# Patient Record
Sex: FEMALE | Race: WHITE | Employment: OTHER | URBAN - NONMETROPOLITAN AREA
[De-identification: names, ages, dates, MRNs, and addresses within clinical notes are randomized per-mention and may not be internally consistent; named-entity substitution may affect disease eponyms.]

---

## 2020-10-02 ENCOUNTER — APPOINTMENT (OUTPATIENT)
Dept: CT IMAGING | Age: 70
DRG: 065 | End: 2020-10-02
Payer: MEDICARE

## 2020-10-02 ENCOUNTER — HOSPITAL ENCOUNTER (INPATIENT)
Age: 70
LOS: 3 days | Discharge: HOME | DRG: 065 | End: 2020-10-05
Attending: EMERGENCY MEDICINE | Admitting: FAMILY MEDICINE
Payer: MEDICARE

## 2020-10-02 ENCOUNTER — APPOINTMENT (OUTPATIENT)
Dept: GENERAL RADIOLOGY | Age: 70
DRG: 065 | End: 2020-10-02
Payer: MEDICARE

## 2020-10-02 PROBLEM — I63.9 OCCIPITAL INFARCTION (HCC): Status: ACTIVE | Noted: 2020-10-02

## 2020-10-02 LAB
ALBUMIN SERPL-MCNC: 4 G/DL (ref 3.5–5.2)
ALP BLD-CCNC: 70 U/L (ref 35–104)
ALT SERPL-CCNC: 11 U/L (ref 5–33)
ANION GAP SERPL CALCULATED.3IONS-SCNC: 12 MMOL/L (ref 7–19)
AST SERPL-CCNC: 20 U/L (ref 5–32)
BASOPHILS ABSOLUTE: 0 K/UL (ref 0–0.2)
BASOPHILS RELATIVE PERCENT: 0.5 % (ref 0–1)
BILIRUB SERPL-MCNC: 1.6 MG/DL (ref 0.2–1.2)
BUN BLDV-MCNC: 14 MG/DL (ref 8–23)
CALCIUM SERPL-MCNC: 8.9 MG/DL (ref 8.8–10.2)
CHLORIDE BLD-SCNC: 101 MMOL/L (ref 98–111)
CO2: 24 MMOL/L (ref 22–29)
CREAT SERPL-MCNC: 0.6 MG/DL (ref 0.5–0.9)
EOSINOPHILS ABSOLUTE: 0 K/UL (ref 0–0.6)
EOSINOPHILS RELATIVE PERCENT: 0.5 % (ref 0–5)
GFR AFRICAN AMERICAN: >59
GFR NON-AFRICAN AMERICAN: >60
GLUCOSE BLD-MCNC: 115 MG/DL (ref 74–109)
HCT VFR BLD CALC: 41.2 % (ref 37–47)
HEMOGLOBIN: 13.1 G/DL (ref 12–16)
IMMATURE GRANULOCYTES #: 0 K/UL
INR BLD: 1.19 (ref 0.88–1.18)
LIPASE: 17 U/L (ref 13–60)
LV EF: 55 %
LVEF MODALITY: NORMAL
LYMPHOCYTES ABSOLUTE: 1.2 K/UL (ref 1.1–4.5)
LYMPHOCYTES RELATIVE PERCENT: 18.6 % (ref 20–40)
MCH RBC QN AUTO: 29.5 PG (ref 27–31)
MCHC RBC AUTO-ENTMCNC: 31.8 G/DL (ref 33–37)
MCV RBC AUTO: 92.8 FL (ref 81–99)
MONOCYTES ABSOLUTE: 0.5 K/UL (ref 0–0.9)
MONOCYTES RELATIVE PERCENT: 8.3 % (ref 0–10)
NEUTROPHILS ABSOLUTE: 4.6 K/UL (ref 1.5–7.5)
NEUTROPHILS RELATIVE PERCENT: 71.6 % (ref 50–65)
PDW BLD-RTO: 13.3 % (ref 11.5–14.5)
PLATELET # BLD: 184 K/UL (ref 130–400)
PMV BLD AUTO: 11.5 FL (ref 9.4–12.3)
POTASSIUM SERPL-SCNC: 3.8 MMOL/L (ref 3.5–5)
PROTHROMBIN TIME: 15.1 SEC (ref 12–14.6)
RBC # BLD: 4.44 M/UL (ref 4.2–5.4)
SODIUM BLD-SCNC: 137 MMOL/L (ref 136–145)
TOTAL PROTEIN: 7.4 G/DL (ref 6.6–8.7)
TROPONIN: <0.01 NG/ML (ref 0–0.03)
TSH REFLEX FT4: 0.59 UIU/ML (ref 0.35–5.5)
WBC # BLD: 6.4 K/UL (ref 4.8–10.8)

## 2020-10-02 PROCEDURE — 83690 ASSAY OF LIPASE: CPT

## 2020-10-02 PROCEDURE — 84484 ASSAY OF TROPONIN QUANT: CPT

## 2020-10-02 PROCEDURE — 99284 EMERGENCY DEPT VISIT MOD MDM: CPT

## 2020-10-02 PROCEDURE — 6360000004 HC RX CONTRAST MEDICATION: Performed by: FAMILY MEDICINE

## 2020-10-02 PROCEDURE — 84443 ASSAY THYROID STIM HORMONE: CPT

## 2020-10-02 PROCEDURE — 6370000000 HC RX 637 (ALT 250 FOR IP): Performed by: FAMILY MEDICINE

## 2020-10-02 PROCEDURE — 6360000002 HC RX W HCPCS: Performed by: EMERGENCY MEDICINE

## 2020-10-02 PROCEDURE — 85610 PROTHROMBIN TIME: CPT

## 2020-10-02 PROCEDURE — 6370000000 HC RX 637 (ALT 250 FOR IP): Performed by: EMERGENCY MEDICINE

## 2020-10-02 PROCEDURE — C8929 TTE W OR WO FOL WCON,DOPPLER: HCPCS

## 2020-10-02 PROCEDURE — 99999 PR OFFICE/OUTPT VISIT,PROCEDURE ONLY: CPT | Performed by: EMERGENCY MEDICINE

## 2020-10-02 PROCEDURE — 93880 EXTRACRANIAL BILAT STUDY: CPT

## 2020-10-02 PROCEDURE — 36415 COLL VENOUS BLD VENIPUNCTURE: CPT

## 2020-10-02 PROCEDURE — 71045 X-RAY EXAM CHEST 1 VIEW: CPT

## 2020-10-02 PROCEDURE — 1210000000 HC MED SURG R&B

## 2020-10-02 PROCEDURE — 70450 CT HEAD/BRAIN W/O DYE: CPT

## 2020-10-02 PROCEDURE — 80053 COMPREHEN METABOLIC PANEL: CPT

## 2020-10-02 PROCEDURE — 85025 COMPLETE CBC W/AUTO DIFF WBC: CPT

## 2020-10-02 PROCEDURE — 93005 ELECTROCARDIOGRAM TRACING: CPT

## 2020-10-02 RX ORDER — SODIUM CHLORIDE 0.9 % (FLUSH) 0.9 %
10 SYRINGE (ML) INJECTION EVERY 12 HOURS SCHEDULED
Status: DISCONTINUED | OUTPATIENT
Start: 2020-10-02 | End: 2020-10-05 | Stop reason: HOSPADM

## 2020-10-02 RX ORDER — ATORVASTATIN CALCIUM 20 MG/1
20 TABLET, FILM COATED ORAL DAILY
COMMUNITY
End: 2021-01-14 | Stop reason: SDUPTHER

## 2020-10-02 RX ORDER — ATORVASTATIN CALCIUM 40 MG/1
40 TABLET, FILM COATED ORAL NIGHTLY
Status: DISCONTINUED | OUTPATIENT
Start: 2020-10-02 | End: 2020-10-05 | Stop reason: HOSPADM

## 2020-10-02 RX ORDER — METOPROLOL TARTRATE 100 MG/1
100 TABLET ORAL 2 TIMES DAILY
COMMUNITY
End: 2021-01-14 | Stop reason: SDUPTHER

## 2020-10-02 RX ORDER — METHIMAZOLE 5 MG/1
5 TABLET ORAL 2 TIMES DAILY
COMMUNITY
End: 2021-01-14 | Stop reason: SDUPTHER

## 2020-10-02 RX ORDER — SODIUM CHLORIDE 0.9 % (FLUSH) 0.9 %
10 SYRINGE (ML) INJECTION PRN
Status: DISCONTINUED | OUTPATIENT
Start: 2020-10-02 | End: 2020-10-05 | Stop reason: HOSPADM

## 2020-10-02 RX ORDER — METOPROLOL TARTRATE 50 MG/1
100 TABLET, FILM COATED ORAL 2 TIMES DAILY
Status: DISCONTINUED | OUTPATIENT
Start: 2020-10-02 | End: 2020-10-05 | Stop reason: HOSPADM

## 2020-10-02 RX ORDER — ONDANSETRON 2 MG/ML
4 INJECTION INTRAMUSCULAR; INTRAVENOUS ONCE
Status: COMPLETED | OUTPATIENT
Start: 2020-10-02 | End: 2020-10-02

## 2020-10-02 RX ORDER — ESCITALOPRAM OXALATE 10 MG/1
10 TABLET ORAL DAILY
Status: DISCONTINUED | OUTPATIENT
Start: 2020-10-02 | End: 2020-10-05 | Stop reason: HOSPADM

## 2020-10-02 RX ORDER — METHIMAZOLE 5 MG/1
5 TABLET ORAL 2 TIMES DAILY
Status: DISCONTINUED | OUTPATIENT
Start: 2020-10-02 | End: 2020-10-05 | Stop reason: HOSPADM

## 2020-10-02 RX ORDER — ACETAMINOPHEN 325 MG/1
650 TABLET ORAL EVERY 4 HOURS PRN
Status: DISCONTINUED | OUTPATIENT
Start: 2020-10-02 | End: 2020-10-05 | Stop reason: HOSPADM

## 2020-10-02 RX ORDER — HYDROCODONE BITARTRATE AND ACETAMINOPHEN 7.5; 325 MG/1; MG/1
1 TABLET ORAL EVERY 6 HOURS PRN
Status: DISCONTINUED | OUTPATIENT
Start: 2020-10-02 | End: 2020-10-05 | Stop reason: HOSPADM

## 2020-10-02 RX ORDER — ACETAMINOPHEN 325 MG/1
650 TABLET ORAL ONCE
Status: COMPLETED | OUTPATIENT
Start: 2020-10-02 | End: 2020-10-02

## 2020-10-02 RX ORDER — CLOPIDOGREL BISULFATE 75 MG/1
75 TABLET ORAL DAILY
Status: ON HOLD | COMMUNITY
End: 2020-10-05 | Stop reason: HOSPADM

## 2020-10-02 RX ORDER — PROMETHAZINE HYDROCHLORIDE 12.5 MG/1
12.5 TABLET ORAL EVERY 6 HOURS PRN
Status: DISCONTINUED | OUTPATIENT
Start: 2020-10-02 | End: 2020-10-05 | Stop reason: HOSPADM

## 2020-10-02 RX ORDER — CLOPIDOGREL BISULFATE 75 MG/1
75 TABLET ORAL ONCE
Status: DISCONTINUED | OUTPATIENT
Start: 2020-10-02 | End: 2020-10-02

## 2020-10-02 RX ORDER — ONDANSETRON 2 MG/ML
4 INJECTION INTRAMUSCULAR; INTRAVENOUS EVERY 6 HOURS PRN
Status: DISCONTINUED | OUTPATIENT
Start: 2020-10-02 | End: 2020-10-05 | Stop reason: HOSPADM

## 2020-10-02 RX ORDER — HYDROCODONE BITARTRATE AND ACETAMINOPHEN 7.5; 325 MG/1; MG/1
1 TABLET ORAL EVERY 6 HOURS PRN
COMMUNITY
End: 2021-01-14 | Stop reason: ALTCHOICE

## 2020-10-02 RX ORDER — LABETALOL HYDROCHLORIDE 5 MG/ML
10 INJECTION, SOLUTION INTRAVENOUS EVERY 10 MIN PRN
Status: DISCONTINUED | OUTPATIENT
Start: 2020-10-02 | End: 2020-10-05 | Stop reason: HOSPADM

## 2020-10-02 RX ORDER — ESCITALOPRAM OXALATE 10 MG/1
10 TABLET ORAL DAILY
COMMUNITY
End: 2021-01-14 | Stop reason: SDUPTHER

## 2020-10-02 RX ADMIN — ACETAMINOPHEN 650 MG: 325 TABLET ORAL at 13:32

## 2020-10-02 RX ADMIN — METHIMAZOLE 5 MG: 5 TABLET ORAL at 22:23

## 2020-10-02 RX ADMIN — METOPROLOL TARTRATE 100 MG: 50 TABLET, FILM COATED ORAL at 22:22

## 2020-10-02 RX ADMIN — ONDANSETRON 4 MG: 2 INJECTION INTRAMUSCULAR; INTRAVENOUS at 13:32

## 2020-10-02 RX ADMIN — ACETAMINOPHEN 650 MG: 325 TABLET ORAL at 22:23

## 2020-10-02 RX ADMIN — PERFLUTREN 1.65 MG: 6.52 INJECTION, SUSPENSION INTRAVENOUS at 16:40

## 2020-10-02 RX ADMIN — ESCITALOPRAM OXALATE 10 MG: 10 TABLET ORAL at 22:23

## 2020-10-02 RX ADMIN — ATORVASTATIN CALCIUM 40 MG: 40 TABLET, FILM COATED ORAL at 22:23

## 2020-10-02 ASSESSMENT — ENCOUNTER SYMPTOMS
TROUBLE SWALLOWING: 0
EYE REDNESS: 0
VOMITING: 0
SORE THROAT: 0
SHORTNESS OF BREATH: 0
COUGH: 0
COLOR CHANGE: 0
ABDOMINAL PAIN: 0
NAUSEA: 0
WHEEZING: 0

## 2020-10-02 ASSESSMENT — PAIN SCALES - GENERAL
PAINLEVEL_OUTOF10: 4
PAINLEVEL_OUTOF10: 0
PAINLEVEL_OUTOF10: 4
PAINLEVEL_OUTOF10: 4

## 2020-10-02 NOTE — PROGRESS NOTES
Vascular lab preliminary. Bilateral carotid duplex scan completed. B/L ICA's no significant stenosis. B/l Vertebrals antegrade flow. Final report pending.

## 2020-10-02 NOTE — ED PROVIDER NOTES
disease          SURGICAL HISTORY       Past Surgical History:   Procedure Laterality Date    TONSILLECTOMY      TUMOR REMOVAL           CURRENT MEDICATIONS       Previous Medications    ATORVASTATIN (LIPITOR) 20 MG TABLET    Take 20 mg by mouth daily    CLOPIDOGREL (PLAVIX) 75 MG TABLET    Take 75 mg by mouth daily    ESCITALOPRAM (LEXAPRO) 10 MG TABLET    Take 10 mg by mouth daily    HYDROCODONE-ACETAMINOPHEN (NORCO) 7.5-325 MG PER TABLET    Take 1 tablet by mouth every 6 hours as needed for Pain. METHIMAZOLE (TAPAZOLE) 5 MG TABLET    Take 5 mg by mouth 2 times daily    METOPROLOL (LOPRESSOR) 100 MG TABLET    Take 100 mg by mouth 2 times daily       ALLERGIES     Niacin and related    FAMILY HISTORY     No family history on file. SOCIAL HISTORY       Social History     Socioeconomic History    Marital status:       Spouse name: Not on file    Number of children: Not on file    Years of education: Not on file    Highest education level: Not on file   Occupational History    Not on file   Social Needs    Financial resource strain: Not on file    Food insecurity     Worry: Not on file     Inability: Not on file    Transportation needs     Medical: Not on file     Non-medical: Not on file   Tobacco Use    Smoking status: Never Smoker    Smokeless tobacco: Never Used   Substance and Sexual Activity    Alcohol use: Not Currently    Drug use: Not Currently    Sexual activity: Not on file   Lifestyle    Physical activity     Days per week: Not on file     Minutes per session: Not on file    Stress: Not on file   Relationships    Social connections     Talks on phone: Not on file     Gets together: Not on file     Attends Buddhism service: Not on file     Active member of club or organization: Not on file     Attends meetings of clubs or organizations: Not on file     Relationship status: Not on file    Intimate partner violence     Fear of current or ex partner: Not on file Emotionally abused: Not on file     Physically abused: Not on file     Forced sexual activity: Not on file   Other Topics Concern    Not on file   Social History Narrative    Not on file       SCREENINGS             PHYSICAL EXAM    (up to 7 for level 4, 8 or more for level 5)     ED Triage Vitals [10/02/20 1103]   BP Temp Temp src Pulse Resp SpO2 Height Weight   (!) 144/92 98.2 °F (36.8 °C) -- 76 18 93 % 5' 2\" (1.575 m) (!) 340 lb (154.2 kg)       Physical Exam  Vitals signs and nursing note reviewed. Exam conducted with a chaperone present. Constitutional:       Appearance: Normal appearance. She is obese. Comments: No real distress although she seems a little off and confused but she her GCS really is 15. HENT:      Head: Normocephalic and atraumatic. Nose: Nose normal.      Mouth/Throat:      Mouth: Mucous membranes are moist.   Eyes:      General: Visual field deficit present. Extraocular Movements: Extraocular movements intact. Pupils: Pupils are equal, round, and reactive to light. Neck:      Musculoskeletal: Normal range of motion and neck supple. Cardiovascular:      Rate and Rhythm: Normal rate. Rhythm irregular. Pulses: Normal pulses. Comments: 80  Pulmonary:      Effort: Pulmonary effort is normal.      Breath sounds: Normal breath sounds. Abdominal:      General: Abdomen is flat. Palpations: Abdomen is soft. Tenderness: There is no abdominal tenderness. Musculoskeletal: Normal range of motion. General: No swelling or tenderness. Skin:     General: Skin is warm and dry. Capillary Refill: Capillary refill takes less than 2 seconds. Neurological:      Mental Status: She is alert. GCS: GCS eye subscore is 4. GCS verbal subscore is 5. GCS motor subscore is 6. Motor: Motor function is intact. Coordination: Coordination is intact. Gait: Gait is intact.       Comments: Patient appears to have a visual field defect with left hemianopsia on my exam.  She cannot see her granddaughter and then when I did my visual field she could not see me on that side. Her coordination and gait was pretty good she stood up and walked without difficulty. The patient was able to flip over her hands normally. Her legs and arms had equal strength. Psychiatric:         Mood and Affect: Mood normal.         Behavior: Behavior normal.         DIAGNOSTIC RESULTS     EKG: All EKG's are interpreted by the Emergency Department Physician who either signs or Co-signs this chart in the absence of a cardiologist.    ekg afib 85 no acute ischemia     RADIOLOGY:   Non-plain film images such as CT, Ultrasound and MRI are read by the radiologist. Plainradiographic images are visualized and preliminarily interpreted by the emergency physician with the below findings:        Interpretation per the Radiologist below, if available at the time of this note:    CT HEAD WO CONTRAST   Final Result   1. Right occipital lobe abnormality compatible with subacute or early   chronic infarct. 2. If symptoms do not fit for stroke in this distribution then pre and   postcontrast MR imaging should be performed to rule out underlying   neoplasm. Signed by Dr Matteo Perry on 10/2/2020 11:59 AM      XR CHEST PORTABLE   Final Result   Shallow inspiration with diminished lung volumes and hypoventilation.    Signed by Dr Georgina Santa on 10/2/2020 11:33 AM            ED BEDSIDE ULTRASOUND:   Performed by ED Physician - none    LABS:  Labs Reviewed   COMPREHENSIVE METABOLIC PANEL - Abnormal; Notable for the following components:       Result Value    Glucose 115 (*)     Total Bilirubin 1.6 (*)     All other components within normal limits   CBC WITH AUTO DIFFERENTIAL - Abnormal; Notable for the following components:    MCHC 31.8 (*)     Neutrophils % 71.6 (*)     Lymphocytes % 18.6 (*)     All other components within normal limits   PROTIME-INR - Abnormal; Notable for the following components:    Protime 15.1 (*)     INR 1.19 (*)     All other components within normal limits   LIPASE   TROPONIN   TSH WITH REFLEX TO FT4   URINE RT REFLEX TO CULTURE   URINE DRUG SCREEN       All other labs were within normal range or not returned as of this dictation. EMERGENCY DEPARTMENT COURSE and DIFFERENTIALDIAGNOSIS/MDM:   Vitals:    Vitals:    10/02/20 1103   BP: (!) 144/92   Pulse: 76   Resp: 18   Temp: 98.2 °F (36.8 °C)   SpO2: 93%   Weight: (!) 340 lb (154.2 kg)   Height: 5' 2\" (1.575 m)       MDM  Number of Diagnoses or Management Options  Atrial fibrillation, unspecified type [facility]): new and requires workup  Cerebrovascular accident (CVA), unspecified mechanism ([facility] [address]): new and requires workup  Confusion: new and requires workup  Obesity with serious comorbidity, unspecified classification, unspecified obesity type:   Visual field defect: new and requires workup  Diagnosis management comments: Patient with strokelike symptoms. Given that is been 2 days I ordered a Noncon head CT. This showed a right occipital infarct. This is consistent with her visual field defect on my exam.  Clinically this all fits it subacute. Patient is in A. fib I think this may be new this is likely ischemic. I did review the CT scan at length. I discussed with Dr. Vivian La neurology. Given that there is some hemorrhagic staining but no acute hemorrhage he did still feel it was beneficial that we did not start anticoagulation at this point the patient usually takes Plavix. He is getting an MRI repeat see his CT scan. I discussed this with Dr. Ej Mcnulty of the hospitalist service. Hold on anticoagulation given the risk of bleed and the large territory. Dr. Vivian La to see the patient in consult. Patient is in A. fib that is rate controlled. Morbid obesity with underlying lung disease as well. Patient be admitted to the hospitalist service.        Amount and/or Complexity of Data Reviewed  Clinical lab tests:

## 2020-10-02 NOTE — H&P
HISTORY AND PHYSICAL             Date: 10/2/2020        Patient Name: Lieutenant Garcia     YOB: 1950      Age:  81 y.o. Chief Complaint     Chief Complaint   Patient presents with    Dizziness     since wednesday    Nausea        History Obtained From   patient, electronic medical record, emergency room physician, family members present at the bedside    History of Present Illness   Patient is a 17-year-old  obese female with a known past medical history of COPD, hypertension, hyperlipidemia, hypothyroidism, prior history of CVA, brought to the emergency room via family member due to concern of altered behavior, patient was traveling from the [place] area to [place] when in her own words states she began to become confused with vision changes, ultimately got to the street of her family's residence however proceeded to the wrong house. Family members dog actually found the patient. Patient states she last suffered a stroke in 2015 has been taking Plavix since that time, patient does states she has had a diagnosis of dilated heart never of atrial fibrillation. Work-up in the emergency room reveals patient to have poor visual deficit left-sided, CT head reveals evidence of occipital infarct. Case discussed with neurology attending due to questionable hemorrhagic findings will hold off on anticoagulation. Patient admitted to medical floor telemetry monitoring plans for continued work-up. Patient currently complaining of headache, eye pain, fatigue currently with wet cloth over the forehead. Denies any chest pain, abdominal pain, nausea vomiting, fevers or chills.     Past Medical History     Past Medical History:   Diagnosis Date    CHF (congestive heart failure) (HCC)     COPD (chronic obstructive pulmonary disease) (HCC)     Hyperlipidemia     Hypertension     Thyroid disease         Past Surgical History     Past Surgical History:   Procedure Laterality Date    TONSILLECTOMY 340 lb (154.2 kg)   SpO2 92%   BMI 62.19 kg/m²     Physical Exam  Vitals signs and nursing note reviewed. Constitutional:       General: She is not in acute distress. Appearance: She is not ill-appearing. HENT:      Head: Normocephalic and atraumatic. Nose: Nose normal.   Eyes:      General:         Right eye: No discharge. Left eye: No discharge. Conjunctiva/sclera: Conjunctivae normal.   Neck:      Musculoskeletal: Normal range of motion. No neck rigidity. Cardiovascular:      Rate and Rhythm: Normal rate. Rhythm irregularly irregular. Pulmonary:      Effort: Pulmonary effort is normal.      Comments: Shallow breath sounds appreciated upon auscultation bilaterally  Abdominal:      General: Bowel sounds are normal.      Tenderness: There is no abdominal tenderness. There is no guarding or rebound. Musculoskeletal: Normal range of motion. Right lower leg: No edema. Left lower leg: No edema. Skin:     General: Skin is warm. Capillary Refill: Capillary refill takes less than 2 seconds. Neurological:      General: No focal deficit present. Mental Status: She is alert and oriented to person, place, and time.       Comments: Left-sided visual change   Psychiatric:         Mood and Affect: Mood normal.         Labs      Recent Results (from the past 24 hour(s))   Comprehensive Metabolic Panel    Collection Time: 10/02/20 11:10 AM   Result Value Ref Range    Sodium 137 136 - 145 mmol/L    Potassium 3.8 3.5 - 5.0 mmol/L    Chloride 101 98 - 111 mmol/L    CO2 24 22 - 29 mmol/L    Anion Gap 12 7 - 19 mmol/L    Glucose 115 (H) 74 - 109 mg/dL    BUN 14 8 - 23 mg/dL    CREATININE 0.6 0.5 - 0.9 mg/dL    GFR Non-African American >60 >60    GFR African American >59 >59    Calcium 8.9 8.8 - 10.2 mg/dL    Total Protein 7.4 6.6 - 8.7 g/dL    Alb 4.0 3.5 - 5.2 g/dL    Total Bilirubin 1.6 (H) 0.2 - 1.2 mg/dL    Alkaline Phosphatase 70 35 - 104 U/L    ALT 11 5 - 33 U/L    AST 20 5 - 32 U/L   CBC Auto Differential    Collection Time: 10/02/20 11:10 AM   Result Value Ref Range    WBC 6.4 4.8 - 10.8 K/uL    RBC 4.44 4.20 - 5.40 M/uL    Hemoglobin 13.1 12.0 - 16.0 g/dL    Hematocrit 41.2 37.0 - 47.0 %    MCV 92.8 81.0 - 99.0 fL    MCH 29.5 27.0 - 31.0 pg    MCHC 31.8 (L) 33.0 - 37.0 g/dL    RDW 13.3 11.5 - 14.5 %    Platelets 340 605 - 625 K/uL    MPV 11.5 9.4 - 12.3 fL    Neutrophils % 71.6 (H) 50.0 - 65.0 %    Lymphocytes % 18.6 (L) 20.0 - 40.0 %    Monocytes % 8.3 0.0 - 10.0 %    Eosinophils % 0.5 0.0 - 5.0 %    Basophils % 0.5 0.0 - 1.0 %    Neutrophils Absolute 4.6 1.5 - 7.5 K/uL    Immature Granulocytes # 0.0 K/uL    Lymphocytes Absolute 1.2 1.1 - 4.5 K/uL    Monocytes Absolute 0.50 0.00 - 0.90 K/uL    Eosinophils Absolute 0.00 0.00 - 0.60 K/uL    Basophils Absolute 0.00 0.00 - 0.20 K/uL   Lipase    Collection Time: 10/02/20 11:10 AM   Result Value Ref Range    Lipase 17 13 - 60 U/L   Protime-INR    Collection Time: 10/02/20 11:10 AM   Result Value Ref Range    Protime 15.1 (H) 12.0 - 14.6 sec    INR 1.19 (H) 0.88 - 1.18   Troponin    Collection Time: 10/02/20 11:10 AM   Result Value Ref Range    Troponin <0.01 0.00 - 0.03 ng/mL   TSH WITH REFLEX TO FT4    Collection Time: 10/02/20 11:10 AM   Result Value Ref Range    TSH Reflex FT4 0.59 0.35 - 5.50 uIU/mL        Imaging/Diagnostics Last 24 Hours   Ct Head Wo Contrast    Result Date: 10/2/2020  CT HEAD WO CONTRAST 10/2/2020 11:55 AM History: Altered mental status and visual changes. Dizziness. In order to have a CT radiation dose as low as reasonably achievable Automated Exposure Control was utilized for adjustment of the mA and/or KV according to patient size. DLP in mGycm= 736. Right occipital lobe hypodense focus involving cortex and subcortical white matter. Area of involvement = 6.3 x 3 cm. There is some slight hyperdensity within this focus with suggests hemorrhagic staining. There is no discrete hemorrhage.  There is a small focus of old ischemic change within the posterior right frontal lobe. Ventricle size is normal. No midline shift. Normal bones. Clear paranasal sinuses and mastoid air cells. 1. Right occipital lobe abnormality compatible with subacute or early chronic infarct. 2. If symptoms do not fit for stroke in this distribution then pre and postcontrast MR imaging should be performed to rule out underlying neoplasm. Signed by Dr Nikolas Diaz on 10/2/2020 11:59 AM    Xr Chest Portable    Result Date: 10/2/2020  XR CHEST PORTABLE 10/2/2020 10:20 AM HISTORY:   Confusion  Single view. Portable upright COMPARISONS:  None FINDINGS: The level of inspiration is shallow and lung volumes diminished. Bronchovascular interstitial markings are prominent likely related to the level of inspiration and hypoventilation. The heart also appears generous, again likely related to hypoventilation. No definite parenchymal infiltrates observed. The bony structures are intact. Shallow inspiration with diminished lung volumes and hypoventilation.  Signed by Dr Myah Hernandez on 10/2/2020 11:33 AM      Assessment      Hospital Problems           Last Modified POA    Occipital infarction ([facility] [address] 10/2/2020 Yes          Plan     Subacute occipital right-sided lobe infarct  -Associated with vision deficit  -CT head- evidence suggestive of hemorrhagic staining although no discrete hemorrhage seen  -Holding off on anticoagulation currently for neurology consultation  -Plans for repeat CT/MRI in the a.m.  -We will obtain echocardiogram  -Continue with statin therapy  -Elevation of head of bed  -PT/OT/SLP      History of hyperthyroidism-continue with methimazole    History of transient ischemic attack  -Currently holding anticoagulation until further characterization with brain imaging    New onset atrial fibrillation rate control  -Continue with metoprolol  -Telemetry monitoring  -We will obtain echocardiogram      Depression/anxiety-chronic condition,

## 2020-10-03 ENCOUNTER — APPOINTMENT (OUTPATIENT)
Dept: MRI IMAGING | Age: 70
DRG: 065 | End: 2020-10-03
Payer: MEDICARE

## 2020-10-03 LAB
AMPHETAMINE SCREEN, URINE: NEGATIVE
ANION GAP SERPL CALCULATED.3IONS-SCNC: 10 MMOL/L (ref 7–19)
BACTERIA: ABNORMAL /HPF
BARBITURATE SCREEN URINE: NEGATIVE
BENZODIAZEPINE SCREEN, URINE: NEGATIVE
BILIRUBIN URINE: NEGATIVE
BLOOD, URINE: NEGATIVE
BUN BLDV-MCNC: 11 MG/DL (ref 8–23)
C-REACTIVE PROTEIN: 0.46 MG/DL (ref 0–0.5)
CALCIUM SERPL-MCNC: 8.4 MG/DL (ref 8.8–10.2)
CANNABINOID SCREEN URINE: NEGATIVE
CHLORIDE BLD-SCNC: 104 MMOL/L (ref 98–111)
CHOLESTEROL, TOTAL: 127 MG/DL (ref 160–199)
CLARITY: ABNORMAL
CO2: 21 MMOL/L (ref 22–29)
COCAINE METABOLITE SCREEN URINE: NEGATIVE
COLOR: YELLOW
CREAT SERPL-MCNC: 0.5 MG/DL (ref 0.5–0.9)
CRYSTALS, UA: ABNORMAL /HPF
EPITHELIAL CELLS, UA: ABNORMAL /HPF
GFR AFRICAN AMERICAN: >59
GFR NON-AFRICAN AMERICAN: >60
GLUCOSE BLD-MCNC: 91 MG/DL (ref 74–109)
GLUCOSE URINE: NEGATIVE MG/DL
HBA1C MFR BLD: 5.5 % (ref 4–6)
HCT VFR BLD CALC: 37.7 % (ref 37–47)
HDLC SERPL-MCNC: 49 MG/DL (ref 65–121)
HEMOGLOBIN: 12.2 G/DL (ref 12–16)
KETONES, URINE: NEGATIVE MG/DL
LDL CHOLESTEROL CALCULATED: 62 MG/DL
LEUKOCYTE ESTERASE, URINE: ABNORMAL
Lab: ABNORMAL
MCH RBC QN AUTO: 29.9 PG (ref 27–31)
MCHC RBC AUTO-ENTMCNC: 32.4 G/DL (ref 33–37)
MCV RBC AUTO: 92.4 FL (ref 81–99)
NITRITE, URINE: NEGATIVE
OPIATE SCREEN URINE: POSITIVE
PDW BLD-RTO: 13.3 % (ref 11.5–14.5)
PH UA: 5 (ref 5–8)
PLATELET # BLD: 159 K/UL (ref 130–400)
PMV BLD AUTO: 11.5 FL (ref 9.4–12.3)
POTASSIUM REFLEX MAGNESIUM: 4 MMOL/L (ref 3.5–5)
PROTEIN UA: NEGATIVE MG/DL
RBC # BLD: 4.08 M/UL (ref 4.2–5.4)
SEDIMENTATION RATE, ERYTHROCYTE: 20 MM/HR (ref 0–25)
SODIUM BLD-SCNC: 135 MMOL/L (ref 136–145)
SPECIFIC GRAVITY UA: 1.02 (ref 1–1.03)
TRIGL SERPL-MCNC: 80 MG/DL (ref 0–149)
UROBILINOGEN, URINE: 1 E.U./DL
WBC # BLD: 5.6 K/UL (ref 4.8–10.8)
WBC UA: ABNORMAL /HPF (ref 0–5)

## 2020-10-03 PROCEDURE — 2580000003 HC RX 258: Performed by: FAMILY MEDICINE

## 2020-10-03 PROCEDURE — A9577 INJ MULTIHANCE: HCPCS | Performed by: PSYCHIATRY & NEUROLOGY

## 2020-10-03 PROCEDURE — 92523 SPEECH SOUND LANG COMPREHEN: CPT

## 2020-10-03 PROCEDURE — 1210000000 HC MED SURG R&B

## 2020-10-03 PROCEDURE — 97161 PT EVAL LOW COMPLEX 20 MIN: CPT

## 2020-10-03 PROCEDURE — 80061 LIPID PANEL: CPT

## 2020-10-03 PROCEDURE — 6360000004 HC RX CONTRAST MEDICATION: Performed by: PSYCHIATRY & NEUROLOGY

## 2020-10-03 PROCEDURE — 86140 C-REACTIVE PROTEIN: CPT

## 2020-10-03 PROCEDURE — 85652 RBC SED RATE AUTOMATED: CPT

## 2020-10-03 PROCEDURE — 70553 MRI BRAIN STEM W/O & W/DYE: CPT

## 2020-10-03 PROCEDURE — 6370000000 HC RX 637 (ALT 250 FOR IP): Performed by: FAMILY MEDICINE

## 2020-10-03 PROCEDURE — 85027 COMPLETE CBC AUTOMATED: CPT

## 2020-10-03 PROCEDURE — 92610 EVALUATE SWALLOWING FUNCTION: CPT

## 2020-10-03 PROCEDURE — 99223 1ST HOSP IP/OBS HIGH 75: CPT | Performed by: PSYCHIATRY & NEUROLOGY

## 2020-10-03 PROCEDURE — 6360000002 HC RX W HCPCS: Performed by: FAMILY MEDICINE

## 2020-10-03 PROCEDURE — 81001 URINALYSIS AUTO W/SCOPE: CPT

## 2020-10-03 PROCEDURE — 83036 HEMOGLOBIN GLYCOSYLATED A1C: CPT

## 2020-10-03 PROCEDURE — 80048 BASIC METABOLIC PNL TOTAL CA: CPT

## 2020-10-03 PROCEDURE — 97116 GAIT TRAINING THERAPY: CPT

## 2020-10-03 PROCEDURE — 80307 DRUG TEST PRSMV CHEM ANLYZR: CPT

## 2020-10-03 PROCEDURE — 36415 COLL VENOUS BLD VENIPUNCTURE: CPT

## 2020-10-03 RX ADMIN — GADOBENATE DIMEGLUMINE 20 ML: 529 INJECTION, SOLUTION INTRAVENOUS at 14:29

## 2020-10-03 RX ADMIN — HYDROCODONE BITARTRATE AND ACETAMINOPHEN 1 TABLET: 7.5; 325 TABLET ORAL at 20:32

## 2020-10-03 RX ADMIN — METOPROLOL TARTRATE 100 MG: 50 TABLET, FILM COATED ORAL at 20:32

## 2020-10-03 RX ADMIN — ESCITALOPRAM OXALATE 10 MG: 10 TABLET ORAL at 09:09

## 2020-10-03 RX ADMIN — METHIMAZOLE 5 MG: 5 TABLET ORAL at 09:09

## 2020-10-03 RX ADMIN — HYDROCODONE BITARTRATE AND ACETAMINOPHEN 1 TABLET: 7.5; 325 TABLET ORAL at 07:47

## 2020-10-03 RX ADMIN — SODIUM CHLORIDE, PRESERVATIVE FREE 10 ML: 5 INJECTION INTRAVENOUS at 20:32

## 2020-10-03 RX ADMIN — METOPROLOL TARTRATE 100 MG: 50 TABLET, FILM COATED ORAL at 09:09

## 2020-10-03 RX ADMIN — METHIMAZOLE 5 MG: 5 TABLET ORAL at 20:32

## 2020-10-03 RX ADMIN — ATORVASTATIN CALCIUM 40 MG: 40 TABLET, FILM COATED ORAL at 20:32

## 2020-10-03 RX ADMIN — ONDANSETRON 4 MG: 2 INJECTION INTRAMUSCULAR; INTRAVENOUS at 23:41

## 2020-10-03 RX ADMIN — ACETAMINOPHEN 650 MG: 325 TABLET ORAL at 23:41

## 2020-10-03 ASSESSMENT — PAIN SCALES - GENERAL
PAINLEVEL_OUTOF10: 8
PAINLEVEL_OUTOF10: 0
PAINLEVEL_OUTOF10: 7
PAINLEVEL_OUTOF10: 8

## 2020-10-03 ASSESSMENT — PAIN DESCRIPTION - PAIN TYPE: TYPE: ACUTE PAIN

## 2020-10-03 ASSESSMENT — PAIN DESCRIPTION - ONSET: ONSET: ON-GOING

## 2020-10-03 ASSESSMENT — PAIN DESCRIPTION - LOCATION: LOCATION: HEAD

## 2020-10-03 ASSESSMENT — PAIN DESCRIPTION - FREQUENCY: FREQUENCY: INTERMITTENT

## 2020-10-03 ASSESSMENT — PAIN - FUNCTIONAL ASSESSMENT: PAIN_FUNCTIONAL_ASSESSMENT: PREVENTS OR INTERFERES SOME ACTIVE ACTIVITIES AND ADLS

## 2020-10-03 ASSESSMENT — PAIN DESCRIPTION - DESCRIPTORS: DESCRIPTORS: ACHING;SHOOTING;THROBBING

## 2020-10-03 ASSESSMENT — PAIN DESCRIPTION - ORIENTATION: ORIENTATION: ANTERIOR

## 2020-10-03 ASSESSMENT — PAIN DESCRIPTION - PROGRESSION: CLINICAL_PROGRESSION: GRADUALLY WORSENING

## 2020-10-03 NOTE — CONSULTS
[facility] Neurology Consult      Patient:   Clemencia Todd  MR#:    [id number]  Account Number:                   [id number]      Room:    [facility]8/508-01   YOB: 1950  Date of Progress Note: 10/3/2020  Time of Note                           12:19 PM  Attending Physician:  Rosa Langford MD  Consulting Physician:  Rodolfo Whitehead DO       CHIEF COMPLAINT:  Visual loss    HISTORY OF PRESENT ILLNESS:   This is a 81 y.o. female who was admitted with left visual field loss. Symptoms started a few days ago. No overt associated facial drooping, slurred speech or focal weakness. No prior stroke history noted. No history of carotid disease. New onset atrial fibrillation is noted. Mild headache noted, though denies scalp tenderness or jaw claudication, no dizziness or vertigo. CT with subacute appearing right posterior MCA infarct, with patchy hyperdensity suggestive of mild or early hemorraghic transformation. REVIEW OF SYSTEMS:  Constitutional - No fever or chills. HENT -  No Scalp tenderness. No tinnitus or significant hearing loss. No nose bleeding, no sore throat. Eyes - No eye pain  Respiratory - No significant shortness of breath or cough  Cardiovascular - No chest pain. No palpitations or significant leg swelling  Gastrointestinal - No abdominal swelling or pain. Genitourinary - No difficulty urinating, dysuria  Musculoskeletal - No back pain or myalgia. Skin - No color change or rash  Neurologic - No seizures. No lateralizing weakness. No numbness. Hematologic - No easy bruising or spontaneous bleeding. Psychiatric - No anxiety.      PAST MEDICAL HISTORY:      Diagnosis Date    CHF (congestive heart failure) (HCC)     COPD [facility]chronic obstructive pulmonary disease) ([facility] [address]     Hyperlipidemia     Hypertension     Thyroid disease        PAST SURGICAL HISTORY:      Procedure Laterality Date    TONSILLECTOMY      TUMOR REMOVAL         SOCIAL HISTORY:   TOBACCO:   reports that lesions over external nose or ears  Oropharynx without erythema, palate midline  Cardiovascular -   No clubbing, cyanosis, or edema   Pulmonary-   Good expansion, normal effort without use of accessory muscles  Musculoskeletal -   No significant wasting of muscles noted  Gait as below, see gait exam in the neurologic exam  Muscle strength, tone, stability as below see the motor exam in the neurologic exam.   No bony deformities  Skin -   Warm, dry, and intact to inspection and palpation. No rash, erythema, or pallor  Psychiatric -   Mood, affect, and behavior appear normal    Memory as below see mental status examination in the neurologic exam      NEUROLOGICAL EXAM    Mental status   [x] Awake, alert, oriented   [x] Affect attention and concentration appear appropriate  [x] Recent and remote memory appears unremarkable  [x] Speech normal without dysarthria or aphasia, comprehension and repetition intact.    COMMENTS:   Cranial Nerves [] No VF deficit to confrontation,  optic discs normal, no papilledema on fundoscopic exam.  [x] PERRLA, EOMI, no nystagmus, conjugate eye movements, no ptosis  [x] Face symmetric  [x] Facial sensation intact  [x] Tongue midline no atrophy or fasciculations present  [x] Palate midline, hearing to finger rub normal  [x] Shoulder shrug and SCM testing normal  COMMENTS: Left visual field deficit noted   Motor   [x] 5/5 strength x 4 extremities  [x] Normal bulk and tone  [x] No tremor present  [x] No rigidity or bradykinesia noted  COMMENTS:   Sensory  [x] Sensation intact to light touch, pin prick, vibration, and proprioception BLE  [x] Sensation intact to light touch, pin prick, vibration, and proprioception BUE  COMMENTS:   Coordination [x] FTN normal bilaterally   [] HTS normal bilaterally  [] KVNG normal.   COMMENTS:   Reflexes  [x] Symmetric and non-pathological  [] Toes downgoing bilaterally  [] No clonus present  COMMENTS:   Gait                  [x] Normal steady gait    [] Ataxic    [] Spastic     [] Magnetic     [] Shuffling  [] Not assessed  COMMENTS:       LABS/IMAGING:    As below and per HPI    Ct Head Wo Contrast    Result Date: 10/2/2020  CT HEAD WO CONTRAST 10/2/2020 11:55 AM History: Altered mental status and visual changes. Dizziness. In order to have a CT radiation dose as low as reasonably achievable Automated Exposure Control was utilized for adjustment of the mA and/or KV according to patient size. DLP in mGycm= 736. Right occipital lobe hypodense focus involving cortex and subcortical white matter. Area of involvement = 6.3 x 3 cm. There is some slight hyperdensity within this focus with suggests hemorrhagic staining. There is no discrete hemorrhage. There is a small focus of old ischemic change within the posterior right frontal lobe. Ventricle size is normal. No midline shift. Normal bones. Clear paranasal sinuses and mastoid air cells. 1. Right occipital lobe abnormality compatible with subacute or early chronic infarct. 2. If symptoms do not fit for stroke in this distribution then pre and postcontrast MR imaging should be performed to rule out underlying neoplasm. Signed by Dr Imtiaz Truong on 10/2/2020 11:59 AM    Xr Chest Portable    Result Date: 10/2/2020  XR CHEST PORTABLE 10/2/2020 10:20 AM HISTORY:   Confusion  Single view. Portable upright COMPARISONS:  None FINDINGS: The level of inspiration is shallow and lung volumes diminished. Bronchovascular interstitial markings are prominent likely related to the level of inspiration and hypoventilation. The heart also appears generous, again likely related to hypoventilation. No definite parenchymal infiltrates observed. The bony structures are intact. Shallow inspiration with diminished lung volumes and hypoventilation.  Signed by Dr Lisa Lynn on 10/2/2020 11:33 AM    Vascular Carotid Duplex Bilateral    Result Date: 10/3/2020  Vascular Carotid Procedure  Demographics   Patient Name  [facility] Age                81                [address]   Patient       [id number]         Gender             Female  Number   Visit Number  [id number]      Interpreting       Bernadette Caldwell MD                               Physician   Date of Birth 1950     Referring          Ion Montesinos                               Physician   Accession     [id number]     [address]  Number                                          RVS, RCS  Procedure Type of Study:   Cerebral:Carotid, VL CAROTID BILATERAL. Indications for Study:CVA. Risk Factors   - The patient's risk factor(s) include: dyslipidemia, obesity and arterial     hypertension. Impression   Duplex sonography with color flow enhancement was performed bilaterally on  the cervical carotid system. No evidence of hemodynamically significant  stenosis in the bilateral carotid and vertebral arteries. Signature   ----------------------------------------------------------------  Electronically signed by Bernadette Caldwell MD(Interpreting  physician) on 10/03/2020 07:42 AM  ----------------------------------------------------------------  Blood Pressure:Left arm 217/ mmHg. Velocities are measured in cm/s ; Diameters are measured in mm Carotid Right Measurements +------------+-------+-------+--------+-------+------------+---------------+ ! Location    ! PSV    ! EDV    ! Angle   ! RI     !%Stenosis   ! Tortuosity     ! +------------+-------+-------+--------+-------+------------+---------------+ ! Prox CCA    !47.1   !11.8   !60      !0.75   !            !               ! +------------+-------+-------+--------+-------+------------+---------------+ ! Mid CCA     !61.3   !14.9   !60      !0.76   !            !               ! +------------+-------+-------+--------+-------+------------+---------------+ ! Prox ICA    !36.1   !14.9   !60      !0.59   !            !               ! +------------+-------+-------+--------+-------+------------+---------------+ ! Mid ICA !55.4   !19.6   !60      !0.65   !            !               ! +------------+-------+-------+--------+-------+------------+---------------+ ! Dist ICA    ! 64     !23.2   ! 60      !0.64   !            !               ! +------------+-------+-------+--------+-------+------------+---------------+ ! Prox ECA    !54.2   ! 8.25   !60      !0.85   !            !               ! +------------+-------+-------+--------+-------+------------+---------------+ ! Vertebral   !29.1   !8.64   !60      !0.7    ! !               ! +------------+-------+-------+--------+-------+------------+---------------+   - There is antegrade vertebral flow noted on the right side. - Additional Measurements:ICAPSV/CCAPSV 1.36. ICAEDV/CCAEDV 1.97. Carotid Left Measurements +------------+-------+-------+--------+-------+------------+---------------+ ! Location    ! PSV    ! EDV    ! Angle   ! RI     !%Stenosis   ! Tortuosity     ! +------------+-------+-------+--------+-------+------------+---------------+ ! Prox CCA    !75.6   !21.7   !60      !0.71   !            !               ! +------------+-------+-------+--------+-------+------------+---------------+ ! Mid CCA     !62.7   !18.8   !60      !0.7    ! !               ! +------------+-------+-------+--------+-------+------------+---------------+ ! Prox ICA    !46.8   !14.1   ! 60      !0.7    ! !               ! +------------+-------+-------+--------+-------+------------+---------------+ ! Mid ICA     !43.2   ! 15.7   !60      !0.64   !            !               ! +------------+-------+-------+--------+-------+------------+---------------+ ! Dist ICA    !55.8   !21.2   ! 60      !0.62   !            !               ! +------------+-------+-------+--------+-------+------------+---------------+ ! Prox ECA    !56.9   !8.79   !60      !0.85   !            !               ! +------------+-------+-------+--------+-------+------------+---------------+ ! Vertebral   !35.8   !9.43   ! 61 !0. 74   !            !               ! +------------+-------+-------+--------+-------+------------+---------------+   - There is antegrade vertebral flow noted on the left side. - Additional Measurements:ICAPSV/CCAPSV 0.74. ICAEDV/CCAEDV 0.98. Recent Labs     10/02/20  1110 10/03/20  0303   WBC 6.4 5.6   HGB 13.1 12.2    159     Recent Labs     10/02/20  1110 10/03/20  0302    135*   K 3.8 4.0    104   CO2 24 21*   BUN 14 11   CREATININE 0.6 0.5   GLUCOSE 115* 91     Recent Labs     10/02/20  1110   AST 20   ALT 11   BILITOT 1.6*   ALKPHOS 70     Recent Labs     10/03/20  0302   CHOL 127*   HDL 49*     Recent Labs     10/02/20  1110   INR 1.19*         ASSESSMENT:  81 y.o. admitted with left visual field loss, headache, CT with subacute appearing right posterior MCA infarct with suggestion of mild or early hemorrhagic transformation. New onset atrial fibrillation noted. PLAN:  1. MRI brain  2. ECHO, Tele  3. Hold antiplatelet medication and anticoagulation given CT findings and not to worsen potential hemorraghic changes. Follow up MRI. Long term will need anticoagulated once safe given underlying atrial fibrillaton. 4.  PT, OT, ST  5. DVT proph -scd's  6. Risk factor maximization, statin, cautious blood pressure titration. 7.  Additional labs including ESR, CRP    Please feel free to call with any questions. [phone] (cell phone).     Ameya Ramos DO  Board Certified Neurology

## 2020-10-03 NOTE — PROGRESS NOTES
Speech Language Pathology  Facility/Department: [facility] 5 SURG SERVICES  Initial Speech/Language/Cognitive Assessment  Bedside Swallow Evaluation     NAME: Clemencia Todd  : 1950   MRN: [id number]  ADMISSION DATE: 10/2/2020  ADMITTING DIAGNOSIS: has Occipital infarction [facility]) on their problem list.    Date of Eval: 10/3/2020   Evaluating Therapist: KOREY Ledesma    Assessment:  Completed assessment. SLP ranked functional intelligibility of speech for unfamiliar listeners at 100% in conversation. Patient exhibits decreased visual attention, slow processing, mildly delayed auditory comprehension particularly as length and complexity of information increases, and mildly delayed verbalizations. Also evaluated patient's swallowing function. Patient exhibits sluggish laryngeal elevation for swallow airway protection. Even so, no outward S/S penetration/aspiration was noted with any regular solid consistency trial or thin H2O trial administered during assessment this date. At this time, would recommend continuation regular solid consistency and thin liquids. Assist in meal set-up. Will continue to follow. Thank you for this consult. Goals:  Short-term Goals  Timeframe for Short-term Goals: 1-2x/day for 3 days  Goal 1: Patient will tolerate regular solid consistency and thin liquids with min S/S penetration/aspiration during PO intake. Goal 2: Patient staff will follow swallow safety recommendations to decrease risk of penetration/aspiration during PO intake. Goal 3: Monitor speech/language/cognitive functioning. Subjective:  General  Chart Reviewed: Yes  Patient assessed for rehabilitation services?: Yes  Family / Caregiver Present: Yes    Objective: Auditory Comprehension  Comprehension:  (Patient demonstrated ability to answer simple yes/no questions regarding immediate environment and current state of being at independent level and with adequate processing speed.  Patient demonstrated ability to follow simple 1 step commands independently and with adequate processing speed. While mild delays were noted, patient demonstrated ability to answer yes/no questions of increased complexity and to follow complex 1 and simple  2 step commands at independent level.)    Expression  Primary Mode of Expression:  (Confrontation naming of items in room was considered to be delayed but appropriate. Structured responsive speech and responses in natural conversation were considered to be mildly delayed but appropriate.)    Motor Speech:  (SLP ranked functional intelligibility of speech for unfamiliar listeners at 100% in verbalizations with background noise present.)    Overall Orientation Status:  (Patient demonstrated ability to verbalize name, birthday, age, address, phone number, city, state, hospital, month, and year at independent level. Patient did not verbalize SARAHY or date independently.)    Attention:  (Patient demonstrated decreased visual attention during assessment.)    Memory:  (Patient demonstrated appropriate immediate memory with sequences of unrelated numbers/words set up to 5 items without repetitions provided. Patietn demonstrated appropriate short-term/working memory with 10 minute delay +distractions present during assessment.)    Problem Solving:  (Patient verbalized appropriate simple solutions to situations that could occur during activities of daily living at independent level. )    Additional Assessments:  Assessed patient's swallowing function. With regular solid consistency trials presented independently, patient exhibited adequate vertical jaw movement during oral prep (patient recently underwent extraction of teeth in July). Oral transit of regular solid consistency primarily measured 1-2 seconds in length and min oral cavity residue was noted post swallows; residue cleared from the mouth with additional dry swallows.  Oral transit of thin H2O trials, presented independently via cup,

## 2020-10-03 NOTE — PROGRESS NOTES
Physical Therapy    Facility/Department: [facility] SURG SERVICES  Initial Assessment    NAME: Claudell Cancer  : 1950  MRN: [id number]    Date of Service: 10/3/2020    Discharge Recommendations:  Continue to assess pending progress   PT Equipment Recommendations  Other: assessing    Assessment   Body structures, Functions, Activity limitations: Decreased functional mobility ; Decreased endurance  Assessment: Pt is likely close to baseline for ambulation but is at inc risk for a fall due to visual disturbance. anticipate seeing short term for PT  Prognosis: Good  Decision Making: Low Complexity  PT Education: General Safety; Functional Mobility Training;Transfer Training  REQUIRES PT FOLLOW UP: Yes  Activity Tolerance  Activity Tolerance: Patient Tolerated treatment well       Patient Diagnosis(es): The primary encounter diagnosis was Cerebrovascular accident (CVA), unspecified mechanism ([facility] [address] Diagnoses of Visual field defect, Confusion, Obesity with serious comorbidity, unspecified classification, unspecified obesity type, and Atrial fibrillation, unspecified type [facility]) were also pertinent to this visit. has a past medical history of CHF (congestive heart failure) ([facility] [address] COPD (chronic obstructive pulmonary disease) ([facility] [address] Hyperlipidemia, Hypertension, and Thyroid disease. has a past surgical history that includes tumor removal and Tonsillectomy. Restrictions     Vision/Hearing  Vision: Impaired(c/o objects being distorted)     Subjective  General  Diagnosis: occipital infarction  Follows Commands: Within Functional Limits  Subjective  Subjective: Pt states her vision is her main issue. c/o objects appearing distorted and this causes her to feel mildly unsteady. family state Pt continues to have some confusion about how to dress and how to use utensils for eating.   Pain Screening  Patient Currently in Pain: No          Orientation     Social/Functional History  Social/Functional History  Lives With: Family  ADL Assistance: Independent  Ambulation Assistance: Independent  Transfer Assistance: Independent  Active : Yes  Cognition        Objective          AROM RLE (degrees)  RLE AROM: WFL  AROM LLE (degrees)  LLE AROM : WFL  Strength RUE  Comment: moving le's against gravity with no knee buckling when ambulating  Tone RLE  RLE Tone: Normotonic  Tone LLE  LLE Tone: Normotonic  Motor Control  Gross Motor?: WFL  Sensation  Overall Sensation Status: WFL  Bed mobility  Sit to Supine: Minimal assistance  Transfers  Sit to Stand: Contact guard assistance  Stand to sit: Contact guard assistance  Ambulation  Ambulation?: Yes  Ambulation 1  Surface: level tile  Device: No Device  Assistance: Contact guard assistance  Quality of Gait: \"waddling\" type gt. pt c/o \"bad knees\". pT was given a rw to try but states she does not need it and prefers to ambulate without one. Gait Deviations: Slow Ashli;Decreased step length  Distance: 10 ft, 25 ft  Comments: PT c/o fatigue and does not want to amb farther than in room at time of eval              Plan   Plan  Times per week: 5-7  Plan weeks: 2  Current Treatment Recommendations: Strengthening, Transfer Training, Cognitive Reorientation, Gait Training, Functional Mobility Training, Patient/Caregiver Education & Training, Endurance Training  Safety Devices  Type of devices: Call light within reach, Gait belt, Left in chair(family in room to observe)    G-Code       OutComes Score                                                  AM-PAC Score             Goals  Short term goals  Time Frame for Short term goals: 2 wks  Short term goal 1: amb 150 ft with supervision, AD as indicated.   Short term goal 2: sup<>sit, IND       Therapy Time   Individual Concurrent Group Co-treatment   Time In           Time Out           Minutes                   Samara Mc PT    Electronically signed by Samara Mc PT on 10/3/2020 at 10:17 AM

## 2020-10-03 NOTE — PROGRESS NOTES
Progress Note  Date:10/3/2020       Room:0508/508-01  Patient Name:Lashonda Milian     YOB: 1950     Age:69 y.o. Subjective    Subjective   Patient seen and examined this a.m. granddaughter present at the bedside as well as another family member. Patient states her vision slightly improved continues to feel as though objects along the wall are floating. Complains of frontal headache she states this is been chronic and ongoing since her dental procedure. Awaiting neurology. Currently denies any chest pain, shortness of breath, abdominal pain, nausea vomiting, fevers or chills      Cumulative Hospital course: Patient admitted 10/2, brought to the emergency room due to concerns of confusion, headache, patient with traveling vehicle and got lost.  Work-up in the emergency room revealed occipital lobe infarct. Patient admitted for neurological evaluation, echocardiogram completed awaiting official report, bilateral ultrasounds no evidence of significant stenosis, hemoglobin A1c within normal limits, lipid panel stable, neurology services consulted awaiting further plan of care    Review of Systems   ROS: 14 point review of systems is negative except as specifically addressed above. Objective         Vitals Last 24 Hours:  TEMPERATURE:  Temp  Av.8 °F (36.6 °C)  Min: 96.7 °F (35.9 °C)  Max: 98.2 °F (36.8 °C)  RESPIRATIONS RANGE: Resp  Av.6  Min: 16  Max: 23  PULSE OXIMETRY RANGE: SpO2  Av.4 %  Min: 84 %  Max: 96 %  PULSE RANGE: Pulse  Av.8  Min: 71  Max: 98  BLOOD PRESSURE RANGE: Systolic (71XZZ), JKJ:294 , Min:101 , MACHELLE:117   ; Diastolic (61VVP), YXF:54, Min:57, Max:105    I/O (24Hr): Intake/Output Summary (Last 24 hours) at 10/3/2020 0936  Last data filed at 10/3/2020 0730  Gross per 24 hour   Intake 275 ml   Output 300 ml   Net -25 ml     Physical Exam  Constitutional:       Appearance: She is obese. She is not ill-appearing.    HENT:      Head: Normocephalic and involving cortex and subcortical white matter. Area of involvement = 6.3 x 3 cm. There is some slight hyperdensity within this focus with suggests hemorrhagic staining. There is no discrete hemorrhage. There is a small focus of old ischemic change within the posterior right frontal lobe. Ventricle size is normal. No midline shift. Normal bones. Clear paranasal sinuses and mastoid air cells. 1. Right occipital lobe abnormality compatible with subacute or early chronic infarct. 2. If symptoms do not fit for stroke in this distribution then pre and postcontrast MR imaging should be performed to rule out underlying neoplasm. Signed by Dr Catalino Rios on 10/2/2020 11:59 AM    Xr Chest Portable    Result Date: 10/2/2020  XR CHEST PORTABLE 10/2/2020 10:20 AM HISTORY:   Confusion  Single view. Portable upright COMPARISONS:  None FINDINGS: The level of inspiration is shallow and lung volumes diminished. Bronchovascular interstitial markings are prominent likely related to the level of inspiration and hypoventilation. The heart also appears generous, again likely related to hypoventilation. No definite parenchymal infiltrates observed. The bony structures are intact. Shallow inspiration with diminished lung volumes and hypoventilation.  Signed by Dr Carmenza Khan on 10/2/2020 11:33 AM    Vascular Carotid Duplex Bilateral    Result Date: 10/3/2020  Vascular Carotid Procedure  Demographics   Patient Name  [facility]       Age                81                [address]   Patient       [id number]         Gender             Female  Number   Visit Number  [id number]      Interpreting       Chucho Fleming MD                               Physician   Date of Birth 1950     Referring          Anuja Watson                               Physician   Accession     [id number]     [address]  Number                                          RVS, RCS  Procedure Type of Study:   Cerebral:Carotid, VL CAROTID BILATERAL. Indications for Study:CVA. Risk Factors   - The patient's risk factor(s) include: dyslipidemia, obesity and arterial     hypertension. Impression   Duplex sonography with color flow enhancement was performed bilaterally on  the cervical carotid system. No evidence of hemodynamically significant  stenosis in the bilateral carotid and vertebral arteries. Signature   ----------------------------------------------------------------  Electronically signed by Hernan Storm MD(Interpreting  physician) on 10/03/2020 07:42 AM  ----------------------------------------------------------------  Blood Pressure:Left arm 217/ mmHg. Velocities are measured in cm/s ; Diameters are measured in mm Carotid Right Measurements +------------+-------+-------+--------+-------+------------+---------------+ ! Location    ! PSV    ! EDV    ! Angle   ! RI     !%Stenosis   ! Tortuosity     ! +------------+-------+-------+--------+-------+------------+---------------+ ! Prox CCA    !47.1   !11.8   !60      !0.75   !            !               ! +------------+-------+-------+--------+-------+------------+---------------+ ! Mid CCA     !61.3   !14.9   !60      !0.76   !            !               ! +------------+-------+-------+--------+-------+------------+---------------+ ! Prox ICA    !36.1   !14.9   !60      !0.59   !            !               ! +------------+-------+-------+--------+-------+------------+---------------+ ! Mid ICA     ! 55.4   !19.6   !60      !0.65   !            !               ! +------------+-------+-------+--------+-------+------------+---------------+ ! Dist ICA    ! 64     !23.2   ! 60      !0.64   !            !               ! +------------+-------+-------+--------+-------+------------+---------------+ ! Prox ECA    !54.2   ! 8.25   !60      !0.85   !            !               ! +------------+-------+-------+--------+-------+------------+---------------+ ! Vertebral   !29.1   !8.64   !60      !0.7    ! !               ! +------------+-------+-------+--------+-------+------------+---------------+   - There is antegrade vertebral flow noted on the right side. - Additional Measurements:ICAPSV/CCAPSV 1.36. ICAEDV/CCAEDV 1.97. Carotid Left Measurements +------------+-------+-------+--------+-------+------------+---------------+ ! Location    ! PSV    ! EDV    ! Angle   ! RI     !%Stenosis   ! Tortuosity     ! +------------+-------+-------+--------+-------+------------+---------------+ ! Prox CCA    !75.6   !21.7   !60      !0.71   !            !               ! +------------+-------+-------+--------+-------+------------+---------------+ ! Mid CCA     !62.7   !18.8   !60      !0.7    ! !               ! +------------+-------+-------+--------+-------+------------+---------------+ ! Prox ICA    !46.8   !14.1   ! 60      !0.7    ! !               ! +------------+-------+-------+--------+-------+------------+---------------+ ! Mid ICA     !43.2   ! 15.7   !60      !0.64   !            !               ! +------------+-------+-------+--------+-------+------------+---------------+ ! Dist ICA    !55.8   !21.2   ! 60      !0.62   !            !               ! +------------+-------+-------+--------+-------+------------+---------------+ ! Prox ECA    !56.9   !8.79   !60      !0.85   !            !               ! +------------+-------+-------+--------+-------+------------+---------------+ ! Vertebral   !35.8   !9.43   !60      !0.74   !            !               ! +------------+-------+-------+--------+-------+------------+---------------+   - There is antegrade vertebral flow noted on the left side. - Additional Measurements:ICAPSV/CCAPSV 0.74. ICAEDV/CCAEDV 0.98.     Assessment//Plan           Hospital Problems           Last Modified POA    Occipital infarction ([facility] [address] 10/2/2020 Yes        Subacute occipital right-sided lobe infarct  -Associated with vision deficit -patient stating objects along the wall of blurry in nature  -CT head- evidence suggestive of hemorrhagic staining although no discrete hemorrhage seen  -Holding off on anticoagulation currently for neurology consultation  -Plans for repeat CT/MRI in the a.m.  -Echocardiogram completed awaiting official report  -Bilateral Doppler carotids-unremarkable  -Continue with statin therapy  -Elevation of head of bed  -PT/OT/SLP        History of hyperthyroidism-continue with methimazole     History of transient ischemic attack  -Currently holding anticoagulation until further characterization with brain imaging     New onset atrial fibrillation rate control  -Continue with metoprolol  -Telemetry monitoring  -We will obtain echocardiogram        Depression/anxiety-chronic condition, continue Lexapro        Essential hypertension-chronic condition, continue with metoprolol       Dyslipidemia   - Controlled   - Continue Statin therapy   Lab Results   Component Value Date    CHOL 127 (L) 10/03/2020   ,   Lab Results   Component Value Date    HDL 49 (L) 10/03/2020     Lab Results   Component Value Date    LDLCALC 62 10/03/2020   ,   Lab Results   Component Value Date    TRIG 80 10/03/2020     Morbid obesity-noted     Consultations Ordered:  IP CONSULT TO NEUROLOGY         EMR Dragon/Transcription disclaimer:   Much of this encounter note is an electronic transcription/translation of spoken language to printed text.  The electronic translation of spoken language may permit erroneous, or at times, nonsensical words or phrases to be inadvertently transcribed; although attempts have made to review the note for such errors, some may still exist.    Electronically signed by   Jody Richfield   Internal Medicine Hospitalist  On 10/3/2020  At 9:47 AM

## 2020-10-03 NOTE — PROGRESS NOTES
Speech Language Pathology  Facility/Department: [facility]  Initial Speech/Language/Cognitive Assessment  Bedside Swallow Evaluation     NAME: Guille Costa  : 1950   MRN: [id number]  ADMISSION DATE: 10/2/2020  ADMITTING DIAGNOSIS: has Occipital infarction [facility]) on their problem list.    Date of Eval: 10/3/2020   Evaluating Therapist: Terry Vasques, SLP    Assessment:  Completed assessment. SLP ranked functional intelligibility of speech for unfamiliar listeners at 100% in conversation. Patient exhibited decreased visual attention, slow processing, delayed auditory comprehension particularly as length and complexity of information increases, delayed verbalizations, decreased immediate/short-term memory functioning, and delayed reasoning/problem solving. Also evaluated patient's swallowing function. Patient exhibits inconsistently sluggish, inconsistently mildly decreased laryngeal elevation for swallow airway protection. Even so, no outward S/S penetration/aspiration was noted with any regular solid consistency trial or thin H2O trial administered during assessment this date. At this time, would recommend continuation regular solid consistency and thin liquids. Assist in meal set-up. Will continue to follow. Thank you for this consult. Goals:  Short-term Goals  Timeframe for Short-term Goals: 1-2x/day for 3 days  Goal 1: Patient will tolerate regular solid consistency and thin liquids with min S/S penetration/aspiration during PO intake. Goal 2: Patient staff will follow swallow safety recommendations to decrease risk of penetration/aspiration during PO intake. Goal 3: Monitor speech/language/cognitive functioning. Goal 4: Patient will complete attention and processing tasks with 70% accuracy and with provision of mod cues/prompts. Goal 5: Patient will complete memory functioning tasks with 75% accuracy and with provision of mod cues/prompts.    Goal 6: Patient will complete reasoning/problem solving tasks with provision of min cues/prompts. Subjective:  General  Chart Reviewed: Yes  Patient assessed for rehabilitation services?: Yes  Family / Caregiver Present: Yes    Objective: Auditory Comprehension  Comprehension:  (Patient demonstrated ability to answer simple yes/no questions regarding immediate environment and current state of being at independent level and with adequate processing speed. Patient demonstrated ability to follow simple 1 step commands independently and with adequate processing speed. While moderate delays were noted, patient demonstrated ability to answer yes/no questions of increased complexity and to follow complex 1 and simple 2 step commands at independent level.)    Expression  Primary Mode of Expression:  (Confrontation naming of items in room was considered to be delayed but appropriate. Structured responsive speech and responses in natural conversation were considered to be moderately delayed but appropriate.)    Motor Speech:  (SLP ranked functional intelligibility of speech for unfamiliar listeners at 100% in verbalizations without background noise present.)    Overall Orientation Status:  (Patient demonstrated ability to verbalize name, birthday, age, address, phone number, city, state, hospital, SARAHY, and year at independent level. Patient did not verbalize SARAHY or date independently.)    Attention:  (Patient demonstrated decreased visual attention during assessment.)    Memory:  (Patient demonstrated decreased immediate memory with sequences of unrelated numbers/words set up to 5 items without repetitions provided.  Patient demonstrated decreased short-term/working memory with 10 minute delay+distractions present during assessment.)    Problem Solving:  (While delayed, patient demonstrated ability to verbalize appropriate simple solutions to situations that could occur during activities of daily living at independent level. )    Additional Assessments:  Assessed patient's swallowing function. With regular solid consistency trials presented independently, patient exhibited adequate vertical jaw movement during oral prep. Oral transit of regular solid consistency primarily measured 1-2 seconds in length and min oral cavity residue was noted post swallows; residue cleared from the mouth with additional dry swallows. Oral transit of thin H2O trials, presented independently via cup, primarily measured 1-2 seconds in length. Laryngeal elevation during swallow initiation was considered to be inconsistently sluggish and inconsistently mildly decreased for swallow airway protection. Even so, no outward S/S penetration/aspiration was observed with any regular solid consistency trial or thin H2O trial administered during evaluation this date. At this time, would recommend continuation regular solid consistency and thin liquids. Assist in meal set-up. Will continue to follow.     Electronically signed by KOREY Lebron on 10/3/2020 at 12:35 PM

## 2020-10-04 LAB
ANION GAP SERPL CALCULATED.3IONS-SCNC: 9 MMOL/L (ref 7–19)
BUN BLDV-MCNC: 17 MG/DL (ref 8–23)
CALCIUM SERPL-MCNC: 8.5 MG/DL (ref 8.8–10.2)
CHLORIDE BLD-SCNC: 102 MMOL/L (ref 98–111)
CO2: 27 MMOL/L (ref 22–29)
CREAT SERPL-MCNC: 0.9 MG/DL (ref 0.5–0.9)
GFR AFRICAN AMERICAN: >59
GFR NON-AFRICAN AMERICAN: >60
GLUCOSE BLD-MCNC: 103 MG/DL (ref 74–109)
HCT VFR BLD CALC: 37.6 % (ref 37–47)
HEMOGLOBIN: 12.2 G/DL (ref 12–16)
MCH RBC QN AUTO: 30 PG (ref 27–31)
MCHC RBC AUTO-ENTMCNC: 32.4 G/DL (ref 33–37)
MCV RBC AUTO: 92.4 FL (ref 81–99)
PDW BLD-RTO: 13.2 % (ref 11.5–14.5)
PLATELET # BLD: 148 K/UL (ref 130–400)
PMV BLD AUTO: 11.6 FL (ref 9.4–12.3)
POTASSIUM REFLEX MAGNESIUM: 4.3 MMOL/L (ref 3.5–5)
RBC # BLD: 4.07 M/UL (ref 4.2–5.4)
SODIUM BLD-SCNC: 138 MMOL/L (ref 136–145)
WBC # BLD: 5 K/UL (ref 4.8–10.8)

## 2020-10-04 PROCEDURE — 80048 BASIC METABOLIC PNL TOTAL CA: CPT

## 2020-10-04 PROCEDURE — 99233 SBSQ HOSP IP/OBS HIGH 50: CPT | Performed by: PSYCHIATRY & NEUROLOGY

## 2020-10-04 PROCEDURE — 2580000003 HC RX 258: Performed by: FAMILY MEDICINE

## 2020-10-04 PROCEDURE — 6360000002 HC RX W HCPCS: Performed by: STUDENT IN AN ORGANIZED HEALTH CARE EDUCATION/TRAINING PROGRAM

## 2020-10-04 PROCEDURE — 6360000002 HC RX W HCPCS: Performed by: FAMILY MEDICINE

## 2020-10-04 PROCEDURE — 2580000003 HC RX 258: Performed by: STUDENT IN AN ORGANIZED HEALTH CARE EDUCATION/TRAINING PROGRAM

## 2020-10-04 PROCEDURE — 85027 COMPLETE CBC AUTOMATED: CPT

## 2020-10-04 PROCEDURE — 6370000000 HC RX 637 (ALT 250 FOR IP): Performed by: FAMILY MEDICINE

## 2020-10-04 PROCEDURE — 1210000000 HC MED SURG R&B

## 2020-10-04 PROCEDURE — 87086 URINE CULTURE/COLONY COUNT: CPT

## 2020-10-04 PROCEDURE — 36415 COLL VENOUS BLD VENIPUNCTURE: CPT

## 2020-10-04 RX ORDER — 0.9 % SODIUM CHLORIDE 0.9 %
500 INTRAVENOUS SOLUTION INTRAVENOUS ONCE
Status: COMPLETED | OUTPATIENT
Start: 2020-10-04 | End: 2020-10-04

## 2020-10-04 RX ADMIN — SODIUM CHLORIDE, PRESERVATIVE FREE 10 ML: 5 INJECTION INTRAVENOUS at 21:09

## 2020-10-04 RX ADMIN — SODIUM CHLORIDE, PRESERVATIVE FREE 10 ML: 5 INJECTION INTRAVENOUS at 08:16

## 2020-10-04 RX ADMIN — CEFTRIAXONE 1 G: 1 INJECTION, POWDER, FOR SOLUTION INTRAMUSCULAR; INTRAVENOUS at 23:04

## 2020-10-04 RX ADMIN — SODIUM CHLORIDE 500 ML: 9 INJECTION, SOLUTION INTRAVENOUS at 10:31

## 2020-10-04 RX ADMIN — HYDROCODONE BITARTRATE AND ACETAMINOPHEN 1 TABLET: 7.5; 325 TABLET ORAL at 21:08

## 2020-10-04 RX ADMIN — ATORVASTATIN CALCIUM 40 MG: 40 TABLET, FILM COATED ORAL at 21:08

## 2020-10-04 RX ADMIN — ESCITALOPRAM OXALATE 10 MG: 10 TABLET ORAL at 08:16

## 2020-10-04 RX ADMIN — METOPROLOL TARTRATE 100 MG: 50 TABLET, FILM COATED ORAL at 08:16

## 2020-10-04 RX ADMIN — METHIMAZOLE 5 MG: 5 TABLET ORAL at 08:16

## 2020-10-04 RX ADMIN — METOPROLOL TARTRATE 100 MG: 50 TABLET, FILM COATED ORAL at 21:08

## 2020-10-04 RX ADMIN — METHIMAZOLE 5 MG: 5 TABLET ORAL at 21:08

## 2020-10-04 RX ADMIN — ONDANSETRON 4 MG: 2 INJECTION INTRAMUSCULAR; INTRAVENOUS at 11:23

## 2020-10-04 RX ADMIN — ACETAMINOPHEN 650 MG: 325 TABLET ORAL at 08:19

## 2020-10-04 ASSESSMENT — PAIN DESCRIPTION - LOCATION: LOCATION: HEAD

## 2020-10-04 ASSESSMENT — PAIN SCALES - GENERAL
PAINLEVEL_OUTOF10: 6
PAINLEVEL_OUTOF10: 0
PAINLEVEL_OUTOF10: 0
PAINLEVEL_OUTOF10: 3
PAINLEVEL_OUTOF10: 2

## 2020-10-04 ASSESSMENT — PAIN DESCRIPTION - PAIN TYPE: TYPE: ACUTE PAIN

## 2020-10-04 NOTE — PROGRESS NOTES
Progress Note  Date:10/4/2020       Room:0529/529-01  Patient Name:Lashonda Milian     YOB: 1950     Age:69 y.o. Subjective    Subjective   Patient seen and examined this a.m. has been transitioned to room 529, patient sitting up on her bed consuming breakfast.  Conversational improved clinical picture. Reviewed MRI for modalities. Vision has improved. Currently denies any chest pain, shortness of breath, abdominal pain, nausea vomiting, fevers or chills      Cumulative Hospital course: Patient admitted 10/2, brought to the emergency room due to concerns of confusion, headache, patient with traveling vehicle and got lost.  Work-up in the emergency room revealed occipital lobe infarct. Patient admitted for neurological evaluation, echocardiogram completed awaiting official report, bilateral ultrasounds no evidence of significant stenosis, hemoglobin A1c within normal limits, lipid panel stable, MRI brain correlates acute occipital infarct, continue with PT/OT, vision has improved anticipating rehab need. Review of Systems   ROS: 14 point review of systems is negative except as specifically addressed above. Objective         Vitals Last 24 Hours:  TEMPERATURE:  Temp  Av.4 °F (36.3 °C)  Min: 96.9 °F (36.1 °C)  Max: 97.8 °F (36.6 °C)  RESPIRATIONS RANGE: Resp  Av.2  Min: 16  Max: 18  PULSE OXIMETRY RANGE: SpO2  Av.6 %  Min: 90 %  Max: 97 %  PULSE RANGE: Pulse  Av.2  Min: 72  Max: 82  BLOOD PRESSURE RANGE: Systolic (41YBD), VIRGILIO:587 , Min:105 , BNZ:889   ; Diastolic (58EBT), FNI:16, Min:61, Max:81    I/O (24Hr): Intake/Output Summary (Last 24 hours) at 10/4/2020 0942  Last data filed at 10/4/2020 0933  Gross per 24 hour   Intake 1250 ml   Output 800 ml   Net 450 ml     Physical Exam  Constitutional:       Appearance: She is obese. She is not ill-appearing. HENT:      Head: Normocephalic and atraumatic.       Nose: Nose normal.   Eyes:      General: No scleral icterus. Right eye: No discharge. Left eye: No discharge. Conjunctiva/sclera: Conjunctivae normal.   Neck:      Musculoskeletal: Normal range of motion. Cardiovascular:      Rate and Rhythm: Normal rate. Rhythm irregularly irregular. Pulmonary:      Effort: Pulmonary effort is normal.      Breath sounds: No wheezing or rales. Abdominal:      Tenderness: There is no abdominal tenderness. There is no guarding or rebound. Musculoskeletal:         General: No tenderness. Right lower leg: No edema. Left lower leg: No edema. Comments: Headache has improved   Skin:     General: Skin is warm. Capillary Refill: Capillary refill takes less than 2 seconds. Neurological:      Mental Status: She is alert and oriented to person, place, and time. Motor: Weakness present. Comments:     Visual changes have improved   Psychiatric:         Mood and Affect: Mood normal.         Labs/Imaging/Diagnostics    Labs:  CBC:  Recent Labs     10/02/20  1110 10/03/20  0303 10/04/20  0337   WBC 6.4 5.6 5.0   RBC 4.44 4.08* 4.07*   HGB 13.1 12.2 12.2   HCT 41.2 37.7 37.6   MCV 92.8 92.4 92.4   RDW 13.3 13.3 13.2    159 148     CHEMISTRIES:  Recent Labs     10/02/20  1110 10/03/20  0302 10/04/20  0337    135* 138   K 3.8 4.0 4.3    104 102   CO2 24 21* 27   BUN 14 11 17   CREATININE 0.6 0.5 0.9   GLUCOSE 115* 91 103     PT/INR:  Recent Labs     10/02/20  1110   PROTIME 15.1*   INR 1.19*     APTT:No results for input(s): APTT in the last 72 hours. LIVER PROFILE:  Recent Labs     10/02/20  1110   AST 20   ALT 11   BILITOT 1.6*   ALKPHOS 70       Imaging Last 24 Hours:  Ct Head Wo Contrast    Result Date: 10/2/2020  CT HEAD WO CONTRAST 10/2/2020 11:55 AM History: Altered mental status and visual changes. Dizziness.  In order to have a CT radiation dose as low as reasonably achievable Automated Exposure Control was utilized for adjustment of the mA and/or KV according to patient size. DLP in mGycm= 736. Right occipital lobe hypodense focus involving cortex and subcortical white matter. Area of involvement = 6.3 x 3 cm. There is some slight hyperdensity within this focus with suggests hemorrhagic staining. There is no discrete hemorrhage. There is a small focus of old ischemic change within the posterior right frontal lobe. Ventricle size is normal. No midline shift. Normal bones. Clear paranasal sinuses and mastoid air cells. 1. Right occipital lobe abnormality compatible with subacute or early chronic infarct. 2. If symptoms do not fit for stroke in this distribution then pre and postcontrast MR imaging should be performed to rule out underlying neoplasm. Signed by Dr Elizabeth Gallagher on 10/2/2020 11:59 AM    Xr Chest Portable    Result Date: 10/2/2020  XR CHEST PORTABLE 10/2/2020 10:20 AM HISTORY:   Confusion  Single view. Portable upright COMPARISONS:  None FINDINGS: The level of inspiration is shallow and lung volumes diminished. Bronchovascular interstitial markings are prominent likely related to the level of inspiration and hypoventilation. The heart also appears generous, again likely related to hypoventilation. No definite parenchymal infiltrates observed. The bony structures are intact. Shallow inspiration with diminished lung volumes and hypoventilation.  Signed by Dr Claudio Quarles on 10/2/2020 11:33 AM    Vascular Carotid Duplex Bilateral    Result Date: 10/3/2020  Vascular Carotid Procedure  Demographics   Patient Name  [facility]       Age                81                [address]   Patient       [id number]         Gender             Female  Number   Visit Number  [id number]      Interpreting       Kuldeep Duran MD                               Physician   Date of Birth 1950     Referring          Gerhard Chávez                               Physician   Accession     [id number]     [address]  Number RVS, RCS  Procedure Type of Study:   Cerebral:Carotid, VL CAROTID BILATERAL. Indications for Study:CVA. Risk Factors   - The patient's risk factor(s) include: dyslipidemia, obesity and arterial     hypertension. Impression   Duplex sonography with color flow enhancement was performed bilaterally on  the cervical carotid system. No evidence of hemodynamically significant  stenosis in the bilateral carotid and vertebral arteries. Signature   ----------------------------------------------------------------  Electronically signed by Diaz Morales MD(Interpreting  physician) on 10/03/2020 07:42 AM  ----------------------------------------------------------------  Blood Pressure:Left arm 217/ mmHg. Velocities are measured in cm/s ; Diameters are measured in mm Carotid Right Measurements +------------+-------+-------+--------+-------+------------+---------------+ ! Location    ! PSV    ! EDV    ! Angle   ! RI     !%Stenosis   ! Tortuosity     ! +------------+-------+-------+--------+-------+------------+---------------+ ! Prox CCA    !47.1   !11.8   !60      !0.75   !            !               ! +------------+-------+-------+--------+-------+------------+---------------+ ! Mid CCA     !61.3   !14.9   !60      !0.76   !            !               ! +------------+-------+-------+--------+-------+------------+---------------+ ! Prox ICA    !36.1   !14.9   !60      !0.59   !            !               ! +------------+-------+-------+--------+-------+------------+---------------+ ! Mid ICA     ! 55.4   !19.6   !60      !0.65   !            !               ! +------------+-------+-------+--------+-------+------------+---------------+ ! Dist ICA    ! 64     !23.2   ! 60      !0.64   !            !               ! +------------+-------+-------+--------+-------+------------+---------------+ ! Prox ECA    !54.2   ! 8.25   !60      !0.85   !            !               ! +------------+-------+-------+--------+-------+------------+---------------+ ! Vertebral   !29.1   !8.64   !60      !0.7    ! !               ! +------------+-------+-------+--------+-------+------------+---------------+   - There is antegrade vertebral flow noted on the right side. - Additional Measurements:ICAPSV/CCAPSV 1.36. ICAEDV/CCAEDV 1.97. Carotid Left Measurements +------------+-------+-------+--------+-------+------------+---------------+ ! Location    ! PSV    ! EDV    ! Angle   ! RI     !%Stenosis   ! Tortuosity     ! +------------+-------+-------+--------+-------+------------+---------------+ ! Prox CCA    !75.6   !21.7   !60      !0.71   !            !               ! +------------+-------+-------+--------+-------+------------+---------------+ ! Mid CCA     !62.7   !18.8   !60      !0.7    ! !               ! +------------+-------+-------+--------+-------+------------+---------------+ ! Prox ICA    !46.8   !14.1   ! 60      !0.7    ! !               ! +------------+-------+-------+--------+-------+------------+---------------+ ! Mid ICA     !43.2   ! 15.7   !60      !0.64   !            !               ! +------------+-------+-------+--------+-------+------------+---------------+ ! Dist ICA    !55.8   !21.2   ! 60      !0.62   !            !               ! +------------+-------+-------+--------+-------+------------+---------------+ ! Prox ECA    !56.9   !8.79   !60      !0.85   !            !               ! +------------+-------+-------+--------+-------+------------+---------------+ ! Vertebral   !35.8   !9.43   !60      !0.74   !            !               ! +------------+-------+-------+--------+-------+------------+---------------+   - There is antegrade vertebral flow noted on the left side. - Additional Measurements:ICAPSV/CCAPSV 0.74. ICAEDV/CCAEDV 0.98.     Assessment//Plan           Hospital Problems           Last Modified POA    [facility]2020 Yes Acute occipital right-sided lobe infarct  -Associated with vision deficit -patient stating objects along the wall of blurry in nature  -CT head- evidence suggestive of hemorrhagic staining although no discrete hemorrhage seen  -MRI brain-acute occipital infarct  -Holding off on anticoagulation currently for neurology consultation  -Echocardiogram completed awaiting official report  -Bilateral Doppler carotids-unremarkable  -Continue with statin therapy  -Elevation of head of bed  -PT/OT/SLP  -Case management for assistance with discharge disposition        History of hyperthyroidism-continue with methimazole     History of transient ischemic attack  -Currently holding anticoagulation until further characterization with brain imaging     New onset atrial fibrillation rate control  -Continue with metoprolol  -Telemetry monitoring  -Awaiting echocardiogram report        Depression/anxiety-chronic condition, continue Lexapro        Essential hypertension-chronic condition, continue with metoprolol       Dyslipidemia   - Controlled   - Continue Statin therapy   Lab Results   Component Value Date    CHOL 127 (L) 10/03/2020   ,   Lab Results   Component Value Date    HDL 49 (L) 10/03/2020     Lab Results   Component Value Date    LDLCALC 62 10/03/2020   ,   Lab Results   Component Value Date    TRIG 80 10/03/2020     Morbid obesity-noted     Consultations Ordered:  IP CONSULT TO NEUROLOGY         EMR Dragon/Transcription disclaimer:   Much of this encounter note is an electronic transcription/translation of spoken language to printed text.  The electronic translation of spoken language may permit erroneous, or at times, nonsensical words or phrases to be inadvertently transcribed; although attempts have made to review the note for such errors, some may still exist.    Electronically signed by   Maddie Allen   Internal Medicine Hospitalist  On 10/4/2020  At 9:42 AM

## 2020-10-04 NOTE — PROGRESS NOTES
[facility] Neurology Progress Note      Patient:   Kameron Funez  MR#:    [id number]   Room:    [facility]   YOB: 1950  Date of Progress Note: 10/4/2020  Time of Note                           12:58 PM  Consulting Physician:  Florence Martinez DO  Attending Physician:  Berhane Mckeon MD      INTERVAL HISTORY:  Doing about the same, no acute events, visual symptoms largely unchanged, possible mild improvement noted, headache better. REVIEW OF SYSTEMS:  Constitutional: No fevers   Neck:No stiffness  Respiratory: No shortness of breath  Cardiovascular: No chest pain No palpitations  Gastrointestinal: No abdominal pain    Genitourinary: No Dysuria  Neurological: No confusion    PHYSICAL EXAM:    Constitutional -   BP (!) 90/56   Pulse 66   Temp 97.2 °F (36.2 °C) (Temporal)   Resp 18   Ht 5' 2\" (1.575 m)   Wt (!) 340 lb (154.2 kg)   SpO2 92%   BMI 62.19 kg/m²   General appearance: No acute distress   EYES -   Conjunctiva normal  Pupillary exam as below, see CN exam in the neurologic exam  ENT-    No scars, masses, or lesions over external nose or ears  Hearing normal bilaterally to finger rub  Neck-   No neck masses noted  Thyroid normal   No jugular vein distension  Cardiovascular -   No clubbing, cyanosis, or edema   Pulmonary-   Good expansion, normal effort without use of accessory muscles  Musculoskeletal -   No significant wasting of muscles noted  Gait as below, see gait exam in the neurologic exam  Muscle strength, tone, stability as below see the motor exam in the neurologic exam.   No bony deformities  Skin -   Warm, dry, and intact to inspection and palpation. No rash, erythema, or pallor  Psychiatric -   Mood, affect, and behavior appear normal    Memory as below see mental status examination in the neurologic exam       NEUROLOGICAL EXAM     Mental status    [x]? Awake, alert, oriented   [x]? Affect attention and concentration appear appropriate  [x]?  Recent and remote memory appears unremarkable  [x]? Speech normal without dysarthria or aphasia, comprehension and repetition intact. COMMENTS:   Cranial Nerves []? No VF deficit to confrontation,  optic discs normal, no papilledema on fundoscopic exam.  [x]? PERRLA, EOMI, no nystagmus, conjugate eye movements, no ptosis  [x]? Face symmetric  [x]? Facial sensation intact  [x]? Tongue midline no atrophy or fasciculations present  [x]? Palate midline, hearing to finger rub normal  [x]? Shoulder shrug and SCM testing normal  COMMENTS: Left visual field deficit noted   Motor   [x]? 5/5 strength x 4 extremities  [x]? Normal bulk and tone  [x]? No tremor present  [x]? No rigidity or bradykinesia noted  COMMENTS:   Sensory  [x]? Sensation intact to light touch, pin prick, vibration, and proprioception BLE  [x]? Sensation intact to light touch, pin prick, vibration, and proprioception BUE  COMMENTS:   Coordination [x]? FTN normal bilaterally   []? HTS normal bilaterally  []? KVNG normal.   COMMENTS:   Reflexes  [x]? Symmetric and non-pathological  []? Toes downgoing bilaterally  []? No clonus present  COMMENTS:   Gait                  [x]? Normal steady gait    []? Ataxic    []? Spastic     []? Magnetic     []? Shuffling  []? Not assessed  COMMENTS:        LABS/IMAGING:    Ct Head Wo Contrast    Result Date: 10/2/2020  CT HEAD WO CONTRAST 10/2/2020 11:55 AM History: Altered mental status and visual changes. Dizziness. In order to have a CT radiation dose as low as reasonably achievable Automated Exposure Control was utilized for adjustment of the mA and/or KV according to patient size. DLP in mGycm= 736. Right occipital lobe hypodense focus involving cortex and subcortical white matter. Area of involvement = 6.3 x 3 cm. There is some slight hyperdensity within this focus with suggests hemorrhagic staining. There is no discrete hemorrhage. There is a small focus of old ischemic change within the posterior right frontal lobe.  Ventricle size is normal. No midline shift. Normal bones. Clear paranasal sinuses and mastoid air cells. 1. Right occipital lobe abnormality compatible with subacute or early chronic infarct. 2. If symptoms do not fit for stroke in this distribution then pre and postcontrast MR imaging should be performed to rule out underlying neoplasm. Signed by Dr Marissa Reddy on 10/2/2020 11:59 AM    Xr Chest Portable    Result Date: 10/2/2020  XR CHEST PORTABLE 10/2/2020 10:20 AM HISTORY:   Confusion  Single view. Portable upright COMPARISONS:  None FINDINGS: The level of inspiration is shallow and lung volumes diminished. Bronchovascular interstitial markings are prominent likely related to the level of inspiration and hypoventilation. The heart also appears generous, again likely related to hypoventilation. No definite parenchymal infiltrates observed. The bony structures are intact. Shallow inspiration with diminished lung volumes and hypoventilation. Signed by Dr Jeri Gupta on 10/2/2020 11:33 AM    Vascular Carotid Duplex Bilateral    Result Date: 10/3/2020  Vascular Carotid Procedure  Demographics   Patient [facility]  [facility]       Age                81                [address] [facility]   Patient       [id number]         Gender             Female  Number   Visit Number  [id number]      Interpreting       Saida Gallegos MD                               Physician   Date of Birth 1950     Referring          Issa Bustos                               Physician   Accession     [id number]     [address]  Number                                          RVS, RCS  Procedure Type of Study:   Cerebral:Carotid, VL CAROTID BILATERAL. Indications for Study:CVA. Risk Factors   - The patient's risk factor(s) include: dyslipidemia, obesity and arterial     hypertension. Impression   Duplex sonography with color flow enhancement was performed bilaterally on  the cervical carotid system.  No evidence of hemodynamically +------------+-------+-------+--------+-------+------------+---------------+ ! Location    ! PSV    ! EDV    ! Angle   ! RI     !%Stenosis   ! Tortuosity     ! +------------+-------+-------+--------+-------+------------+---------------+ ! Prox CCA    !75.6   !21.7   !60      !0.71   !            !               ! +------------+-------+-------+--------+-------+------------+---------------+ ! Mid CCA     !62.7   !18.8   !60      !0.7    ! !               ! +------------+-------+-------+--------+-------+------------+---------------+ ! Prox ICA    !46.8   !14.1   ! 60      !0.7    ! !               ! +------------+-------+-------+--------+-------+------------+---------------+ ! Mid ICA     !43.2   ! 15.7   !60      !0.64   !            !               ! +------------+-------+-------+--------+-------+------------+---------------+ ! Dist ICA    !55.8   !21.2   ! 60      !0.62   !            !               ! +------------+-------+-------+--------+-------+------------+---------------+ ! Prox ECA    !56.9   !8.79   !60      !0.85   !            !               ! +------------+-------+-------+--------+-------+------------+---------------+ ! Vertebral   !35.8   !9.43   !60      !0.74   !            !               ! +------------+-------+-------+--------+-------+------------+---------------+   - There is antegrade vertebral flow noted on the left side. - Additional Measurements:ICAPSV/CCAPSV 0.74. ICAEDV/CCAEDV 0.98. Mri Brain W Wo Contrast    Result Date: 10/3/2020  EXAMINATION:  MRI BRAIN W WO CONTRAST  10/3/2020 3:28 PM HISTORY: Altered mental status and vision changes. Stroke symptoms. COMPARISON: Head CT dated 10/2/2020 TECHNIQUE: Multiplanar MR imaging the brain was performed as well as gadolinium enhanced imaging. FINDINGS: As noted on the CT examination there is restricted diffusion signal abnormality involving the right posterior parietal/occipital lobe compatible with acute ischemic change/infarction. fibrillation noted. Exam stable overnight.      PLAN:  1. Follow up ECHO, Tele  2. Will start Eliquis in the AM at 5 mg bid, should be ok to anticoagulate given infarct size being less than 1/3 the MCA distribution, no clear evidence of hemorraghic transformation on MRI and now over 72 hours past stroke onset. 3.  PT, OT, ST  4. DVT proph   5.  Risk factor maximization, statin, cautious blood pressure titration. Please feel free to call with any questions. [phone] (cell phone).       Elieser Huggins DO  Board Certified Neurology

## 2020-10-05 VITALS
OXYGEN SATURATION: 94 % | BODY MASS INDEX: 53.92 KG/M2 | SYSTOLIC BLOOD PRESSURE: 117 MMHG | TEMPERATURE: 97.7 F | RESPIRATION RATE: 20 BRPM | HEART RATE: 60 BPM | WEIGHT: 293 LBS | HEIGHT: 62 IN | DIASTOLIC BLOOD PRESSURE: 72 MMHG

## 2020-10-05 LAB
ANION GAP SERPL CALCULATED.3IONS-SCNC: 9 MMOL/L (ref 7–19)
BUN BLDV-MCNC: 14 MG/DL (ref 8–23)
CALCIUM SERPL-MCNC: 9 MG/DL (ref 8.8–10.2)
CHLORIDE BLD-SCNC: 102 MMOL/L (ref 98–111)
CO2: 28 MMOL/L (ref 22–29)
CREAT SERPL-MCNC: 0.8 MG/DL (ref 0.5–0.9)
GFR AFRICAN AMERICAN: >59
GFR NON-AFRICAN AMERICAN: >60
GLUCOSE BLD-MCNC: 113 MG/DL (ref 74–109)
HCT VFR BLD CALC: 39.3 % (ref 37–47)
HEMOGLOBIN: 12.6 G/DL (ref 12–16)
MCH RBC QN AUTO: 30 PG (ref 27–31)
MCHC RBC AUTO-ENTMCNC: 32.1 G/DL (ref 33–37)
MCV RBC AUTO: 93.6 FL (ref 81–99)
PDW BLD-RTO: 13.1 % (ref 11.5–14.5)
PLATELET # BLD: 158 K/UL (ref 130–400)
PMV BLD AUTO: 11.5 FL (ref 9.4–12.3)
POTASSIUM REFLEX MAGNESIUM: 3.9 MMOL/L (ref 3.5–5)
RBC # BLD: 4.2 M/UL (ref 4.2–5.4)
SODIUM BLD-SCNC: 139 MMOL/L (ref 136–145)
WBC # BLD: 5.2 K/UL (ref 4.8–10.8)

## 2020-10-05 PROCEDURE — 6370000000 HC RX 637 (ALT 250 FOR IP): Performed by: PSYCHIATRY & NEUROLOGY

## 2020-10-05 PROCEDURE — 80048 BASIC METABOLIC PNL TOTAL CA: CPT

## 2020-10-05 PROCEDURE — 85027 COMPLETE CBC AUTOMATED: CPT

## 2020-10-05 PROCEDURE — 99232 SBSQ HOSP IP/OBS MODERATE 35: CPT | Performed by: PSYCHIATRY & NEUROLOGY

## 2020-10-05 PROCEDURE — 97165 OT EVAL LOW COMPLEX 30 MIN: CPT

## 2020-10-05 PROCEDURE — 6370000000 HC RX 637 (ALT 250 FOR IP): Performed by: FAMILY MEDICINE

## 2020-10-05 PROCEDURE — 36415 COLL VENOUS BLD VENIPUNCTURE: CPT

## 2020-10-05 RX ORDER — ASPIRIN 81 MG/1
81 TABLET ORAL DAILY
Qty: 90 TABLET | Refills: 0 | Status: SHIPPED | OUTPATIENT
Start: 2020-10-05

## 2020-10-05 RX ADMIN — METOPROLOL TARTRATE 100 MG: 50 TABLET, FILM COATED ORAL at 07:47

## 2020-10-05 RX ADMIN — METHIMAZOLE 5 MG: 5 TABLET ORAL at 07:46

## 2020-10-05 RX ADMIN — ESCITALOPRAM OXALATE 10 MG: 10 TABLET ORAL at 07:47

## 2020-10-05 RX ADMIN — ACETAMINOPHEN 650 MG: 325 TABLET ORAL at 08:53

## 2020-10-05 RX ADMIN — APIXABAN 5 MG: 5 TABLET, FILM COATED ORAL at 07:47

## 2020-10-05 ASSESSMENT — PAIN SCALES - GENERAL
PAINLEVEL_OUTOF10: 3
PAINLEVEL_OUTOF10: 0

## 2020-10-05 NOTE — PROGRESS NOTES
Occupational Therapy   Occupational Therapy Initial Assessment  Date: 10/5/2020   Patient Name: Mary Pollack  MRN: [id number]     : 1950    Date of Service: 10/5/2020    Discharge Recommendations:          Assessment   Assessment: Pt. with no visual deficits remaining. No further OT needs  Treatment Diagnosis: Occipital infarct  Prognosis: Good  Decision Making: Low Complexity  REQUIRES OT FOLLOW UP: No  Activity Tolerance  Activity Tolerance: Patient Tolerated treatment well           Patient Diagnosis(es): The primary encounter diagnosis was Cerebrovascular accident (CVA), [facility] Diagnoses of Visual field defect, Confusion, Obesity with serious comorbidity, unspecified classification, unspecified obesity type, and Atrial fibrillation, unspecified type [facility]) were also pertinent to this visit. has a past medical history of CHF (congestive heart failure) ([facility] COPD (chronic obstructive pulmonary disease) ([facility] Hyperlipidemia, Hypertension, and Thyroid disease. has a past surgical history that includes tumor removal and Tonsillectomy.     Treatment Diagnosis: Occipital infarct      Restrictions       Subjective   General  Chart Reviewed: Yes  Patient assessed for rehabilitation services?: Yes  Family / Caregiver Present: No  Diagnosis: Occipital infarct with distorted vision  General Comment  Comments: Pt. states her vision is back to normal and she has been getting up to bathroom unassisted  Patient Currently in Pain: Denies  Pain Assessment  Pain Assessment: 0-10  Pain Level: 0  Patient's Stated Pain Goal: No pain  Vital Signs  Temp: 97.8 °F (36.6 °C)  Temp Source: Temporal  Pulse: 84  Heart Rate Source: Monitor  Resp: 20  BP: 120/66  BP Location: Right Arm  BP Upper/Lower: Lower  Level of Consciousness: Alert  MEWS Score: 1  Patient Currently in Pain: Denies  Oxygen Therapy  SpO2: 91 %  O2 Device: None (Room air)  Social/Functional History  Social/Functional History  Lives With: Family  Home Equipment: Reacher  ADL Assistance: Independent  Ambulation Assistance: Independent  Transfer Assistance: Independent  Active : Yes       Objective   Vision: Impaired    Orientation  Overall Orientation Status: Within Normal Limits        ADL  Feeding: Independent  Grooming: Independent  UE Bathing: Independent  LE Bathing: Independent  UE Dressing: Independent  LE Dressing: Independent  Toileting: Independent        Bed mobility  Supine to Sit: Independent  Transfers  Stand Step Transfers: Independent  Sit to stand: Independent  Vision - Basic Assessment  Prior Vision: No visual deficits(Tracking is WNL, no visual field cuts, no blurred vision)           Sensation  Overall Sensation Status: WFL        LUE AROM (degrees)  LUE AROM : WFL  RUE AROM (degrees)  RUE AROM : WFL  LUE Strength  Gross LUE Strength: WFL  RUE Strength  Gross RUE Strength: WFL                   Plan   Plan  Times per week: OT eval only    G-Code     OutComes Score                                                  AM-PAC Score             Goals  Short term goals  Time Frame for Short term goals: OT eval only  Long term goals  Time Frame for Long term goals : OT eval only       Therapy Time   Individual Concurrent Group Co-treatment   Time In           Time Out           Minutes                   Galina Ramires OT

## 2020-10-05 NOTE — PROGRESS NOTES
Recent and remote memory appears unremarkable  [x]? Speech normal without dysarthria or aphasia, comprehension and repetition intact. COMMENTS:   Cranial Nerves []? No VF deficit to confrontation,  optic discs normal, no papilledema on fundoscopic exam.  [x]? PERRLA, EOMI, no nystagmus, conjugate eye movements, no ptosis  [x]? Face symmetric  [x]? Facial sensation intact  [x]? Tongue midline no atrophy or fasciculations present  [x]? Palate midline, hearing to finger rub normal  [x]? Shoulder shrug and SCM testing normal  COMMENTS: Left visual field deficit noted   Motor   [x]? 5/5 strength x 4 extremities  [x]? Normal bulk and tone  [x]? No tremor present  [x]? No rigidity or bradykinesia noted  COMMENTS:   Sensory  [x]? Sensation intact to light touch, pin prick, vibration, and proprioception BLE  [x]? Sensation intact to light touch, pin prick, vibration, and proprioception BUE  COMMENTS:   Coordination [x]? FTN normal bilaterally   []? HTS normal bilaterally  []? KVNG normal.   COMMENTS:   Reflexes  [x]? Symmetric and non-pathological  []? Toes downgoing bilaterally  []? No clonus present  COMMENTS:   Gait                  [x]? Normal steady gait    []? Ataxic    []? Spastic     []? Magnetic     []? Shuffling  []? Not assessed  COMMENTS:        LABS/IMAGING:    Ct Head Wo Contrast    Result Date: 10/2/2020  CT HEAD WO CONTRAST 10/2/2020 11:55 AM History: Altered mental status and visual changes. Dizziness. In order to have a CT radiation dose as low as reasonably achievable Automated Exposure Control was utilized for adjustment of the mA and/or KV according to patient size. DLP in mGycm= 736. Right occipital lobe hypodense focus involving cortex and subcortical white matter. Area of involvement = 6.3 x 3 cm. There is some slight hyperdensity within this focus with suggests hemorrhagic staining. There is no discrete hemorrhage.  There is a small focus of old ischemic change within the posterior right frontal lobe. Ventricle size is normal. No midline shift. Normal bones. Clear paranasal sinuses and mastoid air cells. 1. Right occipital lobe abnormality compatible with subacute or early chronic infarct. 2. If symptoms do not fit for stroke in this distribution then pre and postcontrast MR imaging should be performed to rule out underlying neoplasm. Signed by Dr Bianca Koch on 10/2/2020 11:59 AM    Xr Chest Portable    Result Date: 10/2/2020  XR CHEST PORTABLE 10/2/2020 10:20 AM HISTORY:   Confusion  Single view. Portable upright COMPARISONS:  None FINDINGS: The level of inspiration is shallow and lung volumes diminished. Bronchovascular interstitial markings are prominent likely related to the level of inspiration and hypoventilation. The heart also appears generous, again likely related to hypoventilation. No definite parenchymal infiltrates observed. The bony structures are intact. Shallow inspiration with diminished lung volumes and hypoventilation. Signed by Dr Christie Nair on 10/2/2020 11:33 AM    Vascular Carotid Duplex Bilateral    Result Date: 10/3/2020  Vascular Carotid Procedure  Demographics   Patient Name  [facility]       Age                81                [address] [facility]   Patient       [id number]         Gender             Female  Number   Visit Number  [id number]      Interpreting       Cindy Cunningham MD                               Physician   Date of Birth 1950     Referring          Iva Rinne                               Physician   Accession     [id number]     [address]  Number                                          RVS, RCS  Procedure Type of Study:   Cerebral:Carotid, VL CAROTID BILATERAL. Indications for Study:CVA. Risk Factors   - The patient's risk factor(s) include: dyslipidemia, obesity and arterial     hypertension. Impression   Duplex sonography with color flow enhancement was performed bilaterally on  the cervical carotid system.  No evidence of hemodynamically significant  stenosis in the bilateral carotid and vertebral arteries. Signature   ----------------------------------------------------------------  Electronically signed by Don Nash  physician) on 10/03/2020 07:42 AM  ----------------------------------------------------------------  Blood Pressure:Left arm 217/ mmHg. Velocities are measured in cm/s ; Diameters are measured in mm Carotid Right Measurements +------------+-------+-------+--------+-------+------------+---------------+ ! Location    ! PSV    ! EDV    ! Angle   ! RI     !%Stenosis   ! Tortuosity     ! +------------+-------+-------+--------+-------+------------+---------------+ ! Prox CCA    !47.1   !11.8   !60      !0.75   !            !               ! +------------+-------+-------+--------+-------+------------+---------------+ ! Mid CCA     !61.3   !14.9   !60      !0.76   !            !               ! +------------+-------+-------+--------+-------+------------+---------------+ ! Prox ICA    !36.1   !14.9   !60      !0.59   !            !               ! +------------+-------+-------+--------+-------+------------+---------------+ ! Mid ICA     ! 55.4   !19.6   !60      !0.65   !            !               ! +------------+-------+-------+--------+-------+------------+---------------+ ! Dist ICA    ! 64     !23.2   ! 60      !0.64   !            !               ! +------------+-------+-------+--------+-------+------------+---------------+ ! Prox ECA    !54.2   ! 8.25   !60      !0.85   !            !               ! +------------+-------+-------+--------+-------+------------+---------------+ ! Vertebral   !29.1   !8.64   !60      !0.7    ! !               ! +------------+-------+-------+--------+-------+------------+---------------+   - There is antegrade vertebral flow noted on the right side. - Additional Measurements:ICAPSV/CCAPSV 1.36. ICAEDV/CCAEDV 1.97.  Carotid Left Measurements There Is corresponding FLAIR signal hyperintensities . There is mild associated sulcal effacement. There is no significant mass effect or midline shift otherwise. There is mild associated vascular enhancement There is an old area of cortical infarction involving the right posterior parietal lobe superiorly, near the vertex. There are punctate FLAIR signal hyperintensities compatible chronic ischemic change. There is no hydrocephalus. No additional abnormal gadolinium enhancement observed. Pituitary gland is not enlarged. Cervical spine imaged in part is unremarkable Globes and intraorbital structures are imaged symmetrically. 1. Acute right posterior parietal/occipital lobe infarct Signed by Dr Meade Cons on 10/3/2020 3:36 PM      Lab Results   Component Value Date    WBC 5.2 10/05/2020    HGB 12.6 10/05/2020    HCT 39.3 10/05/2020    MCV 93.6 10/05/2020     10/05/2020     Lab Results   Component Value Date     10/05/2020    K 3.9 10/05/2020     10/05/2020    CO2 28 10/05/2020    BUN 14 10/05/2020    CREATININE 0.8 10/05/2020    GLUCOSE 113 (H) 10/05/2020    CALCIUM 9.0 10/05/2020    PROT 7.4 10/02/2020    LABALBU 4.0 10/02/2020    BILITOT 1.6 (H) 10/02/2020    ALKPHOS 70 10/02/2020    AST 20 10/02/2020    ALT 11 10/02/2020    LABGLOM >60 10/05/2020    GFRAA >59 10/05/2020     Lab Results   Component Value Date    INR 1.19 (H) 10/02/2020    PROTIME 15.1 (H) 10/02/2020     ESR normal.     RECORD REVIEW:   Previous medical records, medications were reviewed at today's visit. Nursing/physician notes, imaging, labs and vitals reviewed. PT,OT and/or speech notes reviewed    ASSESSMENT:  81 y.o. admitted with left visual field loss, headache, CT with subacute appearing right posterior MCA infarct with questionable suggestion of mild or early hemorrhagic transformation. MRI confirms a moderate sized right posterior MCA infarct with no overt evidence of hemorrhagic transformation. Films reviewed. New onset atrial fibrillation noted. Exam stable overnight.      PLAN:  1. Follow up ECHO, Tele  2. Eliquis 5 mg twice a day and baby aspirin. The latter, given the moderate amount of deep white matter small vessel ischemic change. This can be added at discharge. 3.  PT, OT, ST  4. DVT proph   5.  Risk factor maximization, statin, cautious blood pressure titration. Will ask for inpatient rehab consult but I feel she is likely too high functioning. Would probably benefit from outpatient therapy, at least.  Follow-up with Dr. Lakesha Lopez in 2 weeks.       Sherly Wells MD  Board Certified Neurology

## 2020-10-05 NOTE — CARE COORDINATION
The [address] [facility] [place] [facility]  Notification of Admission Decision      [] Patient has been accepted for admit to [facility] on :       Please write discharge orders and summary prior to discharge. [] Patient acceptance to Rehab pending the following :    [] Eval in progress       [x] Patient determined to be ineligible for services at [facility] because : Only needing P.T., O.T. eval and no needs identified. We recommend you consider: HH or  SNF       Thank you for your referral, we appreciate you.     Electronically Signed by Maddie Pulliam Admissions Coordinator 10/5/2020 10:20 AM

## 2020-10-05 NOTE — PROGRESS NOTES
IV removed, DC instructions reviewed with patient. Outpatient therapy order given to patient. She will arrange for her family to come pick her up.

## 2020-10-05 NOTE — PROGRESS NOTES
Physical Therapy  Name: Cal Cee  MRN:  [id number]  Date of service:  10/5/2020     10/05/20 1147   General   Missed reason Patient declined   Subjective   Subjective Pt refused stating she is going home and is getting dressed to leave now.         Electronically signed by Juan Jose Kapadia PTA on 10/5/2020 at 11:48 AM

## 2020-10-05 NOTE — DISCHARGE SUMMARY
Discharge Summary  Date:10/5/2020        Patient Name:Lashonda Vela Ala     YOB: 1950     Age:69 y.o. Admit Date:10/2/2020   Admission Condition:poor   Discharged Condition:good  Discharge Date: 10/05/20     Discharge Diagnoses   Active Problems:    Occipital infarction [facility])  Resolved Problems:    * No resolved hospital problems. [facility] Stay   Narrative of Hospital Course: Patient admitted 10/2, brought to the emergency room due to concerns of confusion, headache, patient with traveling vehicle and got lost.  Work-up in the emergency room revealed occipital lobe infarct. Patient admitted for neurological evaluation, echocardiogram completed awaiting official report, bilateral ultrasounds no evidence of significant stenosis, hemoglobin A1c within normal limits, lipid panel stable, MRI brain correlates acute occipital infarct, patient worked with physical therapy/Occupational Therapy made great improvement, patient has been started on Eliquis 5 mg twice daily, ASA 81mg  per neurology. Patient has been cleared for discharge 10/5/2020, prescription for physical therapy has been provided. Patient to follow-up with primary care provider in 1 to 2 weeks for hospital discharge as well as ongoing coordination of care. Education modalities attached after visit summary. Consultants:   IP CONSULT TO NEUROLOGY  IP CONSULT TO CASE MANAGEMENT  IP CONSULT TO REHAB/TCU ADMISSION COORDINATOR  IP CONSULT TO REHAB/TCU ADMISSION COORDINATOR  IP CONSULT TO HOME CARE NEEDS    Time Spent on Discharge:  45 minutes were spent in patient examination, evaluation, counseling as well as medication reconciliation, prescriptions for required medications, discharge plan and follow up.       Surgeries/Procedures Performed:       Treatments:   antibiotics: ceftriaxone, analgesia: acetaminophen, cardiac meds: Lipitor, labetalol, Lopressor, anticoagulation: Eliquis, therapies: PT, OT and ST and electrolyte stabilization    Significant Diagnostic Studies:   Recent Labs:  CBC:   Lab Results   Component Value Date    WBC 5.2 10/05/2020    RBC 4.20 10/05/2020    HGB 12.6 10/05/2020    HCT 39.3 10/05/2020    MCV 93.6 10/05/2020    MCH 30.0 10/05/2020    MCHC 32.1 10/05/2020    RDW 13.1 10/05/2020     10/05/2020     BMP:    Lab Results   Component Value Date    GLUCOSE 113 10/05/2020     10/05/2020    K 3.9 10/05/2020     10/05/2020    CO2 28 10/05/2020    ANIONGAP 9 10/05/2020    BUN 14 10/05/2020    CREATININE 0.8 10/05/2020    CALCIUM 9.0 10/05/2020    LABGLOM >60 10/05/2020    GFRAA >59 10/05/2020     HFP:    Lab Results   Component Value Date    PROT 7.4 10/02/2020     CMP:    Lab Results   Component Value Date    GLUCOSE 113 10/05/2020     10/05/2020    K 3.9 10/05/2020     10/05/2020    CO2 28 10/05/2020    BUN 14 10/05/2020    CREATININE 0.8 10/05/2020    ANIONGAP 9 10/05/2020    ALKPHOS 70 10/02/2020    ALT 11 10/02/2020    AST 20 10/02/2020    BILITOT 1.6 10/02/2020    LABALBU 4.0 10/02/2020    LABGLOM >60 10/05/2020    GFRAA >59 10/05/2020    PROT 7.4 10/02/2020    CALCIUM 9.0 10/05/2020     PT/INR:    Lab Results   Component Value Date    PROTIME 15.1 10/02/2020    INR 1.19 10/02/2020     PTT: No results found for: APTT  FLP:    Lab Results   Component Value Date    CHOL 127 10/03/2020    TRIG 80 10/03/2020    HDL 49 10/03/2020     U/A:    Lab Results   Component Value Date    COLORU YELLOW 10/03/2020    SPECGRAV 1.025 10/03/2020    PHUR 5.0 10/03/2020    PROTEINU Negative 10/03/2020    GLUCOSEU Negative 10/03/2020    KETUA Negative 10/03/2020    BILIRUBINUR Negative 10/03/2020    UROBILINOGEN 1.0 10/03/2020    NITRU Negative 10/03/2020    LEUKOCYTESUR SMALL 10/03/2020     TSH:  No results found for: TSH    Radiology Last 7 Days:  Ct Head Wo Contrast    Result Date: 10/2/2020  1. Right occipital lobe abnormality compatible with subacute or early chronic infarct.  2. If symptoms do not fit for stroke in this distribution then pre and postcontrast MR imaging should be performed to rule out underlying neoplasm. Signed by Dr Tesha Magdaleno on 10/2/2020 11:59 AM    Xr Chest Portable    Result Date: 10/2/2020  Shallow inspiration with diminished lung volumes and hypoventilation. Signed by Dr Katia Huber on 10/2/2020 11:33 AM    Mri Brain W Wo Contrast    Result Date: 10/3/2020  1. Acute right posterior parietal/occipital lobe infarct Signed by Dr Katia Huber on 10/3/2020 3:36 PM    Physical Exam  Constitutional:       Appearance: She is obese. She is not ill-appearing. HENT:      Head: Normocephalic and atraumatic. Nose: Nose normal.   Eyes:      General: No scleral icterus. Right eye: No discharge. Left eye: No discharge. Conjunctiva/sclera: Conjunctivae normal.   Neck:      Musculoskeletal: Normal range of motion. Cardiovascular:      Rate and Rhythm: Normal rate. Rhythm irregularly irregular. Pulmonary:      Effort: Pulmonary effort is normal.      Breath sounds: No wheezing or rales. Abdominal:      Tenderness: There is no abdominal tenderness. There is no guarding or rebound. Musculoskeletal:         General: No tenderness. Right lower leg: No edema. Left lower leg: No edema. Comments: Headache has improved   Skin:     General: Skin is warm. Capillary Refill: Capillary refill takes less than 2 seconds. Neurological:      Mental Status: She is alert and oriented to person, place, and time. Motor: Weakness improved. Comments:     Visual changes have improved   Psychiatric:         Mood and Affect: Mood normal.          Discharge Plan   Disposition: Home with [address] [place] [address] [facility]     Provider Follow-Up:   No follow-up provider specified.        Patient Instructions   Diet: cardiac diet    Activity: activity as tolerated    Discharge Medications         Medication List      START taking these medications    apixaban 5 MG Tabs tablet  Commonly known as:  ELIQUIS  Take 1 tablet by mouth 2 times daily  Notes to patient:  Blood Thinner     aspirin EC 81 MG EC tablet  Take 1 tablet by mouth daily        CONTINUE taking these medications    atorvastatin 20 MG tablet  Commonly known as:  LIPITOR     HYDROcodone-acetaminophen 7.5-325 MG per tablet  Commonly known as:  NORCO     Lexapro 10 MG tablet  Generic drug:  escitalopram     methIMAzole 5 MG tablet  Commonly known as:  TAPAZOLE     metoprolol 100 MG tablet  Commonly known as:  LOPRESSOR        STOP taking these medications    clopidogrel 75 MG tablet  Commonly known as:  PLAVIX           Where to Get Your Medications      These medications were sent to [facility], [facility] [address] - P [phone] - F [phone]  [address] [place] [address] [address], [address] [facility] [place] [address]    Phone:  [phone]   · apixaban 5 MG Tabs tablet  · aspirin EC 81 MG EC tablet         EMR Dragon/Transcription disclaimer:   Much of this encounter note is an electronic transcription/translation of spoken language to printed text.  The electronic translation of spoken language may permit erroneous, or at times, nonsensical words or phrases to be inadvertently transcribed; although attempts have made to review the note for such errors, some may still exist.    Electronically signed by   Rodney Magana   Internal Medicine Hospitalist  On 10/5/2020  At 10:33 AM

## 2020-10-06 LAB
EKG P AXIS: NORMAL DEGREES
EKG P-R INTERVAL: NORMAL MS
EKG Q-T INTERVAL: 382 MS
EKG QRS DURATION: 92 MS
EKG QTC CALCULATION (BAZETT): 420 MS
EKG T AXIS: 37 DEGREES

## 2020-10-09 LAB
ORGANISM: ABNORMAL
ORGANISM: ABNORMAL
URINE CULTURE, ROUTINE: ABNORMAL

## 2020-10-19 ENCOUNTER — OFFICE VISIT (OUTPATIENT)
Dept: NEUROSURGERY | Age: 70
End: 2020-10-19
Payer: MEDICARE

## 2020-10-19 VITALS
BODY MASS INDEX: 62.19 KG/M2 | HEIGHT: 62 IN | DIASTOLIC BLOOD PRESSURE: 71 MMHG | OXYGEN SATURATION: 95 % | HEART RATE: 78 BPM | SYSTOLIC BLOOD PRESSURE: 109 MMHG

## 2020-10-19 PROCEDURE — 3017F COLORECTAL CA SCREEN DOC REV: CPT | Performed by: NURSE PRACTITIONER

## 2020-10-19 PROCEDURE — G8484 FLU IMMUNIZE NO ADMIN: HCPCS | Performed by: NURSE PRACTITIONER

## 2020-10-19 PROCEDURE — 1123F ACP DISCUSS/DSCN MKR DOCD: CPT | Performed by: NURSE PRACTITIONER

## 2020-10-19 PROCEDURE — G8427 DOCREV CUR MEDS BY ELIG CLIN: HCPCS | Performed by: NURSE PRACTITIONER

## 2020-10-19 PROCEDURE — 99214 OFFICE O/P EST MOD 30 MIN: CPT | Performed by: NURSE PRACTITIONER

## 2020-10-19 PROCEDURE — 1036F TOBACCO NON-USER: CPT | Performed by: NURSE PRACTITIONER

## 2020-10-19 PROCEDURE — 4040F PNEUMOC VAC/ADMIN/RCVD: CPT | Performed by: NURSE PRACTITIONER

## 2020-10-19 PROCEDURE — 1090F PRES/ABSN URINE INCON ASSESS: CPT | Performed by: NURSE PRACTITIONER

## 2020-10-19 PROCEDURE — G8400 PT W/DXA NO RESULTS DOC: HCPCS | Performed by: NURSE PRACTITIONER

## 2020-10-19 PROCEDURE — 1111F DSCHRG MED/CURRENT MED MERGE: CPT | Performed by: NURSE PRACTITIONER

## 2020-10-19 PROCEDURE — G8417 CALC BMI ABV UP PARAM F/U: HCPCS | Performed by: NURSE PRACTITIONER

## 2020-10-19 NOTE — PROGRESS NOTES
[facility] Neurology Office Note      Patient:   Sarah Whitaker  MR#:    [id number]  Account Number:                         YOB: 1950  Date of Evaluation:  10/19/2020  Time of Note:                          4:48 PM  Primary/Referring Physician:  No primary care provider on file. Consulting Physician:  LALITA Samuel    FOLLOW UP    Chief Complaint   Patient presents with    Referral - General    Cerebrovascular Accident    Dizziness    Headache       HISTORY OF PRESENT ILLNESS    Sarah Whitaker is a 81y.o. year old female here for hospital follow up. She was admitted for acute stroke. She noted confusion, left visual field deficit, headache. No focal weakness noted, no facial drooping. She was out of the time window for TPA or other intervention. MRI brain with right sided posterior parietal/occipital infarct. She was found to be in atrial fibrillation in the ER as well, no prior history. She is now on Eliquis. Vision is now back to baseline. Still noting a mild headache, has been somewhat chronic. Headache pain is posterior, at times it is retro orbital, typically right sided. Notes nausea with some headaches. Denies sound and light sensitivity. Denies jaw claudication, TA tenderness. Overall improved. Past Medical History:   Diagnosis Date    [facility]est[facility]     COPD [facility]chronic obstructive pulmonary disease) [facility] [address]     Hyperlipidemia     Hypertension     Thyroid disease        Past Surgical History:   Procedure Laterality Date    TONSILLECTOMY      TUMOR REMOVAL         History reviewed. No pertinent family history. Social History     Socioeconomic History    Marital status:       Spouse name: Not on file    Number of children: Not on file    Years of education: Not on file    Highest education level: Not on file   Occupational History    Not on file   Social Needs    Financial resource strain: Not on file    Food insecurity     Worry: Not on file     Inability: Not on file    Transportation needs     Medical: Not on file     Non-medical: Not on file   Tobacco Use    Smoking status: Never Smoker    Smokeless tobacco: Never Used   Substance and Sexual Activity    Alcohol use: Not Currently    Drug use: Not Currently    Sexual activity: Not on file   Lifestyle    Physical activity     Days per week: Not on file     Minutes per session: Not on file    Stress: Not on file   Relationships    Social connections     Talks on phone: Not on file     Gets together: Not on file     Attends Cheondoism service: Not on file     Active member of club or organization: Not on file     Attends meetings of clubs or organizations: Not on file     Relationship status: Not on file    Intimate partner violence     Fear of current or ex partner: Not on file     Emotionally abused: Not on file     Physically abused: Not on file     Forced sexual activity: Not on file   Other Topics Concern    Not on file   Social History Narrative    Not on file       Current Outpatient Medications   Medication Sig Dispense Refill    apixaban (ELIQUIS) 5 MG TABS tablet Take 1 tablet by mouth 2 times daily 60 tablet 0    aspirin EC 81 MG EC tablet Take 1 tablet by mouth daily 90 tablet 0    atorvastatin (LIPITOR) 20 MG tablet Take 20 mg by mouth daily      metoprolol (LOPRESSOR) 100 MG tablet Take 100 mg by mouth 2 times daily      escitalopram (LEXAPRO) 10 MG tablet Take 10 mg by mouth daily      methIMAzole (TAPAZOLE) 5 MG tablet Take 5 mg by mouth 2 times daily      HYDROcodone-acetaminophen (NORCO) 7.5-325 MG per tablet Take 1 tablet by mouth every 6 hours as needed for Pain. No current facility-administered medications for this visit. Allergies   Allergen Reactions    Niacin And Related      REVIEW OF SYSTEMS  Constitutional: []? Fever []? Sweat []? Chills []? Recent Injury [x]? Denies all unless marked  HEENT:[x]? Headache  []?  Head Injury/Hearing Loss []? Sore Throat  []? Ear Ache/Dizziness  []? Denies all unless marked  Spine:  []? Neck pain  []? Back pain  []? Sciaticia  [x]? Denies all unless marked  Cardiovascular:[]? Heart Disease []? Chest Pain []? Palpitations  [x]? Denies all unless marked  Pulmonary: []? Shortness of Breath []? Cough   [x]? Denies all unless marke  Gastrointestinal: []? Nausea  []? Vomiting  []? Abdominal Pain  []? Constipation  []? Diarrhea  []? Dark Bloody Stools  [x]? Denies all unless marked  Psychiatric/Behavioral:[]? Depression []? Anxiety [x]? Denies all unless marked  Genitourinary:   []? Frequency  []? Urgency  []? Incontinence []? Pain with Urination  [x]? Denies all unless marked  Extremities: []? Pain  []? Swelling  [x]? Denies all unless marked  Musculoskeletal: []? Muscle Pain  []? Joint Pain  []? Arthritis []? Muscle Cramps []? Muscle Twitches  [x]? Denies all unless marked  Sleep: []? Insomnia []? Snoring []? Restless Legs []? Sleep Apnea  []? Daytime Sleepiness  [x]? Denies all unless marked  Skin:[]? Rash []? Skin Discoloration [x]? Denies all unless marked   Neurological: []? Visual Disturbance/Memory Loss []? Loss of Balance []? Slurred Speech/Weakness []? Seizures  [x]? Vertigo/Dizziness []? Denies all unless marked    The MA has completed the ROS with the patient. I have reviewed it in its' entirety with the patient and agree with the documentation. PHYSICAL EXAM  /71   Pulse 78   Ht 5' 2\" (1.575 m)   SpO2 95%   BMI 62.19 kg/m²       Constitutional - No acute distress    HEENT- Conjunctiva normal.  No scars, masses, or lesions over external nose or ears, no neck masses noted, no jugular vein distension, no bruit  Cardiac- Irregularly, irregular rhythm   Pulmonary- Good expansion, normal effort without use of accessory muscles  Musculoskeletal - No significant wasting of muscles noted, no bony deformities  Extremities - No clubbing, cyanosis or edema  Skin - Warm, dry, and intact.   No rash, erythema, or pallor  Psychiatric - Mood, affect, and behavior appear normal      NEUROLOGICAL EXAM     Mental status   [x] Awake, alert, oriented   [x]Affect attention and concentration appear appropriate  [x]Recent and remote memory appears unremarkable  [x]Speech normal without dysarthria or aphasia, comprehension and repetition intact. COMMENTS:    Cranial Nerves [x]No VF deficit to confrontation,  no papilledema on fundoscopic exam.  [x]PERRLA, EOMI, no nystagmus, conjugate eye movements, no ptosis  [x]Face symmetric  [x]Facial sensation intact  [x]Tongue midline no atrophy or fasciculations present  [x]Palate midline, hearing to finger rub normal bilaterally  [x]Shoulder shrug and SCM testing normal bilaterally  COMMENTS:   Motor   [x]5/5 strength x 4 extremities  [x]Normal bulk and tone  [x]No tremor present  [x]No rigidity or bradykinesia noted  COMMENTS:   Sensory  [x]Sensation intact to light touch, pin prick, vibration, and proprioception BLE  [x]Sensation intact to light touch, pin prick, vibration, and proprioception BUE  COMMENTS:   Coordination [x]FTN normal bilaterally   [x]HTS normal bilaterally  [x]KVNG normal bilaterally.    COMMENTS:   Reflexes  [x]Symmetric and non-pathological  [x]Toes down going bilaterally  [x]No clonus present  COMMENTS:   Gait                  [x]Normal steady gait    []Ataxic    []Spastic     []Magnetic     []Shuffling  COMMENTS:       LABS RECORD AND IMAGING REVIEW (As below and per HPI)    No results found for: [id number]  Lab Results   Component Value Date    WBC 5.2 10/05/2020    HGB 12.6 10/05/2020    HCT 39.3 10/05/2020    MCV 93.6 10/05/2020     10/05/2020     Lab Results   Component Value Date     10/05/2020    K 3.9 10/05/2020     10/05/2020    CO2 28 10/05/2020    BUN 14 10/05/2020    CREATININE 0.8 10/05/2020    GLUCOSE 113 (H) 10/05/2020    CALCIUM 9.0 10/05/2020    PROT 7.4 10/02/2020    LABALBU 4.0 10/02/2020    BILITOT 1.6 (H) 10/02/2020    ALKPHOS 70 10/02/2020    AST 20 10/02/2020    ALT 11 10/02/2020    LABGLOM >60 10/05/2020    GFRAA >59 10/05/2020     Lab Results   Component Value Date    CHOL 127 (L) 10/03/2020    TRIG 80 10/03/2020    HDL 49 (L) 10/03/2020    LDLCALC 62 10/03/2020     No results found for: TSH, T4FREE  Lab Results   Component Value Date    CRP 0.46 10/03/2020    SEDRATE 20 10/03/2020        Ct Head Wo Contrast    Result Date: 10/2/2020  CT HEAD WO CONTRAST 10/2/2020 11:55 AM History: Altered mental status and visual changes. Dizziness. In order to have a CT radiation dose as low as reasonably achievable Automated Exposure Control was utilized for adjustment of the mA and/or KV according to patient size. DLP in mGycm= 736. Right occipital lobe hypodense focus involving cortex and subcortical white matter. Area of involvement = 6.3 x 3 cm. There is some slight hyperdensity within this focus with suggests hemorrhagic staining. There is no discrete hemorrhage. There is a small focus of old ischemic change within the posterior right frontal lobe. Ventricle size is normal. No midline shift. Normal bones. Clear paranasal sinuses and mastoid air cells. 1. Right occipital lobe abnormality compatible with subacute or early chronic infarct. 2. If symptoms do not fit for stroke in this distribution then pre and postcontrast MR imaging should be performed to rule out underlying neoplasm. Signed by Dr Elizabeth Gallagher on 10/2/2020 11:59 AM    Xr Chest Portable    Result Date: 10/2/2020  XR CHEST PORTABLE 10/2/2020 10:20 AM HISTORY:   Confusion  Single view. Portable upright COMPARISONS:  None FINDINGS: The level of inspiration is shallow and lung volumes diminished. Bronchovascular interstitial markings are prominent likely related to the level of inspiration and hypoventilation. The heart also appears generous, again likely related to hypoventilation. No definite parenchymal infiltrates observed. The bony structures are intact.     Shallow inspiration with diminished lung volumes and hypoventilation. Signed by Dr Rikki Maloney on 10/2/2020 11:33 AM    Vascular Carotid Duplex Bilateral    Result Date: 10/3/2020  Vascular Carotid Procedure  Demographics   Patient Name  [facility]       Age                81                [address]   Patient       [id number]         Gender             Female  Number   Visit Number  [id number]      Interpreting       Nikki Erazo MD                               Physician   Date of Birth 1950     Referring          Brea Akira                               Physician   Accession     [id number]     [address]  Number                                          RVS, RCS  Procedure Type of Study:   Cerebral:Carotid, VL CAROTID BILATERAL. Indications for Study:CVA. Risk Factors   - The patient's risk factor(s) include: dyslipidemia, obesity and arterial     hypertension. Impression   Duplex sonography with color flow enhancement was performed bilaterally on  the cervical carotid system. No evidence of hemodynamically significant  stenosis in the bilateral carotid and vertebral arteries. Signature   ----------------------------------------------------------------  Electronically signed by Nikki Erazo MD(Interpreting  physician) on 10/03/2020 07:42 AM     Mri Brain W Wo Contrast    Result Date: 10/3/2020  EXAMINATION:  MRI BRAIN W WO CONTRAST  10/3/2020 3:28 PM HISTORY: Altered mental status and vision changes. Stroke symptoms. COMPARISON: Head CT dated 10/2/2020 TECHNIQUE: Multiplanar MR imaging the brain was performed as well as gadolinium enhanced imaging. FINDINGS: As noted on the CT examination there is restricted diffusion signal abnormality involving the right posterior parietal/occipital lobe compatible with acute ischemic change/infarction. There Is corresponding FLAIR signal hyperintensities . There is mild associated sulcal effacement.  There is no significant mass effect or midline shift otherwise. There is mild associated vascular enhancement There is an old area of cortical infarction involving the right posterior parietal lobe superiorly, near the vertex. There are punctate FLAIR signal hyperintensities compatible chronic ischemic change. There is no hydrocephalus. No additional abnormal gadolinium enhancement observed. Pituitary gland is not enlarged. Cervical spine imaged in part is unremarkable Globes and intraorbital structures are imaged symmetrically. 1. Acute right posterior parietal/occipital lobe infarct Signed by Dr Vandana Brown on 10/3/2020 3:36 PM    Echocardiogram (10/2020)-  Summary   Technically difficult study   Moderate left ventricular enlargement with systolic function within normal   limits with EF of 55%   Moderate concentric left ventricular hypertrophy with transmitral tissue   Dopplers consistent with severe/restrictive Vickki Coral 3] diastolic   dysfunction   Moderate left atrial enlargement   Tricuspid aortic valve with adequate cusp separation and neither stenosis   or insufficiency   Mild thickening of a normally mobile mitral valve with mild regurgitation   Nonvisualization pulmonic valve   Poor visualization of the right-sided chambers   Mild tricuspid regurgitation with RVSP estimate of 47 mmHg   Significantly dilated IVC with failure respiratory motion consistent with   marked elevation of right atrial filling pressures in excess of 20 mmHg   No significant pericardial effusion   Aortic root dimensions within normal limits with mild dilatation of the   ascending aorta at 3.4 cm   Definity contrast utilized to better define endocardial borders      Signature      ----------------------------------------------------------------   Electronically signed by Eric Watts MD(Interpreting physician)   on 10/08/2020 08:59 AM    ASSESSMENT:    Sarah Whitaker is a 81y.o. year old female here for hospital follow up.  She was recently admitted with left visual field deficit, confusion, headache. MRI brain with right posterior parietal/occipital infarct. Echocardiogram with moderate left atrial and ventricular enlargement. Found to be in atrial fibrillation upon presentation in ER, no prior history of this. Carotid artery u/s was normal. She has overall improved, returned to baseline since discharge. Will plan for cardiology given echocardiogram and atrial fibrillation. Given continued headaches, will plan for CT head to rule out any acute source of headache. ICD-10-CM    1. Occipital infarction [facility])  I63.9 Recardo GRACIELA HernandezN, Cardiology, [place]     CT HEAD WO CONTRAST   2. Atrial fibrillation, unspecified type [facility])  I48.91 Shivani Hernandez, APRN, Cardiology, [place]       PLAN:  1. Continue stroke risk factor maximization- ASA, statin, Eliquis and anti hypertensive; will look at patient assistance. 2. Cardiology referral   3. Trial of Ubrelvy prn  4. CT head   5. Return in about 3 months (around 1/19/2021) for follow up, sooner if worsening. Discussed importance of coming to the ER within 3-4 hours of stroke like symptoms. Sagrario Rivera DNP, APRN    Note:  A total of >50% (>13 minutes) of 25 minutes was spent discussing the pathophysiology and treatment and/or coordination of care of the above diagnoses.

## 2020-10-27 ENCOUNTER — HOSPITAL ENCOUNTER (OUTPATIENT)
Dept: CT IMAGING | Age: 70
Discharge: HOME | End: 2020-10-27
Payer: MEDICARE

## 2020-10-27 PROCEDURE — 70450 CT HEAD/BRAIN W/O DYE: CPT

## 2020-10-29 ENCOUNTER — TELEPHONE (OUTPATIENT)
Dept: NEUROSURGERY | Age: 70
End: 2020-10-29

## 2020-10-29 NOTE — TELEPHONE ENCOUNTER
Called patient per Allyn eJan to give her the results of recent test, patient understood also going to try to get help with her Eliquis

## 2020-11-03 NOTE — TELEPHONE ENCOUNTER
She was started on Eliquis for new onset atrial fibrillation that was found when she came to the ER with a stroke. From a neurological perspective she needs to be on anti coagulation given this/reduce risk of embolic stroke. However $600 a month is not reasonable. I would recommend that she switch to Coumadin. She has an appointment with cardiology tomorrow for the atrial fibrillation. If they believe she needs to continue anti coagulation then they can help get her set up with home monitoring and such. Either that or her PCP can do this. Defer further management of this to cardiology or PCP.

## 2020-11-03 NOTE — TELEPHONE ENCOUNTER
Spoke with Wanda Patricia and provided the toll free number [phone] to check eligibility for co-pay assistance. I called Wanda Patricia back to let her know I did find some samples of Eliquis in the office she could get at her earliest convenience. Wanda Patricia said that Sarah did not qualify for any assistance and the drug is over $600.

## 2020-11-06 ENCOUNTER — OFFICE VISIT (OUTPATIENT)
Dept: CARDIOLOGY | Age: 70
End: 2020-11-06
Payer: MEDICARE

## 2020-11-06 VITALS
WEIGHT: 293 LBS | HEART RATE: 81 BPM | BODY MASS INDEX: 53.92 KG/M2 | DIASTOLIC BLOOD PRESSURE: 88 MMHG | SYSTOLIC BLOOD PRESSURE: 134 MMHG | HEIGHT: 62 IN

## 2020-11-06 PROCEDURE — 1123F ACP DISCUSS/DSCN MKR DOCD: CPT | Performed by: CLINICAL NURSE SPECIALIST

## 2020-11-06 PROCEDURE — 99213 OFFICE O/P EST LOW 20 MIN: CPT | Performed by: CLINICAL NURSE SPECIALIST

## 2020-11-06 PROCEDURE — 1036F TOBACCO NON-USER: CPT | Performed by: CLINICAL NURSE SPECIALIST

## 2020-11-06 PROCEDURE — G8400 PT W/DXA NO RESULTS DOC: HCPCS | Performed by: CLINICAL NURSE SPECIALIST

## 2020-11-06 PROCEDURE — G8417 CALC BMI ABV UP PARAM F/U: HCPCS | Performed by: CLINICAL NURSE SPECIALIST

## 2020-11-06 PROCEDURE — 0296T PR EXT ECG > 48HR TO 21 DAY RCRD W/CONECT INTL RCRD: CPT | Performed by: CLINICAL NURSE SPECIALIST

## 2020-11-06 PROCEDURE — G8427 DOCREV CUR MEDS BY ELIG CLIN: HCPCS | Performed by: CLINICAL NURSE SPECIALIST

## 2020-11-06 PROCEDURE — G8484 FLU IMMUNIZE NO ADMIN: HCPCS | Performed by: CLINICAL NURSE SPECIALIST

## 2020-11-06 PROCEDURE — 93000 ELECTROCARDIOGRAM COMPLETE: CPT | Performed by: CLINICAL NURSE SPECIALIST

## 2020-11-06 PROCEDURE — 1090F PRES/ABSN URINE INCON ASSESS: CPT | Performed by: CLINICAL NURSE SPECIALIST

## 2020-11-06 PROCEDURE — 4040F PNEUMOC VAC/ADMIN/RCVD: CPT | Performed by: CLINICAL NURSE SPECIALIST

## 2020-11-06 PROCEDURE — 3017F COLORECTAL CA SCREEN DOC REV: CPT | Performed by: CLINICAL NURSE SPECIALIST

## 2020-11-06 NOTE — PATIENT INSTRUCTIONS
without talking to your doctor first.   Lifestyle changes    · Do not smoke. Smoking can increase your chance of a stroke and heart attack. If you need help quitting, talk to your doctor about stop-smoking programs and medicines. These can increase your chances of quitting for good.     · Eat a heart-healthy diet.     · Stay at a healthy weight. Lose weight if you need to.     · Limit alcohol to 2 drinks a day for men and 1 drink a day for women. Too much alcohol can cause health problems.     · Avoid colds and flu. Get a pneumococcal vaccine shot. If you have had one before, ask your doctor whether you need another dose. Get a flu shot every year. If you must be around people with colds or flu, wash your hands often. Activity    · If your doctor recommends it, get more exercise. Walking is a good choice. Bit by bit, increase the amount you walk every day. Try for at least 30 minutes on most days of the week. You also may want to swim, bike, or do other activities. Your doctor may suggest that you join a cardiac rehabilitation program so that you can have help increasing your physical activity safely.     · Start light exercise if your doctor says it is okay. Even a small amount will help you get stronger, have more energy, and manage stress. Walking is an easy way to get exercise. Start out by walking a little more than you did in the hospital. Gradually increase the amount you walk.     · When you exercise, watch for signs that your heart is working too hard. You are pushing too hard if you cannot talk while you are exercising. If you become short of breath or dizzy or have chest pain, sit down and rest immediately.     · Check your pulse regularly. Place two fingers on the artery at the palm side of your wrist, in line with your thumb. If your heartbeat seems uneven or fast, talk to your doctor. When should you call for help? Call 911 anytime you think you may need emergency care.  For example, call if:    · You have symptoms of a heart attack. These may include:  ? Chest pain or pressure, or a strange feeling in the chest.  ? Sweating. ? Shortness of breath. ? Nausea or vomiting. ? Pain, pressure, or a strange feeling in the back, neck, jaw, or upper belly or in one or both shoulders or arms. ? Lightheadedness or sudden weakness. ? A fast or irregular heartbeat. After you call 911, the  may tell you to chew 1 adult-strength or 2 to 4 low-dose aspirin. Wait for an ambulance. Do not try to drive yourself.     · You have symptoms of a stroke. These may include:  ? Sudden numbness, tingling, weakness, or loss of movement in your face, arm, or leg, especially on only one side of your body. ? Sudden vision changes. ? Sudden trouble speaking. ? Sudden confusion or trouble understanding simple statements. ? Sudden problems with walking or balance. ? A sudden, severe headache that is different from past headaches.     · You passed out (lost consciousness). Call your doctor now or seek immediate medical care if:    · You have new or increased shortness of breath.     · You feel dizzy or lightheaded, or you feel like you may faint.     · Your heart rate becomes irregular.     · You can feel your heart flutter in your chest or skip heartbeats. Tell your doctor if these symptoms are new or worse. Watch closely for changes in your health, and be sure to contact your doctor if you have any problems. Where can you learn more? Go to [email or web address]. org and sign in to your [employer] account. Enter U020 in the [employer] box to learn more about \"Atrial Fibrillation: Care Instructions. \"     If you do not have an account, please click on the \"Sign Up Now\" link. Current as of: December 16, 2019               Content Version: 12.6  © 2306-2474 [employer]. Care instructions adapted under license by [facility] ([facility]).  If you have questions about a medical condition or this instruction, always ask your healthcare professional. [place] [address] any warranty or liability for your use of this information.

## 2020-11-09 ENCOUNTER — TELEPHONE (OUTPATIENT)
Dept: NEUROLOGY | Age: 70
End: 2020-11-09

## 2020-11-09 NOTE — TELEPHONE ENCOUNTER
Patients granddaughter called and stated that the patient seen Cardiology and was told that it would be ok for her to be on Topamax for her headaches and was told to call our office and see if you would send it in for her? If you do, she would like to have the medication sent to [facility].

## 2020-11-10 ENCOUNTER — HOSPITAL ENCOUNTER (OUTPATIENT)
Dept: NON INVASIVE DIAGNOSTICS | Age: 70
Discharge: HOME | End: 2020-11-10
Payer: MEDICARE

## 2020-11-10 VITALS
DIASTOLIC BLOOD PRESSURE: 80 MMHG | SYSTOLIC BLOOD PRESSURE: 134 MMHG | BODY MASS INDEX: 59.99 KG/M2 | WEIGHT: 293 LBS | HEART RATE: 93 BPM

## 2020-11-10 PROCEDURE — 93017 CV STRESS TEST TRACING ONLY: CPT

## 2020-11-10 PROCEDURE — C8928 TTE W OR W/O FOL W/CON,STRES: HCPCS

## 2020-11-10 PROCEDURE — 6360000004 HC RX CONTRAST MEDICATION: Performed by: NURSE PRACTITIONER

## 2020-11-10 PROCEDURE — 2580000003 HC RX 258: Performed by: NURSE PRACTITIONER

## 2020-11-10 PROCEDURE — 6360000002 HC RX W HCPCS: Performed by: NURSE PRACTITIONER

## 2020-11-10 PROCEDURE — 2500000003 HC RX 250 WO HCPCS: Performed by: NURSE PRACTITIONER

## 2020-11-10 RX ORDER — METOPROLOL TARTRATE 5 MG/5ML
5 INJECTION INTRAVENOUS PRN
Status: DISCONTINUED | OUTPATIENT
Start: 2020-11-10 | End: 2020-11-11 | Stop reason: HOSPADM

## 2020-11-10 RX ORDER — DOBUTAMINE HYDROCHLORIDE 100 MG/100ML
10 INJECTION INTRAVENOUS CONTINUOUS PRN
Status: DISCONTINUED | OUTPATIENT
Start: 2020-11-10 | End: 2020-11-11 | Stop reason: HOSPADM

## 2020-11-10 RX ORDER — SODIUM CHLORIDE 9 MG/ML
INJECTION, SOLUTION INTRAVENOUS
Status: COMPLETED | OUTPATIENT
Start: 2020-11-10 | End: 2020-11-10

## 2020-11-10 RX ORDER — SODIUM CHLORIDE 0.9 % (FLUSH) 0.9 %
10 SYRINGE (ML) INJECTION PRN
Status: DISCONTINUED | OUTPATIENT
Start: 2020-11-10 | End: 2020-11-11 | Stop reason: HOSPADM

## 2020-11-10 RX ADMIN — PERFLUTREN 2.2 MG: 6.52 INJECTION, SUSPENSION INTRAVENOUS at 14:17

## 2020-11-10 RX ADMIN — SODIUM CHLORIDE: 9 INJECTION, SOLUTION INTRAVENOUS at 14:15

## 2020-11-10 RX ADMIN — METOPROLOL TARTRATE 1 MG: 5 INJECTION INTRAVENOUS at 14:32

## 2020-11-10 RX ADMIN — DOBUTAMINE HYDROCHLORIDE 10 MCG/KG/MIN: 100 INJECTION INTRAVENOUS at 14:21

## 2020-11-10 NOTE — TELEPHONE ENCOUNTER
Topamax is contraindicated with a history of kidney stones. She's already on a beta blocker which can help from headache standpoint. Could consider something like Nortriptyline but there are side effects with this. Last time she was in the office she didn't want to start a preventative. Have her headaches worsened?

## 2020-11-11 ENCOUNTER — TELEPHONE (OUTPATIENT)
Dept: CARDIOLOGY | Age: 70
End: 2020-11-11

## 2020-11-11 NOTE — TELEPHONE ENCOUNTER
Patient had abnormal stress test. Per NP note; \"She has MCDANIEL but attributes it to her weight. No exertional chest pain, pressure, burning or squeezing. \"       Spoke with patient explained results but she is also in between a new thinner for afib. This is a new finding. Before scheduled cath and holding her eliquis need to find out about DCCV first. Will talk with Dr. Cesar Barber (this is who patients request) and then call her back this afternoon.

## 2020-11-12 ENCOUNTER — TELEPHONE (OUTPATIENT)
Dept: CARDIOLOGY | Age: 70
End: 2020-11-12

## 2020-11-12 RX ORDER — NORTRIPTYLINE HYDROCHLORIDE 10 MG/1
10 CAPSULE ORAL NIGHTLY
Qty: 30 CAPSULE | Refills: 2 | Status: SHIPPED | OUTPATIENT
Start: 2020-11-12 | End: 2021-03-15

## 2020-11-12 NOTE — TELEPHONE ENCOUNTER
Spoke with patient and she voiced that she has headaches daily and she takes Tylenol for it. This will help but her headache come back within an hour or so. She also takes Tylenol for arthritis in her knees, patient says that she used to get Norco for this and it helped but she doesn't have anymore. She is willing to try anything for her headaches.  Please advise

## 2020-11-12 NOTE — TELEPHONE ENCOUNTER
Let's try low dose Nortriptyline. Will send to the pharmacy. It may make her tired, can lead to constipation as well so have her monitor. Should take at night time.

## 2020-11-17 NOTE — TELEPHONE ENCOUNTER
Spoke with pt and voiced that Nortriptyline was sent to her pharmacy. Instructed her to take it at night because it can cause drowsiness, and it can lead to constipation. Patient voiced understanding to all.

## 2020-11-17 NOTE — TELEPHONE ENCOUNTER
Spoke with patient. She is not aware of when she goes in and out of rhythm. Plan will be to do DCCV first if she is still out of rhythm then LHC same day radially. She will continue her eliquis as instructed. Referrals entered for both procedures. Spoke with patient and granddaughter about procedure date of 12/1/2020 arrival of 7AM for 9AM. Covid on 11/27 between 8-10 AM only at [facility].

## 2020-11-27 ENCOUNTER — OFFICE VISIT (OUTPATIENT)
Age: 70
End: 2020-11-27

## 2020-11-27 VITALS — HEART RATE: 71 BPM | TEMPERATURE: 97.9 F | OXYGEN SATURATION: 95 %

## 2020-11-27 LAB — SARS-COV-2, PCR: NOT DETECTED

## 2020-11-30 ENCOUNTER — TELEPHONE (OUTPATIENT)
Dept: CARDIOLOGY | Age: 70
End: 2020-11-30

## 2020-12-01 ENCOUNTER — HOSPITAL ENCOUNTER (OUTPATIENT)
Dept: CARDIAC CATH/INVASIVE PROCEDURES | Age: 70
Discharge: HOME | End: 2020-12-01
Attending: INTERNAL MEDICINE | Admitting: INTERNAL MEDICINE
Payer: MEDICARE

## 2020-12-01 VITALS
RESPIRATION RATE: 17 BRPM | TEMPERATURE: 97.1 F | HEART RATE: 74 BPM | DIASTOLIC BLOOD PRESSURE: 83 MMHG | HEIGHT: 62 IN | OXYGEN SATURATION: 93 % | BODY MASS INDEX: 53.92 KG/M2 | WEIGHT: 293 LBS | SYSTOLIC BLOOD PRESSURE: 125 MMHG

## 2020-12-01 LAB
ALBUMIN SERPL-MCNC: 3.9 G/DL (ref 3.5–5.2)
ALP BLD-CCNC: 85 U/L (ref 35–104)
ALT SERPL-CCNC: 12 U/L (ref 5–33)
ANION GAP SERPL CALCULATED.3IONS-SCNC: 8 MMOL/L (ref 7–19)
AST SERPL-CCNC: 22 U/L (ref 5–32)
BILIRUB SERPL-MCNC: 1 MG/DL (ref 0.2–1.2)
BUN BLDV-MCNC: 15 MG/DL (ref 8–23)
CALCIUM SERPL-MCNC: 8.8 MG/DL (ref 8.8–10.2)
CHLORIDE BLD-SCNC: 104 MMOL/L (ref 98–111)
CO2: 28 MMOL/L (ref 22–29)
CREAT SERPL-MCNC: 0.7 MG/DL (ref 0.5–0.9)
EKG P AXIS: NORMAL DEGREES
EKG P-R INTERVAL: NORMAL MS
EKG Q-T INTERVAL: 466 MS
EKG QRS DURATION: 98 MS
EKG QTC CALCULATION (BAZETT): 476 MS
EKG T AXIS: 122 DEGREES
GFR AFRICAN AMERICAN: >59
GFR NON-AFRICAN AMERICAN: >60
GLUCOSE BLD-MCNC: 138 MG/DL (ref 74–109)
HCT VFR BLD CALC: 44.2 % (ref 37–47)
HEMOGLOBIN: 14.3 G/DL (ref 12–16)
MCH RBC QN AUTO: 29 PG (ref 27–31)
MCHC RBC AUTO-ENTMCNC: 32.4 G/DL (ref 33–37)
MCV RBC AUTO: 89.7 FL (ref 81–99)
PDW BLD-RTO: 14.6 % (ref 11.5–14.5)
PLATELET # BLD: 173 K/UL (ref 130–400)
PMV BLD AUTO: 11.4 FL (ref 9.4–12.3)
POTASSIUM REFLEX MAGNESIUM: 4.1 MMOL/L (ref 3.5–5)
RBC # BLD: 4.93 M/UL (ref 4.2–5.4)
SODIUM BLD-SCNC: 140 MMOL/L (ref 136–145)
TOTAL PROTEIN: 7.1 G/DL (ref 6.6–8.7)
WBC # BLD: 4.5 K/UL (ref 4.8–10.8)

## 2020-12-01 PROCEDURE — C1769 GUIDE WIRE: HCPCS

## 2020-12-01 PROCEDURE — 93005 ELECTROCARDIOGRAM TRACING: CPT | Performed by: INTERNAL MEDICINE

## 2020-12-01 PROCEDURE — 92960 CARDIOVERSION ELECTRIC EXT: CPT

## 2020-12-01 PROCEDURE — 93010 ELECTROCARDIOGRAM REPORT: CPT | Performed by: INTERNAL MEDICINE

## 2020-12-01 PROCEDURE — 2709999900 HC NON-CHARGEABLE SUPPLY

## 2020-12-01 PROCEDURE — 85027 COMPLETE CBC AUTOMATED: CPT

## 2020-12-01 PROCEDURE — 99152 MOD SED SAME PHYS/QHP 5/>YRS: CPT

## 2020-12-01 PROCEDURE — 6360000002 HC RX W HCPCS

## 2020-12-01 PROCEDURE — 92960 CARDIOVERSION ELECTRIC EXT: CPT | Performed by: INTERNAL MEDICINE

## 2020-12-01 PROCEDURE — 80053 COMPREHEN METABOLIC PANEL: CPT

## 2020-12-01 PROCEDURE — 99024 POSTOP FOLLOW-UP VISIT: CPT | Performed by: INTERNAL MEDICINE

## 2020-12-01 PROCEDURE — 2500000003 HC RX 250 WO HCPCS

## 2020-12-01 PROCEDURE — 93458 L HRT ARTERY/VENTRICLE ANGIO: CPT | Performed by: INTERNAL MEDICINE

## 2020-12-01 PROCEDURE — 99152 MOD SED SAME PHYS/QHP 5/>YRS: CPT | Performed by: INTERNAL MEDICINE

## 2020-12-01 PROCEDURE — 2580000003 HC RX 258: Performed by: INTERNAL MEDICINE

## 2020-12-01 PROCEDURE — 99153 MOD SED SAME PHYS/QHP EA: CPT

## 2020-12-01 PROCEDURE — 6370000000 HC RX 637 (ALT 250 FOR IP): Performed by: INTERNAL MEDICINE

## 2020-12-01 PROCEDURE — 93458 L HRT ARTERY/VENTRICLE ANGIO: CPT

## 2020-12-01 PROCEDURE — 6360000004 HC RX CONTRAST MEDICATION: Performed by: INTERNAL MEDICINE

## 2020-12-01 PROCEDURE — 36415 COLL VENOUS BLD VENIPUNCTURE: CPT

## 2020-12-01 PROCEDURE — C1894 INTRO/SHEATH, NON-LASER: HCPCS

## 2020-12-01 RX ORDER — SODIUM CHLORIDE 9 MG/ML
INJECTION, SOLUTION INTRAVENOUS CONTINUOUS
Status: DISCONTINUED | OUTPATIENT
Start: 2020-12-01 | End: 2020-12-01 | Stop reason: SDUPTHER

## 2020-12-01 RX ORDER — HYDRALAZINE HYDROCHLORIDE 20 MG/ML
10 INJECTION INTRAMUSCULAR; INTRAVENOUS EVERY 10 MIN PRN
Status: DISCONTINUED | OUTPATIENT
Start: 2020-12-01 | End: 2020-12-01 | Stop reason: HOSPADM

## 2020-12-01 RX ORDER — SODIUM CHLORIDE 0.9 % (FLUSH) 0.9 %
10 SYRINGE (ML) INJECTION EVERY 12 HOURS SCHEDULED
Status: DISCONTINUED | OUTPATIENT
Start: 2020-12-01 | End: 2020-12-01 | Stop reason: SDUPTHER

## 2020-12-01 RX ORDER — SODIUM CHLORIDE 0.9 % (FLUSH) 0.9 %
10 SYRINGE (ML) INJECTION PRN
Status: DISCONTINUED | OUTPATIENT
Start: 2020-12-01 | End: 2020-12-01 | Stop reason: SDUPTHER

## 2020-12-01 RX ORDER — ONDANSETRON 2 MG/ML
4 INJECTION INTRAMUSCULAR; INTRAVENOUS EVERY 6 HOURS PRN
Status: DISCONTINUED | OUTPATIENT
Start: 2020-12-01 | End: 2020-12-01 | Stop reason: SDUPTHER

## 2020-12-01 RX ORDER — ONDANSETRON 2 MG/ML
4 INJECTION INTRAMUSCULAR; INTRAVENOUS EVERY 6 HOURS PRN
Status: DISCONTINUED | OUTPATIENT
Start: 2020-12-01 | End: 2020-12-01 | Stop reason: HOSPADM

## 2020-12-01 RX ORDER — SODIUM CHLORIDE 0.9 % (FLUSH) 0.9 %
10 SYRINGE (ML) INJECTION PRN
Status: DISCONTINUED | OUTPATIENT
Start: 2020-12-01 | End: 2020-12-01 | Stop reason: HOSPADM

## 2020-12-01 RX ORDER — SODIUM CHLORIDE 0.9 % (FLUSH) 0.9 %
10 SYRINGE (ML) INJECTION EVERY 12 HOURS SCHEDULED
Status: DISCONTINUED | OUTPATIENT
Start: 2020-12-01 | End: 2020-12-01 | Stop reason: HOSPADM

## 2020-12-01 RX ORDER — AMIODARONE HYDROCHLORIDE 200 MG/1
200 TABLET ORAL 2 TIMES DAILY
Status: DISCONTINUED | OUTPATIENT
Start: 2020-12-01 | End: 2020-12-01 | Stop reason: HOSPADM

## 2020-12-01 RX ORDER — ACETAMINOPHEN 325 MG/1
650 TABLET ORAL EVERY 4 HOURS PRN
Status: DISCONTINUED | OUTPATIENT
Start: 2020-12-01 | End: 2020-12-01 | Stop reason: HOSPADM

## 2020-12-01 RX ORDER — AMIODARONE HYDROCHLORIDE 200 MG/1
200 TABLET ORAL 2 TIMES DAILY
Qty: 60 TABLET | Refills: 3 | Status: SHIPPED | OUTPATIENT
Start: 2020-12-01 | End: 2021-03-22 | Stop reason: SDUPTHER

## 2020-12-01 RX ORDER — SODIUM CHLORIDE 9 MG/ML
1000 INJECTION, SOLUTION INTRAVENOUS CONTINUOUS
Status: DISCONTINUED | OUTPATIENT
Start: 2020-12-01 | End: 2020-12-01 | Stop reason: HOSPADM

## 2020-12-01 RX ORDER — ASPIRIN 81 MG/1
81 TABLET ORAL ONCE
Status: COMPLETED | OUTPATIENT
Start: 2020-12-01 | End: 2020-12-01

## 2020-12-01 RX ADMIN — ASPIRIN 81 MG: 81 TABLET, FILM COATED ORAL at 08:30

## 2020-12-01 RX ADMIN — AMIODARONE HYDROCHLORIDE 200 MG: 200 TABLET ORAL at 13:56

## 2020-12-01 RX ADMIN — IOPAMIDOL 80 ML: 612 INJECTION, SOLUTION INTRAVENOUS at 11:09

## 2020-12-01 RX ADMIN — SODIUM CHLORIDE: 9 INJECTION, SOLUTION INTRAVENOUS at 08:28

## 2020-12-01 NOTE — PROGRESS NOTES
Cardiac cath preliminary note left ventricular function satisfactory coronary angiograms no significant obstructive disease. Cardioversion subsequently performed x3 unsuccessful.

## 2020-12-01 NOTE — PROGRESS NOTES
Returned post cath. Awake and alert. Puncture site to right wrist clear with TR band on with 11 ml air. Denies any c/o pain. Granddaughter at bedside.

## 2020-12-01 NOTE — H&P
Office Visit     2020  [facility], LALITA   Cardiology   Paroxysmal atrial fibrillation [facility]) +4 more   Dx   New Patient   , Atrial Fibrillation ; Referred by LALITA Sanabria   Reason for Visit    Progress Notes     Expand All Collapse All    [facility] [address]  [id number]  Phone: [phone]  Fax: [phone]     OFFICE VISIT:  2020     Vicki Caceres - : 1950     Dear LALITA Jade,      I appreciate the opportunity of participating in the care of Vicki Caceres. She is a very pleasant 78 y.o. female who I had the opportunity of seeing in my office today, 20. Records from your office have been obtained and reviewed.     Reason For Visit:  Beatriz Saleem is a 78 y.o. female who is here for New Patient (no cardiac symptoms) and Atrial Fibrillation  Was managed in the hospital  with confusion. Found to have occipital lobe infarct. Found to be in atrial fibrillation.   2D echo 10/2/2020 showed  Technically difficult study   Moderate left ventricular enlargement with systolic function within normal   limits with EF of 55%   Moderate concentric left ventricular hypertrophy with transmitral tissue   Dopplers consistent with severe/restrictive Amaris Book 3] diastolic   dysfunction   Moderate left atrial enlargement   Tricuspid aortic valve with adequate cusp separation and neither stenosis   or insufficiency   Mild thickening of a normally mobile mitral valve with mild regurgitation   Nonvisualization pulmonic valve   Poor visualization of the right-sided chambers   Mild tricuspid regurgitation with RVSP estimate of 47 mmHg   Significantly dilated IVC with failure respiratory motion consistent with   marked elevation of right atrial filling pressures in excess of 20 mmHg   No significant pericardial effusion   Aortic root dimensions within normal limits with mild dilatation of Outpatient Medications   Medication Sig Dispense Refill    apixaban (ELIQUIS) 5 MG TABS tablet Take 1 tablet by mouth 2 times daily 60 tablet 0    aspirin EC 81 MG EC tablet Take 1 tablet by mouth daily 90 tablet 0    atorvastatin (LIPITOR) 20 MG tablet Take 20 mg by mouth daily        metoprolol (LOPRESSOR) 100 MG tablet Take 100 mg by mouth 2 times daily        escitalopram (LEXAPRO) 10 MG tablet Take 10 mg by mouth daily        methIMAzole (TAPAZOLE) 5 MG tablet Take 5 mg by mouth 2 times daily        HYDROcodone-acetaminophen (NORCO) 7.5-325 MG per tablet Take 1 tablet by mouth every 6 hours as needed for Pain.          No current facility-administered medications for this visit.            Review of Systems  Constitutional - no significant activity change, appetite change, or unexpected weight change. No fever, chills or diaphoresis. No fatigue. HEENT - no significant rhinorrhea or epistaxis. No tinnitus or significant hearing loss. Eyes - no sudden vision change or amaurosis. Respiratory - no significant wheezing, stridor, apnea or cough. + dyspnea on exertion no resting shortness of breath. Cardiovascular - no exertional chest pain, orthopnea or PND. No sensation of arrhythmia or slow heart rate. No claudication or leg edema. Gastrointestinal - no abdominal swelling or pain. No blood in stool. No severe constipation, diarrhea, nausea, or vomiting. Genitourinary - no difficulty urinating, dysuria, frequency, or urgency. No flank pain or hematuria. No previous radiation or chemotherapy  Musculoskeletal - no back pain, gait disturbance, or myalgia. + Knee pain  Skin - no color change or rash. No pallor. No new surgical incision. Neurologic - no speech difficulty, facial asymmetry or lateralizing weakness. No seizures, presyncope, syncope, or significant dizziness. Hematologic - no easy bruising or excessive bleeding. Psychiatric - no severe anxiety or insomnia. No confusion.    All with no abnormal bleeding. We discussed avoiding NSAIDs to prevent bleeding, can take Tylenol for arthritis pain     Patient has several risk factors for coronary disease including hypertension, hyperlipidemia, morbid obesity. She has had echo that showed significant diastolic dysfunction with preserved LV function. We will get dobutamine stress echo to evaluate for any ischemia     We will have her wear a monitor for about 5 days to assess A. fib burden. After testing will discuss options of trying to obtain normal sinus rhythm           Plan  Monitor for 4-6 days to assess for afib burden  Dobutamine stress echo after wearing monitor  For your insurance next year make sure you have a Medicare part D plan to help cover your Eliquis  Follow up in 2 weeks   Call with any questions or concerns  Follow up with PCP for non cardiac problems  Report any new problems  Cardiovascular Fitness-Exercise as tolerated. Strive for 30 minutes of exercise most days of the week. Cardiac / Healthy Diet  Continue current medications as directed  Continue plan of treatment           I appreciate the opportunity of participating in the care and treatment of this patient.      LALITA Cam     EMR dragon/transcription disclaimer: Much of this encounter note is electronic transcription/translation of spoken language to printed tach. Electronic translation of spoken language may be erroneous, or at times, nonsensical words or phrases may be inadvertently transcribed. Although, I have reviewed the note for such errors, some may still exist.        Cc:  JOHN Mcgee        No significant interval history change since above visit. Stress test 11/10/2020 abnormal suggestion of ischemia now admitted for elective diagnostic cardiac catheterization advised indications alternatives benefits and risk patient agreeable plan today.   I have discussed with the patient regarding indications for the proposed procedure LEFT HEART CATHETERIZATION AND POSSIBLE PERCUTANEOUS INTERVENTION  along with possible alternatives benefits and risks including but not limited to risks of death, myocardial infarction, stroke, contrast induced nephropathy which in some cases may lead to acute kidney failure requiring dialysis, allergic reactions, bleeding requiring blood transfusion,  cardiac arrhthymias, respiratory failure which may require placing the patient on respiratory support such as a ventilator or breathing machine,risk of complications which may require vascular surgery, and if coronary intervention is performed emergency CABG may be required in less than 1% of cases. The patient is awake and alert and understands the issues involved and indicates willingness to proceed as ordered. The patient does not have any contraindications to dual antiplatelet therapy. The patient does not have any known  pending surgical procedures in the next 12 months at this time. The patient is  a reasonable candidate for moderate conscious sedation.     ASA score:  ASA 3 - Patient with moderate systemic disease with functional limitations    Mallampati: I (soft palate, uvula, fauces, tonsillar pillars visible)    Preferred vascular access site will be: right radial artery

## 2020-12-01 NOTE — PROGRESS NOTES
Patient and granddaughter instructed on care of right wrist post cath. Given written instructions. Both stated understanding.

## 2020-12-02 LAB
EKG P AXIS: NORMAL DEGREES
EKG P-R INTERVAL: NORMAL MS
EKG Q-T INTERVAL: 446 MS
EKG QRS DURATION: 98 MS
EKG QTC CALCULATION (BAZETT): 468 MS
EKG T AXIS: 149 DEGREES

## 2020-12-02 PROCEDURE — 93010 ELECTROCARDIOGRAM REPORT: CPT | Performed by: INTERNAL MEDICINE

## 2020-12-02 NOTE — PROCEDURES
Cardioversion Procedure Note    Patient name:  Katina Feng   :  1950  Med Rec No:  [id number]   Room:  [facility] CATH POOL/NONE  Admission date:  2020  Physician:  Zelda Landin MD    Date of procedure:  20    Indication: atrial fibrillation    Postoperative diagnosis: same    Complications: none    Anesthesia: IV sedation provided by cardiac catheterization laboratory nursing personnel    Procedure:  Direct current cardioversion. Description:  After informed consent was obtained and after the patient was adequately sedated, the patient was cardioverted from atrial fibrillation to sinus rhythm with 360 joules, using the biphasic defibrillator. 3 shocks were delivered. The patient tolerated the procedure well and awakened from sedation without difficulty. We were unable to successfully restore sinus rhythm. Note the procedure was performed immediately after cardiac catheterization was completed while the patient was still on the table. An independent trained observer administered medications under my direction. The patient was continuously monitored with respect to level of consciousness, and vital signs/physiologic status throughout the case. For further details regarding specific medications and doses please refer to Cath Lab procedural notes.     Anesthesia start time:1048  Anesthesia stop time:1119      Impression:  Unsuccessful cardioversion attempt    Zelda Landin MD202010:51 AM

## 2020-12-11 ENCOUNTER — TELEPHONE (OUTPATIENT)
Dept: CARDIOLOGY | Age: 70
End: 2020-12-11

## 2020-12-14 ENCOUNTER — OFFICE VISIT (OUTPATIENT)
Dept: CARDIOLOGY | Age: 70
End: 2020-12-14
Payer: MEDICARE

## 2020-12-14 VITALS
DIASTOLIC BLOOD PRESSURE: 82 MMHG | BODY MASS INDEX: 53.92 KG/M2 | HEIGHT: 62 IN | SYSTOLIC BLOOD PRESSURE: 114 MMHG | HEART RATE: 75 BPM | WEIGHT: 293 LBS

## 2020-12-14 PROBLEM — E66.01 MORBIDLY OBESE (HCC): Status: ACTIVE | Noted: 2020-12-14

## 2020-12-14 PROCEDURE — G8417 CALC BMI ABV UP PARAM F/U: HCPCS | Performed by: INTERNAL MEDICINE

## 2020-12-14 PROCEDURE — 1090F PRES/ABSN URINE INCON ASSESS: CPT | Performed by: INTERNAL MEDICINE

## 2020-12-14 PROCEDURE — 99214 OFFICE O/P EST MOD 30 MIN: CPT | Performed by: INTERNAL MEDICINE

## 2020-12-14 PROCEDURE — 1123F ACP DISCUSS/DSCN MKR DOCD: CPT | Performed by: INTERNAL MEDICINE

## 2020-12-14 PROCEDURE — 4040F PNEUMOC VAC/ADMIN/RCVD: CPT | Performed by: INTERNAL MEDICINE

## 2020-12-14 PROCEDURE — G8484 FLU IMMUNIZE NO ADMIN: HCPCS | Performed by: INTERNAL MEDICINE

## 2020-12-14 PROCEDURE — 93000 ELECTROCARDIOGRAM COMPLETE: CPT | Performed by: INTERNAL MEDICINE

## 2020-12-14 PROCEDURE — G8427 DOCREV CUR MEDS BY ELIG CLIN: HCPCS | Performed by: INTERNAL MEDICINE

## 2020-12-14 PROCEDURE — 1036F TOBACCO NON-USER: CPT | Performed by: INTERNAL MEDICINE

## 2020-12-14 PROCEDURE — G8400 PT W/DXA NO RESULTS DOC: HCPCS | Performed by: INTERNAL MEDICINE

## 2020-12-14 PROCEDURE — 3017F COLORECTAL CA SCREEN DOC REV: CPT | Performed by: INTERNAL MEDICINE

## 2020-12-14 ASSESSMENT — ENCOUNTER SYMPTOMS
VOMITING: 0
GASTROINTESTINAL NEGATIVE: 1
RESPIRATORY NEGATIVE: 1
SHORTNESS OF BREATH: 0
NAUSEA: 0
EYES NEGATIVE: 1
DIARRHEA: 0

## 2020-12-14 NOTE — PROGRESS NOTES
[facility]                            Date:  12/14/2020  Patient: Wesly Barnett  Age:  78 y. o., 1950      Reason for evaluation:         SUBJECTIVE:    Returns today follow-up assessment underwent cardiac catheterization 12/1/2020 9 critical coronary artery disease normal left ventricular function. Attempted cardioversion at that time x3 was unsuccessful. We started on amiodarone which she is taking. EKG today shows atrial fibrillation. She is interested in attempting cardioversion a second time. Still somewhat short of breath denies chest pain on anticoagulation denies any bleeding issues no other issues reported. Blood pressure 114/82 heart 75. Review of Systems   Constitutional: Negative. Negative for chills, fever and unexpected weight change. HENT: Negative. Eyes: Negative. Respiratory: Negative. Negative for shortness of breath. Cardiovascular: Negative. Negative for chest pain. Gastrointestinal: Negative. Negative for diarrhea, nausea and vomiting. Endocrine: Negative. Genitourinary: Negative. Musculoskeletal: Negative. Skin: Negative. Neurological: Negative. All other systems reviewed and are negative. OBJECTIVE:     /82   Pulse 75   Ht 5' 2\" (1.575 m)   Wt (!) 320 lb (145.2 kg)   BMI 58.53 kg/m²     Labs:   CBC: No results for input(s): WBC, HGB, HCT, PLT in the last 72 hours. BMP:No results for input(s): NA, K, CO2, BUN, CREATININE, LABGLOM, GLUCOSE in the last 72 hours. BNP: No results for input(s): BNP in the last 72 hours. PT/INR: No results for input(s): PROTIME, INR in the last 72 hours. APTT:No results for input(s): APTT in the last 72 hours. CARDIAC ENZYMES:No results for input(s): CKTOTAL, CKMB, CKMBINDEX, TROPONINI in the last 72 hours.   FASTING LIPID PANEL:  Lab Results   Component Value Date    HDL 49 10/03/2020    LDLCALC 62 10/03/2020    TRIG 80 10/03/2020 Medical: Not on file     Non-medical: Not on file   Tobacco Use    Smoking status: Never Smoker    Smokeless tobacco: Never Used   Substance and Sexual Activity    Alcohol use: Not Currently    Drug use: Not Currently    Sexual activity: Not on file   Lifestyle    Physical activity     Days per week: Not on file     Minutes per session: Not on file    Stress: Not on file   Relationships    Social connections     Talks on phone: Not on file     Gets together: Not on file     Attends Ep[employer] service: Not on file     Active member of club or organization: Not on file     Attends meetings of clubs or organizations: Not on file     Relationship status: Not on file    Intimate partner violence     Fear of current or ex partner: Not on file     Emotionally abused: Not on file     Physically abused: Not on file     Forced sexual activity: Not on file   Other Topics Concern    Not on file   Social History Narrative    Not on file       Physical Examination:  /82   Pulse 75   Ht 5' 2\" (1.575 m)   Wt (!) 320 lb (145.2 kg)   BMI 58.53 kg/m²   Physical Exam  Vitals signs reviewed. Constitutional:       Appearance: She is well-developed. Neck:      Vascular: No carotid bruit or JVD. Cardiovascular:      Rate and Rhythm: Normal rate and regular rhythm. Heart sounds: Normal heart sounds. No murmur. No friction rub. No gallop. Pulmonary:      Effort: Pulmonary effort is normal. No respiratory distress. Breath sounds: Normal breath sounds. No wheezing or rales. Abdominal:      General: There is no distension. Tenderness: There is no abdominal tenderness. Lymphadenopathy:      Cervical: No cervical adenopathy. Skin:     General: Skin is warm and dry. ASSESSMENT:     Diagnosis Orders   1. Paroxysmal atrial fibrillation (HCC)  EKG 12 lead   2. Essential hypertension     3. Morbidly obese ([address]     4. Abnormal stress test     5.  Mixed hyperlipidemia

## 2020-12-14 NOTE — PATIENT INSTRUCTIONS
[place] [facility] the [facility] and [address] located on the first floor of [facility].       Patient's contact number:  [phone] (home)     Date/Time: 12/22/2020 arrive at 8Am for 10Am procedure. COVID this Friday between 8-10AM at [facility] only 12/18/2020      Out-Patient -  Cardioversion    Cardioversion is a procedure that uses an electrical current to help normalize the heart rhythm. You will be coming into our facility as an outpatient and will go home following this procedure. Cardioversion Instructions:  · Do not eat or drink anything after midnight the night before your procedure. May take morning medications with a sip of water unless otherwise directed not to. · You should arrange to have someone take you home rather than drive yourself. · Further plans will depend upon the result of your procedure. If for any reason you are unable to keep this appointment, please contact [facility], [phone], as soon as possible to reschedule.

## 2020-12-18 ENCOUNTER — OFFICE VISIT (OUTPATIENT)
Age: 70
End: 2020-12-18

## 2020-12-18 VITALS — TEMPERATURE: 97 F | OXYGEN SATURATION: 97 % | HEART RATE: 63 BPM

## 2020-12-18 LAB — SARS-COV-2, PCR: NOT DETECTED

## 2020-12-22 ENCOUNTER — HOSPITAL ENCOUNTER (OUTPATIENT)
Dept: CARDIAC CATH/INVASIVE PROCEDURES | Age: 70
Discharge: HOME | End: 2020-12-22
Attending: INTERNAL MEDICINE | Admitting: INTERNAL MEDICINE
Payer: MEDICARE

## 2020-12-22 VITALS
DIASTOLIC BLOOD PRESSURE: 81 MMHG | WEIGHT: 293 LBS | HEIGHT: 62 IN | TEMPERATURE: 96.5 F | HEART RATE: 45 BPM | SYSTOLIC BLOOD PRESSURE: 130 MMHG | BODY MASS INDEX: 53.92 KG/M2 | RESPIRATION RATE: 14 BRPM | OXYGEN SATURATION: 95 %

## 2020-12-22 LAB
ANION GAP SERPL CALCULATED.3IONS-SCNC: 9 MMOL/L (ref 7–19)
BUN BLDV-MCNC: 12 MG/DL (ref 8–23)
CALCIUM SERPL-MCNC: 9.2 MG/DL (ref 8.8–10.2)
CHLORIDE BLD-SCNC: 98 MMOL/L (ref 98–111)
CO2: 30 MMOL/L (ref 22–29)
CREAT SERPL-MCNC: 0.8 MG/DL (ref 0.5–0.9)
EKG P AXIS: 33 DEGREES
EKG P AXIS: NORMAL DEGREES
EKG P-R INTERVAL: 156 MS
EKG P-R INTERVAL: NORMAL MS
EKG Q-T INTERVAL: 430 MS
EKG Q-T INTERVAL: 442 MS
EKG QRS DURATION: 110 MS
EKG QRS DURATION: 98 MS
EKG QTC CALCULATION (BAZETT): 399 MS
EKG QTC CALCULATION (BAZETT): 470 MS
EKG T AXIS: 37 DEGREES
EKG T AXIS: 84 DEGREES
GFR AFRICAN AMERICAN: >59
GFR NON-AFRICAN AMERICAN: >60
GLUCOSE BLD-MCNC: 111 MG/DL (ref 74–109)
POTASSIUM REFLEX MAGNESIUM: 3.9 MMOL/L (ref 3.5–5)
SODIUM BLD-SCNC: 137 MMOL/L (ref 136–145)

## 2020-12-22 PROCEDURE — 36415 COLL VENOUS BLD VENIPUNCTURE: CPT

## 2020-12-22 PROCEDURE — 80048 BASIC METABOLIC PNL TOTAL CA: CPT

## 2020-12-22 PROCEDURE — 99024 POSTOP FOLLOW-UP VISIT: CPT | Performed by: INTERNAL MEDICINE

## 2020-12-22 PROCEDURE — 99152 MOD SED SAME PHYS/QHP 5/>YRS: CPT

## 2020-12-22 PROCEDURE — 93010 ELECTROCARDIOGRAM REPORT: CPT | Performed by: INTERNAL MEDICINE

## 2020-12-22 PROCEDURE — 6360000002 HC RX W HCPCS

## 2020-12-22 PROCEDURE — 99152 MOD SED SAME PHYS/QHP 5/>YRS: CPT | Performed by: INTERNAL MEDICINE

## 2020-12-22 PROCEDURE — 92960 CARDIOVERSION ELECTRIC EXT: CPT

## 2020-12-22 PROCEDURE — 2580000003 HC RX 258: Performed by: INTERNAL MEDICINE

## 2020-12-22 PROCEDURE — 92960 CARDIOVERSION ELECTRIC EXT: CPT | Performed by: INTERNAL MEDICINE

## 2020-12-22 PROCEDURE — 93005 ELECTROCARDIOGRAM TRACING: CPT | Performed by: INTERNAL MEDICINE

## 2020-12-22 RX ORDER — SODIUM CHLORIDE 9 MG/ML
INJECTION, SOLUTION INTRAVENOUS CONTINUOUS
Status: DISCONTINUED | OUTPATIENT
Start: 2020-12-22 | End: 2020-12-22 | Stop reason: HOSPADM

## 2020-12-22 RX ADMIN — SODIUM CHLORIDE: 9 INJECTION, SOLUTION INTRAVENOUS at 09:17

## 2020-12-22 SDOH — HEALTH STABILITY: MENTAL HEALTH: HOW OFTEN DO YOU HAVE A DRINK CONTAINING ALCOHOL?: NEVER

## 2020-12-22 NOTE — H&P
PT/INR: No results for input(s): PROTIME, INR in the last 72 hours. APTT:No results for input(s): APTT in the last 72 hours. CARDIAC ENZYMES:No results for input(s): CKTOTAL, CKMB, CKMBINDEX, TROPONINI in the last 72 hours.   FASTING LIPID PANEL:        Lab Results   Component Value Date     HDL 49 10/03/2020     LDLCALC 62 10/03/2020     TRIG 80 10/03/2020      LIVER PROFILE:No results for input(s): AST, ALT, LABALBU in the last 72 hours.         Past Medical History        Past Medical History:   Diagnosis Date    Arthritis      [facility]      Cerebral artery occlusion with cerebral infarction ([facility]      CHF (congestive heart failure) ([facility]      COPD (chronic obstructive pulmonary disease) ([facility]      Hyperlipidemia      Hypertension      Thyroid disease           Past Surgical History         Past Surgical History:   Procedure Laterality Date    TONSILLECTOMY        TUMOR REMOVAL             Family History         Family History   Problem Relation Age of Onset    Pacemaker Mother      Heart Disease Mother                Allergies   Allergen Reactions    Niacin And Related        Current Facility-Administered Medications          Current Outpatient Medications   Medication Sig Dispense Refill    amiodarone (CORDARONE) 200 MG tablet Take 1 tablet by mouth 2 times daily 60 tablet 3    nortriptyline (PAMELOR) 10 MG capsule Take 1 capsule by mouth nightly 30 capsule 2    apixaban (ELIQUIS) 5 MG TABS tablet Take 1 tablet by mouth 2 times daily 60 tablet 0    aspirin EC 81 MG EC tablet Take 1 tablet by mouth daily 90 tablet 0    atorvastatin (LIPITOR) 20 MG tablet Take 20 mg by mouth daily        metoprolol (LOPRESSOR) 100 MG tablet Take 100 mg by mouth 2 times daily        escitalopram (LEXAPRO) 10 MG tablet Take 10 mg by mouth daily        methIMAzole (TAPAZOLE) 5 MG tablet Take 5 mg by mouth 2 times daily      HYDROcodone-acetaminophen (NORCO) 7.5-325 MG per tablet Take 1 tablet by mouth every 6 hours as needed for Pain.          No current facility-administered medications for this visit.          Social History               Socioeconomic History    Marital status:        Spouse name: Not on file    Number of children: Not on file    Years of education: Not on file    Highest education level: Not on file   Occupational History    Not on file   Social Needs    Financial resource strain: Not on file    Food insecurity       Worry: Not on file       Inability: Not on file    Transportation needs       Medical: Not on file       Non-medical: Not on file   Tobacco Use    Smoking status: Never Smoker    Smokeless tobacco: Never Used   Substance and Sexual Activity    Alcohol use: Not Currently    Drug use: Not Currently    Sexual activity: Not on file   Lifestyle    Physical activity       Days per week: Not on file       Minutes per session: Not on file    Stress: Not on file   Relationships    Social connections       Talks on phone: Not on file       Gets together: Not on file       Attends Latter-day service: Not on file       Active member of club or organization: Not on file       Attends meetings of clubs or organizations: Not on file       Relationship status: Not on file    Intimate partner violence       Fear of current or ex partner: Not on file       Emotionally abused: Not on file       Physically abused: Not on file       Forced sexual activity: Not on file   Other Topics Concern    Not on file   Social History Narrative    Not on file            Physical Examination:  /82   Pulse 75   Ht 5' 2\" (1.575 m)   Wt (!) 320 lb (145.2 kg)   BMI 58.53 kg/m²   Physical Exam  Vitals signs reviewed. Constitutional:       Appearance: She is well-developed. Neck:      Vascular: No carotid bruit or JVD. Cardiovascular:      Rate and Rhythm: Normal rate and regular rhythm. Heart sounds: Normal heart sounds. No murmur. No friction rub. No gallop. Pulmonary:      Effort: Pulmonary effort is normal. No respiratory distress. Breath sounds: Normal breath sounds. No wheezing or rales. Abdominal:      General: There is no distension. Tenderness: There is no abdominal tenderness. Lymphadenopathy:      Cervical: No cervical adenopathy. Skin:     General: Skin is warm and dry.                ASSESSMENT:       Diagnosis Orders   1. Paroxysmal atrial fibrillation (HCC)  EKG 12 lead   2. Essential hypertension      3. Morbidly obese ([address]      4. Abnormal stress test      5. Mixed hyperlipidemia      6. Chronic anticoagulation      7. Long term current use of antiarrhythmic drug            PLAN:      Orders Placed This Encounter   Procedures    EKG 12 lead        Encounter Medications    No orders of the defined types were placed in this encounter.            1. Continue present medications  2. After discussion we will schedule her for a second attempt at cardioversion next week. 3. Recommend follow-up assessment thereafter 6 weeks     Return in about 6 weeks (around 1/25/2021).       Bronson Kaminski MD 12/14/2020 2:06 PM [facility]        Thisdictation was generated by voice recognition computer software. Although all attempts are made to edit the dictation for accuracy, there may be errors in the transcription that are not intended.                         No significant interval history change since above visit  ASA score: 3  Airway score: Mallampati class I  Patient is acceptable candidate for moderate IV conscious sedation.

## 2020-12-22 NOTE — PROCEDURES
Cardioversion Procedure Note    Patient name:  Robin Salguero   :  1950  Med Rec No:  [id number]   Room:  [facility] CATH POOL/NONE  Admission date:  2020  Physician:  Link Lainez MD    Date of procedure:  20    Indication: atrial fibrillation    Postoperative diagnosis: same    Complications: none    Anesthesia: IV sedation provided by cardiac catheterization laboratory nursing personnel    Procedure:  Direct current cardioversion. Description:  After informed consent was obtained and after the patient was adequately sedated, the patient was cardioverted from atrial fibrillation to sinus rhythm with 360 joules, using the biphasic defibrillator. 1 shock was  delivered. The patient tolerated the procedure well and awakened from sedation without difficulty. An independent trained observer administered medications under my direction. The patient was continuously monitored with respect to level of consciousness, and vital signs/physiologic status throughout the case. For further details regarding specific medications and doses please refer to Cath Lab procedural notes. Anesthesia start time:1250  Anesthesia stop time:1303      Impression:  Successful cardioversion from atrial fibrillation to sinus rhythm, as described above.     Link Lainez MD20201:20 PM

## 2020-12-22 NOTE — FLOWSHEET NOTE
Pt arrived back to cath holding from cath lab via stretcher with RN. Pt alert and sleepy. Connected to BS monitor with Sinus Bernardine Fey noted on BS Monitor. Pt denies pain. Pt drifted off to sleep shortly after returning to her room and awakened easily when RN in room to check patient. Pt later awakened enough to ingest a turkey sandwich and drink. Discharge instructions were explained to patient with voiced understanding. Pt was then assisted up to go to bathroom to void, and get dressed. Pt stable.

## 2021-01-11 ENCOUNTER — TELEPHONE (OUTPATIENT)
Dept: CARDIOLOGY CLINIC | Age: 71
End: 2021-01-11

## 2021-01-12 ENCOUNTER — TELEPHONE (OUTPATIENT)
Dept: CARDIOLOGY CLINIC | Age: 71
End: 2021-01-12

## 2021-01-14 ENCOUNTER — OFFICE VISIT (OUTPATIENT)
Dept: FAMILY MEDICINE CLINIC | Age: 71
End: 2021-01-14
Payer: MEDICARE

## 2021-01-14 VITALS
HEIGHT: 62 IN | SYSTOLIC BLOOD PRESSURE: 118 MMHG | RESPIRATION RATE: 16 BRPM | OXYGEN SATURATION: 97 % | HEART RATE: 84 BPM | DIASTOLIC BLOOD PRESSURE: 84 MMHG | WEIGHT: 293 LBS | TEMPERATURE: 97.2 F | BODY MASS INDEX: 53.92 KG/M2

## 2021-01-14 DIAGNOSIS — I10 ESSENTIAL HYPERTENSION: ICD-10-CM

## 2021-01-14 DIAGNOSIS — Z12.31 ENCOUNTER FOR SCREENING MAMMOGRAM FOR MALIGNANT NEOPLASM OF BREAST: ICD-10-CM

## 2021-01-14 DIAGNOSIS — N32.81 OVERACTIVE BLADDER: ICD-10-CM

## 2021-01-14 DIAGNOSIS — M19.90 ARTHRITIS: ICD-10-CM

## 2021-01-14 DIAGNOSIS — F33.1 MODERATE EPISODE OF RECURRENT MAJOR DEPRESSIVE DISORDER (HCC): ICD-10-CM

## 2021-01-14 DIAGNOSIS — E78.2 MIXED HYPERLIPIDEMIA: ICD-10-CM

## 2021-01-14 DIAGNOSIS — I48.91 ATRIAL FIBRILLATION, UNSPECIFIED TYPE (HCC): Primary | ICD-10-CM

## 2021-01-14 DIAGNOSIS — Z23 NEED FOR VACCINATION FOR PNEUMOCOCCUS: ICD-10-CM

## 2021-01-14 DIAGNOSIS — E05.90 HYPERTHYROIDISM: ICD-10-CM

## 2021-01-14 DIAGNOSIS — I63.50 CEREBROVASCULAR ACCIDENT (CVA) DUE TO OCCLUSION OF CEREBRAL ARTERY (HCC): ICD-10-CM

## 2021-01-14 DIAGNOSIS — Z12.11 SCREENING FOR MALIGNANT NEOPLASM OF COLON: ICD-10-CM

## 2021-01-14 DIAGNOSIS — Z23 NEEDS FLU SHOT: ICD-10-CM

## 2021-01-14 PROCEDURE — 90694 VACC AIIV4 NO PRSRV 0.5ML IM: CPT | Performed by: FAMILY MEDICINE

## 2021-01-14 PROCEDURE — G8484 FLU IMMUNIZE NO ADMIN: HCPCS | Performed by: FAMILY MEDICINE

## 2021-01-14 PROCEDURE — 1090F PRES/ABSN URINE INCON ASSESS: CPT | Performed by: FAMILY MEDICINE

## 2021-01-14 PROCEDURE — G8427 DOCREV CUR MEDS BY ELIG CLIN: HCPCS | Performed by: FAMILY MEDICINE

## 2021-01-14 PROCEDURE — 4040F PNEUMOC VAC/ADMIN/RCVD: CPT | Performed by: FAMILY MEDICINE

## 2021-01-14 PROCEDURE — 1123F ACP DISCUSS/DSCN MKR DOCD: CPT | Performed by: FAMILY MEDICINE

## 2021-01-14 PROCEDURE — 3017F COLORECTAL CA SCREEN DOC REV: CPT | Performed by: FAMILY MEDICINE

## 2021-01-14 PROCEDURE — 99204 OFFICE O/P NEW MOD 45 MIN: CPT | Performed by: FAMILY MEDICINE

## 2021-01-14 PROCEDURE — 1036F TOBACCO NON-USER: CPT | Performed by: FAMILY MEDICINE

## 2021-01-14 PROCEDURE — G8417 CALC BMI ABV UP PARAM F/U: HCPCS | Performed by: FAMILY MEDICINE

## 2021-01-14 PROCEDURE — G8400 PT W/DXA NO RESULTS DOC: HCPCS | Performed by: FAMILY MEDICINE

## 2021-01-14 PROCEDURE — G0008 ADMIN INFLUENZA VIRUS VAC: HCPCS | Performed by: FAMILY MEDICINE

## 2021-01-14 PROCEDURE — 90732 PPSV23 VACC 2 YRS+ SUBQ/IM: CPT | Performed by: FAMILY MEDICINE

## 2021-01-14 PROCEDURE — G0009 ADMIN PNEUMOCOCCAL VACCINE: HCPCS | Performed by: FAMILY MEDICINE

## 2021-01-14 RX ORDER — TOLTERODINE 4 MG/1
4 CAPSULE, EXTENDED RELEASE ORAL DAILY
Qty: 90 CAPSULE | Refills: 1 | Status: SHIPPED | OUTPATIENT
Start: 2021-01-14

## 2021-01-14 RX ORDER — METHIMAZOLE 5 MG/1
5 TABLET ORAL 2 TIMES DAILY
Qty: 180 TABLET | Refills: 1 | Status: SHIPPED | OUTPATIENT
Start: 2021-01-14 | End: 2021-08-17

## 2021-01-14 RX ORDER — METOPROLOL TARTRATE 100 MG/1
100 TABLET ORAL 2 TIMES DAILY
Qty: 180 TABLET | Refills: 1 | Status: SHIPPED | OUTPATIENT
Start: 2021-01-14 | End: 2021-02-09 | Stop reason: SDUPTHER

## 2021-01-14 RX ORDER — ESCITALOPRAM OXALATE 20 MG/1
20 TABLET ORAL DAILY
Qty: 90 TABLET | Refills: 1 | Status: SHIPPED | OUTPATIENT
Start: 2021-01-14 | End: 2021-07-12

## 2021-01-14 RX ORDER — ATORVASTATIN CALCIUM 20 MG/1
20 TABLET, FILM COATED ORAL DAILY
Qty: 90 TABLET | Refills: 3 | Status: SHIPPED | OUTPATIENT
Start: 2021-01-14 | End: 2021-09-24 | Stop reason: SDUPTHER

## 2021-01-14 ASSESSMENT — PATIENT HEALTH QUESTIONNAIRE - PHQ9
SUM OF ALL RESPONSES TO PHQ QUESTIONS 1-9: 0
SUM OF ALL RESPONSES TO PHQ QUESTIONS 1-9: 0

## 2021-01-14 NOTE — PROGRESS NOTES
[facility] PHYSICIAN SERVICES  [facility]  [address] [place] [address]  Dept: [phone]  Dept Fax: [phone]: [phone]     Visit type: New patient    Reason for Visit: New Patient (wants labs), Establish Care, Medication Refill, Other (stroke), and Memory Loss (since the stroke)      Assessment and Plan       1. Atrial fibrillation, unspecified type (HCC)  -     apixaban (ELIQUIS) 5 MG TABS tablet; Take 1 tablet by mouth 2 times daily, Disp-60 tablet, R-5Normal  Discussed proper use of medication. Discussed importance of maintaining follow-up with cardiology. We will continue to monitor and adjust course dictates. 2. Cerebrovascular accident (CVA) due to occlusion of cerebral artery ([address]  Denies any residual from the previous CVA. Discussed proper use of medication and lifestyle modifications that may build to reduce further episodes. We will continue to monitor with assistance as course dictates. 3. Essential hypertension  -     metoprolol (LOPRESSOR) 100 MG tablet; Take 1 tablet by mouth 2 times daily, Disp-180 tablet, R-1Normal  Controlled current medication. Will continue continue to monitor. 4. Mixed hyperlipidemia  -     atorvastatin (LIPITOR) 20 MG tablet; Take 1 tablet by mouth daily, Disp-90 tablet, R-3Normal  Tolerating Lipitor. Will continue and adjust the course dictates. 5. Overactive bladder  -     tolterodine (DETROL LA) 4 MG extended release capsule; Take 1 capsule by mouth daily, Disp-90 capsule, R-1Normal  Symptomatic improvement with use of Detrol LA. Will continue continue to monitor. 6. Moderate episode of recurrent major depressive disorder (HCC)  -     escitalopram (LEXAPRO) 20 MG tablet; Take 1 tablet by mouth daily, Disp-90 tablet, R-1Normal  Doing well with current dose of Lexapro. Will continue and adjust the course dictates. 7. Hyperthyroidism  -     methIMAzole (TAPAZOLE) 5 MG tablet;  Take 1 tablet by mouth 2 times daily, Lipitor without any myalgias or GI upsets. She is doing to watch her diet and try to stay physically active. She has been taking nortriptyline for her headaches following her stroke and states that she is no longer having bad headaches that she was having previously and that the medicine seems to be doing well for her. She does have some issues with depression and does seem to get benefits with the use of Lexapro. She states that she is not really having a lot of problems with depression at this time but states that her son is a nursing home following a traumatic brain injury and that with the Covid pandemic she has been unable to see him which has been very difficult for her. She still feels like the Lexapro helps but it is just been a little more stressful with everything going on. She does have a history of hyperthyroidism and has been taking methimazole with good control over thyroid. She does also have issues with arterial hypertension and is benefiting from the use of Lopressor. She denies any problems with the medicine or any symptoms of abnormal blood pressures. She does attempt avoid excess salt intake. Review of Systems   Constitutional: Negative for activity change, appetite change, fatigue and fever. HENT: Negative for congestion and rhinorrhea. Eyes: Negative for pain and discharge. Respiratory: Negative for cough and shortness of breath. Cardiovascular: Negative for chest pain and palpitations. Gastrointestinal: Negative for abdominal pain, constipation, diarrhea, nausea and vomiting. Endocrine: Negative for cold intolerance and heat intolerance. Genitourinary: Negative for dysuria and hematuria. Musculoskeletal: Positive for arthralgias. Negative for back pain and neck pain. Skin: Negative for rash and wound. Neurological: Negative for syncope and weakness. Hematological: Negative for adenopathy. Does not bruise/bleed easily.    Psychiatric/Behavioral: 5' 2\" (1.575 m)   Wt (!) 311 lb (141.1 kg)   SpO2 97%   BMI 56.88 kg/m²   Physical Exam  Vitals signs and nursing note reviewed. Constitutional:       General: She is not in acute distress. Appearance: Normal appearance. She is well-developed. She is not diaphoretic. HENT:      Head: Normocephalic and atraumatic. Right Ear: External ear normal.      Left Ear: External ear normal.      Mouth/Throat:      Comments: Mask in place  Eyes:      General: No scleral icterus. Right eye: No discharge. Left eye: No discharge. Conjunctiva/sclera: Conjunctivae normal.   Neck:      Musculoskeletal: Normal range of motion and neck supple. No muscular tenderness. Trachea: No tracheal deviation. Cardiovascular:      Rate and Rhythm: Normal rate and regular rhythm. Heart sounds: Normal heart sounds. No murmur. No friction rub. No gallop. Pulmonary:      Effort: Pulmonary effort is normal. No respiratory distress. Breath sounds: Normal breath sounds. No wheezing or rales. Abdominal:      General: Bowel sounds are normal. There is no distension. Palpations: Abdomen is soft. Tenderness: There is no abdominal tenderness. Musculoskeletal:         General: No deformity (No gross deformities of upper or lower extremities) or signs of injury. Right lower leg: No edema. Left lower leg: No edema. Lymphadenopathy:      Cervical: No cervical adenopathy. Skin:     General: Skin is warm and dry. Findings: No erythema or rash. Neurological:      Mental Status: She is alert and oriented to person, place, and time. Cranial Nerves: No cranial nerve deficit. Psychiatric:         Behavior: Behavior normal.         Thought Content:  Thought content normal.           Data Reviewed and Summarized       Labs:     Imaging/Testing:          Johnny Birmingham MD

## 2021-01-20 ENCOUNTER — OFFICE VISIT (OUTPATIENT)
Dept: NEUROSURGERY | Age: 71
End: 2021-01-20
Payer: MEDICARE

## 2021-01-20 VITALS
OXYGEN SATURATION: 96 % | BODY MASS INDEX: 53.92 KG/M2 | HEIGHT: 62 IN | TEMPERATURE: 97.3 F | WEIGHT: 293 LBS | HEART RATE: 83 BPM | DIASTOLIC BLOOD PRESSURE: 77 MMHG | SYSTOLIC BLOOD PRESSURE: 129 MMHG

## 2021-01-20 DIAGNOSIS — I48.91 ATRIAL FIBRILLATION, UNSPECIFIED TYPE (HCC): ICD-10-CM

## 2021-01-20 DIAGNOSIS — I63.9 OCCIPITAL INFARCTION (HCC): Primary | ICD-10-CM

## 2021-01-20 PROCEDURE — G8417 CALC BMI ABV UP PARAM F/U: HCPCS | Performed by: NURSE PRACTITIONER

## 2021-01-20 PROCEDURE — 99213 OFFICE O/P EST LOW 20 MIN: CPT | Performed by: NURSE PRACTITIONER

## 2021-01-20 PROCEDURE — 3017F COLORECTAL CA SCREEN DOC REV: CPT | Performed by: NURSE PRACTITIONER

## 2021-01-20 PROCEDURE — 1090F PRES/ABSN URINE INCON ASSESS: CPT | Performed by: NURSE PRACTITIONER

## 2021-01-20 PROCEDURE — 1123F ACP DISCUSS/DSCN MKR DOCD: CPT | Performed by: NURSE PRACTITIONER

## 2021-01-20 PROCEDURE — G8400 PT W/DXA NO RESULTS DOC: HCPCS | Performed by: NURSE PRACTITIONER

## 2021-01-20 PROCEDURE — G8484 FLU IMMUNIZE NO ADMIN: HCPCS | Performed by: NURSE PRACTITIONER

## 2021-01-20 PROCEDURE — 1036F TOBACCO NON-USER: CPT | Performed by: NURSE PRACTITIONER

## 2021-01-20 PROCEDURE — G8427 DOCREV CUR MEDS BY ELIG CLIN: HCPCS | Performed by: NURSE PRACTITIONER

## 2021-01-20 PROCEDURE — 4040F PNEUMOC VAC/ADMIN/RCVD: CPT | Performed by: NURSE PRACTITIONER

## 2021-01-20 NOTE — PROGRESS NOTES
[facility] Neurology Office Note      Patient:   Myra Stallings  MR#:    [id number]  Account Number:                         YOB: 1950  Date of Evaluation:  1/20/2020  Primary/Referring Physician:  JOHN Mcgowan   Consulting Physician:  LALITA Carmona    FOLLOW UP    Chief Complaint   Patient presents with    3 Month Follow-Up     patient states things are \"good better than they were\"       HISTORY OF PRESENT ILLNESS    Myra Stallings is a 78y.o. year old female here for follow up. She was previously admitted with stroke. Found to be in atrial fibrillation, new onset, in ER. She has undergone cardioversion since last visit and has noted improvement in symptoms. Following with cardiology in a few days. On Eliquis and Amiodarone. Headaches are resolved since last visit. Previously headache pain was posterior, right sided with mild nausea. No light/sound sensitivity. She is on Nortriptyline. Denies further stroke like symptoms. Prior stroke symptoms included confusion, left visual field deficit, headache. No focal weakness, no facial drooping. She was out of the time window for TPA or other intervention. MRI brain with right sided posterior parietal/occipital infarct. No other complaints. Past Medical History:   Diagnosis Date    Arthritis     Atrial fibrillation (HCC)     Cerebral artery occlusion with cerebral infarction ([address]     CHF (congestive heart failure) (HCC)     COPD (chronic obstructive pulmonary disease) ([address]     Hyperlipidemia     Hypertension     Thyroid disease        Past Surgical History:   Procedure Laterality Date    CARDIOVERSION  2020    HYSTERECTOMY      With removal of tumor    TONSILLECTOMY      TUMOR REMOVAL         Family History   Problem Relation Age of Onset    Pacemaker Mother     Heart Disease Mother        Social History     Socioeconomic History    Marital status:       Spouse name: Not on file    Number of children: Not on file    Years of education: Not on file    Highest education level: Not on file   Occupational History    Not on file   Social Needs    Financial resource strain: Not on file    Food insecurity     Worry: Not on file     Inability: Not on file    Transportation needs     Medical: Not on file     Non-medical: Not on file   Tobacco Use    Smoking status: Never Smoker    Smokeless tobacco: Never Used   Substance and Sexual Activity    Alcohol use: Never     Frequency: Never    Drug use: Not Currently    Sexual activity: Not on file   Lifestyle    Physical activity     Days per week: Not on file     Minutes per session: Not on file    Stress: Not on file   Relationships    Social connections     Talks on phone: Not on file     Gets together: Not on file     Attends [employer] service: Not on file     Active member of club or organization: Not on file     Attends meetings of clubs or organizations: Not on file     Relationship status: Not on file    Intimate partner violence     Fear of current or ex partner: Not on file     Emotionally abused: Not on file     Physically abused: Not on file     Forced sexual activity: Not on file   Other Topics Concern    Not on file   Social History Narrative    Not on file       Current Outpatient Medications   Medication Sig Dispense Refill    atorvastatin (LIPITOR) 20 MG tablet Take 1 tablet by mouth daily 90 tablet 3    apixaban (ELIQUIS) 5 MG TABS tablet Take 1 tablet by mouth 2 times daily 60 tablet 5    escitalopram (LEXAPRO) 20 MG tablet Take 1 tablet by mouth daily 90 tablet 1    methIMAzole (TAPAZOLE) 5 MG tablet Take 1 tablet by mouth 2 times daily 180 tablet 1    metoprolol (LOPRESSOR) 100 MG tablet Take 1 tablet by mouth 2 times daily 180 tablet 1    tolterodine (DETROL LA) 4 MG extended release capsule Take 1 capsule by mouth daily 90 capsule 1    amiodarone (CORDARONE) 200 MG tablet Take 1 tablet by mouth 2 times daily 60 tablet 3    nortriptyline (PAMELOR) 10 MG capsule Take 1 capsule by mouth nightly 30 capsule 2    aspirin EC 81 MG EC tablet Take 1 tablet by mouth daily 90 tablet 0     No current facility-administered medications for this visit. Allergies   Allergen Reactions    Niacin And Related      REVIEW OF SYSTEMS  Constitutional: []? Fever []? Sweats []? Chills []? Recent Injury [x]? Denies all unless marked  HEENT:[]? Headache  []? Head Injury []? Hearing Loss  []? Sore Throat  []? Ear Ache [x]? Denies all unless marked  Spine:  []? Neck pain  []? Back pain  []? Sciaticia  [x]? Denies all unless marked  Cardiovascular:[]? Heart Disease []? Palpitations []? Chest Pain   [x]? Denies all unless marked  Pulmonary: []? Shortness of Breath []? Cough   [x]? Denies all unless marked  Psychiatric/Behavioral:[]? Depression []? Anxiety [x]? Denies all unless marked  Gastrointestinal: []? Nausea  []? Vomiting  []? Abdominal Pain  []? Constipation  []? Diarrhea  [x]? Denies all unless marked  Genitourinary:   []? Frequency  []? Urgency  []? Dysuria []? Incontinence  [x]? Denies all unless marked  Extremities: []? Pain  []? Swelling  [x]? Denies all unless marked  Musculoskeletal: []? Myalgias  []? Joint Pain  []? Arthritis []? Muscle Cramps []? Muscle Twitches  [x]? Denies all unless marked  Sleep: []? Insomnia[]? Snoring []? Restless Legs  []? Sleep Apnea  []? Daytime Sleepiness  [x]? Denies all unless marked  Skin:[]? Rash []? Color Change [x]? Denies all unless marked   Neurological:[]? Visual Disturbance []? Memory Loss []? Loss of Balance []? Slurred Speech []? Weakness []? Seizures  []? Dizziness [x]? Denies all unless marked    The MA has completed the ROS with the patient. I have reviewed it in its' entirety with the patient and agree with the documentation.      PHYSICAL EXAM  /77   Pulse 83   Temp 97.3 °F (36.3 °C)   Ht 5' 2\" (1.575 m)   Wt (!) 311 lb (141.1 kg)   SpO2 96%   BMI 56.88 kg/m²       Constitutional - No acute distress    HEENT- Conjunctiva normal.  No scars, masses, or lesions over external nose or ears, no neck masses noted, no jugular vein distension, no bruit  Cardiac- Irregularly, irregular rhythm   Pulmonary- Good expansion, normal effort without use of accessory muscles  Musculoskeletal - No significant wasting of muscles noted, no bony deformities  Extremities - No clubbing, cyanosis or edema  Skin - Warm, dry, and intact. No rash, erythema, or pallor  Psychiatric - Mood, affect, and behavior appear normal      NEUROLOGICAL EXAM     Mental status   [x] Awake, alert, oriented   [x]Affect attention and concentration appear appropriate  [x]Recent and remote memory appears unremarkable  [x]Speech normal without dysarthria or aphasia, comprehension and repetition intact. COMMENTS:    Cranial Nerves [x]No VF deficit to confrontation,  no papilledema on fundoscopic exam.  [x]PERRLA, EOMI, no nystagmus, conjugate eye movements, no ptosis  [x]Face symmetric  [x]Facial sensation intact  [x]Tongue midline no atrophy or fasciculations present  [x]Palate midline, hearing to finger rub normal bilaterally  [x]Shoulder shrug and SCM testing normal bilaterally  COMMENTS:   Motor   [x]5/5 strength x 4 extremities  [x]Normal bulk and tone  [x]No tremor present  [x]No rigidity or bradykinesia noted  COMMENTS:   Sensory  [x]Sensation intact to light touch, pin prick, vibration, and proprioception BLE  [x]Sensation intact to light touch, pin prick, vibration, and proprioception BUE  COMMENTS:   Coordination [x]FTN normal bilaterally   [x]HTS normal bilaterally  [x]KVNG normal bilaterally.    COMMENTS:   Reflexes  [x]Symmetric and non-pathological  [x]Toes down going bilaterally  [x]No clonus present  COMMENTS:   Gait                  [x]Normal steady gait    []Ataxic    []Spastic     []Magnetic     []Shuffling  COMMENTS:       LABS RECORD AND IMAGING REVIEW (As below and per HPI)    No results found for: [id number]  Lab Results   Component Value Date    WBC 4.5 (L) 12/01/2020    HGB 14.3 12/01/2020    HCT 44.2 12/01/2020    MCV 89.7 12/01/2020     12/01/2020     Lab Results   Component Value Date     12/22/2020    K 3.9 12/22/2020    CL 98 12/22/2020    CO2 30 (H) 12/22/2020    BUN 12 12/22/2020    CREATININE 0.8 12/22/2020    GLUCOSE 111 (H) 12/22/2020    CALCIUM 9.2 12/22/2020    PROT 7.1 12/01/2020    LABALBU 3.9 12/01/2020    BILITOT 1.0 12/01/2020    ALKPHOS 85 12/01/2020    AST 22 12/01/2020    ALT 12 12/01/2020    LABGLOM >60 12/22/2020    GFRAA >59 12/22/2020     Lab Results   Component Value Date    CHOL 127 (L) 10/03/2020    TRIG 80 10/03/2020    HDL 49 (L) 10/03/2020    LDLCALC 62 10/03/2020     No results found for: TSH, T4FREE  Lab Results   Component Value Date    CRP 0.46 10/03/2020    SEDRATE 20 10/03/2020        Ct Head Wo Contrast    Result Date: 10/2/2020  CT HEAD WO CONTRAST 10/2/2020 11:55 AM History: Altered mental status and visual changes. Dizziness. In order to have a CT radiation dose as low as reasonably achievable Automated Exposure Control was utilized for adjustment of the mA and/or KV according to patient size. DLP in mGycm= 736. Right occipital lobe hypodense focus involving cortex and subcortical white matter. Area of involvement = 6.3 x 3 cm. There is some slight hyperdensity within this focus with suggests hemorrhagic staining. There is no discrete hemorrhage. There is a small focus of old ischemic change within the posterior right frontal lobe. Ventricle size is normal. No midline shift. Normal bones. Clear paranasal sinuses and mastoid air cells. 1. Right occipital lobe abnormality compatible with subacute or early chronic infarct. 2. If symptoms do not fit for stroke in this distribution then pre and postcontrast MR imaging should be performed to rule out underlying neoplasm.  Signed by Dr Mark Velasquez on 10/2/2020 11:59 AM    Xr Chest Portable    Result Date: 10/2/2020  XR CHEST PORTABLE 10/2/2020 10:20 AM HISTORY: Confusion  Single view. Portable upright COMPARISONS:  None FINDINGS: The level of inspiration is shallow and lung volumes diminished. Bronchovascular interstitial markings are prominent likely related to the level of inspiration and hypoventilation. The heart also appears generous, again likely related to hypoventilation. No definite parenchymal infiltrates observed. The bony structures are intact. Shallow inspiration with diminished lung volumes and hypoventilation. Signed by Dr Oren Stern on 10/2/2020 11:33 AM    Vascular Carotid Duplex Bilateral    Result Date: 10/3/2020  Vascular Carotid Procedure  Demographics   Patient Name  [facility]       Age                81                [address]   Patient       [id number]         Gender             Female  Number   Visit Number  [id number]      Interpreting       Valdemar Lewis MD                               Physician   Date of Birth 1950     Referring          Nick Santa                               Physician   Accession     [id number]     [id number]  Number                                          RVS, RCS  Procedure Type of Study:   Cerebral:Carotid, VL CAROTID BILATERAL. Indications for Study:CVA. Risk Factors   - The patient's risk factor(s) include: dyslipidemia, obesity and arterial     hypertension. Impression   Duplex sonography with color flow enhancement was performed bilaterally on  the cervical carotid system. No evidence of hemodynamically significant  stenosis in the bilateral carotid and vertebral arteries. Signature   ----------------------------------------------------------------  Electronically signed by Valdemar Lewis MD(Interpreting  physician) on 10/03/2020 07:42 AM     Mri Brain W Wo Contrast    Result Date: 10/3/2020  EXAMINATION:  MRI BRAIN W WO CONTRAST  10/3/2020 3:28 PM HISTORY: Altered mental status and vision changes. Stroke symptoms.  COMPARISON: Head CT dated 10/2/2020 TECHNIQUE: Multiplanar MR imaging the brain was performed as well as gadolinium enhanced imaging. FINDINGS: As noted on the CT examination there is restricted diffusion signal abnormality involving the right posterior parietal/occipital lobe compatible with acute ischemic change/infarction. There Is corresponding FLAIR signal hyperintensities . There is mild associated sulcal effacement. There is no significant mass effect or midline shift otherwise. There is mild associated vascular enhancement There is an old area of cortical infarction involving the right posterior parietal lobe superiorly, near the vertex. There are punctate FLAIR signal hyperintensities compatible chronic ischemic change. There is no hydrocephalus. No additional abnormal gadolinium enhancement observed. Pituitary gland is not enlarged. Cervical spine imaged in part is unremarkable Globes and intraorbital structures are imaged symmetrically.     1. Acute right posterior parietal/occipital lobe infarct Signed by Dr Lesvia Gillespie on 10/3/2020 3:36 PM    Echocardiogram (10/2020)-  Summary   Technically difficult study   Moderate left ventricular enlargement with systolic function within normal   limits with EF of 55%   Moderate concentric left ventricular hypertrophy with transmitral tissue   Dopplers consistent with severe/restrictive Marlise Berliner 3] diastolic   dysfunction   Moderate left atrial enlargement   Tricuspid aortic valve with adequate cusp separation and neither stenosis   or insufficiency   Mild thickening of a normally mobile mitral valve with mild regurgitation   Nonvisualization pulmonic valve   Poor visualization of the right-sided chambers   Mild tricuspid regurgitation with RVSP estimate of 47 mmHg   Significantly dilated IVC with failure respiratory motion consistent with   marked elevation of right atrial filling pressures in excess of 20 mmHg   No significant pericardial effusion   Aortic root dimensions within normal limits with mild dilatation of the ascending aorta at 3.4 cm   Definity contrast utilized to better define endocardial borders      Signature      ----------------------------------------------------------------   Electronically signed by Maurice Apple MD(Interpreting physician)   on 10/08/2020 08:59 AM    CT head (10/2020)-   Impression   1. Watershed zone infarct on the right involving the right   occipital-parietal junction region with mild mass effect. No   hemorrhagic transformation is seen. 2. Chronic infarct in the right parietal convexity. Signed by Dr Jason Robert on 10/27/2020 11:25 AM     ASSESSMENT:    Jerzy Escobar is a 78y.o. year old female here for hospital follow up. She does appear to be in atrial fibrillation today, irregularly irregular rhythm noted with palpation/auscultation. She is asymptomatic today in the office, her rate is not fast, no chest pain. She has undergone a cardioversion several weeks ago. She is on Amiodarone and Eliquis. Previously admitted with left visual field deficit, confusion and headache. MRI brain with right posterior parietal/occipital infarct. Echocardiogram with moderate left atrial and ventricular enlargement. Found to be in atrial fibrillation upon presentation in ER, no prior history of this. Carotid artery u/s was normal. She feels improved overall today. Given improvement in headaches, will stop Nortriptyline. ICD-10-CM    1. Occipital infarction ([address]  I63.9    2. Atrial fibrillation, unspecified type ([address]  I48.91        PLAN:  1. Stop Nortriptyline today. Defer further sleep agents to PCP  2. Continue with cardiology as previously scheduled, defer further management of Eliquis to cardiology team. Out of rhythm today but asymptomatic so felt ok to go home. To ER with any chest pain, racing heart beat etc.   3. Continue stroke risk factor maximization- ASA, statin, Eliquis and anti hypertensive  4. Return in about 4 months (around 5/20/2021) for follow up, sooner if worsening. Discussed importance of coming to the ER within 3-4 hours of stroke like symptoms.      Bernie Cobb DNP, APRN

## 2021-01-22 ENCOUNTER — TELEPHONE (OUTPATIENT)
Dept: CARDIOLOGY CLINIC | Age: 71
End: 2021-01-22

## 2021-01-25 PROBLEM — N32.81 OVERACTIVE BLADDER: Status: ACTIVE | Noted: 2021-01-25

## 2021-01-25 PROBLEM — F33.1 MODERATE EPISODE OF RECURRENT MAJOR DEPRESSIVE DISORDER (HCC): Status: ACTIVE | Noted: 2021-01-25

## 2021-01-25 PROBLEM — E05.90 HYPERTHYROIDISM: Status: ACTIVE | Noted: 2021-01-25

## 2021-01-25 PROBLEM — I63.50 CEREBROVASCULAR ACCIDENT (CVA) DUE TO OCCLUSION OF CEREBRAL ARTERY (HCC): Status: ACTIVE | Noted: 2021-01-25

## 2021-01-25 ASSESSMENT — ENCOUNTER SYMPTOMS
SHORTNESS OF BREATH: 0
COUGH: 0
CONSTIPATION: 0
EYE DISCHARGE: 0
RHINORRHEA: 0
EYE PAIN: 0
DIARRHEA: 0
ABDOMINAL PAIN: 0
NAUSEA: 0
BACK PAIN: 0
VOMITING: 0

## 2021-01-26 NOTE — PATIENT INSTRUCTIONS

## 2021-02-02 ENCOUNTER — TELEPHONE (OUTPATIENT)
Dept: CARDIOLOGY CLINIC | Age: 71
End: 2021-02-02

## 2021-02-09 DIAGNOSIS — I10 ESSENTIAL HYPERTENSION: ICD-10-CM

## 2021-02-09 RX ORDER — METOPROLOL TARTRATE 100 MG/1
100 TABLET ORAL 2 TIMES DAILY
Qty: 180 TABLET | Refills: 1 | Status: SHIPPED | OUTPATIENT
Start: 2021-02-09 | End: 2021-07-26

## 2021-02-12 ENCOUNTER — TELEPHONE (OUTPATIENT)
Dept: CARDIOLOGY CLINIC | Age: 71
End: 2021-02-12

## 2021-02-12 NOTE — TELEPHONE ENCOUNTER
Patient moved her 4 week follow due to weather,matthew  was told by Felicia Fisher that she in AFIB and needs to see if she needs to be seen sooner.

## 2021-02-18 NOTE — TELEPHONE ENCOUNTER
Spoke with patient said she had no clue she was in afib. She denies any SOB, fatigue or anything to make her think she is out of rhythm. I asked if she could come in today to see ISHAN. [place] and she is not able to get out of her driveway so she is not able to leave her home. Will talk with ISHAN to see what to do from here.

## 2021-02-22 ENCOUNTER — OFFICE VISIT (OUTPATIENT)
Dept: CARDIOLOGY CLINIC | Age: 71
End: 2021-02-22
Payer: MEDICARE

## 2021-02-22 VITALS — HEIGHT: 62 IN | BODY MASS INDEX: 53.92 KG/M2 | WEIGHT: 293 LBS | HEART RATE: 58 BPM

## 2021-02-22 DIAGNOSIS — E66.01 MORBIDLY OBESE (HCC): ICD-10-CM

## 2021-02-22 DIAGNOSIS — I48.91 ATRIAL FIBRILLATION, UNSPECIFIED TYPE (HCC): Primary | ICD-10-CM

## 2021-02-22 DIAGNOSIS — Z79.01 CHRONIC ANTICOAGULATION: ICD-10-CM

## 2021-02-22 DIAGNOSIS — I10 ESSENTIAL HYPERTENSION: ICD-10-CM

## 2021-02-22 DIAGNOSIS — E78.2 MIXED HYPERLIPIDEMIA: ICD-10-CM

## 2021-02-22 PROCEDURE — 1123F ACP DISCUSS/DSCN MKR DOCD: CPT | Performed by: INTERNAL MEDICINE

## 2021-02-22 PROCEDURE — G8417 CALC BMI ABV UP PARAM F/U: HCPCS | Performed by: INTERNAL MEDICINE

## 2021-02-22 PROCEDURE — 1090F PRES/ABSN URINE INCON ASSESS: CPT | Performed by: INTERNAL MEDICINE

## 2021-02-22 PROCEDURE — 1036F TOBACCO NON-USER: CPT | Performed by: INTERNAL MEDICINE

## 2021-02-22 PROCEDURE — 3017F COLORECTAL CA SCREEN DOC REV: CPT | Performed by: INTERNAL MEDICINE

## 2021-02-22 PROCEDURE — G8427 DOCREV CUR MEDS BY ELIG CLIN: HCPCS | Performed by: INTERNAL MEDICINE

## 2021-02-22 PROCEDURE — G8400 PT W/DXA NO RESULTS DOC: HCPCS | Performed by: INTERNAL MEDICINE

## 2021-02-22 PROCEDURE — 93000 ELECTROCARDIOGRAM COMPLETE: CPT | Performed by: INTERNAL MEDICINE

## 2021-02-22 PROCEDURE — 99214 OFFICE O/P EST MOD 30 MIN: CPT | Performed by: INTERNAL MEDICINE

## 2021-02-22 PROCEDURE — G8484 FLU IMMUNIZE NO ADMIN: HCPCS | Performed by: INTERNAL MEDICINE

## 2021-02-22 PROCEDURE — 4040F PNEUMOC VAC/ADMIN/RCVD: CPT | Performed by: INTERNAL MEDICINE

## 2021-02-22 ASSESSMENT — ENCOUNTER SYMPTOMS
EYES NEGATIVE: 1
NAUSEA: 0
RESPIRATORY NEGATIVE: 1
SHORTNESS OF BREATH: 0
VOMITING: 0
DIARRHEA: 0
GASTROINTESTINAL NEGATIVE: 1

## 2021-02-22 NOTE — PATIENT INSTRUCTIONS
[place] at the [facility] and [address] located on the first floor of [facility].       Patient's contact number:  [phone] (home)     Date/Time: 3/2/21 arrive at [address] [place] [address] for 10AM procedure  COVID swab only on 2/26/21 between 8-11AM at [facility] only. Out-Patient -  Cardioversion    Cardioversion is a procedure that uses an electrical current to help normalize the heart rhythm. You will be coming into our facility as an outpatient and will go home following this procedure. Cardioversion Instructions:  · Do not eat or drink anything after midnight the night before your procedure. May take morning medications with a sip of water unless otherwise directed not to. · You should arrange to have someone take you home rather than drive yourself. · Further plans will depend upon the result of your procedure. If for any reason you are unable to keep this appointment, please contact [facility], [phone], as soon as possible to reschedule.

## 2021-02-22 NOTE — PROGRESS NOTES
 CHF (congestive heart failure) (HCC)     COPD (chronic obstructive pulmonary disease) [address] [address]     Hyperlipidemia     Hypertension     Thyroid disease      Past Surgical History:   Procedure Laterality Date    CARDIOVERSION  2020    HYSTERECTOMY      With removal of tumor    TONSILLECTOMY      TUMOR REMOVAL       Family History   Problem Relation Age of Onset    Pacemaker Mother     Heart Disease Mother      Allergies   Allergen Reactions    Niacin And Related      Current Outpatient Medications   Medication Sig Dispense Refill    metoprolol (LOPRESSOR) 100 MG tablet Take 1 tablet by mouth 2 times daily 180 tablet 1    atorvastatin (LIPITOR) 20 MG tablet Take 1 tablet by mouth daily 90 tablet 3    apixaban (ELIQUIS) 5 MG TABS tablet Take 1 tablet by mouth 2 times daily 60 tablet 5    escitalopram (LEXAPRO) 20 MG tablet Take 1 tablet by mouth daily 90 tablet 1    methIMAzole (TAPAZOLE) 5 MG tablet Take 1 tablet by mouth 2 times daily 180 tablet 1    tolterodine (DETROL LA) 4 MG extended release capsule Take 1 capsule by mouth daily 90 capsule 1    amiodarone (CORDARONE) 200 MG tablet Take 1 tablet by mouth 2 times daily 60 tablet 3    nortriptyline (PAMELOR) 10 MG capsule Take 1 capsule by mouth nightly 30 capsule 2    aspirin EC 81 MG EC tablet Take 1 tablet by mouth daily 90 tablet 0     No current facility-administered medications for this visit. Social History     Socioeconomic History    Marital status:       Spouse name: Not on file    Number of children: Not on file    Years of education: Not on file    Highest education level: Not on file   Occupational History    Not on file   Social Needs    Financial resource strain: Not on file    Food insecurity     Worry: Not on file     Inability: Not on file    Transportation needs     Medical: Not on file     Non-medical: Not on file   Tobacco Use    Smoking status: Never Smoker    Smokeless tobacco: Never Used   Substance and Sexual Activity    Alcohol use: Never     Frequency: Never    Drug use: Not Currently    Sexual activity: Not on file   Lifestyle    Physical activity     Days per week: Not on file     Minutes per session: Not on file    Stress: Not on file   Relationships    Social connections     Talks on phone: Not on file     Gets together: Not on file     Attends Qu[employer] service: Not on file     Active member of club or organization: Not on file     Attends meetings of clubs or organizations: Not on file     Relationship status: Not on file    Intimate partner violence     Fear of current or ex partner: Not on file     Emotionally abused: Not on file     Physically abused: Not on file     Forced sexual activity: Not on file   Other Topics Concern    Not on file   Social History Narrative    Not on file       Physical Examination:  Pulse 58   Ht 5' 2\" (1.575 m)   Wt (!) 321 lb (145.6 kg)   BMI 58.71 kg/m²   Physical Exam  Vitals signs reviewed. Constitutional:       Appearance: She is well-developed. Neck:      Vascular: No carotid bruit or JVD. Cardiovascular:      Rate and Rhythm: Normal rate and regular rhythm. Heart sounds: Normal heart sounds. No murmur. No friction rub. No gallop. Pulmonary:      Effort: Pulmonary effort is normal. No respiratory distress. Breath sounds: Normal breath sounds. No wheezing or rales. Abdominal:      General: There is no distension. Tenderness: There is no abdominal tenderness. Lymphadenopathy:      Cervical: No cervical adenopathy. Skin:     General: Skin is warm and dry. ASSESSMENT:     Diagnosis Orders   1. Atrial fibrillation, unspecified type ([address]  EKG 12 lead   2. Essential hypertension     3. Mixed hyperlipidemia     4. Morbidly obese ([address]     5. Chronic anticoagulation         PLAN:  Orders Placed This Encounter   Procedures    EKG 12 lead     No orders of the defined types were placed in this encounter.         1. Continue present medications  2. After discussion reschedule her for another attempt at cardioversion. Advise indication alternatives benefits and risk she is agreeable wishes to proceed. ASA score: 3  Airway score: Mallampati class II  Patient is acceptable candidate for moderate IV conscious sedation    Return in about 4 weeks (around 3/22/2021) for return to Dr. Rafiq Daniel only. Nola Rascon MD 2/22/2021 11:46 AM [facility]      Thisdictation was generated by voice recognition computer software. Although all attempts are made to edit the dictation for accuracy, there may be errors in the transcription that are not intended.

## 2021-02-26 ENCOUNTER — OFFICE VISIT (OUTPATIENT)
Age: 71
End: 2021-02-26

## 2021-02-26 VITALS — HEART RATE: 98 BPM | TEMPERATURE: 97 F | OXYGEN SATURATION: 98 %

## 2021-02-26 DIAGNOSIS — Z11.59 SCREENING FOR VIRAL DISEASE: Primary | ICD-10-CM

## 2021-02-26 LAB — SARS-COV-2, PCR: NOT DETECTED

## 2021-02-26 PROCEDURE — 99999 PR OFFICE/OUTPT VISIT,PROCEDURE ONLY: CPT | Performed by: NURSE PRACTITIONER

## 2021-03-02 ENCOUNTER — HOSPITAL ENCOUNTER (OUTPATIENT)
Dept: CARDIAC CATH/INVASIVE PROCEDURES | Age: 71
Discharge: HOME | End: 2021-03-02
Attending: INTERNAL MEDICINE | Admitting: INTERNAL MEDICINE
Payer: MEDICARE

## 2021-03-02 LAB
ANION GAP SERPL CALCULATED.3IONS-SCNC: 10 MMOL/L (ref 7–19)
BUN BLDV-MCNC: 15 MG/DL (ref 8–23)
CALCIUM SERPL-MCNC: 8.9 MG/DL (ref 8.8–10.2)
CHLORIDE BLD-SCNC: 102 MMOL/L (ref 98–111)
CO2: 30 MMOL/L (ref 22–29)
CREAT SERPL-MCNC: 0.7 MG/DL (ref 0.5–0.9)
EKG P AXIS: 20 DEGREES
EKG P-R INTERVAL: 162 MS
EKG Q-T INTERVAL: 524 MS
EKG QRS DURATION: 96 MS
EKG QTC CALCULATION (BAZETT): 505 MS
EKG T AXIS: 68 DEGREES
GFR AFRICAN AMERICAN: >59
GFR NON-AFRICAN AMERICAN: >60
GLUCOSE BLD-MCNC: 98 MG/DL (ref 74–109)
HCT VFR BLD CALC: 43.6 % (ref 37–47)
HEMOGLOBIN: 14 G/DL (ref 12–16)
MCH RBC QN AUTO: 31.7 PG (ref 27–31)
MCHC RBC AUTO-ENTMCNC: 32.1 G/DL (ref 33–37)
MCV RBC AUTO: 98.9 FL (ref 81–99)
PDW BLD-RTO: 15.1 % (ref 11.5–14.5)
PLATELET # BLD: 198 K/UL (ref 130–400)
PMV BLD AUTO: 11.7 FL (ref 9.4–12.3)
POTASSIUM REFLEX MAGNESIUM: 3.8 MMOL/L (ref 3.5–5)
RBC # BLD: 4.41 M/UL (ref 4.2–5.4)
SODIUM BLD-SCNC: 142 MMOL/L (ref 136–145)
WBC # BLD: 5 K/UL (ref 4.8–10.8)

## 2021-03-02 PROCEDURE — 36415 COLL VENOUS BLD VENIPUNCTURE: CPT

## 2021-03-02 PROCEDURE — 80048 BASIC METABOLIC PNL TOTAL CA: CPT

## 2021-03-02 PROCEDURE — 85027 COMPLETE CBC AUTOMATED: CPT

## 2021-03-02 PROCEDURE — 93005 ELECTROCARDIOGRAM TRACING: CPT | Performed by: INTERNAL MEDICINE

## 2021-03-02 PROCEDURE — 93010 ELECTROCARDIOGRAM REPORT: CPT | Performed by: INTERNAL MEDICINE

## 2021-03-02 RX ORDER — SODIUM CHLORIDE 9 MG/ML
INJECTION, SOLUTION INTRAVENOUS CONTINUOUS
Status: DISCONTINUED | OUTPATIENT
Start: 2021-03-02 | End: 2021-03-02 | Stop reason: HOSPADM

## 2021-03-05 ENCOUNTER — TELEPHONE (OUTPATIENT)
Dept: CARDIOLOGY CLINIC | Age: 71
End: 2021-03-05

## 2021-03-12 ENCOUNTER — TELEPHONE (OUTPATIENT)
Dept: NEUROLOGY | Age: 71
End: 2021-03-12

## 2021-03-12 NOTE — TELEPHONE ENCOUNTER
Patient left message stating that since she has been taking Nortriptyline she cannot sleep, usually wakes up with extremely dry mouth which lasts all day. She is requesting to stop taking the medication and wants to know if she needs to wean off of it first. Patient is also requesting a sleep aid.  Please advise

## 2021-03-15 RX ORDER — NORTRIPTYLINE HYDROCHLORIDE 10 MG/1
10 CAPSULE ORAL NIGHTLY
Qty: 90 CAPSULE | Refills: 0 | Status: SHIPPED | OUTPATIENT
Start: 2021-03-15 | End: 2021-09-24 | Stop reason: SDUPTHER

## 2021-03-15 NOTE — TELEPHONE ENCOUNTER
Requested Prescriptions     Pending Prescriptions Disp Refills    nortriptyline (PAMELOR) 10 MG capsule [Pharmacy Med Name: Nortriptyline HCl 10 MG Oral Capsule] 90 capsule 0     Sig: TAKE 1 CAPSULE BY MOUTH NIGHTLY       Last Office Visit: 1/20/2021  Next Office Visit: 5/20/2021  Last Medication Refill: 11/12/20 with 2 refills

## 2021-03-15 NOTE — TELEPHONE ENCOUNTER
Spoke with patient and explained that it was ok for her to stop taking the Nortriptyline. I also voiced that she could try Melatonin for sleep. I advised if this didn't work she should follow up with her PCP. She understood all.

## 2021-03-15 NOTE — TELEPHONE ENCOUNTER
Ok to stop the Nortriptyline, doesn't need to wean this as she is on the lowest dose already.  Could try Melatonin for sleep but otherwise needs to discuss with PCP regarding sleep medication

## 2021-03-17 ENCOUNTER — TELEPHONE (OUTPATIENT)
Dept: CARDIOLOGY CLINIC | Age: 71
End: 2021-03-17

## 2021-03-17 NOTE — TELEPHONE ENCOUNTER
Patient called stating she had blood in urine and when she wiped it looked the start of her period but she has had hysterectomy. Advised to monitor and if bleeding gets worse to go to ED. Advised not to stop eliquis and ASA and keep follow up with Dr. Geovanna Ortiz tomorrow.

## 2021-03-18 ENCOUNTER — OFFICE VISIT (OUTPATIENT)
Dept: CARDIOLOGY CLINIC | Age: 71
End: 2021-03-18
Payer: MEDICARE

## 2021-03-18 VITALS
WEIGHT: 293 LBS | SYSTOLIC BLOOD PRESSURE: 140 MMHG | HEART RATE: 56 BPM | BODY MASS INDEX: 53.92 KG/M2 | HEIGHT: 62 IN | DIASTOLIC BLOOD PRESSURE: 84 MMHG

## 2021-03-18 DIAGNOSIS — I10 ESSENTIAL HYPERTENSION: ICD-10-CM

## 2021-03-18 DIAGNOSIS — E78.2 MIXED HYPERLIPIDEMIA: ICD-10-CM

## 2021-03-18 DIAGNOSIS — R94.39 ABNORMAL STRESS TEST: ICD-10-CM

## 2021-03-18 DIAGNOSIS — E66.01 MORBIDLY OBESE (HCC): ICD-10-CM

## 2021-03-18 DIAGNOSIS — I48.91 ATRIAL FIBRILLATION, UNSPECIFIED TYPE (HCC): Primary | ICD-10-CM

## 2021-03-18 PROCEDURE — 99214 OFFICE O/P EST MOD 30 MIN: CPT | Performed by: INTERNAL MEDICINE

## 2021-03-18 PROCEDURE — G8484 FLU IMMUNIZE NO ADMIN: HCPCS | Performed by: INTERNAL MEDICINE

## 2021-03-18 PROCEDURE — 93000 ELECTROCARDIOGRAM COMPLETE: CPT | Performed by: INTERNAL MEDICINE

## 2021-03-18 PROCEDURE — 3017F COLORECTAL CA SCREEN DOC REV: CPT | Performed by: INTERNAL MEDICINE

## 2021-03-18 PROCEDURE — G8400 PT W/DXA NO RESULTS DOC: HCPCS | Performed by: INTERNAL MEDICINE

## 2021-03-18 PROCEDURE — 1036F TOBACCO NON-USER: CPT | Performed by: INTERNAL MEDICINE

## 2021-03-18 PROCEDURE — G8417 CALC BMI ABV UP PARAM F/U: HCPCS | Performed by: INTERNAL MEDICINE

## 2021-03-18 PROCEDURE — 4040F PNEUMOC VAC/ADMIN/RCVD: CPT | Performed by: INTERNAL MEDICINE

## 2021-03-18 PROCEDURE — 1123F ACP DISCUSS/DSCN MKR DOCD: CPT | Performed by: INTERNAL MEDICINE

## 2021-03-18 PROCEDURE — G8427 DOCREV CUR MEDS BY ELIG CLIN: HCPCS | Performed by: INTERNAL MEDICINE

## 2021-03-18 PROCEDURE — 1090F PRES/ABSN URINE INCON ASSESS: CPT | Performed by: INTERNAL MEDICINE

## 2021-03-18 ASSESSMENT — ENCOUNTER SYMPTOMS
NAUSEA: 0
DIARRHEA: 0
RESPIRATORY NEGATIVE: 1
EYES NEGATIVE: 1
VOMITING: 0
GASTROINTESTINAL NEGATIVE: 1
SHORTNESS OF BREATH: 0

## 2021-03-18 NOTE — PROGRESS NOTES
[facility] Progress Note                            Date:  3/18/2021  Patient: Ryan Moreno  Age:  78 y. o., 1950      Reason for evaluation:         SUBJECTIVE:    Returns today follow-up assessment recently brought back in for a second attempt at cardioversion and on the day that she arrived she was already back in sinus rhythm so this was not performed she is feeling better with less exertional dyspnea and increased energy. Denies any chest pain. She was having some genitourinary bleeding which is subsided advised to see her primary care physician regarding this no other issues reported. Review of Systems   Constitutional: Negative. Negative for chills, fever and unexpected weight change. HENT: Negative. Eyes: Negative. Respiratory: Negative. Negative for shortness of breath. Cardiovascular: Negative. Negative for chest pain. Gastrointestinal: Negative. Negative for diarrhea, nausea and vomiting. Endocrine: Negative. Genitourinary: Negative. Musculoskeletal: Negative. Skin: Negative. Neurological: Negative. OBJECTIVE:     BP (!) 140/84   Pulse 56   Ht 5' 2\" (1.575 m)   Wt (!) 315 lb (142.9 kg)   BMI 57.61 kg/m²     Labs:   CBC: No results for input(s): WBC, HGB, HCT, PLT in the last 72 hours. BMP:No results for input(s): NA, K, CO2, BUN, CREATININE, LABGLOM, GLUCOSE in the last 72 hours. BNP: No results for input(s): BNP in the last 72 hours. PT/INR: No results for input(s): PROTIME, INR in the last 72 hours. APTT:No results for input(s): APTT in the last 72 hours. CARDIAC ENZYMES:No results for input(s): CKTOTAL, CKMB, CKMBINDEX, TROPONINI in the last 72 hours. FASTING LIPID PANEL:  Lab Results   Component Value Date    HDL 49 10/03/2020    LDLCALC 62 10/03/2020    TRIG 80 10/03/2020     LIVER PROFILE:No results for input(s): AST, ALT, LABALBU in the last 72 hours.         Past Medical History:   Diagnosis Date    Arthritis     Atrial fibrillation (HCC)     [facility] occlusion [facility] infarction [facility] [address]     CHF (congestive heart failure) (HCC)     COPD (chronic obstructive pulmonary disease) ([facility] [address]     Hyperlipidemia     Hypertension     Thyroid disease      Past Surgical History:   Procedure Laterality Date    CARDIOVERSION  2020    HYSTERECTOMY      With removal of tumor    TONSILLECTOMY      TUMOR REMOVAL       Family History   Problem Relation Age of Onset    Pacemaker Mother     Heart Disease Mother      Allergies   Allergen Reactions    Niacin And Related      Current Outpatient Medications   Medication Sig Dispense Refill    nortriptyline (PAMELOR) 10 MG capsule TAKE 1 CAPSULE BY MOUTH NIGHTLY 90 capsule 0    metoprolol (LOPRESSOR) 100 MG tablet Take 1 tablet by mouth 2 times daily 180 tablet 1    atorvastatin (LIPITOR) 20 MG tablet Take 1 tablet by mouth daily 90 tablet 3    apixaban (ELIQUIS) 5 MG TABS tablet Take 1 tablet by mouth 2 times daily 60 tablet 5    escitalopram (LEXAPRO) 20 MG tablet Take 1 tablet by mouth daily 90 tablet 1    methIMAzole (TAPAZOLE) 5 MG tablet Take 1 tablet by mouth 2 times daily 180 tablet 1    tolterodine (DETROL LA) 4 MG extended release capsule Take 1 capsule by mouth daily 90 capsule 1    amiodarone (CORDARONE) 200 MG tablet Take 1 tablet by mouth 2 times daily 60 tablet 3    aspirin EC 81 MG EC tablet Take 1 tablet by mouth daily 90 tablet 0     No current facility-administered medications for this visit. Social History     Socioeconomic History    Marital status:       Spouse name: Not on file    Number of children: Not on file    Years of education: Not on file    Highest education level: Not on file   Occupational History    Not on file   Social Needs    Financial resource strain: Not on file    Food insecurity     Worry: Not on file     Inability: Not on file    Transportation needs     Medical: Not on file     Non-medical: Not on file Tobacco Use    Smoking status: Never Smoker    Smokeless tobacco: Never Used   Substance and Sexual Activity    Alcohol use: Never     Frequency: Never    Drug use: Not Currently    Sexual activity: Not on file   Lifestyle    Physical activity     Days per week: Not on file     Minutes per session: Not on file    Stress: Not on file   Relationships    Social connections     Talks on phone: Not on file     Gets together: Not on file     Attends Congregational service: Not on file     Active member of club or organization: Not on file     Attends meetings of clubs or organizations: Not on file     Relationship status: Not on file    Intimate partner violence     Fear of current or ex partner: Not on file     Emotionally abused: Not on file     Physically abused: Not on file     Forced sexual activity: Not on file   Other Topics Concern    Not on file   Social History Narrative    Not on file       Physical Examination:  BP (!) 140/84   Pulse 56   Ht 5' 2\" (1.575 m)   Wt (!) 315 lb (142.9 kg)   BMI 57.61 kg/m²   Physical Exam  Vitals signs reviewed. Constitutional:       Appearance: She is well-developed. Neck:      Vascular: No carotid bruit or JVD. Cardiovascular:      Rate and Rhythm: Normal rate and regular rhythm. Heart sounds: Normal heart sounds. No murmur. No friction rub. No gallop. Pulmonary:      Effort: Pulmonary effort is normal. No respiratory distress. Breath sounds: Normal breath sounds. No wheezing or rales. Abdominal:      General: There is no distension. Tenderness: There is no abdominal tenderness. Lymphadenopathy:      Cervical: No cervical adenopathy. Skin:     General: Skin is warm and dry. ASSESSMENT:     Diagnosis Orders   1. Atrial fibrillation, unspecified type ([facility] [address]  EKG 12 lead   2. Abnormal stress test     3. Essential hypertension     4. Mixed hyperlipidemia     5.  Morbidly obese ([address]         PLAN:  Orders Placed This Encounter Procedures    EKG 12 lead     No orders of the defined types were placed in this encounter. 1. Continue present medications  2. Recommend follow-up assessment in 1 month    Return in about 4 weeks (around 4/15/2021) for return to Dr. Rafiq Daniel only. Nola Rascon MD 3/18/2021 2:41 PM CDT    [id number] [facility]      Thisdictation was generated by voice recognition computer software. Although all attempts are made to edit the dictation for accuracy, there may be errors in the transcription that are not intended.

## 2021-03-22 RX ORDER — AMIODARONE HYDROCHLORIDE 200 MG/1
200 TABLET ORAL 2 TIMES DAILY
Qty: 60 TABLET | Refills: 3 | Status: SHIPPED | OUTPATIENT
Start: 2021-03-22 | End: 2021-07-26

## 2021-04-22 ENCOUNTER — OFFICE VISIT (OUTPATIENT)
Dept: CARDIOLOGY CLINIC | Age: 71
End: 2021-04-22
Payer: MEDICARE

## 2021-04-22 VITALS
BODY MASS INDEX: 53.92 KG/M2 | WEIGHT: 293 LBS | HEART RATE: 46 BPM | HEIGHT: 62 IN | SYSTOLIC BLOOD PRESSURE: 126 MMHG | DIASTOLIC BLOOD PRESSURE: 60 MMHG

## 2021-04-22 DIAGNOSIS — E78.2 MIXED HYPERLIPIDEMIA: ICD-10-CM

## 2021-04-22 DIAGNOSIS — I10 ESSENTIAL HYPERTENSION: ICD-10-CM

## 2021-04-22 DIAGNOSIS — E66.01 MORBIDLY OBESE (HCC): ICD-10-CM

## 2021-04-22 DIAGNOSIS — I48.91 ATRIAL FIBRILLATION, UNSPECIFIED TYPE (HCC): Primary | ICD-10-CM

## 2021-04-22 DIAGNOSIS — Z79.899 LONG TERM CURRENT USE OF ANTIARRHYTHMIC DRUG: ICD-10-CM

## 2021-04-22 DIAGNOSIS — Z79.01 CHRONIC ANTICOAGULATION: ICD-10-CM

## 2021-04-22 PROCEDURE — G8417 CALC BMI ABV UP PARAM F/U: HCPCS | Performed by: INTERNAL MEDICINE

## 2021-04-22 PROCEDURE — 1090F PRES/ABSN URINE INCON ASSESS: CPT | Performed by: INTERNAL MEDICINE

## 2021-04-22 PROCEDURE — 1123F ACP DISCUSS/DSCN MKR DOCD: CPT | Performed by: INTERNAL MEDICINE

## 2021-04-22 PROCEDURE — G8427 DOCREV CUR MEDS BY ELIG CLIN: HCPCS | Performed by: INTERNAL MEDICINE

## 2021-04-22 PROCEDURE — 1036F TOBACCO NON-USER: CPT | Performed by: INTERNAL MEDICINE

## 2021-04-22 PROCEDURE — 4040F PNEUMOC VAC/ADMIN/RCVD: CPT | Performed by: INTERNAL MEDICINE

## 2021-04-22 PROCEDURE — 99214 OFFICE O/P EST MOD 30 MIN: CPT | Performed by: INTERNAL MEDICINE

## 2021-04-22 PROCEDURE — 3017F COLORECTAL CA SCREEN DOC REV: CPT | Performed by: INTERNAL MEDICINE

## 2021-04-22 PROCEDURE — 93000 ELECTROCARDIOGRAM COMPLETE: CPT | Performed by: INTERNAL MEDICINE

## 2021-04-22 PROCEDURE — G8400 PT W/DXA NO RESULTS DOC: HCPCS | Performed by: INTERNAL MEDICINE

## 2021-04-22 RX ORDER — FUROSEMIDE 20 MG/1
20 TABLET ORAL DAILY PRN
Qty: 30 TABLET | Refills: 5 | Status: SHIPPED | OUTPATIENT
Start: 2021-04-22

## 2021-04-22 ASSESSMENT — ENCOUNTER SYMPTOMS
SHORTNESS OF BREATH: 0
GASTROINTESTINAL NEGATIVE: 1
EYES NEGATIVE: 1
RESPIRATORY NEGATIVE: 1
NAUSEA: 0
DIARRHEA: 0
VOMITING: 0

## 2021-04-22 NOTE — PROGRESS NOTES
[facility] Progress Note                            Date:  4/26/2021  Patient: Ligia Urias  Age:  78 y. o., 1950      Reason for evaluation:         SUBJECTIVE:    Returns today follow-up assessment recently showed up for cardioversion but was in sinus rhythm at that time subsequently discharged home. Follow-up for paroxysmal atrial fibrillation chronic anticoagulation hypertension morbid obesity etc.  EKG today shows sinus rhythm. Dyspnea about the same denies chest pain she has some mild edema. No new complaints otherwise noted. Blood pressure 126/60 heart 46. Review of Systems   Constitutional: Negative. Negative for chills, fever and unexpected weight change. HENT: Negative. Eyes: Negative. Respiratory: Negative. Negative for shortness of breath. Cardiovascular: Negative. Negative for chest pain. Gastrointestinal: Negative. Negative for diarrhea, nausea and vomiting. Endocrine: Negative. Genitourinary: Negative. Musculoskeletal: Negative. Skin: Negative. Neurological: Negative. All other systems reviewed and are negative. OBJECTIVE:     /60   Pulse (!) 46   Ht 5' 2\" (1.575 m)   Wt (!) 330 lb (149.7 kg)   BMI 60.36 kg/m²     Labs:   CBC: No results for input(s): WBC, HGB, HCT, PLT in the last 72 hours. BMP:No results for input(s): NA, K, CO2, BUN, CREATININE, LABGLOM, GLUCOSE in the last 72 hours. BNP: No results for input(s): BNP in the last 72 hours. PT/INR: No results for input(s): PROTIME, INR in the last 72 hours. APTT:No results for input(s): APTT in the last 72 hours. CARDIAC ENZYMES:No results for input(s): CKTOTAL, CKMB, CKMBINDEX, TROPONINI in the last 72 hours. FASTING LIPID PANEL:  Lab Results   Component Value Date    HDL 49 10/03/2020    LDLCALC 62 10/03/2020    TRIG 80 10/03/2020     LIVER PROFILE:No results for input(s): AST, ALT, LABALBU in the last 72 hours.         Past Medical History:   Diagnosis Date    Not on file     Inability: Not on file    Transportation needs     Medical: Not on file     Non-medical: Not on file   Tobacco Use    Smoking status: Never Smoker    Smokeless tobacco: Never Used   Substance and Sexual Activity    Alcohol use: Never     Frequency: Never    Drug use: Not Currently    Sexual activity: Not on file   Lifestyle    Physical activity     Days per week: Not on file     Minutes per session: Not on file    Stress: Not on file   Relationships    Social connections     Talks on phone: Not on file     Gets together: Not on file     Attends Denominational service: Not on file     Active member of club or organization: Not on file     Attends meetings of clubs or organizations: Not on file     Relationship status: Not on file    Intimate partner violence     Fear of current or ex partner: Not on file     Emotionally abused: Not on file     Physically abused: Not on file     Forced sexual activity: Not on file   Other Topics Concern    Not on file   Social History Narrative    Not on file       Physical Examination:  /60   Pulse (!) 46   Ht 5' 2\" (1.575 m)   Wt (!) 330 lb (149.7 kg)   BMI 60.36 kg/m²   Physical Exam  Vitals signs reviewed. Constitutional:       Appearance: She is well-developed. Neck:      Vascular: No carotid bruit or JVD. Cardiovascular:      Rate and Rhythm: Normal rate and regular rhythm. Heart sounds: Normal heart sounds. No murmur. No friction rub. No gallop. Pulmonary:      Effort: Pulmonary effort is normal. No respiratory distress. Breath sounds: Normal breath sounds. No wheezing or rales. Abdominal:      General: There is no distension. Tenderness: There is no abdominal tenderness. Musculoskeletal:         General: Swelling present. Comments: 1+ edema bilaterally   Lymphadenopathy:      Cervical: No cervical adenopathy. Skin:     General: Skin is warm and dry. ASSESSMENT:     Diagnosis Orders   1.  Atrial fibrillation, unspecified type ([facility] [address]  EKG 12 lead   2. Essential hypertension     3. Mixed hyperlipidemia     4. Morbidly obese ([address]     5. Chronic anticoagulation     6. Long term current use of antiarrhythmic drug         PLAN:  Orders Placed This Encounter   Procedures    EKG 12 lead     Orders Placed This Encounter   Medications    furosemide (LASIX) 20 MG tablet     Sig: Take 1 tablet by mouth daily as needed (Edema or swelling)     Dispense:  30 tablet     Refill:  5         1. Continue present medications  2. Recommend follow-up assessment in 3 months  3. Lasix 20 mg p.o. daily as needed    Return in about 3 months (around 7/22/2021) for return to Dr. Woo Akers only. Venus Singh MD 4/26/2021 3:52 PM CDT    [facility]      Thisdictation was generated by voice recognition computer software. Although all attempts are made to edit the dictation for accuracy, there may be errors in the transcription that are not intended.

## 2021-07-10 DIAGNOSIS — F33.1 MODERATE EPISODE OF RECURRENT MAJOR DEPRESSIVE DISORDER (HCC): ICD-10-CM

## 2021-07-12 RX ORDER — ESCITALOPRAM OXALATE 20 MG/1
TABLET ORAL
Qty: 90 TABLET | Refills: 0 | Status: SHIPPED | OUTPATIENT
Start: 2021-07-12 | End: 2021-07-13 | Stop reason: SDUPTHER

## 2021-07-13 DIAGNOSIS — F33.1 MODERATE EPISODE OF RECURRENT MAJOR DEPRESSIVE DISORDER (HCC): ICD-10-CM

## 2021-07-13 RX ORDER — ESCITALOPRAM OXALATE 20 MG/1
20 TABLET ORAL DAILY
Qty: 90 TABLET | Refills: 1 | Status: SHIPPED | OUTPATIENT
Start: 2021-07-13 | End: 2021-09-24 | Stop reason: SDUPTHER

## 2021-07-26 ENCOUNTER — OFFICE VISIT (OUTPATIENT)
Dept: CARDIOLOGY CLINIC | Age: 71
End: 2021-07-26
Payer: MEDICARE

## 2021-07-26 VITALS
HEART RATE: 47 BPM | BODY MASS INDEX: 53.92 KG/M2 | WEIGHT: 293 LBS | OXYGEN SATURATION: 96 % | HEIGHT: 62 IN | DIASTOLIC BLOOD PRESSURE: 72 MMHG | SYSTOLIC BLOOD PRESSURE: 136 MMHG

## 2021-07-26 DIAGNOSIS — Z79.01 CHRONIC ANTICOAGULATION: ICD-10-CM

## 2021-07-26 DIAGNOSIS — I10 ESSENTIAL HYPERTENSION: ICD-10-CM

## 2021-07-26 DIAGNOSIS — I48.0 PAROXYSMAL ATRIAL FIBRILLATION (HCC): Primary | ICD-10-CM

## 2021-07-26 DIAGNOSIS — E78.2 MIXED HYPERLIPIDEMIA: ICD-10-CM

## 2021-07-26 DIAGNOSIS — Z79.899 LONG TERM CURRENT USE OF ANTIARRHYTHMIC DRUG: ICD-10-CM

## 2021-07-26 DIAGNOSIS — E66.01 MORBIDLY OBESE (HCC): ICD-10-CM

## 2021-07-26 DIAGNOSIS — R94.39 ABNORMAL STRESS TEST: ICD-10-CM

## 2021-07-26 PROCEDURE — 99214 OFFICE O/P EST MOD 30 MIN: CPT | Performed by: INTERNAL MEDICINE

## 2021-07-26 PROCEDURE — 1090F PRES/ABSN URINE INCON ASSESS: CPT | Performed by: INTERNAL MEDICINE

## 2021-07-26 PROCEDURE — 1123F ACP DISCUSS/DSCN MKR DOCD: CPT | Performed by: INTERNAL MEDICINE

## 2021-07-26 PROCEDURE — G8417 CALC BMI ABV UP PARAM F/U: HCPCS | Performed by: INTERNAL MEDICINE

## 2021-07-26 PROCEDURE — G8427 DOCREV CUR MEDS BY ELIG CLIN: HCPCS | Performed by: INTERNAL MEDICINE

## 2021-07-26 PROCEDURE — 4040F PNEUMOC VAC/ADMIN/RCVD: CPT | Performed by: INTERNAL MEDICINE

## 2021-07-26 PROCEDURE — 1036F TOBACCO NON-USER: CPT | Performed by: INTERNAL MEDICINE

## 2021-07-26 PROCEDURE — 3017F COLORECTAL CA SCREEN DOC REV: CPT | Performed by: INTERNAL MEDICINE

## 2021-07-26 PROCEDURE — 93000 ELECTROCARDIOGRAM COMPLETE: CPT | Performed by: INTERNAL MEDICINE

## 2021-07-26 PROCEDURE — G8400 PT W/DXA NO RESULTS DOC: HCPCS | Performed by: INTERNAL MEDICINE

## 2021-07-26 RX ORDER — METOPROLOL TARTRATE 50 MG/1
50 TABLET, FILM COATED ORAL 2 TIMES DAILY
Qty: 60 TABLET | Refills: 5 | Status: SHIPPED | OUTPATIENT
Start: 2021-07-26 | End: 2022-04-19 | Stop reason: SDUPTHER

## 2021-07-26 RX ORDER — AMIODARONE HYDROCHLORIDE 200 MG/1
200 TABLET ORAL DAILY
Qty: 30 TABLET | Refills: 5 | Status: SHIPPED | OUTPATIENT
Start: 2021-07-26 | End: 2021-11-03 | Stop reason: SDUPTHER

## 2021-07-26 ASSESSMENT — ENCOUNTER SYMPTOMS
DIARRHEA: 0
GASTROINTESTINAL NEGATIVE: 1
NAUSEA: 0
RESPIRATORY NEGATIVE: 1
EYES NEGATIVE: 1
VOMITING: 0
SHORTNESS OF BREATH: 0

## 2021-07-26 NOTE — PROGRESS NOTES
[facility] Progress Note                            Date:  7/26/2021  Patient: Nisa White  Age:  78 y. o., 1950      Reason for evaluation:         SUBJECTIVE:    Returns today follow-up assessment EKG today confirms sinus bradycardia rate of 45. Denies chest pain denies increased dyspnea on anticoagulation denies any bleeding issues. No other complaints or issues reported. Blood pressure 136/72 heart rate 47. Review of Systems   Constitutional: Negative. Negative for chills, fever and unexpected weight change. HENT: Negative. Eyes: Negative. Respiratory: Negative. Negative for shortness of breath. Cardiovascular: Negative. Negative for chest pain. Gastrointestinal: Negative. Negative for diarrhea, nausea and vomiting. Endocrine: Negative. Genitourinary: Negative. Musculoskeletal: Negative. Skin: Negative. Neurological: Negative. All other systems reviewed and are negative. OBJECTIVE:     /72   Pulse (!) 47   Ht 5' 2\" (1.575 m)   Wt (!) 330 lb (149.7 kg)   SpO2 96%   BMI 60.36 kg/m²     Labs:   CBC: No results for input(s): WBC, HGB, HCT, PLT in the last 72 hours. BMP:No results for input(s): NA, K, CO2, BUN, CREATININE, LABGLOM, GLUCOSE in the last 72 hours. BNP: No results for input(s): BNP in the last 72 hours. PT/INR: No results for input(s): PROTIME, INR in the last 72 hours. APTT:No results for input(s): APTT in the last 72 hours. CARDIAC ENZYMES:No results for input(s): CKTOTAL, CKMB, CKMBINDEX, TROPONINI in the last 72 hours. FASTING LIPID PANEL:  Lab Results   Component Value Date    HDL 49 10/03/2020    LDLCALC 62 10/03/2020    TRIG 80 10/03/2020     LIVER PROFILE:No results for input(s): AST, ALT, LABALBU in the last 72 hours.         Past Medical History:   Diagnosis Date    Arthritis     Atrial fibrillation ([address]     [facility]     CH[facility]est[facility] failure[facility]     COPD (chronic obstructive pulmonary disease) [facility]     Hyperlipidemia     Hypertension     Thyroid disease      Past Surgical History:   Procedure Laterality Date    CARDIOVERSION  2020    HYSTERECTOMY      With removal of tumor    TONSILLECTOMY      TUMOR REMOVAL       Family History   Problem Relation Age of Onset    Pacemaker Mother     Heart Disease Mother      Allergies   Allergen Reactions    Niacin And Related      Current Outpatient Medications   Medication Sig Dispense Refill    escitalopram (LEXAPRO) 20 MG tablet Take 1 tablet by mouth daily 90 tablet 1    furosemide (LASIX) 20 MG tablet Take 1 tablet by mouth daily as needed (Edema or swelling) 30 tablet 5    amiodarone (CORDARONE) 200 MG tablet Take 1 tablet by mouth 2 times daily 60 tablet 3    nortriptyline (PAMELOR) 10 MG capsule TAKE 1 CAPSULE BY MOUTH NIGHTLY 90 capsule 0    metoprolol (LOPRESSOR) 100 MG tablet Take 1 tablet by mouth 2 times daily 180 tablet 1    atorvastatin (LIPITOR) 20 MG tablet Take 1 tablet by mouth daily 90 tablet 3    apixaban (ELIQUIS) 5 MG TABS tablet Take 1 tablet by mouth 2 times daily 60 tablet 5    methIMAzole (TAPAZOLE) 5 MG tablet Take 1 tablet by mouth 2 times daily 180 tablet 1    tolterodine (DETROL LA) 4 MG extended release capsule Take 1 capsule by mouth daily 90 capsule 1    aspirin EC 81 MG EC tablet Take 1 tablet by mouth daily 90 tablet 0     No current facility-administered medications for this visit. Social History     Socioeconomic History    Marital status:       Spouse name: Not on file    Number of children: Not on file    Years of education: Not on file    Highest education level: Not on file   Occupational History    Not on file   Tobacco Use    Smoking status: Never Smoker    Smokeless tobacco: Never Used   Vaping Use    Vaping Use: Never used   Substance and Sexual Activity    Alcohol use: Never    Drug use: Not Currently    Sexual activity: Not on file   Other Topics Concern    Not on file   Social History Narrative    Not on file     Social Determinants of Health     Financial Resource Strain:     Difficulty of Paying Living Expenses:    Food Insecurity:     Worried About Running Out of Food in the Last Year:     [address] in the Last Year:    Transportation Needs:     Lack of Transportation (Medical):  Lack of Transportation (Non-Medical):    Physical Activity:     Days of Exercise per Week:     Minutes of Exercise per Session:    Stress:     Feeling of Stress :    Social Connections:     Frequency of Communication with Friends and Family:     Frequency of Social Gatherings with Friends and Family:     Attends Religion Services:     Active Member of Clubs or Organizations:     Attends Club or Organization Meetings:     Marital Status:    Intimate Partner Violence:     Fear of Current or Ex-Partner:     Emotionally Abused:     Physically Abused:     Sexually Abused:        Physical Examination:  /72   Pulse (!) 47   Ht 5' 2\" (1.575 m)   Wt (!) 330 lb (149.7 kg)   SpO2 96%   BMI 60.36 kg/m²   Physical Exam  Vitals reviewed. Constitutional:       Appearance: She is well-developed. Neck:      Vascular: No carotid bruit or JVD. Cardiovascular:      Rate and Rhythm: Normal rate and regular rhythm. Heart sounds: Normal heart sounds. No murmur heard. No friction rub. No gallop. Pulmonary:      Effort: Pulmonary effort is normal. No respiratory distress. Breath sounds: Normal breath sounds. No wheezing or rales. Abdominal:      General: There is no distension. Tenderness: There is no abdominal tenderness. Lymphadenopathy:      Cervical: No cervical adenopathy. Skin:     General: Skin is warm and dry. ASSESSMENT:     Diagnosis Orders   1. Atrial fibrillation, unspecified type ([address]  EKG 12 lead   2. Abnormal stress test     3. Morbidly obese ([address]     4. Essential hypertension     5. Mixed hyperlipidemia     6. Chronic anticoagulation     7. Long term current use of antiarrhythmic drug     8. Sinus bradycardia         PLAN:  Orders Placed This Encounter   Procedures    EKG 12 lead     No orders of the defined types were placed in this encounter. 1. Continue present medications  2. Recommend decreasing amiodarone to 1 tablet p.o. daily  3. Recommend decreasing metoprolol to 50 mg p.o. twice daily  4. Recommend follow-up assessment in 3 months    Return in about 3 months (around 10/26/2021) for return to Dr. Venus Lennox only. Laura Alonso MD 7/26/2021 2:45 PM CDT    [facility]      Thisdictation was generated by voice recognition computer software. Although all attempts are made to edit the dictation for accuracy, there may be errors in the transcription that are not intended.

## 2021-08-17 DIAGNOSIS — E05.90 HYPERTHYROIDISM: ICD-10-CM

## 2021-08-17 DIAGNOSIS — I48.91 ATRIAL FIBRILLATION, UNSPECIFIED TYPE (HCC): ICD-10-CM

## 2021-08-17 RX ORDER — APIXABAN 5 MG/1
TABLET, FILM COATED ORAL
Qty: 60 TABLET | Refills: 0 | Status: SHIPPED | OUTPATIENT
Start: 2021-08-17 | End: 2021-09-28 | Stop reason: SDUPTHER

## 2021-08-17 RX ORDER — METHIMAZOLE 5 MG/1
TABLET ORAL
Qty: 60 TABLET | Refills: 0 | Status: SHIPPED | OUTPATIENT
Start: 2021-08-17 | End: 2021-09-24 | Stop reason: SDUPTHER

## 2021-09-24 ENCOUNTER — TELEPHONE (OUTPATIENT)
Dept: PRIMARY CARE CLINIC | Age: 71
End: 2021-09-24

## 2021-09-24 DIAGNOSIS — I48.91 ATRIAL FIBRILLATION, UNSPECIFIED TYPE (HCC): ICD-10-CM

## 2021-09-24 DIAGNOSIS — E05.90 HYPERTHYROIDISM: ICD-10-CM

## 2021-09-24 DIAGNOSIS — E78.2 MIXED HYPERLIPIDEMIA: ICD-10-CM

## 2021-09-24 DIAGNOSIS — F33.1 MODERATE EPISODE OF RECURRENT MAJOR DEPRESSIVE DISORDER (HCC): ICD-10-CM

## 2021-09-27 RX ORDER — NORTRIPTYLINE HYDROCHLORIDE 10 MG/1
10 CAPSULE ORAL NIGHTLY
Qty: 90 CAPSULE | Refills: 0 | Status: SHIPPED | OUTPATIENT
Start: 2021-09-27 | End: 2022-08-01 | Stop reason: SDUPTHER

## 2021-09-27 NOTE — TELEPHONE ENCOUNTER
Olenaaida Belia has requested a refill on her medication.       Last office visit : 1/20/2021   Next office visit : Visit date not found   Last medication refill :3/15/2021        Requested Prescriptions     Pending Prescriptions Disp Refills    nortriptyline (PAMELOR) 10 MG capsule 90 capsule 0     Sig: Take 1 capsule by mouth nightly
LLQ

## 2021-09-28 RX ORDER — ATORVASTATIN CALCIUM 20 MG/1
20 TABLET, FILM COATED ORAL DAILY
Qty: 30 TABLET | Refills: 0 | Status: SHIPPED | OUTPATIENT
Start: 2021-09-28 | End: 2021-12-06 | Stop reason: SDUPTHER

## 2021-09-28 RX ORDER — METHIMAZOLE 5 MG/1
5 TABLET ORAL 2 TIMES DAILY
Qty: 60 TABLET | Refills: 0 | Status: SHIPPED | OUTPATIENT
Start: 2021-09-28 | End: 2021-12-06 | Stop reason: SDUPTHER

## 2021-09-28 RX ORDER — ESCITALOPRAM OXALATE 20 MG/1
20 TABLET ORAL DAILY
Qty: 30 TABLET | Refills: 0 | Status: SHIPPED | OUTPATIENT
Start: 2021-09-28 | End: 2021-11-03 | Stop reason: SDUPTHER

## 2021-11-03 DIAGNOSIS — F33.1 MODERATE EPISODE OF RECURRENT MAJOR DEPRESSIVE DISORDER (HCC): ICD-10-CM

## 2021-11-04 RX ORDER — AMIODARONE HYDROCHLORIDE 200 MG/1
200 TABLET ORAL DAILY
Qty: 30 TABLET | Refills: 5 | Status: SHIPPED | OUTPATIENT
Start: 2021-11-04 | End: 2021-12-08

## 2021-11-04 RX ORDER — ESCITALOPRAM OXALATE 20 MG/1
20 TABLET ORAL DAILY
Qty: 30 TABLET | Refills: 0 | Status: SHIPPED | OUTPATIENT
Start: 2021-11-04 | End: 2021-12-06 | Stop reason: SDUPTHER

## 2021-11-04 RX ORDER — AMIODARONE HYDROCHLORIDE 200 MG/1
200 TABLET ORAL DAILY
Qty: 30 TABLET | Refills: 5 | Status: SHIPPED | OUTPATIENT
Start: 2021-11-04 | End: 2022-03-01 | Stop reason: SDUPTHER

## 2021-11-24 DIAGNOSIS — E78.2 MIXED HYPERLIPIDEMIA: ICD-10-CM

## 2021-11-24 DIAGNOSIS — F33.1 MODERATE EPISODE OF RECURRENT MAJOR DEPRESSIVE DISORDER (HCC): ICD-10-CM

## 2021-11-24 DIAGNOSIS — E05.90 HYPERTHYROIDISM: ICD-10-CM

## 2021-11-24 DIAGNOSIS — I48.91 ATRIAL FIBRILLATION, UNSPECIFIED TYPE (HCC): ICD-10-CM

## 2021-11-24 RX ORDER — METHIMAZOLE 5 MG/1
5 TABLET ORAL 2 TIMES DAILY
Qty: 60 TABLET | Refills: 0 | OUTPATIENT
Start: 2021-11-24

## 2021-11-24 RX ORDER — ATORVASTATIN CALCIUM 20 MG/1
20 TABLET, FILM COATED ORAL DAILY
Qty: 30 TABLET | Refills: 0 | OUTPATIENT
Start: 2021-11-24

## 2021-11-24 RX ORDER — ESCITALOPRAM OXALATE 20 MG/1
20 TABLET ORAL DAILY
Qty: 30 TABLET | Refills: 0 | OUTPATIENT
Start: 2021-11-24

## 2021-11-26 DIAGNOSIS — I48.91 ATRIAL FIBRILLATION, UNSPECIFIED TYPE (HCC): ICD-10-CM

## 2021-11-26 DIAGNOSIS — E05.90 HYPERTHYROIDISM: ICD-10-CM

## 2021-11-26 DIAGNOSIS — F33.1 MODERATE EPISODE OF RECURRENT MAJOR DEPRESSIVE DISORDER (HCC): ICD-10-CM

## 2021-11-26 DIAGNOSIS — E78.2 MIXED HYPERLIPIDEMIA: ICD-10-CM

## 2021-11-29 ENCOUNTER — TELEPHONE (OUTPATIENT)
Dept: CARDIOLOGY CLINIC | Age: 71
End: 2021-11-29

## 2021-11-29 RX ORDER — METHIMAZOLE 5 MG/1
5 TABLET ORAL 2 TIMES DAILY
Qty: 60 TABLET | Refills: 0 | OUTPATIENT
Start: 2021-11-29

## 2021-11-29 RX ORDER — ATORVASTATIN CALCIUM 20 MG/1
20 TABLET, FILM COATED ORAL DAILY
Qty: 30 TABLET | Refills: 0 | OUTPATIENT
Start: 2021-11-29

## 2021-11-29 RX ORDER — ESCITALOPRAM OXALATE 20 MG/1
20 TABLET ORAL DAILY
Qty: 30 TABLET | Refills: 0 | OUTPATIENT
Start: 2021-11-29

## 2021-12-02 DIAGNOSIS — I48.91 ATRIAL FIBRILLATION, UNSPECIFIED TYPE (HCC): ICD-10-CM

## 2021-12-06 ENCOUNTER — OFFICE VISIT (OUTPATIENT)
Dept: FAMILY MEDICINE CLINIC | Age: 71
End: 2021-12-06
Payer: MEDICARE

## 2021-12-06 VITALS
HEIGHT: 62 IN | TEMPERATURE: 98 F | HEART RATE: 88 BPM | BODY MASS INDEX: 53.92 KG/M2 | SYSTOLIC BLOOD PRESSURE: 136 MMHG | OXYGEN SATURATION: 97 % | DIASTOLIC BLOOD PRESSURE: 82 MMHG | WEIGHT: 293 LBS

## 2021-12-06 DIAGNOSIS — E05.90 HYPERTHYROIDISM: ICD-10-CM

## 2021-12-06 DIAGNOSIS — I48.91 ATRIAL FIBRILLATION, UNSPECIFIED TYPE (HCC): ICD-10-CM

## 2021-12-06 DIAGNOSIS — E78.2 MIXED HYPERLIPIDEMIA: ICD-10-CM

## 2021-12-06 DIAGNOSIS — Z23 NEEDS FLU SHOT: ICD-10-CM

## 2021-12-06 DIAGNOSIS — Z00.00 ROUTINE GENERAL MEDICAL EXAMINATION AT A HEALTH CARE FACILITY: Primary | ICD-10-CM

## 2021-12-06 DIAGNOSIS — Z00.00 ROUTINE GENERAL MEDICAL EXAMINATION AT A HEALTH CARE FACILITY: ICD-10-CM

## 2021-12-06 DIAGNOSIS — F33.1 MODERATE EPISODE OF RECURRENT MAJOR DEPRESSIVE DISORDER (HCC): ICD-10-CM

## 2021-12-06 LAB
ALBUMIN SERPL-MCNC: 3.9 G/DL (ref 3.5–5.2)
ALP BLD-CCNC: 116 U/L (ref 35–104)
ALT SERPL-CCNC: 16 U/L (ref 5–33)
ANION GAP SERPL CALCULATED.3IONS-SCNC: 14 MMOL/L (ref 7–19)
AST SERPL-CCNC: 28 U/L (ref 5–32)
BASOPHILS ABSOLUTE: 0 K/UL (ref 0–0.2)
BASOPHILS RELATIVE PERCENT: 0.8 % (ref 0–1)
BILIRUB SERPL-MCNC: 1.3 MG/DL (ref 0.2–1.2)
BUN BLDV-MCNC: 19 MG/DL (ref 8–23)
CALCIUM SERPL-MCNC: 9.1 MG/DL (ref 8.8–10.2)
CHLORIDE BLD-SCNC: 102 MMOL/L (ref 98–111)
CHOLESTEROL, TOTAL: 128 MG/DL (ref 160–199)
CO2: 25 MMOL/L (ref 22–29)
CREAT SERPL-MCNC: 0.7 MG/DL (ref 0.5–0.9)
CREATININE URINE: 83 MG/DL (ref 4.2–622)
EOSINOPHILS ABSOLUTE: 0.1 K/UL (ref 0–0.6)
EOSINOPHILS RELATIVE PERCENT: 2.3 % (ref 0–5)
GFR AFRICAN AMERICAN: >59
GFR NON-AFRICAN AMERICAN: >60
GLUCOSE BLD-MCNC: 90 MG/DL (ref 74–109)
HBA1C MFR BLD: 5.1 % (ref 4–6)
HCT VFR BLD CALC: 41.3 % (ref 37–47)
HDLC SERPL-MCNC: 66 MG/DL (ref 65–121)
HEMOGLOBIN: 13 G/DL (ref 12–16)
IMMATURE GRANULOCYTES #: 0 K/UL
LDL CHOLESTEROL CALCULATED: 51 MG/DL
LYMPHOCYTES ABSOLUTE: 0.9 K/UL (ref 1.1–4.5)
LYMPHOCYTES RELATIVE PERCENT: 23.8 % (ref 20–40)
MCH RBC QN AUTO: 31.2 PG (ref 27–31)
MCHC RBC AUTO-ENTMCNC: 31.5 G/DL (ref 33–37)
MCV RBC AUTO: 99 FL (ref 81–99)
MICROALBUMIN UR-MCNC: 1.6 MG/DL (ref 0–19)
MICROALBUMIN/CREAT UR-RTO: 19.3 MG/G
MONOCYTES ABSOLUTE: 0.5 K/UL (ref 0–0.9)
MONOCYTES RELATIVE PERCENT: 13.6 % (ref 0–10)
NEUTROPHILS ABSOLUTE: 2.3 K/UL (ref 1.5–7.5)
NEUTROPHILS RELATIVE PERCENT: 59.2 % (ref 50–65)
PDW BLD-RTO: 14.1 % (ref 11.5–14.5)
PLATELET # BLD: 138 K/UL (ref 130–400)
PMV BLD AUTO: 11.7 FL (ref 9.4–12.3)
POTASSIUM SERPL-SCNC: 4.4 MMOL/L (ref 3.5–5)
RBC # BLD: 4.17 M/UL (ref 4.2–5.4)
SODIUM BLD-SCNC: 141 MMOL/L (ref 136–145)
T4 FREE: 1.48 NG/DL (ref 0.93–1.7)
TOTAL PROTEIN: 7.1 G/DL (ref 6.6–8.7)
TRIGL SERPL-MCNC: 56 MG/DL (ref 0–149)
TSH SERPL DL<=0.05 MIU/L-ACNC: 3.87 UIU/ML (ref 0.27–4.2)
WBC # BLD: 3.8 K/UL (ref 4.8–10.8)

## 2021-12-06 PROCEDURE — G0438 PPPS, INITIAL VISIT: HCPCS | Performed by: FAMILY MEDICINE

## 2021-12-06 PROCEDURE — 1123F ACP DISCUSS/DSCN MKR DOCD: CPT | Performed by: FAMILY MEDICINE

## 2021-12-06 PROCEDURE — 4040F PNEUMOC VAC/ADMIN/RCVD: CPT | Performed by: FAMILY MEDICINE

## 2021-12-06 PROCEDURE — 99214 OFFICE O/P EST MOD 30 MIN: CPT | Performed by: FAMILY MEDICINE

## 2021-12-06 PROCEDURE — 90694 VACC AIIV4 NO PRSRV 0.5ML IM: CPT | Performed by: FAMILY MEDICINE

## 2021-12-06 PROCEDURE — 3017F COLORECTAL CA SCREEN DOC REV: CPT | Performed by: FAMILY MEDICINE

## 2021-12-06 PROCEDURE — G8484 FLU IMMUNIZE NO ADMIN: HCPCS | Performed by: FAMILY MEDICINE

## 2021-12-06 PROCEDURE — G0008 ADMIN INFLUENZA VIRUS VAC: HCPCS | Performed by: FAMILY MEDICINE

## 2021-12-06 RX ORDER — METHIMAZOLE 5 MG/1
5 TABLET ORAL 2 TIMES DAILY
Qty: 60 TABLET | Refills: 5 | Status: SHIPPED | OUTPATIENT
Start: 2021-12-06 | End: 2022-05-19 | Stop reason: SDUPTHER

## 2021-12-06 RX ORDER — ATORVASTATIN CALCIUM 20 MG/1
20 TABLET, FILM COATED ORAL DAILY
Qty: 30 TABLET | Refills: 5 | Status: SHIPPED | OUTPATIENT
Start: 2021-12-06 | End: 2022-05-19 | Stop reason: SDUPTHER

## 2021-12-06 RX ORDER — ESCITALOPRAM OXALATE 10 MG/1
30 TABLET ORAL DAILY
Qty: 90 TABLET | Refills: 2 | Status: SHIPPED | OUTPATIENT
Start: 2021-12-06 | End: 2022-05-19 | Stop reason: SDUPTHER

## 2021-12-06 SDOH — ECONOMIC STABILITY: FOOD INSECURITY: WITHIN THE PAST 12 MONTHS, THE FOOD YOU BOUGHT JUST DIDN'T LAST AND YOU DIDN'T HAVE MONEY TO GET MORE.: NEVER TRUE

## 2021-12-06 SDOH — ECONOMIC STABILITY: FOOD INSECURITY: WITHIN THE PAST 12 MONTHS, YOU WORRIED THAT YOUR FOOD WOULD RUN OUT BEFORE YOU GOT MONEY TO BUY MORE.: NEVER TRUE

## 2021-12-06 ASSESSMENT — PATIENT HEALTH QUESTIONNAIRE - PHQ9
SUM OF ALL RESPONSES TO PHQ QUESTIONS 1-9: 0
2. FEELING DOWN, DEPRESSED OR HOPELESS: 0
SUM OF ALL RESPONSES TO PHQ QUESTIONS 1-9: 0
SUM OF ALL RESPONSES TO PHQ QUESTIONS 1-9: 0
1. LITTLE INTEREST OR PLEASURE IN DOING THINGS: 0
SUM OF ALL RESPONSES TO PHQ9 QUESTIONS 1 & 2: 0

## 2021-12-06 ASSESSMENT — SOCIAL DETERMINANTS OF HEALTH (SDOH): HOW HARD IS IT FOR YOU TO PAY FOR THE VERY BASICS LIKE FOOD, HOUSING, MEDICAL CARE, AND HEATING?: NOT HARD AT ALL

## 2021-12-06 ASSESSMENT — LIFESTYLE VARIABLES
AUDIT-C TOTAL SCORE: INCOMPLETE
HOW OFTEN DO YOU HAVE A DRINK CONTAINING ALCOHOL: NEVER
AUDIT TOTAL SCORE: INCOMPLETE
HOW OFTEN DO YOU HAVE A DRINK CONTAINING ALCOHOL: 0

## 2021-12-06 NOTE — PROGRESS NOTES
After obtaining consent, and per orders of Dr. Luis Bains, injection of FluAd given in Left deltoid by Derik Hawk. Patient instructed to remain in clinic for 20 minutes afterwards, and to report any adverse reaction to me immediately.

## 2021-12-06 NOTE — PROGRESS NOTES
Medicare Annual Wellness Visit  Name: Annamarie Ajing Date: 2021   MRN: [id number] Sex: Female   Age: 73 y.o. Ethnicity: Non- / Non    : 1950 Race: White (non-)      Nighat Carter is here for Medicare AWV and Medication Refill    Screenings for behavioral, psychosocial and functional/safety risks, and cognitive dysfunction are all negative except as indicated below. These results, as well as other patient data from the [address] form, are documented in Flowsheets linked to this Encounter. Allergies   Allergen Reactions    Niacin And Related          Prior to Visit Medications    Medication Sig Taking?  Authorizing Provider   escitalopram (LEXAPRO) 10 MG tablet Take 3 tablets by mouth daily Yes Cindi Alcantara MD   apixaban (ELIQUIS) 5 MG TABS tablet Take 1 tablet by mouth twice daily Yes Cindi Alcantara MD   methIMAzole (TAPAZOLE) 5 MG tablet Take 1 tablet by mouth 2 times daily Yes Cindi Alcantara MD   atorvastatin (LIPITOR) 20 MG tablet Take 1 tablet by mouth daily Yes Cindi Alcantara MD   amiodarone (CORDARONE) 200 MG tablet Take 1 tablet by mouth daily  Gerhardt Snowman, APRN   nortriptyline (PAMELOR) 10 MG capsule Take 1 capsule by mouth nightly  LALITA Bridges   metoprolol (LOPRESSOR) 50 MG tablet Take 1 tablet by mouth 2 times daily  Cristian Benedict MD   furosemide (LASIX) 20 MG tablet Take 1 tablet by mouth daily as needed (Edema or swelling)  Critsian Benedict MD   tolterodine (DETROL LA) 4 MG extended release capsule Take 1 capsule by mouth daily  Cindi Alcantara MD   aspirin EC 81 MG EC tablet Take 1 tablet by mouth daily  Ella Graves MD         Past Medical History:   Diagnosis Date    Arthritis     Atrial fibrillation [facility])     Cerebral artery occlusion with cerebral infarction [facility])     [facility]     [facility]     Hyperlipidemia     Hypertension     Thyroid disease        Past Surgical History:   Procedure Laterality Date    CARDIOVERSION  2020    HYSTERECTOMY      With removal of tumor    TONSILLECTOMY      TUMOR REMOVAL           Family History   Problem Relation Age of Onset    Pacemaker Mother     Heart Disease Mother        CareTeam (Including outside providers/suppliers regularly involved in providing care):   Patient Care Team:  Gage Gramajo MD as PCP - General (Family Medicine)  Gage Gramajo MD as PCP - [facility] Empaneled Provider  LALITA Hector as Nurse Practitioner (Certified Nurse Specialist)  Krystina Viera MD as Consulting Physician (Interventional Cardiology)  LALITA Aguilar as Advanced Practice Nurse (Neurology)    Wt Readings from Last 3 Encounters:   12/08/21 (!) 321 lb (145.6 kg)   12/06/21 (!) 324 lb (147 kg)   07/26/21 (!) 330 lb (149.7 kg)     Vitals:    12/06/21 1445   BP: 136/82   Site: Left Wrist   Position: Sitting   Cuff Size: Medium Adult   Pulse: 88   Temp: 98 °F (36.7 °C)   TempSrc: Temporal   SpO2: 97%   Weight: (!) 324 lb (147 kg)   Height: 5' 2\" (1.575 m)     Body mass index is 59.26 kg/m². Based upon direct observation of the patient, evaluation of cognition reveals recent and remote memory intact. Patient's complete Health Risk Assessment and screening values have been reviewed and are found in Flowsheets. The following problems were reviewed today and where indicated follow up appointments were made and/or referrals ordered. Positive Risk Factor Screenings with Interventions:            General Health and ACP:  General  In general, how would you say your health is?: Fair  In the past 7 days, have you experienced any of the following?  New or Increased Pain, New or Increased Fatigue, Loneliness, Social Isolation, Stress or Anger?: (!) Stress  Do you get the social and emotional support that you need?: Yes  Do you have a Living Will?: (!) No  Advance Directives     Power of [address] [place] [address] Will ACP-Advance Directive ACP-Power of     Not on File Not on File Not on File Not on File      General Health Risk Interventions:  · Stress: going back and forth to [place] since her sons TBI following motorcycle accident and she is going back and forth to help and see him. She is also helping take care of grandkids and just stressed with all that. · No Living Will: further information provided to patient. Health Habits/Nutrition:  Health Habits/Nutrition  Do you exercise for at least 20 minutes 2-3 times per week?: Yes  Have you lost any weight without trying in the past 3 months?: No  Do you eat only one meal per day?: No  Have you seen the dentist within the past year?: (!) No  Body mass index: (!) 59.25  Health Habits/Nutrition Interventions:  · Dental exam overdue:  patient encouraged to make appointment with his/her dentist  · discussed BMI and benefits of diet and exercise.      Hearing/Vision:  No exam data present  Hearing/Vision  Do you or your family notice any trouble with your hearing that hasn't been managed with hearing aids?: No  Do you have difficulty driving, watching TV, or doing any of your daily activities because of your eyesight?: (!) Yes  Have you had an eye exam within the past year?: (!) No  Hearing/Vision Interventions:  · Vision concerns:  patient encouraged to make appointment with his/her eye specialist      Personalized Preventive Plan   Current Health Maintenance Status  Immunization History   Administered Date(s) Administered    Influenza, Quadv, adjuvanted, 72 yrs +, IM, PF (Fluad) 01/14/2021, 12/06/2021    Pneumococcal Polysaccharide ([id number]) 01/14/2021        Health Maintenance   Topic Date Due    Hepatitis C screen  Never done    COVID-19 Vaccine (1) Never done    DTaP/Tdap/Td vaccine (1 - Tdap) Never done    Colon cancer screen colonoscopy  Never done    Breast cancer screen  Never done    Shingles Vaccine (1 of 2) Never done    DEXA (modify frequency per FRAX score)  Never done    Annual Wellness Visit (AWV)  Never done    Lipid screen  12/06/2022    TSH testing  12/06/2022    Potassium monitoring  12/06/2022    Creatinine monitoring  12/06/2022    Flu vaccine  Completed    Pneumococcal 65+ years Vaccine  Completed    Hepatitis A vaccine  Aged Out    Hepatitis B vaccine  Aged Out    Hib vaccine  Aged Out    Meningococcal (ACWY) vaccine  Aged Out     Recommendations for [employer] Due: see orders and patient instructions/AVS.  . Recommended screening schedule for the next 5-10 years is provided to the patient in written form: see Patient Instructions/AVS.    Link Alanna was seen today for medicare awv and medication refill. Diagnoses and all orders for this visit:    Routine general medical examination at a health care facility  -     CBC Auto Differential; Future  -     Comprehensive Metabolic Panel; Future  -     Hemoglobin A1C; Future  -     Lipid Panel; Future  -     Microalbumin / Creatinine Urine Ratio;  Future    Needs flu shot  -     INFLUENZA, QUADV, ADJUVANTED, 65 YRS =, IM, PF, PREFILL SYR, 0.5ML (FLUAD)

## 2021-12-06 NOTE — PATIENT INSTRUCTIONS
Advance Directives: Care Instructions  Overview  An advance directive is a legal way to state your wishes at the end of your life. It tells your family and your doctor what to do if you can't say what you want. There are two main types of advance directives. You can change them any time your wishes change. Living will. This form tells your family and your doctor your wishes about life support and other treatment. The form is also called a declaration. Medical power of . This form lets you name a person to make treatment decisions for you when you can't speak for yourself. This person is called a health care agent (health care proxy, health care surrogate). The form is also called a durable power of  for health care. If you do not have an advance directive, decisions about your medical care may be made by a family member, or by a doctor or a  who doesn't know you. It may help to think of an advance directive as a gift to the people who care for you. If you have one, they won't have to make tough decisions by themselves. Follow-up care is a key part of your treatment and safety. Be sure to make and go to all appointments, and call your doctor if you are having problems. It's also a good idea to know your test results and keep a list of the medicines you take. What should you include in an advance directive? Many states have a unique advance directive form. (It may ask you to address specific issues.) Or you might use a universal form that's approved by many states. If your form doesn't tell you what to address, it may be hard to know what to include in your advance directive. Use the questions below to help you get started. · Who do you want to make decisions about your medical care if you are not able to? · What life-support measures do you want if you have a serious illness that gets worse over time or can't be cured? · What are you most afraid of that might happen? (Maybe you're afraid of having pain, losing your independence, or being kept alive by machines.)  · Where would you prefer to die? (Your home? A hospital? A nursing home?)  · Do you want to donate your organs when you die? · Do you want certain Islam practices performed before you die? When should you call for help? Be sure to contact your doctor if you have any questions. Where can you learn more? Go to [email or web address] and sign in to your [email or web address] account. Enter R264 in the p3dsystems box to learn more about \"Advance Directives: Care Instructions. \"     If you do not have an account, please click on the \"Sign Up Now\" link. Current as of: March 17, 2021               Content Version: 13.0  © 2006-2021 Healthwise, [employer]. Care instructions adapted under license by [facility] ([facility]). If you have questions about a medical condition or this instruction, always ask your healthcare professional. [place] [address] any warranty or liability for your use of this information. Learning About Medical Power of   What is a medical power of ? A medical power of , also called a durable power of  for health care, is one type of the legal forms called advance directives. It lets you name the person you want to make treatment decisions for you if you can't speak or decide for yourself. The person you choose is called your health care agent. This person is also called a health care proxy or health care surrogate. A medical power of  may be called something else in your state. How do you choose a health care agent? Choose your health care agent carefully. This person may or may not be a family member. Talk to the person before you make your final decision. Make sure he or she is comfortable with this responsibility. It's a good idea to choose someone who:  · Is at least 25years old.   · Knows you well and you learn more? Go to [email or web address] and sign in to your [employer] account. Enter [id number] in the [facility] box to learn more about \"Learning About [address]. \"     If you do not have an account, please click on the \"Sign Up Now\" link. Current as of: March 17, 2021               Content Version: 13.0  © 2006-2021 Healthwise, [employer]. Care instructions adapted under license by [facility] ([facility]). If you have questions about a medical condition or this instruction, always ask your healthcare professional. [address] any warranty or liability for your use of this information. Learning About Living Colin Garrett  What is a living will? A living will, also called a declaration, is a legal form. It tells your family and your doctor your wishes when you can't speak for yourself. It's used by the health professionals who will treat you as you near the end of your life or if you get seriously hurt or ill. If you put your wishes in writing, your loved ones and others will know what kind of care you want. They won't need to guess. This can ease your mind and be helpful to others. And you can change or cancel your living will at any time. A living will is not the same as an estate or property will. An estate will explains what you want to happen with your money and property after you die. How do you use it? A living will is used to describe the kinds of treatment or life support you want as you near the end of your life or if you get seriously hurt or ill. Keep these facts in mind about living yuan. · Your living will is used only if you can't speak or make decisions for yourself. Most often, one or more doctors must certify that you can't speak or decide for yourself before your living will takes effect. · If you get better and can speak for yourself again, you can accept or refuse any treatment.  It doesn't matter what you said in your living will. · Some states may limit your right to refuse treatment in certain cases. For example, you may need to clearly state in your living will that you don't want artificial hydration and nutrition, such as being fed through a tube. Is a living will a legal document? A living will is a legal document. Each state has its own laws about living yuan. And a living will may be called something else in your state. Here are some things to know about living yuan. · You don't need an  to complete a living will. But legal advice can be helpful if your state's laws are unclear. It can also help if your health history is complicated or your family can't agree on what should be in your living will. · You can change your living will at any time. Some people find that their wishes about end-of-life care change as their health changes. If you make big changes to your living will, complete a new form. · If you move to another state, make sure that your living will is legal in the state where you now live. In most cases, doctors will respect your wishes even if you have a form from a different state. · You might use a universal form that has been approved by many states. This kind of form can sometimes be filled out and stored online. Your digital copy will then be available wherever you have a connection to the internet. The doctors and nurses who need to treat you can find it right away. · Your state may offer an online registry. This is another place where you can store your living will online. · It's a good idea to get your living will notarized. This means using a person called a  to watch two people sign, or witness, your living will. What should you know when you create a living will? Here are some questions to ask yourself as you make your living will:  · Do you know enough about life support methods that might be used?  If not, talk to your doctor so you know what might be done if you information. Personalized Preventive Plan for Lucho Romeo - 12/6/2021  Medicare offers a range of preventive health benefits. Some of the tests and screenings are paid in full while other may be subject to a deductible, co-insurance, and/or copay. Some of these benefits include a comprehensive review of your medical history including lifestyle, illnesses that may run in your family, and various assessments and screenings as appropriate. After reviewing your medical record and screening and assessments performed today your provider may have ordered immunizations, labs, imaging, and/or referrals for you. A list of these orders (if applicable) as well as your Preventive Care list are included within your After Visit Summary for your review. Other Preventive Recommendations:    · A preventive eye exam performed by an eye specialist is recommended every 1-2 years to screen for glaucoma; cataracts, macular degeneration, and other eye disorders. · A preventive dental visit is recommended every 6 months. · Try to get at least 150 minutes of exercise per week or 10,000 steps per day on a pedometer . · Order or download the FREE \"Exercise & Physical Activity: Your Everyday Guide\" from [employer]. Call 4-228.994.2249 or search [employer] online. · You need 8281-1960 mg of calcium and 7841-1237 IU of vitamin D per day. It is possible to meet your calcium requirement with diet alone, but a vitamin D supplement is usually necessary to meet this goal.  · When exposed to the sun, use a sunscreen that protects against both UVA and UVB radiation with an SPF of 30 or greater. Reapply every 2 to 3 hours or after sweating, drying off with a towel, or swimming. · Always wear a seat belt when traveling in a car. Always wear a helmet when riding a bicycle or motorcycle.

## 2021-12-07 ENCOUNTER — TELEPHONE (OUTPATIENT)
Dept: CARDIOLOGY CLINIC | Age: 71
End: 2021-12-07

## 2021-12-08 ENCOUNTER — OFFICE VISIT (OUTPATIENT)
Dept: CARDIOLOGY CLINIC | Age: 71
End: 2021-12-08
Payer: MEDICARE

## 2021-12-08 VITALS
WEIGHT: 293 LBS | SYSTOLIC BLOOD PRESSURE: 138 MMHG | DIASTOLIC BLOOD PRESSURE: 98 MMHG | HEIGHT: 61 IN | BODY MASS INDEX: 55.32 KG/M2 | HEART RATE: 53 BPM

## 2021-12-08 DIAGNOSIS — I48.0 PAROXYSMAL ATRIAL FIBRILLATION (HCC): Primary | ICD-10-CM

## 2021-12-08 DIAGNOSIS — I10 ESSENTIAL HYPERTENSION: ICD-10-CM

## 2021-12-08 DIAGNOSIS — E78.2 MIXED HYPERLIPIDEMIA: ICD-10-CM

## 2021-12-08 PROCEDURE — 99214 OFFICE O/P EST MOD 30 MIN: CPT | Performed by: CLINICAL NURSE SPECIALIST

## 2021-12-08 PROCEDURE — G8400 PT W/DXA NO RESULTS DOC: HCPCS | Performed by: CLINICAL NURSE SPECIALIST

## 2021-12-08 PROCEDURE — G8484 FLU IMMUNIZE NO ADMIN: HCPCS | Performed by: CLINICAL NURSE SPECIALIST

## 2021-12-08 PROCEDURE — 1123F ACP DISCUSS/DSCN MKR DOCD: CPT | Performed by: CLINICAL NURSE SPECIALIST

## 2021-12-08 PROCEDURE — G8427 DOCREV CUR MEDS BY ELIG CLIN: HCPCS | Performed by: CLINICAL NURSE SPECIALIST

## 2021-12-08 PROCEDURE — 3017F COLORECTAL CA SCREEN DOC REV: CPT | Performed by: CLINICAL NURSE SPECIALIST

## 2021-12-08 PROCEDURE — 4040F PNEUMOC VAC/ADMIN/RCVD: CPT | Performed by: CLINICAL NURSE SPECIALIST

## 2021-12-08 PROCEDURE — G8417 CALC BMI ABV UP PARAM F/U: HCPCS | Performed by: CLINICAL NURSE SPECIALIST

## 2021-12-08 PROCEDURE — 1036F TOBACCO NON-USER: CPT | Performed by: CLINICAL NURSE SPECIALIST

## 2021-12-08 PROCEDURE — 1090F PRES/ABSN URINE INCON ASSESS: CPT | Performed by: CLINICAL NURSE SPECIALIST

## 2021-12-08 PROCEDURE — 93000 ELECTROCARDIOGRAM COMPLETE: CPT | Performed by: CLINICAL NURSE SPECIALIST

## 2021-12-08 NOTE — PATIENT INSTRUCTIONS
Follow up in 6 mos With Dr. Ricco Robert   Call with any questions or concerns  Follow up with Lloyd Chinchilla MD for non cardiac problems  Report any new problems  Cardiovascular Fitness-Exercise as tolerated. Strive for 30 minutes of exercise most days of the week. Cardiac / Healthy Diet  Continue current medications as directed  Continue plan of treatment  It is always recommended that you bring your medications bottles with you to each visit - this is for your safety!

## 2021-12-08 NOTE — PROGRESS NOTES
[address] [place], [address]  [address]  Phone: [phone]  Fax: [phone]    OFFICE VISIT:  2021    Azar Kiser - : 1950    Reason For Visit:  Ronnie Mayo is a 73 y.o. female who is here for 3 Month Follow-Up (no cardiac symptoms ) and Atrial Fibrillation  History of paroxysmal atrial fibrillation had positive stress test last December. Underwent elective heart catheterization 2020 that showed minimal nonobstructive coronary disease with normal LV systolic function. Patient had cardioversion    Returns today in follow-up. She states overall she is doing fairly well. She is not noticed any recurrent arrhythmia. She states she has some arthritis and tries not to take any Aleve. Was tested several years ago and was told she did not have sleep apnea    Subjective  Ronnie Mayo denies exertional chest pain, shortness of breath, orthopnea, paroxysmal nocturnal dyspnea, syncope, presyncope, arrhythmia, edema and fatigue. The patient denies numbness or weakness to suggest cerebrovascular accident or transient ischemic attack. Balbina Vargas MD is PCP and follows labs including lipids.   Azar Kiser has the following history as recorded in [facility]:    Patient Active Problem List    Diagnosis Date Noted    Atrial fibrillation ([address]     Morbidly obese ([address] 2020    Abnormal stress test     Cerebrovascular accident (CVA) due to occlusion of cerebral artery ([address] 2021    Overactive bladder 2021    Moderate episode of recurrent major depressive disorder ([address] 2021    Hyperthyroidism 2021    Hypertension     Hyperlipidemia     Occipital infarction ([address] 10/02/2020     Past Medical History:   Diagnosis Date    Arthritis     Atrial fibrillation ([address]     Cerebral artery occlusion with cerebral infarction ([address]     CHF (congestive heart failure) ([address]     COPD (chronic obstructive pulmonary disease) (HCC)     Hyperlipidemia chest pain, orthopnea or PND. No sensation of arrhythmia or slow heart rate. No claudication or leg edema. Gastrointestinal - no abdominal swelling or pain. No blood in stool. No severe constipation, diarrhea, nausea, or vomiting. Genitourinary - no difficulty urinating, dysuria, frequency, or urgency. No flank pain or hematuria. Musculoskeletal - no back pain, gait - slow and cautious  +Knee pain . Skin - no color change or rash. No pallor. No new surgical incision. Neurologic - no speech difficulty, facial asymmetry or lateralizing weakness. No seizures, presyncope, syncope, or significant dizziness. Hematologic - no easy bruising or excessive bleeding. Psychiatric - no severe anxiety or insomnia. No confusion. All other review of systems are negative. Objective  Vital Signs - BP (!) 138/98   Pulse 53   Ht 5' 1\" (1.549 m)   Wt (!) 321 lb (145.6 kg)   BMI 60.65 kg/m²   General - [facility] is alert, cooperative, and pleasant. Well groomed. No acute distress. Body habitus is morbidly obese. HEENT - The head is normocephalic. No circumoral cyanosis. Dentition is normal.   EYES -  No Xanthelasma, no arcus senilis, no conjunctival hemorrhages or discharge. Neck - Supple, without increased jugular venous pressures. No carotid bruits. No mass. Respiratory - Lungs are clear bilaterally. No wheezes or rales. Normal effort without use of accessory muscles. Cardiovascular - Heart has regular rhythm and rate. No murmurs, rubs or gallops. + pedal pulses and no varicosities. Abdominal -  Soft, nontender, nondistended. Bowel sounds are intact. Extremities - No clubbing, cyanosis, or  edema. Musculoskeletal -  No clubbing . No Osler's nodes. Gait- slow and limping . No kyphosis or scoliosis. Skin -  no statis ulcers or dermatitis. Neurological - No focal signs are identified. Oriented to person, place and time. Psychiatric -  Appropriate affect and mood. Assessment:     Diagnosis Orders   1. Paroxysmal atrial fibrillation (HCC)  EKG 12 lead   2. Mixed hyperlipidemia     3. Essential hypertension       Data:  BP Readings from Last 3 Encounters:   12/08/21 (!) 138/98   12/06/21 136/82   07/26/21 136/72    Pulse Readings from Last 3 Encounters:   12/08/21 53   12/06/21 88   07/26/21 (!) 47        Wt Readings from Last 3 Encounters:   12/08/21 (!) 321 lb (145.6 kg)   12/06/21 (!) 324 lb (147 kg)   07/26/21 (!) 330 lb (149.7 kg)   EKG today shows sinus bradycardia with a rate of 53. QTc 462 ms. Low voltage in precordial leads  No change from previous EKG    Rhythm maintained on amiodarone 200 mg daily. Remains anticoagulated on Eliquis  Blood pressure slightly elevated today but has been better controlled. Also on low-dose aspirin, beta-blocker statin, diuretic as needed   Reviewed PCP recent notes   Reviewed recent labs - stable      States taking medications as prescribed  Stable cardiovascular status. No evidence of overt heart failure, angina or dysrhythmia. 30 minutes were spent preparing, reviewing and seeing patient. All questions answered    Plan    Follow up in 6 mos With Dr. Judson Bond   Call with any questions or concerns  Follow up with Landen Tidwell MD for non cardiac problems  Report any new problems  Cardiovascular Fitness-Exercise as tolerated. Strive for 30 minutes of exercise most days of the week. Cardiac / Healthy Diet  Continue current medications as directed  Continue plan of treatment  It is always recommended that you bring your medications bottles with you to each visit - this is for your safety! Michial Eisenmenger, APRN    EMR dragon/transcription disclaimer: Much of this encounter note is electronic transcription/translation of spoken language to printed tach. Electronic translation of spoken language may be erroneous, or at times, nonsensical words or phrases may be inadvertently transcribed.  Although, I have reviewed the note for such errors, some may still exist.

## 2021-12-23 ASSESSMENT — ENCOUNTER SYMPTOMS
BACK PAIN: 0
CONSTIPATION: 0
EYE DISCHARGE: 0
NAUSEA: 0
ABDOMINAL PAIN: 0
RHINORRHEA: 0
SHORTNESS OF BREATH: 0
COUGH: 0
DIARRHEA: 0
VOMITING: 0
EYE PAIN: 0

## 2021-12-23 NOTE — PROGRESS NOTES
Swapna Sharmin [facility]  [address] [address]  Dept: [phone]  Dept Fax: [phone]    Visit type: Established patient    Reason for Visit: Medicare AWV and Medication Refill         Assessment and Plan       1. Moderate episode of recurrent major depressive disorder (HCC)  -     escitalopram (LEXAPRO) 10 MG tablet; Take 3 tablets by mouth daily, Disp-90 tablet, R-2Normal  2. Atrial fibrillation, unspecified type (HCC)  -     apixaban (ELIQUIS) 5 MG TABS tablet; Take 1 tablet by mouth twice daily, Disp-60 tablet, R-0Normal  3. Hyperthyroidism  -     methIMAzole (TAPAZOLE) 5 MG tablet; Take 1 tablet by mouth 2 times daily, Disp-60 tablet, R-5Normal  -     TSH without Reflex; Future  -     T4, Free; Future  4. Mixed hyperlipidemia  -     atorvastatin (LIPITOR) 20 MG tablet; Take 1 tablet by mouth daily, Disp-30 tablet, R-5Normal    With patient doing well with her current medications will continue at this time continue to monitor and adjust the course dictates. Discussed lifestyle modifications that may beneficial for symptoms. Discussed last further evaluation of her hyperthyroidism. Discussed signs symptoms or medical attention. All questions were answered and patient voiced understanding and agreement with plan as discussed. Return in 6 months (on 6/6/2022), or if symptoms worsen or fail to improve, for Medicare Annual Wellness Visit in 1 year. Subjective       HPI   Patient has a history of A. fib and is doing well with her current use of Eliquis. She denies any problems with the medicine or any recent issues with bleeding. She does have hyperparathyroidism and is doing well with her methimazole and denies any symptoms suggestive of her thyroid being off at this time. She does also have hyperlipidemia tolerating Lipitor without any myalgias or GI upsets. She is in watch her diet and does try to stay physically active.     She has issues with depression but is continued doing well with the use of Lexapro. She denies any problems with medicine or any recent changes to her symptoms. Review of Systems   Constitutional: Negative for activity change, appetite change, fatigue and fever. HENT: Negative for congestion and rhinorrhea. Eyes: Negative for pain and discharge. Respiratory: Negative for cough and shortness of breath. Cardiovascular: Negative for chest pain and palpitations. Gastrointestinal: Negative for abdominal pain, constipation, diarrhea, nausea and vomiting. Endocrine: Negative for cold intolerance and heat intolerance. Genitourinary: Negative for dysuria and hematuria. Musculoskeletal: Positive for arthralgias. Negative for back pain and neck pain. Skin: Negative for rash and wound. Neurological: Negative for syncope and weakness. Hematological: Negative for adenopathy. Does not bruise/bleed easily. Psychiatric/Behavioral: Positive for dysphoric mood. Negative for sleep disturbance. The patient is not nervous/anxious.          Allergies   Allergen Reactions    Niacin And Related        Outpatient Medications Prior to Visit   Medication Sig Dispense Refill    amiodarone (CORDARONE) 200 MG tablet Take 1 tablet by mouth daily 30 tablet 5    escitalopram (LEXAPRO) 20 MG tablet Take 1 tablet by mouth daily 30 tablet 0    amiodarone (CORDARONE) 200 MG tablet Take 1 tablet by mouth daily (Patient not taking: Reported on 12/8/2021) 30 tablet 5    atorvastatin (LIPITOR) 20 MG tablet Take 1 tablet by mouth daily 30 tablet 0    methIMAzole (TAPAZOLE) 5 MG tablet Take 1 tablet by mouth 2 times daily 60 tablet 0    apixaban (ELIQUIS) 5 MG TABS tablet Take 1 tablet by mouth twice daily 60 tablet 0    nortriptyline (PAMELOR) 10 MG capsule Take 1 capsule by mouth nightly 90 capsule 0    metoprolol (LOPRESSOR) 50 MG tablet Take 1 tablet by mouth 2 times daily 60 tablet 5    furosemide (LASIX) 20 MG tablet Take 1 tablet by mouth daily as needed (Edema or swelling) 30 tablet 5    tolterodine (DETROL LA) 4 MG extended release capsule Take 1 capsule by mouth daily 90 capsule 1    aspirin EC 81 MG EC tablet Take 1 tablet by mouth daily 90 tablet 0     No facility-administered medications prior to visit. Past Medical History:   Diagnosis Date    Arthritis     [facility]     Cerebral artery occlusion with cerebral infarction ([facility]     CHF (congestive heart failure) (HCC)     COPD (chronic obstructive pulmonary disease) [facility]     Hyperlipidemia     Hypertension     Thyroid disease         Social History     Tobacco Use    Smoking status: Never Smoker    Smokeless tobacco: Never Used   Substance Use Topics    Alcohol use: Never        Past Surgical History:   Procedure Laterality Date    CARDIOVERSION  2020    HYSTERECTOMY      With removal of tumor    TONSILLECTOMY      TUMOR REMOVAL         Family History   Problem Relation Age of Onset    Pacemaker Mother     Heart Disease Mother        Objective       /82 (Site: Left Wrist, Position: Sitting, Cuff Size: Medium Adult)   Pulse 88   Temp 98 °F (36.7 °C) (Temporal)   Ht 5' 2\" (1.575 m)   Wt (!) 324 lb (147 kg)   SpO2 97%   BMI 59.26 kg/m²   Physical Exam  Vitals and nursing note reviewed. Constitutional:       General: She is not in acute distress. Appearance: Normal appearance. She is well-developed. She is not diaphoretic. HENT:      Head: Normocephalic and atraumatic. Right Ear: External ear normal.      Left Ear: External ear normal.      Mouth/Throat:      Comments: Mask in place  Eyes:      General: No scleral icterus. Right eye: No discharge. Left eye: No discharge. Conjunctiva/sclera: Conjunctivae normal.   Neck:      Trachea: No tracheal deviation. Cardiovascular:      Rate and Rhythm: Normal rate and regular rhythm. Heart sounds: Normal heart sounds. No murmur heard. No friction rub. No gallop.     Pulmonary: Effort: Pulmonary effort is normal. No respiratory distress. Breath sounds: Normal breath sounds. No wheezing or rales. Abdominal:      General: Bowel sounds are normal. There is no distension. Palpations: Abdomen is soft. Tenderness: There is no abdominal tenderness. Musculoskeletal:         General: No deformity (No gross deformities of upper or lower extremities) or signs of injury. Cervical back: Normal range of motion and neck supple. No muscular tenderness. Right lower leg: No edema. Left lower leg: No edema. Lymphadenopathy:      Cervical: No cervical adenopathy. Skin:     General: Skin is warm and dry. Findings: No erythema or rash. Neurological:      Mental Status: She is alert and oriented to person, place, and time. Cranial Nerves: No cranial nerve deficit. Psychiatric:         Behavior: Behavior normal.         Thought Content:  Thought content normal.           Data Reviewed and Summarized       Labs:     Imaging/Testing:        Romana Gibbs MD

## 2022-01-19 DIAGNOSIS — I48.91 ATRIAL FIBRILLATION, UNSPECIFIED TYPE (HCC): ICD-10-CM

## 2022-01-20 NOTE — TELEPHONE ENCOUNTER
Last OV 12/6/2021  Next OV Visit date not found      Requested Prescriptions     Pending Prescriptions Disp Refills    apixaban (ELIQUIS) 5 MG TABS tablet [Pharmacy Med Name: Eliquis 5 MG Oral Tablet] 60 tablet 0     Sig: Take 1 tablet by mouth twice daily

## 2022-01-24 DIAGNOSIS — I48.91 ATRIAL FIBRILLATION, UNSPECIFIED TYPE (HCC): ICD-10-CM

## 2022-01-24 NOTE — TELEPHONE ENCOUNTER
Last OV 12/6/2021  Next OV Visit date not found      Requested Prescriptions     Pending Prescriptions Disp Refills    apixaban (ELIQUIS) 5 MG TABS tablet 60 tablet 3     Sig: Take 1 tablet by mouth twice daily

## 2022-02-28 DIAGNOSIS — I48.91 ATRIAL FIBRILLATION, UNSPECIFIED TYPE (HCC): ICD-10-CM

## 2022-03-01 RX ORDER — AMIODARONE HYDROCHLORIDE 200 MG/1
200 TABLET ORAL DAILY
Qty: 30 TABLET | Refills: 5 | Status: SHIPPED | OUTPATIENT
Start: 2022-03-01 | End: 2022-04-19 | Stop reason: SDUPTHER

## 2022-04-19 DIAGNOSIS — I48.91 ATRIAL FIBRILLATION, UNSPECIFIED TYPE (HCC): ICD-10-CM

## 2022-04-19 DIAGNOSIS — I10 ESSENTIAL HYPERTENSION: ICD-10-CM

## 2022-04-19 RX ORDER — AMIODARONE HYDROCHLORIDE 200 MG/1
200 TABLET ORAL DAILY
Qty: 30 TABLET | Refills: 1 | Status: SHIPPED | OUTPATIENT
Start: 2022-04-19 | End: 2022-05-19 | Stop reason: SDUPTHER

## 2022-04-19 RX ORDER — METOPROLOL TARTRATE 50 MG/1
50 TABLET, FILM COATED ORAL 2 TIMES DAILY
Qty: 60 TABLET | Refills: 1 | Status: SHIPPED | OUTPATIENT
Start: 2022-04-19 | End: 2022-05-19 | Stop reason: SDUPTHER

## 2022-05-19 DIAGNOSIS — F33.1 MODERATE EPISODE OF RECURRENT MAJOR DEPRESSIVE DISORDER (HCC): ICD-10-CM

## 2022-05-19 DIAGNOSIS — E05.90 HYPERTHYROIDISM: ICD-10-CM

## 2022-05-19 DIAGNOSIS — I10 ESSENTIAL HYPERTENSION: ICD-10-CM

## 2022-05-19 DIAGNOSIS — I48.91 ATRIAL FIBRILLATION, UNSPECIFIED TYPE (HCC): ICD-10-CM

## 2022-05-19 DIAGNOSIS — E78.2 MIXED HYPERLIPIDEMIA: ICD-10-CM

## 2022-05-19 RX ORDER — METHIMAZOLE 5 MG/1
5 TABLET ORAL 2 TIMES DAILY
Qty: 60 TABLET | Refills: 1 | Status: SHIPPED | OUTPATIENT
Start: 2022-05-19 | End: 2022-06-23 | Stop reason: SDUPTHER

## 2022-05-19 RX ORDER — ESCITALOPRAM OXALATE 10 MG/1
30 TABLET ORAL DAILY
Qty: 90 TABLET | Refills: 1 | Status: SHIPPED | OUTPATIENT
Start: 2022-05-19 | End: 2022-06-23 | Stop reason: SDUPTHER

## 2022-05-19 RX ORDER — ATORVASTATIN CALCIUM 20 MG/1
20 TABLET, FILM COATED ORAL DAILY
Qty: 30 TABLET | Refills: 2 | Status: SHIPPED | OUTPATIENT
Start: 2022-05-19 | End: 2022-06-23 | Stop reason: SDUPTHER

## 2022-05-19 NOTE — TELEPHONE ENCOUNTER
Last OV 12/6/2021  Next OV Visit date not found      Requested Prescriptions     Pending Prescriptions Disp Refills    escitalopram (LEXAPRO) 10 MG tablet 90 tablet 2     Sig: Take 3 tablets by mouth daily    methIMAzole (TAPAZOLE) 5 MG tablet 60 tablet 5     Sig: Take 1 tablet by mouth 2 times daily    atorvastatin (LIPITOR) 20 MG tablet 30 tablet 5     Sig: Take 1 tablet by mouth daily    apixaban (ELIQUIS) 5 MG TABS tablet 60 tablet 0     Sig: Take 1 tablet by mouth twice daily

## 2022-05-20 RX ORDER — AMIODARONE HYDROCHLORIDE 200 MG/1
200 TABLET ORAL DAILY
Qty: 30 TABLET | Refills: 1 | Status: SHIPPED | OUTPATIENT
Start: 2022-05-20 | End: 2022-06-23 | Stop reason: SDUPTHER

## 2022-05-20 RX ORDER — METOPROLOL TARTRATE 50 MG/1
50 TABLET, FILM COATED ORAL 2 TIMES DAILY
Qty: 60 TABLET | Refills: 1 | Status: SHIPPED | OUTPATIENT
Start: 2022-05-20 | End: 2022-06-23 | Stop reason: SDUPTHER

## 2022-06-10 ENCOUNTER — TELEPHONE (OUTPATIENT)
Dept: CARDIOLOGY CLINIC | Age: 72
End: 2022-06-10

## 2022-06-13 ENCOUNTER — OFFICE VISIT (OUTPATIENT)
Dept: CARDIOLOGY CLINIC | Age: 72
End: 2022-06-13
Payer: MEDICARE

## 2022-06-13 VITALS
BODY MASS INDEX: 55.32 KG/M2 | WEIGHT: 293 LBS | HEIGHT: 61 IN | HEART RATE: 74 BPM | DIASTOLIC BLOOD PRESSURE: 84 MMHG | SYSTOLIC BLOOD PRESSURE: 112 MMHG

## 2022-06-13 DIAGNOSIS — R94.39 ABNORMAL STRESS TEST: ICD-10-CM

## 2022-06-13 DIAGNOSIS — I48.0 PAROXYSMAL ATRIAL FIBRILLATION (HCC): Primary | ICD-10-CM

## 2022-06-13 DIAGNOSIS — E78.2 MIXED HYPERLIPIDEMIA: ICD-10-CM

## 2022-06-13 DIAGNOSIS — I63.9 OCCIPITAL INFARCTION (HCC): ICD-10-CM

## 2022-06-13 DIAGNOSIS — R06.09 DOE (DYSPNEA ON EXERTION): ICD-10-CM

## 2022-06-13 DIAGNOSIS — E66.01 MORBIDLY OBESE (HCC): ICD-10-CM

## 2022-06-13 DIAGNOSIS — Z79.899 LONG TERM CURRENT USE OF ANTIARRHYTHMIC DRUG: ICD-10-CM

## 2022-06-13 DIAGNOSIS — Z79.01 CHRONIC ANTICOAGULATION: ICD-10-CM

## 2022-06-13 DIAGNOSIS — I10 ESSENTIAL HYPERTENSION: ICD-10-CM

## 2022-06-13 PROCEDURE — 3017F COLORECTAL CA SCREEN DOC REV: CPT | Performed by: INTERNAL MEDICINE

## 2022-06-13 PROCEDURE — G8417 CALC BMI ABV UP PARAM F/U: HCPCS | Performed by: INTERNAL MEDICINE

## 2022-06-13 PROCEDURE — 1090F PRES/ABSN URINE INCON ASSESS: CPT | Performed by: INTERNAL MEDICINE

## 2022-06-13 PROCEDURE — 93000 ELECTROCARDIOGRAM COMPLETE: CPT | Performed by: INTERNAL MEDICINE

## 2022-06-13 PROCEDURE — 1123F ACP DISCUSS/DSCN MKR DOCD: CPT | Performed by: INTERNAL MEDICINE

## 2022-06-13 PROCEDURE — 1036F TOBACCO NON-USER: CPT | Performed by: INTERNAL MEDICINE

## 2022-06-13 PROCEDURE — G8427 DOCREV CUR MEDS BY ELIG CLIN: HCPCS | Performed by: INTERNAL MEDICINE

## 2022-06-13 PROCEDURE — G8400 PT W/DXA NO RESULTS DOC: HCPCS | Performed by: INTERNAL MEDICINE

## 2022-06-13 PROCEDURE — 99214 OFFICE O/P EST MOD 30 MIN: CPT | Performed by: INTERNAL MEDICINE

## 2022-06-13 ASSESSMENT — ENCOUNTER SYMPTOMS
DIARRHEA: 0
SHORTNESS OF BREATH: 0
EYES NEGATIVE: 1
GASTROINTESTINAL NEGATIVE: 1
NAUSEA: 0
VOMITING: 0
RESPIRATORY NEGATIVE: 1

## 2022-06-13 NOTE — PROGRESS NOTES
[facility] Progress Note                            Date:  6/13/2022  Patient: Leigh Mccord  Age:  73 y.o., 1950      Reason for evaluation:         SUBJECTIVE:    Returns today follow-up assessment follow up for paroxysmal atrial fibrillation hypertension hyperlipidemia chronic anticoagulation antiarrhythmic drug usage shortness of breath overall doing reasonably well. Dyspnea stable denies chest pain denies palpitations. She has rare falls. Caretaker who is with her indicated she had an episode about 2 weeks ago where she was somewhat delusional she called the paramedics this is infrequent cause not known. No other complaints or issues reported. Suggested she discuss this with her primary care physician. Blood pressure today 112/84 heart 74. Review of Systems   Constitutional: Negative. Negative for chills, fever and unexpected weight change. HENT: Negative. Eyes: Negative. Respiratory: Negative. Negative for shortness of breath. Cardiovascular: Negative. Negative for chest pain. Gastrointestinal: Negative. Negative for diarrhea, nausea and vomiting. Endocrine: Negative. Genitourinary: Negative. Musculoskeletal: Negative. Skin: Negative. Neurological: Negative. All other systems reviewed and are negative. OBJECTIVE:     /84   Pulse 74   Ht 5' 1\" (1.549 m)   Wt (!) 309 lb (140.2 kg)   BMI 58.39 kg/m²     Labs:   CBC: No results for input(s): WBC, HGB, HCT, PLT in the last 72 hours. BMP:No results for input(s): NA, K, CO2, BUN, CREATININE, LABGLOM, GLUCOSE in the last 72 hours. BNP: No results for input(s): BNP in the last 72 hours. PT/INR: No results for input(s): PROTIME, INR in the last 72 hours. APTT:No results for input(s): APTT in the last 72 hours. CARDIAC ENZYMES:No results for input(s): CKTOTAL, CKMB, CKMBINDEX, TROPONINI in the last 72 hours.   FASTING LIPID PANEL:  Lab Results   Component Value Date    HDL 66 12/06/2021    LDLCALC 51 12/06/2021    TRIG 56 12/06/2021     LIVER PROFILE:No results for input(s): AST, ALT, LABALBU in the last 72 hours. Past Medical History:   Diagnosis Date    Arthritis     [facility]rial [facility]     Cerebral artery occlusion with cerebral infarction ([facility]     CHF (congestive heart failure) (HCC)     COPD (chronic obstructive pulmonary disease) ([facility] [facility]     Hyperlipidemia     Hypertension     Thyroid disease      Past Surgical History:   Procedure Laterality Date    CARDIOVERSION  2020    HYSTERECTOMY (CERVIX STATUS UNKNOWN)      With removal of tumor    TONSILLECTOMY      TUMOR REMOVAL       Family History   Problem Relation Age of Onset    Pacemaker Mother     Heart Disease Mother      Allergies   Allergen Reactions    Niacin And Related      Current Outpatient Medications   Medication Sig Dispense Refill    amiodarone (CORDARONE) 200 MG tablet Take 1 tablet by mouth daily 30 tablet 1    metoprolol tartrate (LOPRESSOR) 50 MG tablet Take 1 tablet by mouth 2 times daily 60 tablet 1    methIMAzole (TAPAZOLE) 5 MG tablet Take 1 tablet by mouth 2 times daily 60 tablet 1    atorvastatin (LIPITOR) 20 MG tablet Take 1 tablet by mouth daily 30 tablet 2    apixaban (ELIQUIS) 5 MG TABS tablet Take 1 tablet by mouth twice daily 60 tablet 1    furosemide (LASIX) 20 MG tablet Take 1 tablet by mouth daily as needed (Edema or swelling) 30 tablet 5    tolterodine (DETROL LA) 4 MG extended release capsule Take 1 capsule by mouth daily 90 capsule 1    aspirin EC 81 MG EC tablet Take 1 tablet by mouth daily 90 tablet 0    escitalopram (LEXAPRO) 10 MG tablet Take 3 tablets by mouth daily (Patient not taking: Reported on 6/13/2022) 90 tablet 1    nortriptyline (PAMELOR) 10 MG capsule Take 1 capsule by mouth nightly (Patient not taking: Reported on 6/13/2022) 90 capsule 0     No current facility-administered medications for this visit.      Social History     Socioeconomic History Physical Exam  Vitals reviewed. Constitutional:       Appearance: She is well-developed. Neck:      Vascular: No carotid bruit or JVD. Cardiovascular:      Rate and Rhythm: Normal rate and regular rhythm. Heart sounds: Normal heart sounds. No murmur heard. No friction rub. No gallop. Pulmonary:      Effort: Pulmonary effort is normal. No respiratory distress. Breath sounds: Normal breath sounds. No wheezing or rales. Abdominal:      General: There is no distension. Tenderness: There is no abdominal tenderness. Lymphadenopathy:      Cervical: No cervical adenopathy. Skin:     General: Skin is warm and dry. ASSESSMENT:     Diagnosis Orders   1. Paroxysmal atrial fibrillation (HCC)  EKG 12 lead   2. Essential hypertension     3. Mixed hyperlipidemia     4. Abnormal stress test     5. Morbidly obese ([address]     6. Chronic anticoagulation     7. Long term current use of antiarrhythmic drug     8. MCDANIEL (dyspnea on exertion)     9. Occipital infarction [facility])         PLAN:  Orders Placed This Encounter   Procedures    EKG 12 lead     No orders of the defined types were placed in this encounter. 1. Continue present medications  2. Recommend follow-up assessment in 6 months    Return in about 6 months (around 12/13/2022) for return to Dr. Kristin Rubinstein only. Azalea Allison MD 6/13/2022 3:29 PM CDT    [facility]      Thisdictation was generated by voice recognition computer software. Although all attempts are made to edit the dictation for accuracy, there may be errors in the transcription that are not intended.

## 2022-06-23 DIAGNOSIS — I48.91 ATRIAL FIBRILLATION, UNSPECIFIED TYPE (HCC): ICD-10-CM

## 2022-06-23 DIAGNOSIS — F33.1 MODERATE EPISODE OF RECURRENT MAJOR DEPRESSIVE DISORDER (HCC): ICD-10-CM

## 2022-06-23 DIAGNOSIS — I10 ESSENTIAL HYPERTENSION: ICD-10-CM

## 2022-06-23 DIAGNOSIS — E78.2 MIXED HYPERLIPIDEMIA: ICD-10-CM

## 2022-06-23 DIAGNOSIS — E05.90 HYPERTHYROIDISM: ICD-10-CM

## 2022-06-23 RX ORDER — AMIODARONE HYDROCHLORIDE 200 MG/1
200 TABLET ORAL DAILY
Qty: 30 TABLET | Refills: 5 | Status: SHIPPED | OUTPATIENT
Start: 2022-06-23 | End: 2022-06-23 | Stop reason: SDUPTHER

## 2022-06-23 RX ORDER — METHIMAZOLE 5 MG/1
5 TABLET ORAL 2 TIMES DAILY
Qty: 60 TABLET | Refills: 1 | Status: SHIPPED | OUTPATIENT
Start: 2022-06-23 | End: 2022-06-23 | Stop reason: SDUPTHER

## 2022-06-23 RX ORDER — ESCITALOPRAM OXALATE 10 MG/1
30 TABLET ORAL DAILY
Qty: 90 TABLET | Refills: 1 | Status: SHIPPED | OUTPATIENT
Start: 2022-06-23 | End: 2022-06-23 | Stop reason: SDUPTHER

## 2022-06-23 RX ORDER — METOPROLOL TARTRATE 50 MG/1
50 TABLET, FILM COATED ORAL 2 TIMES DAILY
Qty: 60 TABLET | Refills: 3 | Status: SHIPPED | OUTPATIENT
Start: 2022-06-23 | End: 2022-06-23 | Stop reason: SDUPTHER

## 2022-06-23 RX ORDER — ATORVASTATIN CALCIUM 20 MG/1
20 TABLET, FILM COATED ORAL DAILY
Qty: 30 TABLET | Refills: 2 | Status: SHIPPED | OUTPATIENT
Start: 2022-06-23 | End: 2022-06-23 | Stop reason: SDUPTHER

## 2022-06-24 RX ORDER — AMIODARONE HYDROCHLORIDE 200 MG/1
200 TABLET ORAL DAILY
Qty: 30 TABLET | Refills: 5 | Status: SHIPPED | OUTPATIENT
Start: 2022-06-24 | End: 2022-08-01 | Stop reason: SDUPTHER

## 2022-06-24 RX ORDER — METOPROLOL TARTRATE 50 MG/1
50 TABLET, FILM COATED ORAL 2 TIMES DAILY
Qty: 60 TABLET | Refills: 5 | Status: SHIPPED | OUTPATIENT
Start: 2022-06-24 | End: 2022-08-01 | Stop reason: SDUPTHER

## 2022-06-27 RX ORDER — ATORVASTATIN CALCIUM 20 MG/1
20 TABLET, FILM COATED ORAL DAILY
Qty: 30 TABLET | Refills: 2 | Status: SHIPPED | OUTPATIENT
Start: 2022-06-27 | End: 2022-09-15 | Stop reason: SDUPTHER

## 2022-06-27 RX ORDER — ESCITALOPRAM OXALATE 10 MG/1
30 TABLET ORAL DAILY
Qty: 90 TABLET | Refills: 1 | Status: SHIPPED | OUTPATIENT
Start: 2022-06-27 | End: 2022-08-01 | Stop reason: SDUPTHER

## 2022-06-27 RX ORDER — METHIMAZOLE 5 MG/1
5 TABLET ORAL 2 TIMES DAILY
Qty: 60 TABLET | Refills: 1 | Status: SHIPPED | OUTPATIENT
Start: 2022-06-27 | End: 2022-08-31 | Stop reason: SDUPTHER

## 2022-08-01 DIAGNOSIS — F33.1 MODERATE EPISODE OF RECURRENT MAJOR DEPRESSIVE DISORDER (HCC): ICD-10-CM

## 2022-08-01 DIAGNOSIS — I10 ESSENTIAL HYPERTENSION: ICD-10-CM

## 2022-08-01 RX ORDER — NORTRIPTYLINE HYDROCHLORIDE 10 MG/1
10 CAPSULE ORAL NIGHTLY
Qty: 30 CAPSULE | Refills: 0 | Status: SHIPPED | OUTPATIENT
Start: 2022-08-01

## 2022-08-01 RX ORDER — AMIODARONE HYDROCHLORIDE 200 MG/1
200 TABLET ORAL DAILY
Qty: 30 TABLET | Refills: 5 | Status: SHIPPED | OUTPATIENT
Start: 2022-08-01 | End: 2022-08-31 | Stop reason: SDUPTHER

## 2022-08-01 RX ORDER — METOPROLOL TARTRATE 50 MG/1
50 TABLET, FILM COATED ORAL 2 TIMES DAILY
Qty: 60 TABLET | Refills: 5 | Status: SHIPPED | OUTPATIENT
Start: 2022-08-01 | End: 2022-08-17 | Stop reason: SDUPTHER

## 2022-08-01 NOTE — TELEPHONE ENCOUNTER
Requested Prescriptions     Pending Prescriptions Disp Refills    nortriptyline (PAMELOR) 10 MG capsule 90 capsule 0     Sig: Take 1 capsule by mouth nightly       Last Office Visit: 1/20/2021  Next Office Visit: Visit date not found  Last Medication Refill: 9/27/2021 with 0 RF

## 2022-08-02 RX ORDER — ESCITALOPRAM OXALATE 10 MG/1
30 TABLET ORAL DAILY
Qty: 90 TABLET | Refills: 1 | Status: SHIPPED | OUTPATIENT
Start: 2022-08-02 | End: 2022-10-27 | Stop reason: SDUPTHER

## 2022-08-17 DIAGNOSIS — I48.91 ATRIAL FIBRILLATION, UNSPECIFIED TYPE (HCC): ICD-10-CM

## 2022-08-17 DIAGNOSIS — I10 ESSENTIAL HYPERTENSION: ICD-10-CM

## 2022-08-17 RX ORDER — METOPROLOL TARTRATE 50 MG/1
50 TABLET, FILM COATED ORAL 2 TIMES DAILY
Qty: 60 TABLET | Refills: 5 | Status: SHIPPED | OUTPATIENT
Start: 2022-08-17

## 2022-08-31 DIAGNOSIS — E05.90 HYPERTHYROIDISM: ICD-10-CM

## 2022-09-01 RX ORDER — AMIODARONE HYDROCHLORIDE 200 MG/1
200 TABLET ORAL DAILY
Qty: 30 TABLET | Refills: 0 | Status: SHIPPED | OUTPATIENT
Start: 2022-09-01 | End: 2022-10-10

## 2022-09-01 RX ORDER — METHIMAZOLE 5 MG/1
5 TABLET ORAL 2 TIMES DAILY
Qty: 60 TABLET | Refills: 1 | Status: SHIPPED | OUTPATIENT
Start: 2022-09-01 | End: 2022-10-27 | Stop reason: SDUPTHER

## 2022-09-14 DIAGNOSIS — I48.91 ATRIAL FIBRILLATION, UNSPECIFIED TYPE (HCC): ICD-10-CM

## 2022-09-15 DIAGNOSIS — I48.91 ATRIAL FIBRILLATION, UNSPECIFIED TYPE (HCC): ICD-10-CM

## 2022-09-15 DIAGNOSIS — E78.2 MIXED HYPERLIPIDEMIA: ICD-10-CM

## 2022-09-15 RX ORDER — ATORVASTATIN CALCIUM 20 MG/1
20 TABLET, FILM COATED ORAL DAILY
Qty: 30 TABLET | Refills: 1 | Status: SHIPPED | OUTPATIENT
Start: 2022-09-15 | End: 2022-10-27 | Stop reason: SDUPTHER

## 2022-10-10 RX ORDER — AMIODARONE HYDROCHLORIDE 200 MG/1
TABLET ORAL
Qty: 60 TABLET | Refills: 5 | Status: SHIPPED | OUTPATIENT
Start: 2022-10-10 | End: 2022-10-28 | Stop reason: SDUPTHER

## 2022-10-27 DIAGNOSIS — I48.91 ATRIAL FIBRILLATION, UNSPECIFIED TYPE (HCC): ICD-10-CM

## 2022-10-27 DIAGNOSIS — E78.2 MIXED HYPERLIPIDEMIA: ICD-10-CM

## 2022-10-27 DIAGNOSIS — E05.90 HYPERTHYROIDISM: ICD-10-CM

## 2022-10-27 DIAGNOSIS — F33.1 MODERATE EPISODE OF RECURRENT MAJOR DEPRESSIVE DISORDER (HCC): ICD-10-CM

## 2022-10-27 RX ORDER — ESCITALOPRAM OXALATE 10 MG/1
30 TABLET ORAL DAILY
Qty: 90 TABLET | Refills: 1 | Status: SHIPPED | OUTPATIENT
Start: 2022-10-27 | End: 2022-10-28 | Stop reason: SDUPTHER

## 2022-10-27 RX ORDER — ATORVASTATIN CALCIUM 20 MG/1
20 TABLET, FILM COATED ORAL DAILY
Qty: 30 TABLET | Refills: 1 | Status: SHIPPED | OUTPATIENT
Start: 2022-10-27 | End: 2022-10-28 | Stop reason: SDUPTHER

## 2022-10-27 RX ORDER — METHIMAZOLE 5 MG/1
5 TABLET ORAL 2 TIMES DAILY
Qty: 60 TABLET | Refills: 1 | Status: SHIPPED | OUTPATIENT
Start: 2022-10-27 | End: 2022-10-28 | Stop reason: SDUPTHER

## 2022-10-27 RX ORDER — AMIODARONE HYDROCHLORIDE 200 MG/1
TABLET ORAL
Qty: 60 TABLET | Refills: 5 | OUTPATIENT
Start: 2022-10-27

## 2022-10-27 NOTE — TELEPHONE ENCOUNTER
Please clarify with Dr. Mario Alberto Wheeler frequency of amiodarone.   Thanks [employer]

## 2022-10-28 DIAGNOSIS — E05.90 HYPERTHYROIDISM: ICD-10-CM

## 2022-10-28 DIAGNOSIS — I48.91 ATRIAL FIBRILLATION, UNSPECIFIED TYPE (HCC): ICD-10-CM

## 2022-10-28 DIAGNOSIS — F33.1 MODERATE EPISODE OF RECURRENT MAJOR DEPRESSIVE DISORDER (HCC): ICD-10-CM

## 2022-10-28 DIAGNOSIS — E78.2 MIXED HYPERLIPIDEMIA: ICD-10-CM

## 2022-10-28 RX ORDER — METHIMAZOLE 5 MG/1
5 TABLET ORAL 2 TIMES DAILY
Qty: 60 TABLET | Refills: 1 | Status: SHIPPED | OUTPATIENT
Start: 2022-10-28

## 2022-10-28 RX ORDER — ESCITALOPRAM OXALATE 10 MG/1
30 TABLET ORAL DAILY
Qty: 90 TABLET | Refills: 1 | Status: SHIPPED | OUTPATIENT
Start: 2022-10-28

## 2022-10-28 RX ORDER — ATORVASTATIN CALCIUM 20 MG/1
20 TABLET, FILM COATED ORAL DAILY
Qty: 30 TABLET | Refills: 1 | Status: SHIPPED | OUTPATIENT
Start: 2022-10-28

## 2022-10-31 RX ORDER — AMIODARONE HYDROCHLORIDE 200 MG/1
200 TABLET ORAL 2 TIMES DAILY
Qty: 60 TABLET | Refills: 5 | Status: SHIPPED | OUTPATIENT
Start: 2022-10-31

## 2022-10-31 NOTE — TELEPHONE ENCOUNTER
Kenny Kraus,  I approved script but she is due full amiodarone work up to include TSH, free T4, CMP, CXR and PFT without bronchodilator.   Thanks [employer]

## 2022-12-12 ENCOUNTER — TELEPHONE (OUTPATIENT)
Dept: CARDIOLOGY CLINIC | Age: 72
End: 2022-12-12

## 2022-12-12 NOTE — TELEPHONE ENCOUNTER
Lorena Knight called to reschedule a  office visit with Dr. Colt Roberts. .     Please be advised that the best time to call her to accommodate their needs is Anytime. Thank you.

## 2023-01-03 RX ORDER — AMIODARONE HYDROCHLORIDE 200 MG/1
200 TABLET ORAL 2 TIMES DAILY
Qty: 60 TABLET | Refills: 5 | Status: SHIPPED | OUTPATIENT
Start: 2023-01-03

## 2023-02-06 ENCOUNTER — TELEPHONE (OUTPATIENT)
Dept: CARDIOLOGY CLINIC | Age: 73
End: 2023-02-06

## 2023-02-09 DIAGNOSIS — I10 ESSENTIAL HYPERTENSION: ICD-10-CM

## 2023-02-09 DIAGNOSIS — E05.90 HYPERTHYROIDISM: ICD-10-CM

## 2023-02-09 DIAGNOSIS — F33.1 MODERATE EPISODE OF RECURRENT MAJOR DEPRESSIVE DISORDER (HCC): ICD-10-CM

## 2023-02-09 RX ORDER — METOPROLOL TARTRATE 50 MG/1
50 TABLET, FILM COATED ORAL 2 TIMES DAILY
Qty: 60 TABLET | Refills: 5 | Status: SHIPPED | OUTPATIENT
Start: 2023-02-09

## 2023-02-09 RX ORDER — METHIMAZOLE 5 MG/1
5 TABLET ORAL 2 TIMES DAILY
Qty: 60 TABLET | Refills: 1 | OUTPATIENT
Start: 2023-02-09

## 2023-02-09 RX ORDER — ESCITALOPRAM OXALATE 10 MG/1
30 TABLET ORAL DAILY
Qty: 90 TABLET | Refills: 1 | OUTPATIENT
Start: 2023-02-09

## 2023-02-09 NOTE — TELEPHONE ENCOUNTER
Last OV 12/6/2021  Next OV Visit date not found      Requested Prescriptions     Pending Prescriptions Disp Refills    escitalopram (LEXAPRO) 10 MG tablet 90 tablet 1     Sig: Take 3 tablets by mouth daily    methIMAzole (TAPAZOLE) 5 MG tablet 60 tablet 1     Sig: Take 1 tablet by mouth 2 times daily     PATIENT NEEDS APPOINTMENT PRIOR TO ADDITIONAL REFILLS

## 2023-05-30 ENCOUNTER — APPOINTMENT (OUTPATIENT)
Dept: CT IMAGING | Age: 73
End: 2023-05-30
Payer: MEDICARE

## 2023-05-30 ENCOUNTER — HOSPITAL ENCOUNTER (EMERGENCY)
Age: 73
Discharge: HOME | End: 2023-05-30
Payer: MEDICARE

## 2023-05-30 VITALS
DIASTOLIC BLOOD PRESSURE: 56 MMHG | TEMPERATURE: 97.6 F | SYSTOLIC BLOOD PRESSURE: 133 MMHG | RESPIRATION RATE: 14 BRPM | WEIGHT: 291 LBS | BODY MASS INDEX: 54.98 KG/M2 | HEART RATE: 46 BPM | OXYGEN SATURATION: 96 %

## 2023-05-30 DIAGNOSIS — Z79.01 CHRONIC ANTICOAGULATION: ICD-10-CM

## 2023-05-30 DIAGNOSIS — R93.5 ABNORMAL ABDOMINAL CT SCAN: Primary | ICD-10-CM

## 2023-05-30 DIAGNOSIS — N30.01 ACUTE CYSTITIS WITH HEMATURIA: ICD-10-CM

## 2023-05-30 LAB
ALBUMIN SERPL-MCNC: 4.1 G/DL (ref 3.5–5.2)
ALP SERPL-CCNC: 104 U/L (ref 35–104)
ALT SERPL-CCNC: 10 U/L (ref 5–33)
ANION GAP SERPL CALCULATED.3IONS-SCNC: 10 MMOL/L (ref 7–19)
APTT PPP: 35.1 SEC (ref 26–36.2)
AST SERPL-CCNC: 21 U/L (ref 5–32)
BACTERIA #/AREA URNS HPF: ABNORMAL /HPF
BASOPHILS # BLD: 0.1 K/UL (ref 0–0.2)
BASOPHILS NFR BLD: 1.3 % (ref 0–1)
BILIRUB SERPL-MCNC: 2.3 MG/DL (ref 0.2–1.2)
BILIRUB UR QL STRIP: ABNORMAL
BUN SERPL-MCNC: 12 MG/DL (ref 8–23)
CALCIUM SERPL-MCNC: 9.2 MG/DL (ref 8.8–10.2)
CHLORIDE SERPL-SCNC: 101 MMOL/L (ref 98–111)
CLARITY UR: ABNORMAL
CO2 SERPL-SCNC: 29 MMOL/L (ref 22–29)
COLOR UR: ABNORMAL
CREAT SERPL-MCNC: 0.8 MG/DL (ref 0.5–0.9)
EOSINOPHIL # BLD: 0.1 K/UL (ref 0–0.6)
EOSINOPHIL NFR BLD: 1.3 % (ref 0–5)
ERYTHROCYTE [DISTWIDTH] IN BLOOD BY AUTOMATED COUNT: 15 % (ref 11.5–14.5)
GLUCOSE SERPL-MCNC: 95 MG/DL (ref 74–109)
GLUCOSE UR STRIP.AUTO-MCNC: NEGATIVE MG/DL
HCT VFR BLD AUTO: 47.4 % (ref 37–47)
HGB BLD-MCNC: 14.3 G/DL (ref 12–16)
HGB UR STRIP.AUTO-MCNC: ABNORMAL MG/L
IMM GRANULOCYTES # BLD: 0 K/UL
INR PPP: 1.36 (ref 0.88–1.18)
KETONES UR STRIP.AUTO-MCNC: NEGATIVE MG/DL
LEUKOCYTE ESTERASE UR QL STRIP.AUTO: ABNORMAL
LYMPHOCYTES # BLD: 0.9 K/UL (ref 1.1–4.5)
LYMPHOCYTES NFR BLD: 22.4 % (ref 20–40)
MCH RBC QN AUTO: 31.3 PG (ref 27–31)
MCHC RBC AUTO-ENTMCNC: 30.2 G/DL (ref 33–37)
MCV RBC AUTO: 103.7 FL (ref 81–99)
MONOCYTES # BLD: 0.4 K/UL (ref 0–0.9)
MONOCYTES NFR BLD: 10.8 % (ref 0–10)
NEUTROPHILS # BLD: 2.4 K/UL (ref 1.5–7.5)
NEUTS SEG NFR BLD: 63.9 % (ref 50–65)
NITRITE UR QL STRIP.AUTO: POSITIVE
PH UR STRIP.AUTO: 5.5 [PH] (ref 5–8)
PLATELET # BLD AUTO: 149 K/UL (ref 130–400)
PMV BLD AUTO: 12.6 FL (ref 9.4–12.3)
POTASSIUM SERPL-SCNC: 3.9 MMOL/L (ref 3.5–5)
PROT SERPL-MCNC: 7 G/DL (ref 6.6–8.7)
PROT UR STRIP.AUTO-MCNC: 100 MG/DL
PROTHROMBIN TIME: 16.3 SEC (ref 12–14.6)
RBC # BLD AUTO: 4.57 M/UL (ref 4.2–5.4)
RBC #/AREA URNS HPF: ABNORMAL /HPF (ref 0–2)
SODIUM SERPL-SCNC: 140 MMOL/L (ref 136–145)
SP GR UR STRIP.AUTO: 1.02 (ref 1–1.03)
SQUAMOUS #/AREA URNS HPF: ABNORMAL /HPF
UROBILINOGEN UR STRIP.AUTO-MCNC: 1 E.U./DL
WBC # BLD AUTO: 3.8 K/UL (ref 4.8–10.8)
WBC #/AREA URNS HPF: ABNORMAL /HPF (ref 0–5)

## 2023-05-30 PROCEDURE — 85730 THROMBOPLASTIN TIME PARTIAL: CPT

## 2023-05-30 PROCEDURE — 85025 COMPLETE CBC W/AUTO DIFF WBC: CPT

## 2023-05-30 PROCEDURE — 2500000003 HC RX 250 WO HCPCS: Performed by: PHYSICIAN ASSISTANT

## 2023-05-30 PROCEDURE — 74176 CT ABD & PELVIS W/O CONTRAST: CPT

## 2023-05-30 PROCEDURE — 36415 COLL VENOUS BLD VENIPUNCTURE: CPT

## 2023-05-30 PROCEDURE — 96372 THER/PROPH/DIAG INJ SC/IM: CPT

## 2023-05-30 PROCEDURE — 80053 COMPREHEN METABOLIC PANEL: CPT

## 2023-05-30 PROCEDURE — 85610 PROTHROMBIN TIME: CPT

## 2023-05-30 PROCEDURE — 6360000002 HC RX W HCPCS: Performed by: PHYSICIAN ASSISTANT

## 2023-05-30 PROCEDURE — 99284 EMERGENCY DEPT VISIT MOD MDM: CPT

## 2023-05-30 PROCEDURE — 81001 URINALYSIS AUTO W/SCOPE: CPT

## 2023-05-30 RX ORDER — CEPHALEXIN 500 MG/1
500 CAPSULE ORAL 2 TIMES DAILY
Qty: 14 CAPSULE | Refills: 0 | Status: SHIPPED | OUTPATIENT
Start: 2023-05-30 | End: 2023-06-06

## 2023-05-30 RX ORDER — CEFTRIAXONE 1 G/1
1000 INJECTION, POWDER, FOR SOLUTION INTRAMUSCULAR; INTRAVENOUS ONCE
Status: COMPLETED | OUTPATIENT
Start: 2023-05-30 | End: 2023-05-30

## 2023-05-30 RX ORDER — LIDOCAINE HYDROCHLORIDE 10 MG/ML
5 INJECTION, SOLUTION EPIDURAL; INFILTRATION; INTRACAUDAL; PERINEURAL ONCE
Status: COMPLETED | OUTPATIENT
Start: 2023-05-30 | End: 2023-05-30

## 2023-05-30 RX ADMIN — CEFTRIAXONE 1000 MG: 1 INJECTION, POWDER, FOR SOLUTION INTRAMUSCULAR; INTRAVENOUS at 19:22

## 2023-05-30 RX ADMIN — LIDOCAINE HYDROCHLORIDE 5 ML: 10 INJECTION, SOLUTION EPIDURAL; INFILTRATION; INTRACAUDAL; PERINEURAL at 19:23

## 2023-05-30 ASSESSMENT — ENCOUNTER SYMPTOMS
BACK PAIN: 0
SORE THROAT: 0
ABDOMINAL DISTENTION: 0
ABDOMINAL PAIN: 0
EYE DISCHARGE: 0
RHINORRHEA: 0
COUGH: 0
NAUSEA: 0
SHORTNESS OF BREATH: 0
APNEA: 0
COLOR CHANGE: 0
EYE PAIN: 0
PHOTOPHOBIA: 0

## 2023-05-30 ASSESSMENT — PAIN - FUNCTIONAL ASSESSMENT: PAIN_FUNCTIONAL_ASSESSMENT: NONE - DENIES PAIN

## 2023-05-30 NOTE — ED PROVIDER NOTES
[facility] EMERGENCY DEPT  eMERGENCYdEPARTMENT eNCOUnter      Pt Name: Bee Ramirez  MRN: [id number]  [place] 1950  Date of evaluation: 5/30/2023  Provider:HOMAR Moy    CHIEF COMPLAINT       Chief Complaint   Patient presents with    Hematuria     X3 days          HISTORY OF PRESENT ILLNESS  (Location/Symptom, Timing/Onset, Context/Setting, Quality, Duration, Modifying Factors, Severity.)   Bee Ramirez is a 81 y.o. female who presents to the emergency department with complaints of gross hematuria is on eliquis prior stroke. Granddaughter present states her urine always mild pink tinge to it. This has been ongoing 3 days prior kidney stone hx mild pelvic pain no flank pain or no fever or chills. Normal intake. HPI    Nursing Notes were reviewed and I agree. REVIEW OF SYSTEMS    (2-9 systems for level 4, 10 or more for level 5)     Review of Systems   Constitutional:  Negative for activity change, appetite change, chills and fever. HENT:  Negative for congestion, postnasal drip, rhinorrhea and sore throat. Eyes:  Negative for photophobia, pain, discharge and visual disturbance. Respiratory:  Negative for apnea, cough and shortness of breath. Cardiovascular:  Negative for chest pain and leg swelling. Gastrointestinal:  Negative for abdominal distention, abdominal pain and nausea. Genitourinary:  Positive for hematuria. Negative for vaginal bleeding. Musculoskeletal:  Negative for arthralgias, back pain, joint swelling, neck pain and neck stiffness. Skin:  Negative for color change and rash. Neurological:  Negative for dizziness, syncope, facial asymmetry and headaches. Hematological:  Negative for adenopathy. Does not bruise/bleed easily. Psychiatric/Behavioral:  Negative for agitation, behavioral problems and confusion. Except as noted above the remainder of the review of systems was reviewed and negative.        PAST MEDICAL HISTORY     Past Medical History:
137

## 2023-06-19 NOTE — TELEPHONE ENCOUNTER
06/19/23 1045   Provider Notification   Provider Name/Title Dr. Hogan   Method of Notification Electronic Page   Notification Reason Status Update;Membrane Status   RN reported that the pt felt leaking of clear fluid when getting up to go to the bathroom. Pt unsure if pee or amniotic fluid. RN reported seeing scant, clear fluid.   Patient called back and was scheduled for OV.

## 2023-07-09 DIAGNOSIS — F33.1 MODERATE EPISODE OF RECURRENT MAJOR DEPRESSIVE DISORDER (HCC): ICD-10-CM

## 2023-07-10 DIAGNOSIS — F33.1 MODERATE EPISODE OF RECURRENT MAJOR DEPRESSIVE DISORDER (HCC): ICD-10-CM

## 2023-07-10 DIAGNOSIS — I10 ESSENTIAL HYPERTENSION: ICD-10-CM

## 2023-07-10 RX ORDER — ESCITALOPRAM OXALATE 10 MG/1
TABLET ORAL
Qty: 90 TABLET | Refills: 0 | OUTPATIENT
Start: 2023-07-10

## 2023-07-10 RX ORDER — ESCITALOPRAM OXALATE 10 MG/1
30 TABLET ORAL DAILY
Qty: 90 TABLET | Refills: 1 | OUTPATIENT
Start: 2023-07-10

## 2023-07-11 RX ORDER — METOPROLOL TARTRATE 50 MG/1
50 TABLET, FILM COATED ORAL 2 TIMES DAILY
Qty: 60 TABLET | Refills: 5 | Status: SHIPPED | OUTPATIENT
Start: 2023-07-11

## 2023-07-14 DIAGNOSIS — F33.1 MODERATE EPISODE OF RECURRENT MAJOR DEPRESSIVE DISORDER (HCC): ICD-10-CM

## 2023-07-17 RX ORDER — ESCITALOPRAM OXALATE 10 MG/1
TABLET ORAL
Qty: 90 TABLET | Refills: 0 | OUTPATIENT
Start: 2023-07-17

## 2023-07-25 DIAGNOSIS — F33.1 MODERATE EPISODE OF RECURRENT MAJOR DEPRESSIVE DISORDER (HCC): ICD-10-CM

## 2023-07-25 DIAGNOSIS — I48.91 ATRIAL FIBRILLATION, UNSPECIFIED TYPE (HCC): ICD-10-CM

## 2023-07-25 RX ORDER — FUROSEMIDE 20 MG/1
20 TABLET ORAL DAILY PRN
Qty: 30 TABLET | Refills: 5 | OUTPATIENT
Start: 2023-07-25

## 2023-07-25 RX ORDER — AMIODARONE HYDROCHLORIDE 200 MG/1
200 TABLET ORAL 2 TIMES DAILY
Qty: 60 TABLET | Refills: 5 | OUTPATIENT
Start: 2023-07-25

## 2023-07-25 RX ORDER — ESCITALOPRAM OXALATE 10 MG/1
30 TABLET ORAL DAILY
Qty: 90 TABLET | Refills: 1 | OUTPATIENT
Start: 2023-07-25

## 2023-08-01 ENCOUNTER — OFFICE VISIT (OUTPATIENT)
Dept: FAMILY MEDICINE CLINIC | Age: 73
End: 2023-08-01
Payer: MEDICARE

## 2023-08-01 VITALS
TEMPERATURE: 98.3 F | BODY MASS INDEX: 52.26 KG/M2 | HEIGHT: 62 IN | SYSTOLIC BLOOD PRESSURE: 130 MMHG | OXYGEN SATURATION: 90 % | WEIGHT: 284 LBS | RESPIRATION RATE: 16 BRPM | DIASTOLIC BLOOD PRESSURE: 56 MMHG | HEART RATE: 58 BPM

## 2023-08-01 DIAGNOSIS — E78.2 MIXED HYPERLIPIDEMIA: ICD-10-CM

## 2023-08-01 DIAGNOSIS — J34.89 NASAL CONGESTION WITH RHINORRHEA: Primary | ICD-10-CM

## 2023-08-01 DIAGNOSIS — E05.90 HYPERTHYROIDISM: ICD-10-CM

## 2023-08-01 DIAGNOSIS — F33.1 MODERATE EPISODE OF RECURRENT MAJOR DEPRESSIVE DISORDER (HCC): ICD-10-CM

## 2023-08-01 DIAGNOSIS — R09.81 NASAL CONGESTION WITH RHINORRHEA: Primary | ICD-10-CM

## 2023-08-01 LAB
CHOLEST SERPL-MCNC: 123 MG/DL (ref 160–199)
HDLC SERPL-MCNC: 54 MG/DL (ref 65–121)
LDLC SERPL CALC-MCNC: 56 MG/DL
T4 FREE SERPL-MCNC: 1.69 NG/DL (ref 0.93–1.7)
TRIGL SERPL-MCNC: 66 MG/DL (ref 0–149)
TSH SERPL DL<=0.005 MIU/L-ACNC: 1.61 UIU/ML (ref 0.27–4.2)

## 2023-08-01 PROCEDURE — 1090F PRES/ABSN URINE INCON ASSESS: CPT | Performed by: FAMILY MEDICINE

## 2023-08-01 PROCEDURE — G8427 DOCREV CUR MEDS BY ELIG CLIN: HCPCS | Performed by: FAMILY MEDICINE

## 2023-08-01 PROCEDURE — 3078F DIAST BP <80 MM HG: CPT | Performed by: FAMILY MEDICINE

## 2023-08-01 PROCEDURE — 99214 OFFICE O/P EST MOD 30 MIN: CPT | Performed by: FAMILY MEDICINE

## 2023-08-01 PROCEDURE — 1123F ACP DISCUSS/DSCN MKR DOCD: CPT | Performed by: FAMILY MEDICINE

## 2023-08-01 PROCEDURE — G8417 CALC BMI ABV UP PARAM F/U: HCPCS | Performed by: FAMILY MEDICINE

## 2023-08-01 PROCEDURE — 3074F SYST BP LT 130 MM HG: CPT | Performed by: FAMILY MEDICINE

## 2023-08-01 PROCEDURE — G8400 PT W/DXA NO RESULTS DOC: HCPCS | Performed by: FAMILY MEDICINE

## 2023-08-01 PROCEDURE — 3017F COLORECTAL CA SCREEN DOC REV: CPT | Performed by: FAMILY MEDICINE

## 2023-08-01 PROCEDURE — 1036F TOBACCO NON-USER: CPT | Performed by: FAMILY MEDICINE

## 2023-08-01 RX ORDER — FLUTICASONE PROPIONATE 50 MCG
2 SPRAY, SUSPENSION (ML) NASAL DAILY
Qty: 16 G | Refills: 2 | Status: SHIPPED | OUTPATIENT
Start: 2023-08-01

## 2023-08-01 RX ORDER — METHIMAZOLE 5 MG/1
5 TABLET ORAL 2 TIMES DAILY
Qty: 60 TABLET | Refills: 1 | Status: SHIPPED | OUTPATIENT
Start: 2023-08-01

## 2023-08-01 RX ORDER — ATORVASTATIN CALCIUM 20 MG/1
20 TABLET, FILM COATED ORAL DAILY
Qty: 30 TABLET | Refills: 1 | Status: SHIPPED | OUTPATIENT
Start: 2023-08-01

## 2023-08-01 SDOH — ECONOMIC STABILITY: FOOD INSECURITY: WITHIN THE PAST 12 MONTHS, YOU WORRIED THAT YOUR FOOD WOULD RUN OUT BEFORE YOU GOT MONEY TO BUY MORE.: NEVER TRUE

## 2023-08-01 SDOH — ECONOMIC STABILITY: HOUSING INSECURITY
IN THE LAST 12 MONTHS, WAS THERE A TIME WHEN YOU DID NOT HAVE A STEADY PLACE TO SLEEP OR SLEPT IN A SHELTER (INCLUDING NOW)?: NO

## 2023-08-01 SDOH — ECONOMIC STABILITY: INCOME INSECURITY: HOW HARD IS IT FOR YOU TO PAY FOR THE VERY BASICS LIKE FOOD, HOUSING, MEDICAL CARE, AND HEATING?: NOT HARD AT ALL

## 2023-08-01 SDOH — ECONOMIC STABILITY: FOOD INSECURITY: WITHIN THE PAST 12 MONTHS, THE FOOD YOU BOUGHT JUST DIDN'T LAST AND YOU DIDN'T HAVE MONEY TO GET MORE.: NEVER TRUE

## 2023-08-01 ASSESSMENT — PATIENT HEALTH QUESTIONNAIRE - PHQ9
SUM OF ALL RESPONSES TO PHQ QUESTIONS 1-9: 0
SUM OF ALL RESPONSES TO PHQ QUESTIONS 1-9: 0
10. IF YOU CHECKED OFF ANY PROBLEMS, HOW DIFFICULT HAVE THESE PROBLEMS MADE IT FOR YOU TO DO YOUR WORK, TAKE CARE OF THINGS AT HOME, OR GET ALONG WITH OTHER PEOPLE: 0
5. POOR APPETITE OR OVEREATING: 0
2. FEELING DOWN, DEPRESSED OR HOPELESS: 0
6. FEELING BAD ABOUT YOURSELF - OR THAT YOU ARE A FAILURE OR HAVE LET YOURSELF OR YOUR FAMILY DOWN: 0
3. TROUBLE FALLING OR STAYING ASLEEP: 0
SUM OF ALL RESPONSES TO PHQ9 QUESTIONS 1 & 2: 0
SUM OF ALL RESPONSES TO PHQ QUESTIONS 1-9: 0
SUM OF ALL RESPONSES TO PHQ QUESTIONS 1-9: 0
1. LITTLE INTEREST OR PLEASURE IN DOING THINGS: 0
4. FEELING TIRED OR HAVING LITTLE ENERGY: 0
9. THOUGHTS THAT YOU WOULD BE BETTER OFF DEAD, OR OF HURTING YOURSELF: 0
8. MOVING OR SPEAKING SO SLOWLY THAT OTHER PEOPLE COULD HAVE NOTICED. OR THE OPPOSITE, BEING SO FIGETY OR RESTLESS THAT YOU HAVE BEEN MOVING AROUND A LOT MORE THAN USUAL: 0
7. TROUBLE CONCENTRATING ON THINGS, SUCH AS READING THE NEWSPAPER OR WATCHING TELEVISION: 0

## 2023-08-13 ASSESSMENT — ENCOUNTER SYMPTOMS
SHORTNESS OF BREATH: 0
BACK PAIN: 0
RHINORRHEA: 1
SORE THROAT: 1
CONSTIPATION: 0
VOMITING: 0
DIARRHEA: 0
NAUSEA: 0
ABDOMINAL PAIN: 0
COUGH: 0
EYE DISCHARGE: 0
EYE PAIN: 0

## 2023-08-30 ENCOUNTER — TELEPHONE (OUTPATIENT)
Dept: FAMILY MEDICINE CLINIC | Age: 73
End: 2023-08-30

## 2023-08-30 NOTE — TELEPHONE ENCOUNTER
----- Message from Justyn Truong MD sent at 8/29/2023  4:52 PM CDT -----  labs appear to be consistent with previous lab results/okay at this time. We can continue monitoring and discuss in greater detail at future appointments.

## 2023-09-12 DIAGNOSIS — E05.90 HYPERTHYROIDISM: ICD-10-CM

## 2023-09-12 RX ORDER — METHIMAZOLE 5 MG/1
5 TABLET ORAL 2 TIMES DAILY
Qty: 60 TABLET | Refills: 1 | Status: SHIPPED | OUTPATIENT
Start: 2023-09-12

## 2023-09-12 RX ORDER — FUROSEMIDE 20 MG/1
20 TABLET ORAL DAILY PRN
Qty: 30 TABLET | Refills: 5 | OUTPATIENT
Start: 2023-09-12

## 2023-09-29 DIAGNOSIS — E78.2 MIXED HYPERLIPIDEMIA: ICD-10-CM

## 2023-10-02 RX ORDER — ATORVASTATIN CALCIUM 20 MG/1
20 TABLET, FILM COATED ORAL DAILY
Qty: 30 TABLET | Refills: 0 | Status: SHIPPED | OUTPATIENT
Start: 2023-10-02

## 2023-10-11 DIAGNOSIS — I48.91 ATRIAL FIBRILLATION, UNSPECIFIED TYPE (HCC): ICD-10-CM

## 2023-10-12 DIAGNOSIS — F33.1 MODERATE EPISODE OF RECURRENT MAJOR DEPRESSIVE DISORDER (HCC): ICD-10-CM

## 2023-11-07 DIAGNOSIS — E78.2 MIXED HYPERLIPIDEMIA: ICD-10-CM

## 2023-11-07 RX ORDER — ATORVASTATIN CALCIUM 20 MG/1
20 TABLET, FILM COATED ORAL DAILY
Qty: 30 TABLET | Refills: 0 | Status: SHIPPED | OUTPATIENT
Start: 2023-11-07

## 2023-11-07 NOTE — TELEPHONE ENCOUNTER
Juve Graffs called to request a refill on her medication.       Last office visit : 8/1/2023   Next office visit : Visit date not found     Requested Prescriptions     Pending Prescriptions Disp Refills    atorvastatin (LIPITOR) 20 MG tablet [Pharmacy Med Name: Atorvastatin Calcium 20 MG Oral Tablet] 30 tablet 0     Sig: Take 1 tablet by mouth once daily

## 2023-11-29 DIAGNOSIS — I48.91 ATRIAL FIBRILLATION, UNSPECIFIED TYPE (HCC): ICD-10-CM

## 2023-11-30 DIAGNOSIS — I48.91 ATRIAL FIBRILLATION, UNSPECIFIED TYPE (HCC): ICD-10-CM

## 2023-11-30 DIAGNOSIS — E05.90 HYPERTHYROIDISM: ICD-10-CM

## 2023-11-30 DIAGNOSIS — E78.2 MIXED HYPERLIPIDEMIA: ICD-10-CM

## 2023-11-30 RX ORDER — AMIODARONE HYDROCHLORIDE 200 MG/1
200 TABLET ORAL 2 TIMES DAILY
Qty: 60 TABLET | Refills: 5 | Status: SHIPPED | OUTPATIENT
Start: 2023-11-30

## 2023-11-30 RX ORDER — METHIMAZOLE 5 MG/1
5 TABLET ORAL 2 TIMES DAILY
Qty: 60 TABLET | Refills: 1 | Status: SHIPPED | OUTPATIENT
Start: 2023-11-30

## 2023-11-30 RX ORDER — ATORVASTATIN CALCIUM 20 MG/1
20 TABLET, FILM COATED ORAL DAILY
Qty: 30 TABLET | Refills: 0 | Status: SHIPPED | OUTPATIENT
Start: 2023-11-30

## 2023-12-01 RX ORDER — AMIODARONE HYDROCHLORIDE 200 MG/1
200 TABLET ORAL 2 TIMES DAILY
Qty: 60 TABLET | Refills: 0 | OUTPATIENT
Start: 2023-12-01

## 2023-12-01 RX ORDER — AMIODARONE HYDROCHLORIDE 200 MG/1
200 TABLET ORAL 2 TIMES DAILY
Qty: 60 TABLET | Refills: 5 | OUTPATIENT
Start: 2023-12-01

## 2023-12-29 DIAGNOSIS — F33.1 MODERATE EPISODE OF RECURRENT MAJOR DEPRESSIVE DISORDER (HCC): ICD-10-CM

## 2023-12-29 DIAGNOSIS — R09.81 NASAL CONGESTION WITH RHINORRHEA: ICD-10-CM

## 2023-12-29 DIAGNOSIS — I48.91 ATRIAL FIBRILLATION, UNSPECIFIED TYPE (HCC): ICD-10-CM

## 2023-12-29 DIAGNOSIS — E05.90 HYPERTHYROIDISM: ICD-10-CM

## 2023-12-29 DIAGNOSIS — J34.89 NASAL CONGESTION WITH RHINORRHEA: ICD-10-CM

## 2024-01-02 RX ORDER — METHIMAZOLE 5 MG/1
5 TABLET ORAL 2 TIMES DAILY
Qty: 60 TABLET | Refills: 1 | Status: SHIPPED | OUTPATIENT
Start: 2024-01-02

## 2024-01-02 RX ORDER — FUROSEMIDE 20 MG/1
20 TABLET ORAL DAILY PRN
Qty: 30 TABLET | Refills: 5 | OUTPATIENT
Start: 2024-01-02

## 2024-01-02 RX ORDER — FLUTICASONE PROPIONATE 50 MCG
2 SPRAY, SUSPENSION (ML) NASAL DAILY
Qty: 16 G | Refills: 2 | Status: SHIPPED | OUTPATIENT
Start: 2024-01-02

## 2024-01-18 ENCOUNTER — TELEPHONE (OUTPATIENT)
Dept: CARDIOLOGY CLINIC | Age: 74
End: 2024-01-18

## 2024-01-18 NOTE — TELEPHONE ENCOUNTER
Called to ask the patient to move up her appt for today, due to weather. The patients daughter stated that the patient is out of town and on her way back to this area. She will get in touch with her

## 2024-01-22 ENCOUNTER — APPOINTMENT (OUTPATIENT)
Dept: CT IMAGING | Age: 74
End: 2024-01-22
Payer: MEDICARE

## 2024-01-22 ENCOUNTER — APPOINTMENT (OUTPATIENT)
Dept: GENERAL RADIOLOGY | Age: 74
End: 2024-01-22
Payer: MEDICARE

## 2024-01-22 ENCOUNTER — HOSPITAL ENCOUNTER (EMERGENCY)
Age: 74
Discharge: HOME | End: 2024-01-22
Attending: EMERGENCY MEDICINE
Payer: MEDICARE

## 2024-01-22 VITALS
DIASTOLIC BLOOD PRESSURE: 64 MMHG | RESPIRATION RATE: 19 BRPM | HEART RATE: 55 BPM | TEMPERATURE: 97.8 F | SYSTOLIC BLOOD PRESSURE: 144 MMHG | OXYGEN SATURATION: 95 %

## 2024-01-22 DIAGNOSIS — R53.1 GENERALIZED WEAKNESS: Primary | ICD-10-CM
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
## 2024-01-22 LAB
ALBUMIN SERPL-MCNC: 3.5 G/DL (ref 3.5–5.2)
ALP SERPL-CCNC: 69 U/L (ref 35–104)
ALT SERPL-CCNC: 13 U/L (ref 5–33)
ANION GAP SERPL CALCULATED.3IONS-SCNC: 11 MMOL/L (ref 7–19)
AST SERPL-CCNC: 35 U/L (ref 5–32)
BACTERIA #/AREA URNS HPF: ABNORMAL /HPF
BASOPHILS # BLD: 0 K/UL (ref 0–0.2)
BASOPHILS NFR BLD: 0.8 % (ref 0–1)
BILIRUB SERPL-MCNC: 2.6 MG/DL (ref 0.2–1.2)
BILIRUB UR QL STRIP: ABNORMAL
BNP BLD-MCNC: 1146 PG/ML (ref 0–124)
BUN SERPL-MCNC: 17 MG/DL (ref 8–23)
CALCIUM SERPL-MCNC: 9 MG/DL (ref 8.8–10.2)
CHLORIDE SERPL-SCNC: 105 MMOL/L (ref 98–111)
CLARITY UR: ABNORMAL
CO2 SERPL-SCNC: 27 MMOL/L (ref 22–29)
COLOR UR: ABNORMAL
CREAT SERPL-MCNC: 0.6 MG/DL (ref 0.5–0.9)
EOSINOPHIL # BLD: 0 K/UL (ref 0–0.6)
EOSINOPHIL NFR BLD: 0.4 % (ref 0–5)
ERYTHROCYTE [DISTWIDTH] IN BLOOD BY AUTOMATED COUNT: 15.4 % (ref 11.5–14.5)
GLUCOSE SERPL-MCNC: 107 MG/DL (ref 74–109)
GLUCOSE UR STRIP.AUTO-MCNC: NEGATIVE MG/DL
HCT VFR BLD AUTO: 40 % (ref 37–47)
HGB BLD-MCNC: 12.6 G/DL (ref 12–16)
HGB UR STRIP.AUTO-MCNC: NEGATIVE MG/L
IMM GRANULOCYTES # BLD: 0 K/UL
KETONES UR STRIP.AUTO-MCNC: ABNORMAL MG/DL
LEUKOCYTE ESTERASE UR QL STRIP.AUTO: ABNORMAL
LYMPHOCYTES # BLD: 0.7 K/UL (ref 1.1–4.5)
LYMPHOCYTES NFR BLD: 14.9 % (ref 20–40)
MCH RBC QN AUTO: 29.7 PG (ref 27–31)
MCHC RBC AUTO-ENTMCNC: 31.5 G/DL (ref 33–37)
MCV RBC AUTO: 94.3 FL (ref 81–99)
MONOCYTES # BLD: 0.5 K/UL (ref 0–0.9)
MONOCYTES NFR BLD: 10.8 % (ref 0–10)
MUCOUS THREADS URNS QL MICRO: ABNORMAL /LPF
NEUTROPHILS # BLD: 3.5 K/UL (ref 1.5–7.5)
NEUTS SEG NFR BLD: 72.9 % (ref 50–65)
NITRITE UR QL STRIP.AUTO: NEGATIVE
PH UR STRIP.AUTO: 6 [PH] (ref 5–8)
PLATELET # BLD AUTO: 129 K/UL (ref 130–400)
PMV BLD AUTO: 11.8 FL (ref 9.4–12.3)
POTASSIUM SERPL-SCNC: 4 MMOL/L (ref 3.5–5)
PROT SERPL-MCNC: 6.8 G/DL (ref 6.6–8.7)
PROT UR STRIP.AUTO-MCNC: ABNORMAL MG/DL
RBC # BLD AUTO: 4.24 M/UL (ref 4.2–5.4)
RBC #/AREA URNS HPF: ABNORMAL /HPF (ref 0–2)
SODIUM SERPL-SCNC: 143 MMOL/L (ref 136–145)
SP GR UR STRIP.AUTO: 1.03 (ref 1–1.03)
SQUAMOUS #/AREA URNS HPF: ABNORMAL /HPF
TROPONIN, HIGH SENSITIVITY: 11 NG/L (ref 0–14)
UROBILINOGEN UR STRIP.AUTO-MCNC: 2 E.U./DL
WBC # BLD AUTO: 4.8 K/UL (ref 4.8–10.8)
WBC #/AREA URNS HPF: ABNORMAL /HPF (ref 0–5)

## 2024-01-22 PROCEDURE — 6370000000 HC RX 637 (ALT 250 FOR IP): Performed by: EMERGENCY MEDICINE

## 2024-01-22 PROCEDURE — 80053 COMPREHEN METABOLIC PANEL: CPT

## 2024-01-22 PROCEDURE — 87086 URINE CULTURE/COLONY COUNT: CPT

## 2024-01-22 PROCEDURE — 85025 COMPLETE CBC W/AUTO DIFF WBC: CPT

## 2024-01-22 PROCEDURE — 36415 COLL VENOUS BLD VENIPUNCTURE: CPT

## 2024-01-22 PROCEDURE — 93005 ELECTROCARDIOGRAM TRACING: CPT | Performed by: EMERGENCY MEDICINE

## 2024-01-22 PROCEDURE — 81001 URINALYSIS AUTO W/SCOPE: CPT

## 2024-01-22 PROCEDURE — 70450 CT HEAD/BRAIN W/O DYE: CPT

## 2024-01-22 PROCEDURE — 72170 X-RAY EXAM OF PELVIS: CPT

## 2024-01-22 PROCEDURE — 84484 ASSAY OF TROPONIN QUANT: CPT

## 2024-01-22 PROCEDURE — 99285 EMERGENCY DEPT VISIT HI MDM: CPT

## 2024-01-22 PROCEDURE — 83880 ASSAY OF NATRIURETIC PEPTIDE: CPT

## 2024-01-22 PROCEDURE — 71045 X-RAY EXAM CHEST 1 VIEW: CPT

## 2024-01-22 RX ORDER — HYDROCODONE BITARTRATE AND ACETAMINOPHEN 5; 325 MG/1; MG/1
1 TABLET ORAL ONCE
Status: COMPLETED | OUTPATIENT
Start: 2024-01-22 | End: 2024-01-22

## 2024-01-22 RX ADMIN — HYDROCODONE BITARTRATE AND ACETAMINOPHEN 1 TABLET: 5; 325 TABLET ORAL at 17:59

## 2024-01-22 ASSESSMENT — ENCOUNTER SYMPTOMS
RHINORRHEA: 0
BACK PAIN: 0
ABDOMINAL PAIN: 0
DIARRHEA: 0
SORE THROAT: 0
SHORTNESS OF BREATH: 0
NAUSEA: 0
VOMITING: 0
COUGH: 0

## 2024-01-22 ASSESSMENT — LIFESTYLE VARIABLES
HOW OFTEN DO YOU HAVE A DRINK CONTAINING ALCOHOL: NEVER
HOW MANY STANDARD DRINKS CONTAINING ALCOHOL DO YOU HAVE ON A TYPICAL DAY: PATIENT DOES NOT DRINK

## 2024-01-22 NOTE — ED PROVIDER NOTES
[facility] EMERGENCY DEPT  eMERGENCY dEPARTMENT eNCOUnter      Pt Name: Lashonda Milian  MRN: [id number]  Birthdate 1950  Date of evaluation: 1/22/2024  Provider: DONNY DINH MD    CHIEF COMPLAINT       Chief Complaint   Patient presents with    Fatigue     Pt presents to ED with c/o Fatigue states that she had a GLF last night states bilateral hip pain            HISTORY OF PRESENT ILLNESS   (Location/Symptom, Timing/Onset,Context/Setting, Quality, Duration, Modifying Factors, Severity)  Note limiting factors.   Lashonda Milian is a 73 y.o. female who presents to the emergency department for generalized weakness and fatigue.  Patient states yesterday evening her legs gave out on her and she fell backwards and hit her head on the wall denies loss of consciousness.  No headache neck or back pain.  Does have some mild tailbone pain.  Tells me she is very weak today barely able to ambulate.  Has difficulty ambulating at baseline and uses a walker for assistance.  Denies any chest pain or shortness of breath but EMS told her thought she was in atrial fibrillation which she does have a history of and is anticoagulated.      The history is provided by the patient and a relative.       NursingNotes were reviewed.    REVIEW OF SYSTEMS    (2-9 systems for level 4, 10 or more for level 5)     Review of Systems   Constitutional:  Positive for fatigue. Negative for chills and fever.   HENT:  Negative for rhinorrhea and sore throat.    Respiratory:  Negative for cough and shortness of breath.    Cardiovascular:  Negative for chest pain and leg swelling.   Gastrointestinal:  Negative for abdominal pain, diarrhea, nausea and vomiting.   Genitourinary:  Negative for dysuria, frequency and urgency.   Musculoskeletal:  Negative for back pain and neck pain.   Skin:  Negative for wound.   Neurological:  Negative for dizziness and headaches.   All other systems reviewed and are negative.           PAST MEDICALHISTORY     Past Medical

## 2024-01-22 NOTE — ED NOTES
Patient placed on cardiac monitor, NiBP, and pulse oximetry. Call light is in reach and family at the bedside. Patient is calm and cooperative at this time.

## 2024-01-23 LAB
EKG P AXIS: NORMAL DEGREES
EKG P-R INTERVAL: NORMAL MS
EKG Q-T INTERVAL: 528 MS
EKG QRS DURATION: 112 MS
EKG QTC CALCULATION (BAZETT): 516 MS
EKG T AXIS: 151 DEGREES

## 2024-01-23 PROCEDURE — 93010 ELECTROCARDIOGRAM REPORT: CPT | Performed by: INTERNAL MEDICINE

## 2024-01-24 LAB — BACTERIA UR CULT: NORMAL

## 2024-01-31 ENCOUNTER — HOSPITAL ENCOUNTER (OUTPATIENT)
Dept: GENERAL RADIOLOGY | Age: 74
Discharge: HOME | End: 2024-01-31
Payer: MEDICARE

## 2024-01-31 ENCOUNTER — OFFICE VISIT (OUTPATIENT)
Dept: FAMILY MEDICINE CLINIC | Age: 74
End: 2024-01-31
Payer: MEDICARE

## 2024-01-31 ENCOUNTER — TELEPHONE (OUTPATIENT)
Dept: FAMILY MEDICINE CLINIC | Age: 74
End: 2024-01-31

## 2024-01-31 VITALS
TEMPERATURE: 98.2 F | BODY MASS INDEX: 51.94 KG/M2 | SYSTOLIC BLOOD PRESSURE: 132 MMHG | HEART RATE: 61 BPM | WEIGHT: 284 LBS | OXYGEN SATURATION: 96 % | RESPIRATION RATE: 17 BRPM | DIASTOLIC BLOOD PRESSURE: 68 MMHG

## 2024-01-31 DIAGNOSIS — I48.0 PAROXYSMAL ATRIAL FIBRILLATION (HCC): ICD-10-CM

## 2024-01-31 DIAGNOSIS — R07.89 CHEST WALL PAIN: ICD-10-CM

## 2024-01-31 DIAGNOSIS — J20.9 ACUTE BRONCHITIS, UNSPECIFIED ORGANISM: Primary | ICD-10-CM

## 2024-01-31 DIAGNOSIS — I10 PRIMARY HYPERTENSION: ICD-10-CM

## 2024-01-31 DIAGNOSIS — J20.9 ACUTE BRONCHITIS, UNSPECIFIED ORGANISM: ICD-10-CM

## 2024-01-31 DIAGNOSIS — H11.32 CONJUNCTIVAL HEMORRHAGE OF LEFT EYE: ICD-10-CM

## 2024-01-31 PROCEDURE — G8427 DOCREV CUR MEDS BY ELIG CLIN: HCPCS | Performed by: CLINICAL NURSE SPECIALIST

## 2024-01-31 PROCEDURE — 71101 X-RAY EXAM UNILAT RIBS/CHEST: CPT

## 2024-01-31 PROCEDURE — 1123F ACP DISCUSS/DSCN MKR DOCD: CPT | Performed by: CLINICAL NURSE SPECIALIST

## 2024-01-31 PROCEDURE — 99214 OFFICE O/P EST MOD 30 MIN: CPT | Performed by: CLINICAL NURSE SPECIALIST

## 2024-01-31 PROCEDURE — 1036F TOBACCO NON-USER: CPT | Performed by: CLINICAL NURSE SPECIALIST

## 2024-01-31 PROCEDURE — 1090F PRES/ABSN URINE INCON ASSESS: CPT | Performed by: CLINICAL NURSE SPECIALIST

## 2024-01-31 PROCEDURE — 3078F DIAST BP <80 MM HG: CPT | Performed by: CLINICAL NURSE SPECIALIST

## 2024-01-31 PROCEDURE — 3075F SYST BP GE 130 - 139MM HG: CPT | Performed by: CLINICAL NURSE SPECIALIST

## 2024-01-31 PROCEDURE — G8400 PT W/DXA NO RESULTS DOC: HCPCS | Performed by: CLINICAL NURSE SPECIALIST

## 2024-01-31 PROCEDURE — G8417 CALC BMI ABV UP PARAM F/U: HCPCS | Performed by: CLINICAL NURSE SPECIALIST

## 2024-01-31 PROCEDURE — G8484 FLU IMMUNIZE NO ADMIN: HCPCS | Performed by: CLINICAL NURSE SPECIALIST

## 2024-01-31 PROCEDURE — 3017F COLORECTAL CA SCREEN DOC REV: CPT | Performed by: CLINICAL NURSE SPECIALIST

## 2024-01-31 RX ORDER — DEXTROMETHORPHAN HYDROBROMIDE AND PROMETHAZINE HYDROCHLORIDE 15; 6.25 MG/5ML; MG/5ML
5 SYRUP ORAL 4 TIMES DAILY PRN
Qty: 140 ML | Refills: 0 | Status: SHIPPED | OUTPATIENT
Start: 2024-01-31 | End: 2024-02-07

## 2024-01-31 RX ORDER — BENZONATATE 200 MG/1
200 CAPSULE ORAL 3 TIMES DAILY PRN
Qty: 30 CAPSULE | Refills: 0 | Status: SHIPPED | OUTPATIENT
Start: 2024-01-31 | End: 2024-02-10

## 2024-01-31 RX ORDER — CIPROFLOXACIN HYDROCHLORIDE 3.5 MG/ML
1 SOLUTION/ DROPS TOPICAL 4 TIMES DAILY
Qty: 10 ML | Refills: 0 | Status: SHIPPED | OUTPATIENT
Start: 2024-01-31 | End: 2024-02-10

## 2024-01-31 RX ORDER — DOXYCYCLINE HYCLATE 100 MG
100 TABLET ORAL 2 TIMES DAILY
Qty: 14 TABLET | Refills: 0 | Status: SHIPPED | OUTPATIENT
Start: 2024-01-31 | End: 2024-02-07

## 2024-01-31 ASSESSMENT — ENCOUNTER SYMPTOMS
BACK PAIN: 0
EYE DISCHARGE: 1
WHEEZING: 0
FACIAL SWELLING: 0
EYE PAIN: 0
SINUS PRESSURE: 0
CHEST TIGHTNESS: 1
SHORTNESS OF BREATH: 0
EYE REDNESS: 1
TROUBLE SWALLOWING: 0
COLOR CHANGE: 0
SORE THROAT: 0
COUGH: 1

## 2024-01-31 ASSESSMENT — PATIENT HEALTH QUESTIONNAIRE - PHQ9
SUM OF ALL RESPONSES TO PHQ QUESTIONS 1-9: 0
SUM OF ALL RESPONSES TO PHQ9 QUESTIONS 1 & 2: 0
1. LITTLE INTEREST OR PLEASURE IN DOING THINGS: 0
SUM OF ALL RESPONSES TO PHQ QUESTIONS 1-9: 0
SUM OF ALL RESPONSES TO PHQ QUESTIONS 1-9: 0
2. FEELING DOWN, DEPRESSED OR HOPELESS: 0
SUM OF ALL RESPONSES TO PHQ QUESTIONS 1-9: 0

## 2024-01-31 NOTE — PROGRESS NOTES
SUBJECTIVE:  Lashonda Milian is a 73 y.o. who presents today for Sinusitis (Since early last week after being in hospital monday) and Eye Drainage      HPI    Lashonda presents today for an acute visit. Granddaughter is present.   She was in ER last week after falling, landing on her bottom and then backwards on posterior skull. She was taken via EMS to [facility]. CT head and pelvis xray benign.     That evening or following day developed a cough. The cough was initially dry but has become productive. At times with hemoptysis, but now is dark green to gray.   Has no trouble expectorating sputum.   Cough is very bothersome.  Has developed left posterior chest soreness from coughing.   Last week had fever, but has been afebrile this week.   Did have chills last week as well.  Chronically with poor appetite and fluid intake, unchanged during illness.   She is taking coricidin and robitussin with very little relief.     Underlying HTN, well controlled.  Afib, controlled as well. Compliant with meds.  Has issues with cost eliquis this year. Is currently in samples. Will see cardiology next week to discuss.    Has conjunctival hemorrhage left eye from coughing. No vision changes. Has matting and crusting in mornings with drainage through the day that is clear to yellow.     Past Medical History:   Diagnosis Date    Arthritis     Atrial fibrillation (HCC)     Cerebral artery occlusion with cerebral infarction (HCC)     CHF (congestive heart failure) (HCC)     COPD (chronic obstructive pulmonary disease) (HCC)     Hyperlipidemia     Hypertension     Thyroid disease      Past Surgical History:   Procedure Laterality Date    CARDIOVERSION  2020    HYSTERECTOMY (CERVIX STATUS UNKNOWN)      With removal of tumor    TONSILLECTOMY      TUMOR REMOVAL       Family History   Problem Relation Age of Onset    Pacemaker Mother     Heart Disease Mother      Social History     Tobacco Use    Smoking status: Never    Smokeless tobacco:

## 2024-01-31 NOTE — TELEPHONE ENCOUNTER
----- Message from LALITA Delacruz sent at 1/31/2024 12:48 PM CST -----  Possible pneumonia vs fluid vs other on CXR today that was not mentioned on CXR in ER last week. Rx today will cover pneumonia. I would like to see her back in 7-8 days for follow up on this

## 2024-02-07 ENCOUNTER — OFFICE VISIT (OUTPATIENT)
Dept: FAMILY MEDICINE CLINIC | Age: 74
End: 2024-02-07
Payer: MEDICARE

## 2024-02-07 VITALS
SYSTOLIC BLOOD PRESSURE: 124 MMHG | HEART RATE: 70 BPM | TEMPERATURE: 97.2 F | DIASTOLIC BLOOD PRESSURE: 84 MMHG | WEIGHT: 273 LBS | RESPIRATION RATE: 17 BRPM | BODY MASS INDEX: 49.93 KG/M2

## 2024-02-07 DIAGNOSIS — J18.9 PNEUMONIA OF BOTH LOWER LOBES DUE TO INFECTIOUS ORGANISM: Primary | ICD-10-CM

## 2024-02-07 DIAGNOSIS — E78.2 MIXED HYPERLIPIDEMIA: ICD-10-CM

## 2024-02-07 DIAGNOSIS — R91.1 LUNG NODULE SEEN ON IMAGING STUDY: ICD-10-CM

## 2024-02-07 DIAGNOSIS — F41.1 GAD (GENERALIZED ANXIETY DISORDER): ICD-10-CM

## 2024-02-07 DIAGNOSIS — I51.89 DIASTOLIC DYSFUNCTION: ICD-10-CM

## 2024-02-07 PROCEDURE — G8400 PT W/DXA NO RESULTS DOC: HCPCS | Performed by: CLINICAL NURSE SPECIALIST

## 2024-02-07 PROCEDURE — G8417 CALC BMI ABV UP PARAM F/U: HCPCS | Performed by: CLINICAL NURSE SPECIALIST

## 2024-02-07 PROCEDURE — G8484 FLU IMMUNIZE NO ADMIN: HCPCS | Performed by: CLINICAL NURSE SPECIALIST

## 2024-02-07 PROCEDURE — 1123F ACP DISCUSS/DSCN MKR DOCD: CPT | Performed by: CLINICAL NURSE SPECIALIST

## 2024-02-07 PROCEDURE — 99214 OFFICE O/P EST MOD 30 MIN: CPT | Performed by: CLINICAL NURSE SPECIALIST

## 2024-02-07 PROCEDURE — 3017F COLORECTAL CA SCREEN DOC REV: CPT | Performed by: CLINICAL NURSE SPECIALIST

## 2024-02-07 PROCEDURE — 3074F SYST BP LT 130 MM HG: CPT | Performed by: CLINICAL NURSE SPECIALIST

## 2024-02-07 PROCEDURE — 3079F DIAST BP 80-89 MM HG: CPT | Performed by: CLINICAL NURSE SPECIALIST

## 2024-02-07 PROCEDURE — 1090F PRES/ABSN URINE INCON ASSESS: CPT | Performed by: CLINICAL NURSE SPECIALIST

## 2024-02-07 PROCEDURE — 1036F TOBACCO NON-USER: CPT | Performed by: CLINICAL NURSE SPECIALIST

## 2024-02-07 PROCEDURE — G8427 DOCREV CUR MEDS BY ELIG CLIN: HCPCS | Performed by: CLINICAL NURSE SPECIALIST

## 2024-02-07 RX ORDER — ATORVASTATIN CALCIUM 20 MG/1
20 TABLET, FILM COATED ORAL DAILY
Qty: 30 TABLET | Refills: 2 | Status: SHIPPED | OUTPATIENT
Start: 2024-02-07

## 2024-02-07 RX ORDER — FUROSEMIDE 20 MG/1
20 TABLET ORAL DAILY PRN
Qty: 30 TABLET | Refills: 5 | Status: SHIPPED | OUTPATIENT
Start: 2024-02-07

## 2024-02-07 RX ORDER — BUSPIRONE HYDROCHLORIDE 5 MG/1
5 TABLET ORAL 3 TIMES DAILY PRN
Qty: 90 TABLET | Refills: 0 | Status: SHIPPED | OUTPATIENT
Start: 2024-02-07 | End: 2024-03-08

## 2024-02-07 ASSESSMENT — ENCOUNTER SYMPTOMS
SHORTNESS OF BREATH: 1
TROUBLE SWALLOWING: 0
WHEEZING: 0
SORE THROAT: 0
CHEST TIGHTNESS: 0
ABDOMINAL PAIN: 0
COUGH: 1
DIARRHEA: 0
BACK PAIN: 0
FACIAL SWELLING: 0
CONSTIPATION: 0
COLOR CHANGE: 0
SINUS PRESSURE: 0

## 2024-02-07 NOTE — PROGRESS NOTES
SUBJECTIVE:  Lashonda Milian is a 73 y.o. who presents today for Follow-up      HPI    Lashonda presents today for follow up.   She was seen in office last week with symptoms of bronchitis.  CXR with bilateral lower lung opacities ?pneumonia vs fluid. As well as 1cm nodular density in right mid lung. Has underlying diastolic dysfunction. Cardiomegaly on xray as well.  Placed on doxycycline for pneumonia.  She has completed regimen.  She notes improvement in cough. Left eye is better. Less soreness to left upper chest and back.    Reviewed medical history. Has ongoing swelling to both legs and some shortness of breath.  Last ECHO with normal EF but moderate diastolic dysfunction. States she is not on lasix currently.  Seeing cardio tomorrow for afib follow up. NSR on exam today.     JAMES, uncontrolled on sertraline 50mg. States she has taken 100mg if needed some days and feels better.  No other medications for mood noted.  No SI/HI.    Mixed hyperlipidemia, on statin. Needs refill. No side effects noted.  LFT WNL at ER recently.    Past Medical History:   Diagnosis Date    Arthritis     Atrial fibrillation (HCC)     Cerebral artery occlusion with cerebral infarction (HCC)     CHF (congestive heart failure) (HCC)     COPD (chronic obstructive pulmonary disease) (HCC)     Hyperlipidemia     Hypertension     Thyroid disease      Past Surgical History:   Procedure Laterality Date    CARDIOVERSION  2020    HYSTERECTOMY (CERVIX STATUS UNKNOWN)      With removal of tumor    TONSILLECTOMY      TUMOR REMOVAL       Family History   Problem Relation Age of Onset    Pacemaker Mother     Heart Disease Mother      Social History     Tobacco Use    Smoking status: Never    Smokeless tobacco: Never   Substance Use Topics    Alcohol use: Never     Current Outpatient Medications   Medication Sig Dispense Refill    furosemide (LASIX) 20 MG tablet Take 1 tablet by mouth daily as needed (Edema or swelling) 30 tablet 5    atorvastatin

## 2024-02-08 ENCOUNTER — OFFICE VISIT (OUTPATIENT)
Dept: CARDIOLOGY CLINIC | Age: 74
End: 2024-02-08
Payer: MEDICARE

## 2024-02-08 VITALS — BODY MASS INDEX: 49.47 KG/M2 | HEIGHT: 61 IN | WEIGHT: 262 LBS

## 2024-02-08 DIAGNOSIS — E66.01 MORBIDLY OBESE (HCC): ICD-10-CM

## 2024-02-08 DIAGNOSIS — I10 ESSENTIAL HYPERTENSION: ICD-10-CM

## 2024-02-08 DIAGNOSIS — I48.0 PAROXYSMAL ATRIAL FIBRILLATION (HCC): Primary | ICD-10-CM

## 2024-02-08 PROCEDURE — G8417 CALC BMI ABV UP PARAM F/U: HCPCS | Performed by: CLINICAL NURSE SPECIALIST

## 2024-02-08 PROCEDURE — 3017F COLORECTAL CA SCREEN DOC REV: CPT | Performed by: CLINICAL NURSE SPECIALIST

## 2024-02-08 PROCEDURE — 99214 OFFICE O/P EST MOD 30 MIN: CPT | Performed by: CLINICAL NURSE SPECIALIST

## 2024-02-08 PROCEDURE — G8400 PT W/DXA NO RESULTS DOC: HCPCS | Performed by: CLINICAL NURSE SPECIALIST

## 2024-02-08 PROCEDURE — 1036F TOBACCO NON-USER: CPT | Performed by: CLINICAL NURSE SPECIALIST

## 2024-02-08 PROCEDURE — 1090F PRES/ABSN URINE INCON ASSESS: CPT | Performed by: CLINICAL NURSE SPECIALIST

## 2024-02-08 PROCEDURE — G8484 FLU IMMUNIZE NO ADMIN: HCPCS | Performed by: CLINICAL NURSE SPECIALIST

## 2024-02-08 PROCEDURE — 93000 ELECTROCARDIOGRAM COMPLETE: CPT | Performed by: CLINICAL NURSE SPECIALIST

## 2024-02-08 PROCEDURE — 1123F ACP DISCUSS/DSCN MKR DOCD: CPT | Performed by: CLINICAL NURSE SPECIALIST

## 2024-02-08 PROCEDURE — G8427 DOCREV CUR MEDS BY ELIG CLIN: HCPCS | Performed by: CLINICAL NURSE SPECIALIST

## 2024-02-08 PROCEDURE — 93246 EXT ECG>7D<15D RECORDING: CPT | Performed by: CLINICAL NURSE SPECIALIST

## 2024-02-08 RX ORDER — AMIODARONE HYDROCHLORIDE 200 MG/1
200 TABLET ORAL DAILY
Qty: 60 TABLET | Refills: 5 | Status: SHIPPED
Start: 2024-02-08

## 2024-02-08 NOTE — PROGRESS NOTES
[facility]  [address] [address]  Phone: [phone]  Fax: [phone]    OFFICE VISIT:  2024    Lashonda Milian - : 1950    Reason For Visit:  Lashonda is a 73 y.o. female who is here for Follow-up (Pt has soa  was diagnosed with pneumonia ) and Atrial Fibrillation  History of paroxysmal atrial fibrillation, hypertension, hyperlipidemia on amiodarone and anticoagulation.  Last seen in the office in 2022    Patient was recently seen in the emergency room for fatigue and a fall.  EKG there showed atrial fibrillation with a rate of 53.  She is here today for follow-up and evaluation.  Ongoing shortness of breath.  She reports she had upper respiratory infection that turned to pneumonia.  She is still coughing.  Slowly getting better.  She denies any bleeding when going to the bathroom but she coughs so hard she had blood in her left eye    Subjective  Lashonda denies exertional chest pain,  orthopnea, paroxysmal nocturnal dyspnea, syncope, presyncope, arrhythmia, edema and fatigue.  The patient denies numbness or weakness to suggest cerebrovascular accident or transient ischemic attack.    Nick Martinez MD is PCP and follows labs .  Lashonda Milian has the following history as recorded in [facility]:    Patient Active Problem List    Diagnosis Date Noted    Atrial fibrillation (HCC)     Morbidly obese (HCC) 2020    Abnormal stress test     Diastolic dysfunction 2024    Lung nodule seen on imaging study 2024    JAMES (generalized anxiety disorder) 2024    Cerebrovascular accident (CVA) due to occlusion of cerebral artery (HCC) 2021    Overactive bladder 2021    Moderate episode of recurrent major depressive disorder (HCC) 2021    Hyperthyroidism 2021    Hypertension     Hyperlipidemia     Occipital infarction (HCC) 10/02/2020     Past Medical History:   Diagnosis Date    Arthritis     Atrial fibrillation (HCC)

## 2024-02-08 NOTE — PATIENT INSTRUCTIONS
Continue Eliquis twice a day    Decrease amiodarone to once a day  Wear monitor to assess for afib burden        Maintain good blood pressure control-goal<130/80 at rest  Maintain good cholesterol control LDL goal<70 with arterial disease  If you are diabetic work to keep/obtain hemoglobin A1c< 7    Follow up in 4 week - will get PFTs after lung infection clears   Call with any questions or concerns  Follow up with  for non cardiac problems  Report any new problems  Cardiovascular Fitness-Exercise as tolerated.  Strive for 30 minutes of exercise most days of the week.    Cardiac / Healthy Diet- Avoid processed high fat foods, maintain low sodium/salt   Continue current medications as directed  Continue plan of treatment  It is always recommended that you bring your medications bottles with you to each visit - this is for your safety!

## 2024-02-27 DIAGNOSIS — I48.0 PAROXYSMAL ATRIAL FIBRILLATION (HCC): Primary | ICD-10-CM

## 2024-02-27 PROCEDURE — 93248 EXT ECG>7D<15D REV&INTERPJ: CPT | Performed by: CLINICAL NURSE SPECIALIST

## 2024-02-28 ENCOUNTER — TELEPHONE (OUTPATIENT)
Dept: CARDIOLOGY CLINIC | Age: 74
End: 2024-02-28

## 2024-02-28 NOTE — TELEPHONE ENCOUNTER
[facility] for patient to come in tomorrow 2/29/2024 to see Dr. Boothe

## 2024-02-28 NOTE — TELEPHONE ENCOUNTER
----- Message from LALITA Prieto sent at 2/27/2024  4:05 PM CST -----  Patient is anticoagulated but monitor for over a week did show to be persistent A-fib.  Currently on amiodarone 200 mg daily.    Average heart rate 58 so rate is well-controlled    Is a Dr. Boothe patient.  Needs to come in and discuss with him options whether to pursue sinus rhythm, have cardioversion or maintain rate control

## 2024-02-29 ENCOUNTER — OFFICE VISIT (OUTPATIENT)
Dept: CARDIOLOGY CLINIC | Age: 74
End: 2024-02-29
Payer: MEDICARE

## 2024-02-29 VITALS
HEART RATE: 59 BPM | DIASTOLIC BLOOD PRESSURE: 68 MMHG | BODY MASS INDEX: 46.74 KG/M2 | SYSTOLIC BLOOD PRESSURE: 116 MMHG | WEIGHT: 254 LBS | HEIGHT: 62 IN

## 2024-02-29 DIAGNOSIS — I48.0 PAROXYSMAL ATRIAL FIBRILLATION (HCC): Primary | ICD-10-CM

## 2024-02-29 DIAGNOSIS — I10 ESSENTIAL HYPERTENSION: ICD-10-CM

## 2024-02-29 DIAGNOSIS — R94.39 ABNORMAL STRESS TEST: ICD-10-CM

## 2024-02-29 DIAGNOSIS — Z79.01 CHRONIC ANTICOAGULATION: ICD-10-CM

## 2024-02-29 DIAGNOSIS — Z79.899 LONG TERM CURRENT USE OF ANTIARRHYTHMIC DRUG: ICD-10-CM

## 2024-02-29 DIAGNOSIS — E78.2 MIXED HYPERLIPIDEMIA: ICD-10-CM

## 2024-02-29 DIAGNOSIS — E66.01 MORBIDLY OBESE (HCC): ICD-10-CM

## 2024-02-29 DIAGNOSIS — R06.09 DOE (DYSPNEA ON EXERTION): ICD-10-CM

## 2024-02-29 PROCEDURE — 3017F COLORECTAL CA SCREEN DOC REV: CPT | Performed by: INTERNAL MEDICINE

## 2024-02-29 PROCEDURE — G8400 PT W/DXA NO RESULTS DOC: HCPCS | Performed by: INTERNAL MEDICINE

## 2024-02-29 PROCEDURE — 1036F TOBACCO NON-USER: CPT | Performed by: INTERNAL MEDICINE

## 2024-02-29 PROCEDURE — 93000 ELECTROCARDIOGRAM COMPLETE: CPT | Performed by: INTERNAL MEDICINE

## 2024-02-29 PROCEDURE — 1123F ACP DISCUSS/DSCN MKR DOCD: CPT | Performed by: INTERNAL MEDICINE

## 2024-02-29 PROCEDURE — 3078F DIAST BP <80 MM HG: CPT | Performed by: INTERNAL MEDICINE

## 2024-02-29 PROCEDURE — G8427 DOCREV CUR MEDS BY ELIG CLIN: HCPCS | Performed by: INTERNAL MEDICINE

## 2024-02-29 PROCEDURE — 99214 OFFICE O/P EST MOD 30 MIN: CPT | Performed by: INTERNAL MEDICINE

## 2024-02-29 PROCEDURE — G8484 FLU IMMUNIZE NO ADMIN: HCPCS | Performed by: INTERNAL MEDICINE

## 2024-02-29 PROCEDURE — 1090F PRES/ABSN URINE INCON ASSESS: CPT | Performed by: INTERNAL MEDICINE

## 2024-02-29 PROCEDURE — G8417 CALC BMI ABV UP PARAM F/U: HCPCS | Performed by: INTERNAL MEDICINE

## 2024-02-29 PROCEDURE — 3074F SYST BP LT 130 MM HG: CPT | Performed by: INTERNAL MEDICINE

## 2024-02-29 ASSESSMENT — ENCOUNTER SYMPTOMS
RESPIRATORY NEGATIVE: 1
DIARRHEA: 0
EYES NEGATIVE: 1
GASTROINTESTINAL NEGATIVE: 1
NAUSEA: 0
SHORTNESS OF BREATH: 0
VOMITING: 0

## 2024-02-29 NOTE — PROGRESS NOTES
breath sounds. No wheezing or rales.   Abdominal:      General: There is no distension.      Tenderness: There is no abdominal tenderness.   Lymphadenopathy:      Cervical: No cervical adenopathy.   Skin:     General: Skin is warm and dry.             ASSESSMENT:     Diagnosis Orders   1. Paroxysmal atrial fibrillation (HCC)  EKG 12 lead      2. Essential hypertension        3. Morbidly obese (HCC)        4. Mixed hyperlipidemia        5. Abnormal stress test        6. Chronic anticoagulation        7. Long term current use of antiarrhythmic drug        8. MCDANIEL (dyspnea on exertion)            PLAN:  Orders Placed This Encounter   Procedures    EKG 12 lead     No orders of the defined types were placed in this encounter.        Continue furosemide 20 mg a day  Continue Lipitor 20 mg a day  Continue apixaban 5 mg p.o. twice daily  Continue metoprolol 50 mg p.o. twice daily  Echocardiogram  Discussed with patient and family member about possible cardioversion versus medical management; after discussion they would like to try electrical cardioversion we will see if we can arrange for this next week.  Follow-up assessment in 1 month    Return in about 4 weeks (around 3/28/2024) for return to Dr. Boothe only.      Santos Boothe MD 2/29/2024 2:50 PM [facility]      Thisdictation was generated by voice recognition computer software.  Although all attempts are made to edit the dictation for accuracy, there may be errors in the transcription that are not intended.

## 2024-02-29 NOTE — PATIENT INSTRUCTIONS
[place] at the [facility] [place] ([facility]) located on the first floor of [facility].   Enter through hospital main entrance and turn immediately to your left.     Procedure Date: 03/05/2024  Procedure Arrival Time: 9:45AM  Procedure Start Time: 11:30AM  ( Times are subject to change)     Please have your lab work ( CBC and CMP) done before your procedure Date.     Procedure Instructions:   1) You will need to not have anything to eat or drink the morning before the procedure.   2) You may stay overnight for observation.  3) You can still take all of your morning medication with a sip of water.   4) You will need a  for the procedure.

## 2024-03-01 DIAGNOSIS — I10 ESSENTIAL HYPERTENSION: ICD-10-CM

## 2024-03-01 DIAGNOSIS — F33.1 MODERATE EPISODE OF RECURRENT MAJOR DEPRESSIVE DISORDER (HCC): ICD-10-CM

## 2024-03-01 DIAGNOSIS — F41.1 GAD (GENERALIZED ANXIETY DISORDER): ICD-10-CM

## 2024-03-01 DIAGNOSIS — I48.91 ATRIAL FIBRILLATION, UNSPECIFIED TYPE (HCC): ICD-10-CM

## 2024-03-01 DIAGNOSIS — E05.90 HYPERTHYROIDISM: ICD-10-CM

## 2024-03-01 RX ORDER — BUSPIRONE HYDROCHLORIDE 5 MG/1
5 TABLET ORAL 3 TIMES DAILY PRN
Qty: 90 TABLET | Refills: 0 | Status: SHIPPED | OUTPATIENT
Start: 2024-03-01 | End: 2024-04-05

## 2024-03-04 RX ORDER — METHIMAZOLE 5 MG/1
5 TABLET ORAL 2 TIMES DAILY
Qty: 60 TABLET | Refills: 1 | Status: SHIPPED | OUTPATIENT
Start: 2024-03-04

## 2024-03-04 RX ORDER — METOPROLOL TARTRATE 50 MG/1
50 TABLET, FILM COATED ORAL 2 TIMES DAILY
Qty: 60 TABLET | Refills: 5 | Status: SHIPPED | OUTPATIENT
Start: 2024-03-04

## 2024-03-05 ENCOUNTER — HOSPITAL ENCOUNTER (OUTPATIENT)
Dept: NON INVASIVE DIAGNOSTICS | Age: 74
Discharge: HOME | End: 2024-03-05
Payer: MEDICARE

## 2024-03-05 ENCOUNTER — HOSPITAL ENCOUNTER (OUTPATIENT)
Dept: CARDIAC CATH/INVASIVE PROCEDURES | Age: 74
Discharge: HOME | End: 2024-03-05
Attending: INTERNAL MEDICINE | Admitting: INTERNAL MEDICINE
Payer: MEDICARE

## 2024-03-05 VITALS
SYSTOLIC BLOOD PRESSURE: 146 MMHG | HEART RATE: 52 BPM | RESPIRATION RATE: 18 BRPM | DIASTOLIC BLOOD PRESSURE: 79 MMHG | OXYGEN SATURATION: 93 %

## 2024-03-05 DIAGNOSIS — R06.09 DOE (DYSPNEA ON EXERTION): ICD-10-CM

## 2024-03-05 DIAGNOSIS — Z79.899 LONG TERM CURRENT USE OF ANTIARRHYTHMIC DRUG: ICD-10-CM

## 2024-03-05 DIAGNOSIS — I10 ESSENTIAL HYPERTENSION: ICD-10-CM

## 2024-03-05 DIAGNOSIS — Z79.01 CHRONIC ANTICOAGULATION: ICD-10-CM

## 2024-03-05 DIAGNOSIS — I48.0 PAROXYSMAL ATRIAL FIBRILLATION (HCC): ICD-10-CM

## 2024-03-05 DIAGNOSIS — R94.39 ABNORMAL STRESS TEST: ICD-10-CM

## 2024-03-05 LAB
ANION GAP SERPL CALCULATED.3IONS-SCNC: 5 MMOL/L (ref 7–19)
BUN SERPL-MCNC: 13 MG/DL (ref 8–23)
CALCIUM SERPL-MCNC: 8.8 MG/DL (ref 8.8–10.2)
CHLORIDE SERPL-SCNC: 104 MMOL/L (ref 98–111)
CO2 SERPL-SCNC: 30 MMOL/L (ref 22–29)
CREAT SERPL-MCNC: 0.6 MG/DL (ref 0.5–0.9)
EKG P AXIS: NORMAL DEGREES
EKG P-R INTERVAL: NORMAL MS
EKG Q-T INTERVAL: 378 MS
EKG QRS DURATION: 120 MS
EKG QTC CALCULATION (BAZETT): 366 MS
EKG T AXIS: 73 DEGREES
ERYTHROCYTE [DISTWIDTH] IN BLOOD BY AUTOMATED COUNT: 17 % (ref 11.5–14.5)
GLUCOSE SERPL-MCNC: 93 MG/DL (ref 74–109)
HCT VFR BLD AUTO: 39 % (ref 37–47)
HGB BLD-MCNC: 12.4 G/DL (ref 12–16)
MCH RBC QN AUTO: 30.3 PG (ref 27–31)
MCHC RBC AUTO-ENTMCNC: 31.8 G/DL (ref 33–37)
MCV RBC AUTO: 95.4 FL (ref 81–99)
PLATELET # BLD AUTO: 130 K/UL (ref 130–400)
PMV BLD AUTO: 12.1 FL (ref 9.4–12.3)
POTASSIUM SERPL-SCNC: 4.1 MMOL/L (ref 3.5–5)
RBC # BLD AUTO: 4.09 M/UL (ref 4.2–5.4)
SODIUM SERPL-SCNC: 139 MMOL/L (ref 136–145)
WBC # BLD AUTO: 3.2 K/UL (ref 4.8–10.8)

## 2024-03-05 PROCEDURE — 93005 ELECTROCARDIOGRAM TRACING: CPT | Performed by: INTERNAL MEDICINE

## 2024-03-05 PROCEDURE — 93010 ELECTROCARDIOGRAM REPORT: CPT | Performed by: INTERNAL MEDICINE

## 2024-03-05 PROCEDURE — 2580000003 HC RX 258: Performed by: INTERNAL MEDICINE

## 2024-03-05 PROCEDURE — 80048 BASIC METABOLIC PNL TOTAL CA: CPT

## 2024-03-05 PROCEDURE — 93306 TTE W/DOPPLER COMPLETE: CPT

## 2024-03-05 PROCEDURE — 85027 COMPLETE CBC AUTOMATED: CPT

## 2024-03-05 RX ORDER — SODIUM CHLORIDE 9 MG/ML
INJECTION, SOLUTION INTRAVENOUS PRN
Status: DISCONTINUED | OUTPATIENT
Start: 2024-03-05 | End: 2024-03-05 | Stop reason: HOSPADM

## 2024-03-05 RX ORDER — SODIUM CHLORIDE 0.9 % (FLUSH) 0.9 %
5-40 SYRINGE (ML) INJECTION PRN
Status: DISCONTINUED | OUTPATIENT
Start: 2024-03-05 | End: 2024-03-05 | Stop reason: HOSPADM

## 2024-03-05 RX ORDER — SODIUM CHLORIDE 0.9 % (FLUSH) 0.9 %
5-40 SYRINGE (ML) INJECTION EVERY 12 HOURS SCHEDULED
Status: DISCONTINUED | OUTPATIENT
Start: 2024-03-05 | End: 2024-03-05 | Stop reason: HOSPADM

## 2024-03-05 RX ADMIN — SODIUM CHLORIDE: 9 INJECTION, SOLUTION INTRAVENOUS at 13:04

## 2024-03-05 NOTE — PROGRESS NOTES
Dr. Boothe here to see patient. EKGs/rhythm strips reviewed. Patient is in sinus bradycardia per Dr. Boothe. DCCV cancelled.  Electronically signed by Reny Ortez RN on 3/5/2024 at 1:32 PM

## 2024-03-05 NOTE — PROGRESS NOTES
Pt arrived to [facility] holding and states she feels like she is back in rhythm. Pt placed on bedside monitor and looks to be SB in the 50s. Awaiting EKG to verify. Electronically signed by Carolyn Braun RN on 3/5/2024 at 11:29 AM

## 2024-03-05 NOTE — PROGRESS NOTES
Discharge instructions reviewed with patient and patient states understanding. Patient discharged from cath holding with all belongings,.  Electronically signed by Carolyn Braun RN on 3/5/2024 at 1:42 PM

## 2024-04-04 DIAGNOSIS — F41.1 GAD (GENERALIZED ANXIETY DISORDER): ICD-10-CM

## 2024-04-04 DIAGNOSIS — E05.90 HYPERTHYROIDISM: ICD-10-CM

## 2024-04-05 RX ORDER — METHIMAZOLE 5 MG/1
5 TABLET ORAL 2 TIMES DAILY
Qty: 120 TABLET | Refills: 0 | Status: SHIPPED | OUTPATIENT
Start: 2024-04-05

## 2024-04-05 RX ORDER — BUSPIRONE HYDROCHLORIDE 5 MG/1
5 TABLET ORAL 3 TIMES DAILY PRN
Qty: 90 TABLET | Refills: 0 | Status: SHIPPED | OUTPATIENT
Start: 2024-04-05

## 2024-04-05 NOTE — TELEPHONE ENCOUNTER
Lashonda CAROL Rukhsana called to request a refill on her medication.      Last office visit : 2/7/2024   Next office visit : 4/4/2024     Requested Prescriptions     Pending Prescriptions Disp Refills    busPIRone (BUSPAR) 5 MG tablet [Pharmacy Med Name: busPIRone HCl 5 MG Oral Tablet] 90 tablet 0     Sig: TAKE 1 TABLET BY MOUTH THREE TIMES DAILY AS NEEDED FOR ANXIETY            Debbie Knight MA

## 2024-04-05 NOTE — TELEPHONE ENCOUNTER
Lashonda Milian called to request a refill on her medication.      Last office visit : 2/7/2024   Next office visit : Visit date not found     Requested Prescriptions     Pending Prescriptions Disp Refills    methIMAzole (TAPAZOLE) 5 MG tablet [Pharmacy Med Name: methIMAzole 5 MG Oral Tablet] 120 tablet 0     Sig: Take 1 tablet by mouth twice daily            Debbie Knight MA

## 2024-04-08 ENCOUNTER — OFFICE VISIT (OUTPATIENT)
Dept: CARDIOLOGY CLINIC | Age: 74
End: 2024-04-08
Payer: MEDICARE

## 2024-04-08 VITALS
WEIGHT: 275 LBS | HEART RATE: 53 BPM | DIASTOLIC BLOOD PRESSURE: 72 MMHG | SYSTOLIC BLOOD PRESSURE: 138 MMHG | HEIGHT: 61 IN | BODY MASS INDEX: 51.92 KG/M2

## 2024-04-08 DIAGNOSIS — E78.2 MIXED HYPERLIPIDEMIA: ICD-10-CM

## 2024-04-08 DIAGNOSIS — Z79.01 CHRONIC ANTICOAGULATION: ICD-10-CM

## 2024-04-08 DIAGNOSIS — I10 ESSENTIAL HYPERTENSION: ICD-10-CM

## 2024-04-08 DIAGNOSIS — I48.0 PAROXYSMAL ATRIAL FIBRILLATION (HCC): ICD-10-CM

## 2024-04-08 DIAGNOSIS — E66.01 MORBIDLY OBESE (HCC): ICD-10-CM

## 2024-04-08 DIAGNOSIS — R06.09 DOE (DYSPNEA ON EXERTION): ICD-10-CM

## 2024-04-08 DIAGNOSIS — R94.39 ABNORMAL STRESS TEST: ICD-10-CM

## 2024-04-08 DIAGNOSIS — Z79.899 ON AMIODARONE THERAPY: Primary | ICD-10-CM

## 2024-04-08 PROCEDURE — G8400 PT W/DXA NO RESULTS DOC: HCPCS | Performed by: INTERNAL MEDICINE

## 2024-04-08 PROCEDURE — G8417 CALC BMI ABV UP PARAM F/U: HCPCS | Performed by: INTERNAL MEDICINE

## 2024-04-08 PROCEDURE — 3017F COLORECTAL CA SCREEN DOC REV: CPT | Performed by: INTERNAL MEDICINE

## 2024-04-08 PROCEDURE — 99213 OFFICE O/P EST LOW 20 MIN: CPT | Performed by: INTERNAL MEDICINE

## 2024-04-08 PROCEDURE — 1090F PRES/ABSN URINE INCON ASSESS: CPT | Performed by: INTERNAL MEDICINE

## 2024-04-08 PROCEDURE — 3075F SYST BP GE 130 - 139MM HG: CPT | Performed by: INTERNAL MEDICINE

## 2024-04-08 PROCEDURE — 1036F TOBACCO NON-USER: CPT | Performed by: INTERNAL MEDICINE

## 2024-04-08 PROCEDURE — 93000 ELECTROCARDIOGRAM COMPLETE: CPT | Performed by: INTERNAL MEDICINE

## 2024-04-08 PROCEDURE — 1123F ACP DISCUSS/DSCN MKR DOCD: CPT | Performed by: INTERNAL MEDICINE

## 2024-04-08 PROCEDURE — 3078F DIAST BP <80 MM HG: CPT | Performed by: INTERNAL MEDICINE

## 2024-04-08 PROCEDURE — G8427 DOCREV CUR MEDS BY ELIG CLIN: HCPCS | Performed by: INTERNAL MEDICINE

## 2024-04-08 RX ORDER — AMIODARONE HYDROCHLORIDE 200 MG/1
200 TABLET ORAL 2 TIMES DAILY
Qty: 60 TABLET | Refills: 5 | Status: SHIPPED | OUTPATIENT
Start: 2024-04-08

## 2024-04-08 RX ORDER — AMIODARONE HYDROCHLORIDE 200 MG/1
200 TABLET ORAL 2 TIMES DAILY
Qty: 60 TABLET | Refills: 0 | OUTPATIENT
Start: 2024-04-08

## 2024-04-08 NOTE — PROGRESS NOTES
[facility] Progress Note                            Date:  4/8/2024  Patient: Lashonda Milian  Age:  73 y.o., 1950      Reason for evaluation:         SUBJECTIVE:    Returns today for follow-up assessment followed for paroxysmal atrial fibrillation chronic anticoagulation antiarrhythmic drug usage overall doing well.  ECG shows sinus rhythm QTc 42 ms nonspecific ST and T wave abnormalities poor R wave progression.  Overall feels well denies anginal chest pain dyspnea palpitations or any bleeding issues denies palpitations.  Pressure 138/72 heart 53    Review of Systems   Constitutional: Negative.  Negative for chills, fever and unexpected weight change.   HENT: Negative.     Eyes: Negative.    Respiratory: Negative.  Negative for shortness of breath.    Cardiovascular: Negative.  Negative for chest pain.   Gastrointestinal: Negative.  Negative for diarrhea, nausea and vomiting.   Endocrine: Negative.    Genitourinary: Negative.    Musculoskeletal: Negative.    Skin: Negative.    Neurological: Negative.    All other systems reviewed and are negative.        OBJECTIVE:     /72   Pulse 53   Ht 1.549 m (5' 1\")   Wt 124.7 kg (275 lb)   BMI 51.96 kg/m²     Labs:   CBC: No results for input(s): \"WBC\", \"HGB\", \"HCT\", \"PLT\" in the last 72 hours.  BMP:No results for input(s): \"NA\", \"K\", \"CO2\", \"BUN\", \"CREATININE\", \"LABGLOM\", \"GLUCOSE\" in the last 72 hours.  BNP: No results for input(s): \"BNP\" in the last 72 hours.  PT/INR: No results for input(s): \"PROTIME\", \"INR\" in the last 72 hours.  APTT:No results for input(s): \"APTT\" in the last 72 hours.  CARDIAC ENZYMES:No results for input(s): \"CKTOTAL\", \"CKMB\", \"CKMBINDEX\", \"TROPONINI\" in the last 72 hours.  FASTING LIPID PANEL:  Lab Results   Component Value Date/Time    HDL 54 08/01/2023 02:33 PM    LDLCALC 56 08/01/2023 02:33 PM    TRIG 66 08/01/2023 02:33 PM     LIVER PROFILE:No results for input(s): \"AST\", \"ALT\", \"LABALBU\" in the last 72 hours.

## 2024-04-25 ENCOUNTER — HOSPITAL ENCOUNTER (OUTPATIENT)
Dept: GENERAL RADIOLOGY | Age: 74
Discharge: HOME | End: 2024-04-25
Attending: INTERNAL MEDICINE
Payer: MEDICARE

## 2024-04-25 DIAGNOSIS — Z79.899 ON AMIODARONE THERAPY: ICD-10-CM

## 2024-04-25 LAB
ALBUMIN SERPL-MCNC: 3.7 G/DL (ref 3.5–5.2)
ALP SERPL-CCNC: 84 U/L (ref 35–104)
ALT SERPL-CCNC: 9 U/L (ref 5–33)
ANION GAP SERPL CALCULATED.3IONS-SCNC: 13 MMOL/L (ref 7–19)
AST SERPL-CCNC: 20 U/L (ref 5–32)
BILIRUB SERPL-MCNC: 1.6 MG/DL (ref 0.2–1.2)
BUN SERPL-MCNC: 12 MG/DL (ref 8–23)
CALCIUM SERPL-MCNC: 8.9 MG/DL (ref 8.8–10.2)
CHLORIDE SERPL-SCNC: 105 MMOL/L (ref 98–111)
CO2 SERPL-SCNC: 26 MMOL/L (ref 22–29)
CREAT SERPL-MCNC: 0.8 MG/DL (ref 0.5–0.9)
ERYTHROCYTE [DISTWIDTH] IN BLOOD BY AUTOMATED COUNT: 14.9 % (ref 11.5–14.5)
GLUCOSE SERPL-MCNC: 97 MG/DL (ref 74–109)
HCT VFR BLD AUTO: 38 % (ref 37–47)
HGB BLD-MCNC: 11.7 G/DL (ref 12–16)
MCH RBC QN AUTO: 29.9 PG (ref 27–31)
MCHC RBC AUTO-ENTMCNC: 30.8 G/DL (ref 33–37)
MCV RBC AUTO: 97.2 FL (ref 81–99)
PLATELET # BLD AUTO: 121 K/UL (ref 130–400)
PMV BLD AUTO: 11 FL (ref 9.4–12.3)
POTASSIUM SERPL-SCNC: 3.9 MMOL/L (ref 3.5–5)
PROT SERPL-MCNC: 7.1 G/DL (ref 6.6–8.7)
RBC # BLD AUTO: 3.91 M/UL (ref 4.2–5.4)
SODIUM SERPL-SCNC: 144 MMOL/L (ref 136–145)
T3FREE SERPL-MCNC: 2.6 PG/ML (ref 2–4.4)
T4 FREE SERPL-MCNC: 1.38 NG/DL (ref 0.93–1.7)
TSH SERPL DL<=0.005 MIU/L-ACNC: 2.54 UIU/ML (ref 0.27–4.2)
WBC # BLD AUTO: 3.3 K/UL (ref 4.8–10.8)

## 2024-04-25 PROCEDURE — 71046 X-RAY EXAM CHEST 2 VIEWS: CPT

## 2024-05-01 ENCOUNTER — TELEPHONE (OUTPATIENT)
Dept: CARDIOLOGY CLINIC | Age: 74
End: 2024-05-01

## 2024-05-01 ENCOUNTER — TELEPHONE (OUTPATIENT)
Dept: FAMILY MEDICINE CLINIC | Age: 74
End: 2024-05-01

## 2024-05-01 NOTE — TELEPHONE ENCOUNTER
Babita called for Lashonda wanting to know more about what she's having done on Friday. Upon call back Babita was informed that per chart she is to have a PFT done. She wanted to know why it was ordered and I told her she would need to follow up with Dr. Boothe's office because they were the ones that ordered it. She stated she had spoken with them and they informed her they did not and couldn't see that it was ordered. I informed her that per her OV with him on 4/9 it was ordered. She had an XR with pulmonary findings that was also ordered by them. She stated they never heard back from his office with the results of that XR. Gave verbal of radiologists report and made sure they understood that that was not an interpretation and would need to follow up with them with further explanations but that Dr. Martinez hadn't ordered anything so was not in the loop as far as what is going on with these results and further testing. Told her we would be happy to see Lashonda if she needed but that she needed to speak with them again because it might have just been a miscommunication. I printed everything out for them and informed her she could come pick it up at the  so she could see everything. Babita voiced thanks several times for helping explain and look at what was going on. Told her to call us back if she needed anything further. Voiced awareness and agreeability. Disconnected call with no additional needs to be met at this time.

## 2024-05-02 DIAGNOSIS — I48.91 ATRIAL FIBRILLATION, UNSPECIFIED TYPE (HCC): ICD-10-CM

## 2024-05-10 ENCOUNTER — OFFICE VISIT (OUTPATIENT)
Dept: FAMILY MEDICINE CLINIC | Age: 74
End: 2024-05-10
Payer: MEDICARE

## 2024-05-10 VITALS
OXYGEN SATURATION: 96 % | RESPIRATION RATE: 17 BRPM | HEART RATE: 48 BPM | TEMPERATURE: 99 F | SYSTOLIC BLOOD PRESSURE: 136 MMHG | DIASTOLIC BLOOD PRESSURE: 88 MMHG | WEIGHT: 275 LBS | BODY MASS INDEX: 51.96 KG/M2

## 2024-05-10 DIAGNOSIS — R10.9 RIGHT FLANK PAIN: ICD-10-CM

## 2024-05-10 DIAGNOSIS — M54.41 ACUTE RIGHT-SIDED LOW BACK PAIN WITH RIGHT-SIDED SCIATICA: Primary | ICD-10-CM

## 2024-05-10 LAB
BILIRUB UR STRIP.AUTO-MCNC: ABNORMAL MG/DL
CLARITY UR: CLEAR
COLOR UR: ABNORMAL
GLUCOSE UR STRIP.AUTO-MCNC: NEGATIVE MG/DL
HGB UR STRIP.AUTO-MCNC: NEGATIVE MG/L
KETONES UR STRIP.AUTO-MCNC: NEGATIVE MG/DL
LEUKOCYTE ESTERASE UR QL STRIP.AUTO: NEGATIVE
NITRITE UR QL STRIP.AUTO: NEGATIVE
PH UR STRIP.AUTO: 6 [PH] (ref 5–8)
PROT UR STRIP.AUTO-MCNC: NEGATIVE MG/DL
SP GR UR STRIP.AUTO: 1.02 (ref 1–1.03)
URINE REFLEX TO CULTURE: NO
URN SPEC COLLECT METH UR: ABNORMAL
UROBILINOGEN UR STRIP.AUTO-MCNC: 2 E.U./DL

## 2024-05-10 PROCEDURE — G8427 DOCREV CUR MEDS BY ELIG CLIN: HCPCS | Performed by: CLINICAL NURSE SPECIALIST

## 2024-05-10 PROCEDURE — G8400 PT W/DXA NO RESULTS DOC: HCPCS | Performed by: CLINICAL NURSE SPECIALIST

## 2024-05-10 PROCEDURE — 99213 OFFICE O/P EST LOW 20 MIN: CPT | Performed by: CLINICAL NURSE SPECIALIST

## 2024-05-10 PROCEDURE — 1036F TOBACCO NON-USER: CPT | Performed by: CLINICAL NURSE SPECIALIST

## 2024-05-10 PROCEDURE — 96372 THER/PROPH/DIAG INJ SC/IM: CPT | Performed by: CLINICAL NURSE SPECIALIST

## 2024-05-10 PROCEDURE — G8417 CALC BMI ABV UP PARAM F/U: HCPCS | Performed by: CLINICAL NURSE SPECIALIST

## 2024-05-10 PROCEDURE — 3075F SYST BP GE 130 - 139MM HG: CPT | Performed by: CLINICAL NURSE SPECIALIST

## 2024-05-10 PROCEDURE — 1090F PRES/ABSN URINE INCON ASSESS: CPT | Performed by: CLINICAL NURSE SPECIALIST

## 2024-05-10 PROCEDURE — 1123F ACP DISCUSS/DSCN MKR DOCD: CPT | Performed by: CLINICAL NURSE SPECIALIST

## 2024-05-10 PROCEDURE — 3079F DIAST BP 80-89 MM HG: CPT | Performed by: CLINICAL NURSE SPECIALIST

## 2024-05-10 PROCEDURE — 3017F COLORECTAL CA SCREEN DOC REV: CPT | Performed by: CLINICAL NURSE SPECIALIST

## 2024-05-10 RX ORDER — DEXAMETHASONE SODIUM PHOSPHATE 10 MG/ML
10 INJECTION INTRAMUSCULAR; INTRAVENOUS ONCE
Status: COMPLETED | OUTPATIENT
Start: 2024-05-10 | End: 2024-05-10

## 2024-05-10 RX ADMIN — DEXAMETHASONE SODIUM PHOSPHATE 10 MG: 10 INJECTION INTRAMUSCULAR; INTRAVENOUS at 10:33

## 2024-05-10 SDOH — ECONOMIC STABILITY: FOOD INSECURITY: WITHIN THE PAST 12 MONTHS, YOU WORRIED THAT YOUR FOOD WOULD RUN OUT BEFORE YOU GOT MONEY TO BUY MORE.: NEVER TRUE

## 2024-05-10 SDOH — ECONOMIC STABILITY: INCOME INSECURITY: HOW HARD IS IT FOR YOU TO PAY FOR THE VERY BASICS LIKE FOOD, HOUSING, MEDICAL CARE, AND HEATING?: NOT HARD AT ALL

## 2024-05-10 SDOH — ECONOMIC STABILITY: FOOD INSECURITY: WITHIN THE PAST 12 MONTHS, THE FOOD YOU BOUGHT JUST DIDN'T LAST AND YOU DIDN'T HAVE MONEY TO GET MORE.: NEVER TRUE

## 2024-05-10 ASSESSMENT — PATIENT HEALTH QUESTIONNAIRE - PHQ9
5. POOR APPETITE OR OVEREATING: NOT AT ALL
10. IF YOU CHECKED OFF ANY PROBLEMS, HOW DIFFICULT HAVE THESE PROBLEMS MADE IT FOR YOU TO DO YOUR WORK, TAKE CARE OF THINGS AT HOME, OR GET ALONG WITH OTHER PEOPLE: NOT DIFFICULT AT ALL
7. TROUBLE CONCENTRATING ON THINGS, SUCH AS READING THE NEWSPAPER OR WATCHING TELEVISION: NOT AT ALL
SUM OF ALL RESPONSES TO PHQ QUESTIONS 1-9: 0
6. FEELING BAD ABOUT YOURSELF - OR THAT YOU ARE A FAILURE OR HAVE LET YOURSELF OR YOUR FAMILY DOWN: NOT AT ALL
1. LITTLE INTEREST OR PLEASURE IN DOING THINGS: NOT AT ALL
SUM OF ALL RESPONSES TO PHQ QUESTIONS 1-9: 0
8. MOVING OR SPEAKING SO SLOWLY THAT OTHER PEOPLE COULD HAVE NOTICED. OR THE OPPOSITE, BEING SO FIGETY OR RESTLESS THAT YOU HAVE BEEN MOVING AROUND A LOT MORE THAN USUAL: NOT AT ALL
4. FEELING TIRED OR HAVING LITTLE ENERGY: NOT AT ALL
3. TROUBLE FALLING OR STAYING ASLEEP: NOT AT ALL
9. THOUGHTS THAT YOU WOULD BE BETTER OFF DEAD, OR OF HURTING YOURSELF: NOT AT ALL
SUM OF ALL RESPONSES TO PHQ9 QUESTIONS 1 & 2: 0
SUM OF ALL RESPONSES TO PHQ QUESTIONS 1-9: 0
SUM OF ALL RESPONSES TO PHQ QUESTIONS 1-9: 0
2. FEELING DOWN, DEPRESSED OR HOPELESS: NOT AT ALL

## 2024-05-10 ASSESSMENT — ENCOUNTER SYMPTOMS: BACK PAIN: 1

## 2024-05-10 NOTE — PROGRESS NOTES
SUBJECTIVE:  Lashonda Milian is a 73 y.o. who presents today for Lower Back Pain and Hip Pain (Gradually gotten worse the last week)      HPI    Lashonda presents today for an acute visit.   Her granddaughter is present as well.     Patient c/o right lower back pain into right hip and right groin area x 7 days.  She denies any recent fall. She did sit on a bench for prolonged period of time prior to pain starting.   Her pain is worse with movement, like rolling over in bed.   Has taken tylenol with minimal relief.   Reports similar in the past with relief from cortisone injection.     There is no skin abnormality or visible joint deformity. No acute GI or  symptoms.    Past Medical History:   Diagnosis Date    Arthritis     Atrial fibrillation (HCC)     Cerebral artery occlusion with cerebral infarction (HCC)     CHF (congestive heart failure) (HCC)     COPD (chronic obstructive pulmonary disease) (HCC)     Hyperlipidemia     Hypertension     Pneumonia     Thyroid disease      Past Surgical History:   Procedure Laterality Date    CARDIOVERSION  2020    HYSTERECTOMY (CERVIX STATUS UNKNOWN)      With removal of tumor    TONSILLECTOMY      TUMOR REMOVAL       Family History   Problem Relation Age of Onset    Pacemaker Mother     Heart Disease Mother      Social History     Tobacco Use    Smoking status: Never    Smokeless tobacco: Never   Substance Use Topics    Alcohol use: Never     Current Outpatient Medications   Medication Sig Dispense Refill    apixaban (ELIQUIS) 5 MG TABS tablet Take 1 tablet by mouth twice daily 60 tablet 0    amiodarone (CORDARONE) 200 MG tablet Take 1 tablet by mouth 2 times daily 60 tablet 5    busPIRone (BUSPAR) 5 MG tablet TAKE 1 TABLET BY MOUTH THREE TIMES DAILY AS NEEDED FOR ANXIETY 90 tablet 0    methIMAzole (TAPAZOLE) 5 MG tablet Take 1 tablet by mouth twice daily 120 tablet 0    metoprolol tartrate (LOPRESSOR) 50 MG tablet Take 1 tablet by mouth 2 times daily 60 tablet 5

## 2024-05-10 NOTE — PROGRESS NOTES
After obtaining consent, and per orders of Dr. Ortega, injection of Dex10 given in Right vastus lateralis by Debbie Knight MA. Patient instructed to remain in clinic for 20 minutes afterwards, and to report any adverse reaction to me immediately.

## 2024-05-29 DIAGNOSIS — F41.1 GAD (GENERALIZED ANXIETY DISORDER): ICD-10-CM

## 2024-05-29 RX ORDER — BUSPIRONE HYDROCHLORIDE 5 MG/1
5 TABLET ORAL 3 TIMES DAILY PRN
Qty: 90 TABLET | Refills: 0 | Status: SHIPPED | OUTPATIENT
Start: 2024-05-29

## 2024-05-31 DIAGNOSIS — I48.91 ATRIAL FIBRILLATION, UNSPECIFIED TYPE (HCC): ICD-10-CM

## 2024-05-31 DIAGNOSIS — I10 ESSENTIAL HYPERTENSION: ICD-10-CM

## 2024-05-31 DIAGNOSIS — E78.2 MIXED HYPERLIPIDEMIA: ICD-10-CM

## 2024-06-03 RX ORDER — ATORVASTATIN CALCIUM 20 MG/1
20 TABLET, FILM COATED ORAL DAILY
Qty: 30 TABLET | Refills: 2 | Status: SHIPPED | OUTPATIENT
Start: 2024-06-03

## 2024-06-03 RX ORDER — METOPROLOL TARTRATE 50 MG/1
50 TABLET, FILM COATED ORAL 2 TIMES DAILY
Qty: 60 TABLET | Refills: 5 | Status: SHIPPED | OUTPATIENT
Start: 2024-06-03

## 2024-06-05 DIAGNOSIS — F41.1 GAD (GENERALIZED ANXIETY DISORDER): ICD-10-CM

## 2024-06-05 DIAGNOSIS — E05.90 HYPERTHYROIDISM: ICD-10-CM

## 2024-06-05 DIAGNOSIS — F33.1 MODERATE EPISODE OF RECURRENT MAJOR DEPRESSIVE DISORDER (HCC): ICD-10-CM

## 2024-06-05 RX ORDER — METHIMAZOLE 5 MG/1
5 TABLET ORAL 2 TIMES DAILY
Qty: 120 TABLET | Refills: 0 | Status: SHIPPED | OUTPATIENT
Start: 2024-06-05

## 2024-06-05 RX ORDER — BUSPIRONE HYDROCHLORIDE 5 MG/1
5 TABLET ORAL 3 TIMES DAILY PRN
Qty: 90 TABLET | Refills: 0 | Status: SHIPPED | OUTPATIENT
Start: 2024-06-05

## 2024-08-15 DIAGNOSIS — F41.1 GAD (GENERALIZED ANXIETY DISORDER): ICD-10-CM

## 2024-08-15 RX ORDER — BUSPIRONE HYDROCHLORIDE 5 MG/1
5 TABLET ORAL 3 TIMES DAILY PRN
Qty: 30 TABLET | Refills: 0 | Status: SHIPPED | OUTPATIENT
Start: 2024-08-15

## 2024-08-19 ENCOUNTER — HOSPITAL ENCOUNTER (INPATIENT)
Age: 74
LOS: 9 days | Discharge: ANOTHER HOSPITAL | End: 2024-08-31
Attending: STUDENT IN AN ORGANIZED HEALTH CARE EDUCATION/TRAINING PROGRAM | Admitting: INTERNAL MEDICINE
Payer: MEDICARE

## 2024-08-19 DIAGNOSIS — S39.012A BACK STRAIN, INITIAL ENCOUNTER: Primary | ICD-10-CM

## 2024-08-19 DIAGNOSIS — B95.62 MRSA BACTEREMIA: ICD-10-CM

## 2024-08-19 DIAGNOSIS — R26.2 AMBULATORY DYSFUNCTION: ICD-10-CM

## 2024-08-19 DIAGNOSIS — R78.81 BACTEREMIA: ICD-10-CM

## 2024-08-19 DIAGNOSIS — R78.81 MRSA BACTEREMIA: ICD-10-CM

## 2024-08-19 DIAGNOSIS — R52 INTRACTABLE PAIN: ICD-10-CM

## 2024-08-19 DIAGNOSIS — I10 ESSENTIAL HYPERTENSION: ICD-10-CM

## 2024-08-19 LAB
BASOPHILS # BLD: 0 K/UL (ref 0–0.2)
BASOPHILS NFR BLD: 0.1 % (ref 0–1)
EOSINOPHIL # BLD: 0 K/UL (ref 0–0.6)
EOSINOPHIL NFR BLD: 0.4 % (ref 0–5)
ERYTHROCYTE [DISTWIDTH] IN BLOOD BY AUTOMATED COUNT: 17 % (ref 11.5–14.5)
HCT VFR BLD AUTO: 35.8 % (ref 37–47)
HGB BLD-MCNC: 11.3 G/DL (ref 12–16)
IMM GRANULOCYTES # BLD: 0 K/UL
LYMPHOCYTES # BLD: 0.4 K/UL (ref 1.1–4.5)
LYMPHOCYTES NFR BLD: 5.3 % (ref 20–40)
MCH RBC QN AUTO: 29.4 PG (ref 27–31)
MCHC RBC AUTO-ENTMCNC: 31.6 G/DL (ref 33–37)
MCV RBC AUTO: 93.2 FL (ref 81–99)
MONOCYTES # BLD: 0.7 K/UL (ref 0–0.9)
MONOCYTES NFR BLD: 8.5 % (ref 0–10)
NEUTROPHILS # BLD: 6.8 K/UL (ref 1.5–7.5)
NEUTS SEG NFR BLD: 85.2 % (ref 50–65)
PLATELET # BLD AUTO: 131 K/UL (ref 130–400)
PMV BLD AUTO: 11 FL (ref 9.4–12.3)
RBC # BLD AUTO: 3.84 M/UL (ref 4.2–5.4)
WBC # BLD AUTO: 8 K/UL (ref 4.8–10.8)

## 2024-08-19 PROCEDURE — 99285 EMERGENCY DEPT VISIT HI MDM: CPT

## 2024-08-20 ENCOUNTER — APPOINTMENT (OUTPATIENT)
Dept: CT IMAGING | Age: 74
DRG: 551 | End: 2024-08-20
Payer: MEDICARE

## 2024-08-20 PROBLEM — R10.9 INTRACTABLE ABDOMINAL PAIN: Status: ACTIVE | Noted: 2024-08-20

## 2024-08-20 PROBLEM — M54.9 INTRACTABLE BACK PAIN: Status: ACTIVE | Noted: 2024-08-20

## 2024-08-20 LAB
ALBUMIN SERPL-MCNC: 3 G/DL (ref 3.5–5.2)
ALP SERPL-CCNC: 82 U/L (ref 35–104)
ALT SERPL-CCNC: 13 U/L (ref 5–33)
ANION GAP SERPL CALCULATED.3IONS-SCNC: 12 MMOL/L (ref 7–19)
AST SERPL-CCNC: 25 U/L (ref 5–32)
BACTERIA URNS QL MICRO: NEGATIVE /HPF
BILIRUB SERPL-MCNC: 2.7 MG/DL (ref 0.2–1.2)
BILIRUB UR QL STRIP: NEGATIVE
BUN SERPL-MCNC: 14 MG/DL (ref 8–23)
CALCIUM SERPL-MCNC: 8.3 MG/DL (ref 8.8–10.2)
CHLORIDE SERPL-SCNC: 101 MMOL/L (ref 98–111)
CLARITY UR: CLEAR
CO2 SERPL-SCNC: 24 MMOL/L (ref 22–29)
COLOR UR: ABNORMAL
CREAT SERPL-MCNC: 0.6 MG/DL (ref 0.5–0.9)
CRYSTALS URNS MICRO: ABNORMAL /HPF
EPI CELLS #/AREA URNS AUTO: 4 /HPF (ref 0–5)
GLUCOSE SERPL-MCNC: 107 MG/DL (ref 74–109)
GLUCOSE UR STRIP.AUTO-MCNC: NEGATIVE MG/DL
HGB UR STRIP.AUTO-MCNC: ABNORMAL MG/L
HYALINE CASTS #/AREA URNS AUTO: 10 /HPF (ref 0–8)
KETONES UR STRIP.AUTO-MCNC: NEGATIVE MG/DL
LACTATE BLDV-SCNC: 0.6 MMOL/L (ref 0.5–1.9)
LEUKOCYTE ESTERASE UR QL STRIP.AUTO: ABNORMAL
LIPASE SERPL-CCNC: 11 U/L (ref 13–60)
NITRITE UR QL STRIP.AUTO: NEGATIVE
PH UR STRIP.AUTO: 5.5 [PH] (ref 5–8)
POTASSIUM SERPL-SCNC: 3.7 MMOL/L (ref 3.5–5)
PROT SERPL-MCNC: 6.7 G/DL (ref 6.6–8.7)
PROT UR STRIP.AUTO-MCNC: ABNORMAL MG/DL
RBC #/AREA URNS AUTO: 3 /HPF (ref 0–4)
SODIUM SERPL-SCNC: 137 MMOL/L (ref 136–145)
SP GR UR STRIP.AUTO: 1.04 (ref 1–1.03)
UROBILINOGEN UR STRIP.AUTO-MCNC: 1 E.U./DL
WBC #/AREA URNS AUTO: 5 /HPF (ref 0–5)

## 2024-08-20 PROCEDURE — 85025 COMPLETE CBC W/AUTO DIFF WBC: CPT

## 2024-08-20 PROCEDURE — 83605 ASSAY OF LACTIC ACID: CPT

## 2024-08-20 PROCEDURE — 96376 TX/PRO/DX INJ SAME DRUG ADON: CPT

## 2024-08-20 PROCEDURE — 2580000003 HC RX 258: Performed by: HOSPITALIST

## 2024-08-20 PROCEDURE — 81001 URINALYSIS AUTO W/SCOPE: CPT

## 2024-08-20 PROCEDURE — 6370000000 HC RX 637 (ALT 250 FOR IP): Performed by: STUDENT IN AN ORGANIZED HEALTH CARE EDUCATION/TRAINING PROGRAM

## 2024-08-20 PROCEDURE — 83690 ASSAY OF LIPASE: CPT

## 2024-08-20 PROCEDURE — 96375 TX/PRO/DX INJ NEW DRUG ADDON: CPT

## 2024-08-20 PROCEDURE — 2580000003 HC RX 258: Performed by: STUDENT IN AN ORGANIZED HEALTH CARE EDUCATION/TRAINING PROGRAM

## 2024-08-20 PROCEDURE — G0378 HOSPITAL OBSERVATION PER HR: HCPCS

## 2024-08-20 PROCEDURE — 36415 COLL VENOUS BLD VENIPUNCTURE: CPT

## 2024-08-20 PROCEDURE — 6360000002 HC RX W HCPCS: Performed by: STUDENT IN AN ORGANIZED HEALTH CARE EDUCATION/TRAINING PROGRAM

## 2024-08-20 PROCEDURE — 80053 COMPREHEN METABOLIC PANEL: CPT

## 2024-08-20 PROCEDURE — 74177 CT ABD & PELVIS W/CONTRAST: CPT

## 2024-08-20 PROCEDURE — 6370000000 HC RX 637 (ALT 250 FOR IP): Performed by: HOSPITALIST

## 2024-08-20 PROCEDURE — 6360000004 HC RX CONTRAST MEDICATION: Performed by: STUDENT IN AN ORGANIZED HEALTH CARE EDUCATION/TRAINING PROGRAM

## 2024-08-20 PROCEDURE — 96374 THER/PROPH/DIAG INJ IV PUSH: CPT

## 2024-08-20 RX ORDER — SODIUM CHLORIDE 9 MG/ML
INJECTION, SOLUTION INTRAVENOUS PRN
Status: DISCONTINUED | OUTPATIENT
Start: 2024-08-20 | End: 2024-08-31 | Stop reason: HOSPADM

## 2024-08-20 RX ORDER — OXYCODONE HYDROCHLORIDE 5 MG/1
2.5 TABLET ORAL EVERY 4 HOURS PRN
Status: DISCONTINUED | OUTPATIENT
Start: 2024-08-20 | End: 2024-08-31 | Stop reason: HOSPADM

## 2024-08-20 RX ORDER — MAGNESIUM SULFATE IN WATER 40 MG/ML
2000 INJECTION, SOLUTION INTRAVENOUS PRN
Status: DISCONTINUED | OUTPATIENT
Start: 2024-08-20 | End: 2024-08-31 | Stop reason: HOSPADM

## 2024-08-20 RX ORDER — MORPHINE SULFATE 4 MG/ML
4 INJECTION, SOLUTION INTRAMUSCULAR; INTRAVENOUS EVERY 4 HOURS PRN
Status: DISCONTINUED | OUTPATIENT
Start: 2024-08-20 | End: 2024-08-20

## 2024-08-20 RX ORDER — IOPAMIDOL 755 MG/ML
90 INJECTION, SOLUTION INTRAVASCULAR
Status: COMPLETED | OUTPATIENT
Start: 2024-08-20 | End: 2024-08-20

## 2024-08-20 RX ORDER — OXYCODONE HYDROCHLORIDE 10 MG/1
10 TABLET ORAL EVERY 4 HOURS PRN
Status: DISCONTINUED | OUTPATIENT
Start: 2024-08-20 | End: 2024-08-20

## 2024-08-20 RX ORDER — POLYETHYLENE GLYCOL 3350 17 G/17G
17 POWDER, FOR SOLUTION ORAL DAILY PRN
Status: DISCONTINUED | OUTPATIENT
Start: 2024-08-20 | End: 2024-08-31 | Stop reason: HOSPADM

## 2024-08-20 RX ORDER — HYDROMORPHONE HYDROCHLORIDE 1 MG/ML
0.5 INJECTION, SOLUTION INTRAMUSCULAR; INTRAVENOUS; SUBCUTANEOUS ONCE
Status: COMPLETED | OUTPATIENT
Start: 2024-08-20 | End: 2024-08-20

## 2024-08-20 RX ORDER — SODIUM CHLORIDE 0.9 % (FLUSH) 0.9 %
5-40 SYRINGE (ML) INJECTION PRN
Status: DISCONTINUED | OUTPATIENT
Start: 2024-08-20 | End: 2024-08-31 | Stop reason: HOSPADM

## 2024-08-20 RX ORDER — ONDANSETRON 4 MG/1
4 TABLET, ORALLY DISINTEGRATING ORAL EVERY 8 HOURS PRN
Status: DISCONTINUED | OUTPATIENT
Start: 2024-08-20 | End: 2024-08-31 | Stop reason: HOSPADM

## 2024-08-20 RX ORDER — FLUTICASONE PROPIONATE 50 MCG
2 SPRAY, SUSPENSION (ML) NASAL DAILY
Status: DISCONTINUED | OUTPATIENT
Start: 2024-08-20 | End: 2024-08-31 | Stop reason: HOSPADM

## 2024-08-20 RX ORDER — ORPHENADRINE CITRATE 100 MG/1
100 TABLET, EXTENDED RELEASE ORAL ONCE
Status: COMPLETED | OUTPATIENT
Start: 2024-08-20 | End: 2024-08-20

## 2024-08-20 RX ORDER — 0.9 % SODIUM CHLORIDE 0.9 %
500 INTRAVENOUS SOLUTION INTRAVENOUS ONCE
Status: COMPLETED | OUTPATIENT
Start: 2024-08-20 | End: 2024-08-20

## 2024-08-20 RX ORDER — ACETAMINOPHEN 500 MG
1000 TABLET ORAL EVERY 8 HOURS SCHEDULED
Status: DISCONTINUED | OUTPATIENT
Start: 2024-08-20 | End: 2024-08-31 | Stop reason: HOSPADM

## 2024-08-20 RX ORDER — NALOXONE HYDROCHLORIDE 0.4 MG/ML
0.4 INJECTION, SOLUTION INTRAMUSCULAR; INTRAVENOUS; SUBCUTANEOUS PRN
Status: DISCONTINUED | OUTPATIENT
Start: 2024-08-20 | End: 2024-08-31 | Stop reason: HOSPADM

## 2024-08-20 RX ORDER — MORPHINE SULFATE 2 MG/ML
2 INJECTION, SOLUTION INTRAMUSCULAR; INTRAVENOUS EVERY 4 HOURS PRN
Status: DISCONTINUED | OUTPATIENT
Start: 2024-08-20 | End: 2024-08-23

## 2024-08-20 RX ORDER — DEXAMETHASONE SODIUM PHOSPHATE 10 MG/ML
6 INJECTION, SOLUTION INTRAMUSCULAR; INTRAVENOUS ONCE
Status: COMPLETED | OUTPATIENT
Start: 2024-08-20 | End: 2024-08-20

## 2024-08-20 RX ORDER — MORPHINE SULFATE 4 MG/ML
4 INJECTION, SOLUTION INTRAMUSCULAR; INTRAVENOUS ONCE
Status: COMPLETED | OUTPATIENT
Start: 2024-08-20 | End: 2024-08-20

## 2024-08-20 RX ORDER — OXYCODONE HYDROCHLORIDE 5 MG/1
5 TABLET ORAL EVERY 4 HOURS PRN
Status: DISCONTINUED | OUTPATIENT
Start: 2024-08-20 | End: 2024-08-23

## 2024-08-20 RX ORDER — CALCIUM CARBONATE 500 MG/1
500 TABLET, CHEWABLE ORAL 3 TIMES DAILY PRN
Status: DISCONTINUED | OUTPATIENT
Start: 2024-08-20 | End: 2024-08-31 | Stop reason: HOSPADM

## 2024-08-20 RX ORDER — METHIMAZOLE 5 MG/1
5 TABLET ORAL 2 TIMES DAILY
Status: DISCONTINUED | OUTPATIENT
Start: 2024-08-20 | End: 2024-08-31 | Stop reason: HOSPADM

## 2024-08-20 RX ORDER — METOPROLOL TARTRATE 50 MG
50 TABLET ORAL 2 TIMES DAILY
Status: DISCONTINUED | OUTPATIENT
Start: 2024-08-20 | End: 2024-08-28

## 2024-08-20 RX ORDER — MORPHINE SULFATE 2 MG/ML
1 INJECTION, SOLUTION INTRAMUSCULAR; INTRAVENOUS EVERY 4 HOURS PRN
Status: DISCONTINUED | OUTPATIENT
Start: 2024-08-20 | End: 2024-08-23

## 2024-08-20 RX ORDER — ONDANSETRON 2 MG/ML
4 INJECTION INTRAMUSCULAR; INTRAVENOUS EVERY 6 HOURS PRN
Status: DISCONTINUED | OUTPATIENT
Start: 2024-08-20 | End: 2024-08-31 | Stop reason: HOSPADM

## 2024-08-20 RX ORDER — ATORVASTATIN CALCIUM 40 MG/1
40 TABLET, FILM COATED ORAL NIGHTLY
Status: DISCONTINUED | OUTPATIENT
Start: 2024-08-20 | End: 2024-08-31 | Stop reason: HOSPADM

## 2024-08-20 RX ORDER — POTASSIUM CHLORIDE 1500 MG/1
40 TABLET, EXTENDED RELEASE ORAL PRN
Status: DISCONTINUED | OUTPATIENT
Start: 2024-08-20 | End: 2024-08-31 | Stop reason: HOSPADM

## 2024-08-20 RX ORDER — AMIODARONE HYDROCHLORIDE 200 MG/1
200 TABLET ORAL 2 TIMES DAILY
Status: DISCONTINUED | OUTPATIENT
Start: 2024-08-20 | End: 2024-08-31 | Stop reason: HOSPADM

## 2024-08-20 RX ORDER — SODIUM CHLORIDE 0.9 % (FLUSH) 0.9 %
5-40 SYRINGE (ML) INJECTION EVERY 12 HOURS SCHEDULED
Status: DISCONTINUED | OUTPATIENT
Start: 2024-08-20 | End: 2024-08-31 | Stop reason: HOSPADM

## 2024-08-20 RX ORDER — MECOBALAMIN 5000 MCG
5 TABLET,DISINTEGRATING ORAL NIGHTLY PRN
Status: DISCONTINUED | OUTPATIENT
Start: 2024-08-20 | End: 2024-08-31 | Stop reason: HOSPADM

## 2024-08-20 RX ORDER — BUSPIRONE HYDROCHLORIDE 10 MG/1
10 TABLET ORAL 3 TIMES DAILY PRN
Status: DISCONTINUED | OUTPATIENT
Start: 2024-08-20 | End: 2024-08-31 | Stop reason: HOSPADM

## 2024-08-20 RX ORDER — POTASSIUM CHLORIDE 7.45 MG/ML
10 INJECTION INTRAVENOUS PRN
Status: DISCONTINUED | OUTPATIENT
Start: 2024-08-20 | End: 2024-08-31 | Stop reason: HOSPADM

## 2024-08-20 RX ADMIN — IOPAMIDOL 90 ML: 755 INJECTION, SOLUTION INTRAVENOUS at 00:45

## 2024-08-20 RX ADMIN — ACETAMINOPHEN 1000 MG: 500 TABLET ORAL at 14:07

## 2024-08-20 RX ADMIN — ORPHENADRINE CITRATE 100 MG: 100 TABLET, EXTENDED RELEASE ORAL at 01:24

## 2024-08-20 RX ADMIN — METHIMAZOLE 5 MG: 5 TABLET ORAL at 09:29

## 2024-08-20 RX ADMIN — AMIODARONE HYDROCHLORIDE 200 MG: 200 TABLET ORAL at 20:23

## 2024-08-20 RX ADMIN — SODIUM CHLORIDE 500 ML: 9 INJECTION, SOLUTION INTRAVENOUS at 00:49

## 2024-08-20 RX ADMIN — ACETAMINOPHEN 1000 MG: 500 TABLET ORAL at 20:23

## 2024-08-20 RX ADMIN — METOPROLOL TARTRATE 50 MG: 50 TABLET, FILM COATED ORAL at 20:22

## 2024-08-20 RX ADMIN — SODIUM CHLORIDE, PRESERVATIVE FREE 10 ML: 5 INJECTION INTRAVENOUS at 09:31

## 2024-08-20 RX ADMIN — OXYCODONE 5 MG: 5 TABLET ORAL at 14:07

## 2024-08-20 RX ADMIN — SERTRALINE HYDROCHLORIDE 50 MG: 50 TABLET ORAL at 09:29

## 2024-08-20 RX ADMIN — MORPHINE SULFATE 4 MG: 4 INJECTION, SOLUTION INTRAMUSCULAR; INTRAVENOUS at 02:32

## 2024-08-20 RX ADMIN — BUSPIRONE HYDROCHLORIDE 10 MG: 10 TABLET ORAL at 20:31

## 2024-08-20 RX ADMIN — HYDROMORPHONE HYDROCHLORIDE 0.5 MG: 1 INJECTION, SOLUTION INTRAMUSCULAR; INTRAVENOUS; SUBCUTANEOUS at 03:44

## 2024-08-20 RX ADMIN — METOPROLOL TARTRATE 50 MG: 50 TABLET, FILM COATED ORAL at 09:29

## 2024-08-20 RX ADMIN — MORPHINE SULFATE 4 MG: 4 INJECTION, SOLUTION INTRAMUSCULAR; INTRAVENOUS at 00:49

## 2024-08-20 RX ADMIN — SODIUM CHLORIDE, PRESERVATIVE FREE 10 ML: 5 INJECTION INTRAVENOUS at 20:23

## 2024-08-20 RX ADMIN — ATORVASTATIN CALCIUM 40 MG: 40 TABLET, FILM COATED ORAL at 20:23

## 2024-08-20 RX ADMIN — APIXABAN 5 MG: 5 TABLET, FILM COATED ORAL at 09:29

## 2024-08-20 RX ADMIN — APIXABAN 5 MG: 5 TABLET, FILM COATED ORAL at 20:23

## 2024-08-20 RX ADMIN — OXYCODONE 5 MG: 5 TABLET ORAL at 09:29

## 2024-08-20 RX ADMIN — DEXAMETHASONE SODIUM PHOSPHATE 6 MG: 10 INJECTION, SOLUTION INTRAMUSCULAR; INTRAVENOUS at 03:41

## 2024-08-20 RX ADMIN — AMIODARONE HYDROCHLORIDE 200 MG: 200 TABLET ORAL at 09:29

## 2024-08-20 RX ADMIN — METHIMAZOLE 5 MG: 5 TABLET ORAL at 20:23

## 2024-08-20 RX ADMIN — OXYCODONE 5 MG: 5 TABLET ORAL at 17:49

## 2024-08-20 ASSESSMENT — PAIN SCALES - GENERAL
PAINLEVEL_OUTOF10: 10
PAINLEVEL_OUTOF10: 9
PAINLEVEL_OUTOF10: 9
PAINLEVEL_OUTOF10: 8

## 2024-08-20 ASSESSMENT — PAIN DESCRIPTION - LOCATION
LOCATION: BACK;GROIN
LOCATION: BACK;GROIN
LOCATION: BACK
LOCATION: HIP

## 2024-08-20 ASSESSMENT — PAIN - FUNCTIONAL ASSESSMENT
PAIN_FUNCTIONAL_ASSESSMENT: PREVENTS OR INTERFERES SOME ACTIVE ACTIVITIES AND ADLS

## 2024-08-20 ASSESSMENT — PAIN DESCRIPTION - DESCRIPTORS
DESCRIPTORS: ACHING

## 2024-08-20 ASSESSMENT — PAIN DESCRIPTION - ORIENTATION
ORIENTATION: RIGHT
ORIENTATION: RIGHT;LOWER
ORIENTATION: LEFT;RIGHT
ORIENTATION: RIGHT
ORIENTATION: RIGHT;LEFT

## 2024-08-20 NOTE — ED PROVIDER NOTES
[facility] EMERGENCY DEPT  eMERGENCY dEPARTMENT eNCOUnter      Pt Name: Lashonda Milian  MRN: [id number]  Birthdate 1950  Date of evaluation: 8/19/2024  Provider: Edwige Malone MD    CHIEF COMPLAINT       Chief Complaint   Patient presents with    Back Pain     Low back pain radiating from each side, states that she has been having difficulty having a BM          HISTORY OF PRESENT ILLNESS   (Location/Symptom, Timing/Onset,Context/Setting, Quality, Duration, Modifying Factors, Severity)  Note limiting factors.     HPI    Lashonda Milian is a 73 y.o. female with PMH of A-fib on Eliquis, CVA, CHF, hypertension, hyperlipidemia, anxiety, COPD who presents to the emergency department with CC of right lower back and flank pain radiating to the groin and down the right thigh.  Worse over the last couple days.  She states that a few days ago she slept in a different bed and kept falling down and having to pull herself up, some might of strained something at that time.  She denies any other falls or trauma.  She denies any chest pain, shortness of breath, nausea, vomiting, diarrhea, painful urinary symptoms.  She does struggle with chronic constipation but had a bowel movement today.  The pain radiates across her entire back but is worse just above her anterior superior iliac spine on the right.    NursingNotes were reviewed.    REVIEW OF SYSTEMS    (2-9 systems for level 4, 10 or more for level 5)     Review of Systems   Constitutional:  Negative for chills, fatigue and fever.   HENT:  Negative for congestion and sore throat.    Eyes:  Negative for pain and redness.   Respiratory:  Negative for cough, chest tightness and shortness of breath.    Cardiovascular:  Negative for chest pain and leg swelling.   Gastrointestinal:  Negative for abdominal pain, diarrhea, nausea and vomiting.   Genitourinary:  Positive for flank pain. Negative for dysuria and urgency.   Musculoskeletal:  Positive for back pain. Negative for neck  Pulses: Normal pulses.      Heart sounds: No murmur heard.  Pulmonary:      Effort: Pulmonary effort is normal. No respiratory distress.      Breath sounds: No wheezing, rhonchi or rales.   Abdominal:      General: There is no distension.      Palpations: Abdomen is soft.      Tenderness: There is no abdominal tenderness.   Musculoskeletal:      Cervical back: Neck supple.      Right lower leg: No edema.      Left lower leg: No edema.      Comments: Tender to palpation over the right lower back around the right flank.  No midline tenderness along the cervical, thoracic, or lumbar spine.  No tenderness to palpation along the left lower back.  Patient preferentially lays on her left side due to pain on her right   Skin:     General: Skin is warm and dry.      Capillary Refill: Capillary refill takes less than 2 seconds.      Coloration: Skin is not jaundiced.   Neurological:      General: No focal deficit present.      Mental Status: She is alert and oriented to person, place, and time.   Psychiatric:         Mood and Affect: Mood normal.         DIAGNOSTIC RESULTS     EKG: All EKG's areinterpreted by the Emergency Department Physician who either signs or Co-signs this chart in the absence of a cardiologist.    EKG not indicated    RADIOLOGY:  Non-plain film images such as CT, Ultrasound and MRI are read by the radiologist. Plain radiographic images are visualized and preliminarily interpreted bythe emergency physician with the below findings:      CT ABDOMEN PELVIS W IV CONTRAST Additional Contrast? None   Final Result   Asymmetric swelling of the left psoas muscle and stranding in the left retroperitoneum.  Small amount of intramuscular hemorrhage is possible, though no discrete hematoma is visible.       Colonic diverticulosis without acute diverticulitis.       Cholelithiasis without acute cholecystitis.       Cardiomegaly.       ASCVD.       Small hiatal hernia.       Mild splenomegaly.        All CT scans are

## 2024-08-20 NOTE — ED NOTES
Pt transferred from bed to wheel chair to BR. Pt had significant difficulty with transfer. Pt stated she was in a lot of pain and not able to move like normal.

## 2024-08-20 NOTE — ED NOTES
ED TO INPATIENT SBAR HANDOFF    Patient Name: Lashonda Milian   : 1950  73 y.o.   Family/Caregiver Present: Yes  Code Status Order: Full Code    C-SSRS: Risk of Suicide: No Risk  Sitter No  Restraints:         Situation  Chief Complaint:   Chief Complaint   Patient presents with    Back Pain     Low back pain radiating from each side, states that she has been having difficulty having a BM      Patient Diagnosis: Intractable abdominal pain [R10.9]     Brief Description of Patient's Condition: 73 y.o. female with PMH of A-fib on Eliquis, CVA, CHF, hypertension, hyperlipidemia, anxiety, COPD who presents to the emergency department with CC of right lower back and flank pain radiating to the groin and down the right thigh.  Worse over the last couple days.  She states that a few days ago she slept in a different bed and kept falling down and having to pull herself up, some might of strained something at that time.  She denies any other falls or trauma.  She denies any chest pain, shortness of breath, nausea, vomiting, diarrhea, painful urinary symptoms.  She does struggle with chronic constipation but had a bowel movement today.  The pain radiates across her entire back but is worse just above her anterior superior iliac spine on the right.       Mental Status: oriented, alert, coherent, logical, thought processes intact, and able to concentrate and follow conversation  Arrived from: home    Imaging:   CT ABDOMEN PELVIS W IV CONTRAST Additional Contrast? None   Final Result   Asymmetric swelling of the left psoas muscle and stranding in the left retroperitoneum.  Small amount of intramuscular hemorrhage is possible, though no discrete hematoma is visible.       Colonic diverticulosis without acute diverticulitis.       Cholelithiasis without acute cholecystitis.       Cardiomegaly.       ASCVD.       Small hiatal hernia.       Mild splenomegaly.        All CT scans are performed using dose optimization  HCl PF (DILAUDID) injection 0.5 mg Admin Date  08/20/2024 Action  Given Dose  0.5 mg Rate   Route  IntraVENous Documented By  Shalini Monique RN        iopamidol (ISOVUE-370) 76 % injection 90 mL Admin Date  08/20/2024 Action  Given Dose  90 mL Rate   Route  IntraVENous Documented By  Shaq Jamison        morphine sulfate (PF) injection 4 mg Admin Date  08/20/2024 Action  Given Dose  4 mg Rate   Route  IntraVENous Documented By  Chastity Kelly RN        morphine sulfate (PF) injection 4 mg Admin Date  08/20/2024 Action  Given Dose  4 mg Rate   Route  IntraVENous Documented By  Shalini Monique RN        orphenadrine (NORFLEX) extended release tablet 100 mg Admin Date  08/20/2024 Action  Given Dose  100 mg Rate   Route  Oral Documented By  Shalini Monique RN        sodium chloride 0.9 % bolus 500 mL Admin Date  08/20/2024 Action  New Bag Dose  500 mL Rate  491.8 mL/hr Route  IntraVENous Documented By  Chastity Kelly RN            History:   Past Medical History:   Diagnosis Date    Arthritis     Atrial fibrillation (HCC)     Paroxysmal A Fib    Cerebral artery occlusion with cerebral infarction (HCC)     TWICE: once in 2012 or 2013, then had another one 2021    CHF (congestive heart failure) (HCC)     COPD (chronic obstructive pulmonary disease) (HCC)     Hyperlipidemia     Hypertension     On continuous oral anticoagulation     Apixaban    Pneumonia     Thyroid disease        Assessment  Vitals: Level of Consciousness: Alert (0)   Vitals:    08/20/24 0600 08/20/24 0630 08/20/24 0700 08/20/24 0730   BP: (!) 118/58 120/62 (!) 120/58 (!) 120/59   Pulse: 62 60 60 61   Resp:   18 18   Temp:    98.3 °F (36.8 °C)   TempSrc:       SpO2: 95% 96% 93% 96%   Weight:         Predictive Model Details          20 (Normal)  Factor Value    Calculated 8/20/2024 07:59 69% Age 73 years old    Deterioration Index Model 14% Respiratory rate 18     5% Sodium 137 mmol/L     4% Systolic 120     3% Pulse 61     2% Potassium 3.7 mmol/L      1% Hematocrit abnormal (35.8 %)     1% WBC count 8.0 K/uL     0% Pulse oximetry 96 %     0% Temperature 98.3 °F (36.8 °C)       NPO? No  O2 Flow Rate: O2 Device: None (Room air)    Cardiac Rhythm: SR  NIH Score: NIH     Active LDA's:   Peripheral IV 08/19/24 Right Antecubital (Active)   Site Assessment Clean, dry & intact 08/19/24 2344   Line Status Blood return noted;Brisk blood return;Flushed;Specimen collected 08/19/24 2344   Phlebitis Assessment No symptoms 08/19/24 2344   Infiltration Assessment 0 08/19/24 2344   Dressing Status New dressing applied 08/19/24 2344     Pertinent or High Risk Medications/Drips: no   If Yes, please provide details:   Blood Product Administration: no  If Yes, please provide details:   Sepsis Risk Score      Admitted with Sepsis? No    Recommendation  Incomplete orders: floor  Patient Belongings: with pt  Additional Comments: none    If any further questions, please call Sending RN at [phone]      Electronically signed by: Electronically signed by Yoana Pang RN on 8/20/2024 at 7:59 AM

## 2024-08-20 NOTE — H&P
[facility] Hospitalist Group History and Physical    Patient Information:  Patient: Lashonda Milian  MRN: [id number]   Acct: [id number]  YOB: 1950  Admit Date: 8/20/2024   Primary Care Physician: Nick Martinez MD  Advance Directive: Full Code  Health Care Proxy: Mr. Daniel Milian, one of her sons, [phone]         SUBJECTIVE:    Chief Complaint   Patient presents with    Back Pain     Low back pain radiating from each side, states that she has been having difficulty having a BM      EP Sign Out:  Has had 3 doses of narcotics, muscles relaxants and steroids and the pain is not controlled    HPI:  Mrs. Lashonda Milian is a pleasant 73 year old lady from home. She has a history of three months of intermittent back pain. She states that the back pain has flared up recently to the worst it has been. She states that she has trouble having bowel movements when she does not have fruit. She states that she has  had problems with urinary incontinence since the back pain began, she states that she just can not get up in time anymore. She denies loss of bowel continence. She states that she has numbness of the right thigh intermittently for at least 5 years, it  is gaining gradually in size, and has been for at least 5 years.  She presented for this pain to the ED where she had 3 doses of narcotics, muscles relaxants and steroids, and after that was unable to even use a wheel chair where as she can normally tolerate using a walker.     Review of Systems:   Review of Systems   Constitutional:  Positive for chills, diaphoresis (night) and fatigue. Negative for fever.   HENT:  Negative for sneezing.    Respiratory:  Positive for shortness of breath (cheronic MCDANIEL). Negative for cough.    Cardiovascular:  Negative for chest pain and palpitations.   Gastrointestinal:  Positive for abdominal pain. Negative for diarrhea and nausea.   Genitourinary:  Positive for dysuria.   Neurological:  Positive for  is ill-appearing. She is not toxic-appearing.   HENT:      Head: Normocephalic and atraumatic.      Nose: No congestion or rhinorrhea.   Eyes:      General:         Right eye: No discharge.         Left eye: No discharge.   Neck:      Comments: Trachea appears midline, neck is supple  Cardiovascular:      Rate and Rhythm: Normal rate and regular rhythm.      Heart sounds: No murmur heard.     No friction rub. No gallop.   Pulmonary:      Effort: Pulmonary effort is normal. No respiratory distress.      Breath sounds: No stridor. No wheezing, rhonchi or rales.   Chest:      Chest wall: No tenderness.   Abdominal:      General: Bowel sounds are normal.      Palpations: Abdomen is soft.      Tenderness: There is abdominal tenderness. There is no guarding or rebound.      Comments: Has abdominal focus of her obesity   Musculoskeletal:         General: Deformity (has greatlly increased fat stores) present. No tenderness or signs of injury.      Right lower leg: No edema.      Left lower leg: No edema.   Skin:     General: Skin is warm.      Comments: nondiaphroetic   Neurological:      Mental Status: She is alert. She is disoriented.      Comments: Know day of week and year, not day of month nor month   Psychiatric:         Mood and Affect: Mood normal.         Behavior: Behavior normal.        LABORATORY DATA:    CBC:   Recent Labs     08/19/24  2339   WBC 8.0   HGB 11.3*   HCT 35.8*        BMP:   Recent Labs     08/19/24  2339      K 3.7      CO2 24   BUN 14   CREATININE 0.6   CALCIUM 8.3*     Hepatic Profile:   Recent Labs     08/19/24  2339   AST 25   ALT 13   BILITOT 2.7*   ALKPHOS 82     Urinalysis:   Lab Results   Component Value Date/Time    NITRU Negative 08/20/2024 02:09 AM    WBCUA 5 08/20/2024 02:09 AM    BACTERIA Negative 08/20/2024 02:09 AM    RBCUA 3 08/20/2024 02:09 AM    BLOODU TRACE 08/20/2024 02:09 AM    GLUCOSEU Negative 08/20/2024 02:09 AM     EKG:   Not indicated    IMAGING:  CT

## 2024-08-20 NOTE — PROGRESS NOTES
4 Eyes Skin Assessment     NAME:  Lashonda Milian  YOB: 1950  MEDICAL RECORD NUMBER:  [id number]    The patient is being assessed for  Admission    I agree that at least one RN has performed a thorough Head to Toe Skin Assessment on the patient. ALL assessment sites listed below have been assessed.      Areas assessed by both nurses:    Head, Face, Ears, Shoulders, Back, Chest, Arms, Elbows, Hands, Sacrum. Buttock, Coccyx, Ischium, and Legs. Feet and Heels        Does the Patient have a Wound? No noted wound(s)       Clark Prevention initiated by RN: No  Wound Care Orders initiated by RN: No    Pressure Injury (Stage 3,4, Unstageable, DTI, NWPT, and Complex wounds) if present, place Wound referral order by RN under : No    New Ostomies, if present place, Ostomy referral order under : No     Nurse 1 eSignature: Electronically signed by Kandi Bryant RN on 8/20/24 at 9:04 AM CDT    **SHARE this note so that the co-signing nurse can place an eSignature**    Nurse 2 eSignature: {Esignature:[id number]}

## 2024-08-21 ENCOUNTER — APPOINTMENT (OUTPATIENT)
Dept: MRI IMAGING | Age: 74
End: 2024-08-21
Payer: MEDICARE

## 2024-08-21 PROBLEM — R22.41 LEG MASS, RIGHT: Status: ACTIVE | Noted: 2024-08-21

## 2024-08-21 PROBLEM — R93.5 ABNORMAL ABDOMINAL CT SCAN: Status: ACTIVE | Noted: 2024-08-21

## 2024-08-21 PROBLEM — R26.2 AMBULATORY DYSFUNCTION: Status: ACTIVE | Noted: 2024-08-21

## 2024-08-21 LAB
ANION GAP SERPL CALCULATED.3IONS-SCNC: 11 MMOL/L (ref 7–19)
BASOPHILS # BLD: 0 K/UL (ref 0–0.2)
BASOPHILS NFR BLD: 0.1 % (ref 0–1)
BUN SERPL-MCNC: 19 MG/DL (ref 8–23)
CALCIUM SERPL-MCNC: 8 MG/DL (ref 8.8–10.2)
CHLORIDE SERPL-SCNC: 103 MMOL/L (ref 98–111)
CO2 SERPL-SCNC: 25 MMOL/L (ref 22–29)
CREAT SERPL-MCNC: 0.6 MG/DL (ref 0.5–0.9)
CRP SERPL HS-MCNC: 9.7 MG/DL (ref 0–0.5)
EOSINOPHIL # BLD: 0 K/UL (ref 0–0.6)
EOSINOPHIL NFR BLD: 0 % (ref 0–5)
ERYTHROCYTE [DISTWIDTH] IN BLOOD BY AUTOMATED COUNT: 17 % (ref 11.5–14.5)
ERYTHROCYTE [SEDIMENTATION RATE] IN BLOOD BY WESTERGREN METHOD: 81 MM/HR (ref 0–25)
GLUCOSE SERPL-MCNC: 135 MG/DL (ref 74–109)
HCT VFR BLD AUTO: 33.1 % (ref 37–47)
HGB BLD-MCNC: 10.4 G/DL (ref 12–16)
IMM GRANULOCYTES # BLD: 0.1 K/UL
LYMPHOCYTES # BLD: 0.4 K/UL (ref 1.1–4.5)
LYMPHOCYTES NFR BLD: 4.2 % (ref 20–40)
MCH RBC QN AUTO: 29 PG (ref 27–31)
MCHC RBC AUTO-ENTMCNC: 31.4 G/DL (ref 33–37)
MCV RBC AUTO: 92.2 FL (ref 81–99)
MONOCYTES # BLD: 0.7 K/UL (ref 0–0.9)
MONOCYTES NFR BLD: 8 % (ref 0–10)
NEUTROPHILS # BLD: 7.3 K/UL (ref 1.5–7.5)
NEUTS SEG NFR BLD: 87.1 % (ref 50–65)
PLATELET # BLD AUTO: 130 K/UL (ref 130–400)
PMV BLD AUTO: 11.4 FL (ref 9.4–12.3)
POTASSIUM SERPL-SCNC: 4.1 MMOL/L (ref 3.5–5)
RBC # BLD AUTO: 3.59 M/UL (ref 4.2–5.4)
SODIUM SERPL-SCNC: 139 MMOL/L (ref 136–145)
WBC # BLD AUTO: 8.4 K/UL (ref 4.8–10.8)

## 2024-08-21 PROCEDURE — 6360000004 HC RX CONTRAST MEDICATION: Performed by: INTERNAL MEDICINE

## 2024-08-21 PROCEDURE — 2700000000 HC OXYGEN THERAPY PER DAY

## 2024-08-21 PROCEDURE — 6370000000 HC RX 637 (ALT 250 FOR IP): Performed by: HOSPITALIST

## 2024-08-21 PROCEDURE — 97162 PT EVAL MOD COMPLEX 30 MIN: CPT

## 2024-08-21 PROCEDURE — 2580000003 HC RX 258: Performed by: HOSPITALIST

## 2024-08-21 PROCEDURE — 85652 RBC SED RATE AUTOMATED: CPT

## 2024-08-21 PROCEDURE — A9577 INJ MULTIHANCE: HCPCS | Performed by: INTERNAL MEDICINE

## 2024-08-21 PROCEDURE — 97530 THERAPEUTIC ACTIVITIES: CPT

## 2024-08-21 PROCEDURE — 97535 SELF CARE MNGMENT TRAINING: CPT

## 2024-08-21 PROCEDURE — 85025 COMPLETE CBC W/AUTO DIFF WBC: CPT

## 2024-08-21 PROCEDURE — 97165 OT EVAL LOW COMPLEX 30 MIN: CPT

## 2024-08-21 PROCEDURE — 86140 C-REACTIVE PROTEIN: CPT

## 2024-08-21 PROCEDURE — 73720 MRI LWR EXTREMITY W/O&W/DYE: CPT

## 2024-08-21 PROCEDURE — 97110 THERAPEUTIC EXERCISES: CPT

## 2024-08-21 PROCEDURE — 6370000000 HC RX 637 (ALT 250 FOR IP): Performed by: NURSE PRACTITIONER

## 2024-08-21 PROCEDURE — G0378 HOSPITAL OBSERVATION PER HR: HCPCS

## 2024-08-21 PROCEDURE — 80048 BASIC METABOLIC PNL TOTAL CA: CPT

## 2024-08-21 PROCEDURE — 36415 COLL VENOUS BLD VENIPUNCTURE: CPT

## 2024-08-21 PROCEDURE — 94760 N-INVAS EAR/PLS OXIMETRY 1: CPT

## 2024-08-21 PROCEDURE — 72158 MRI LUMBAR SPINE W/O & W/DYE: CPT

## 2024-08-21 RX ADMIN — Medication 5 MG: at 20:34

## 2024-08-21 RX ADMIN — ACETAMINOPHEN 1000 MG: 500 TABLET ORAL at 05:48

## 2024-08-21 RX ADMIN — METHIMAZOLE 5 MG: 5 TABLET ORAL at 20:34

## 2024-08-21 RX ADMIN — ATORVASTATIN CALCIUM 40 MG: 40 TABLET, FILM COATED ORAL at 20:34

## 2024-08-21 RX ADMIN — METOPROLOL TARTRATE 50 MG: 50 TABLET, FILM COATED ORAL at 20:34

## 2024-08-21 RX ADMIN — APIXABAN 5 MG: 5 TABLET, FILM COATED ORAL at 10:16

## 2024-08-21 RX ADMIN — SERTRALINE HYDROCHLORIDE 50 MG: 50 TABLET ORAL at 10:16

## 2024-08-21 RX ADMIN — ACETAMINOPHEN 1000 MG: 500 TABLET ORAL at 20:34

## 2024-08-21 RX ADMIN — GADOBENATE DIMEGLUMINE 20 ML: 529 INJECTION, SOLUTION INTRAVENOUS at 16:09

## 2024-08-21 RX ADMIN — AMIODARONE HYDROCHLORIDE 200 MG: 200 TABLET ORAL at 10:17

## 2024-08-21 RX ADMIN — OXYCODONE 5 MG: 5 TABLET ORAL at 02:26

## 2024-08-21 RX ADMIN — TIZANIDINE 4 MG: 4 TABLET ORAL at 20:34

## 2024-08-21 RX ADMIN — TIZANIDINE 4 MG: 4 TABLET ORAL at 10:41

## 2024-08-21 RX ADMIN — APIXABAN 5 MG: 5 TABLET, FILM COATED ORAL at 20:34

## 2024-08-21 RX ADMIN — SODIUM CHLORIDE, PRESERVATIVE FREE 10 ML: 5 INJECTION INTRAVENOUS at 20:35

## 2024-08-21 RX ADMIN — METHIMAZOLE 5 MG: 5 TABLET ORAL at 10:16

## 2024-08-21 RX ADMIN — ACETAMINOPHEN 1000 MG: 500 TABLET ORAL at 13:57

## 2024-08-21 RX ADMIN — METOPROLOL TARTRATE 50 MG: 50 TABLET, FILM COATED ORAL at 10:16

## 2024-08-21 RX ADMIN — OXYCODONE 5 MG: 5 TABLET ORAL at 13:57

## 2024-08-21 RX ADMIN — BUSPIRONE HYDROCHLORIDE 10 MG: 10 TABLET ORAL at 10:41

## 2024-08-21 ASSESSMENT — PAIN SCALES - GENERAL
PAINLEVEL_OUTOF10: 8
PAINLEVEL_OUTOF10: 9
PAINLEVEL_OUTOF10: 8
PAINLEVEL_OUTOF10: 7

## 2024-08-21 NOTE — PROGRESS NOTES
[facility]      Patient:  Lashonda Milian  YOB: 1950  Date of Service: 8/21/2024  MRN: [id number]   Acct: [id number]   Primary Care Physician: Nick Martinez MD  Advance Directive: Full Code  Admit Date: 8/19/2024       Hospital Day: 0  Portions of this note have been copied forward, however, changed to reflect the most current clinical status of this patient.    CHIEF COMPLAINT back pain    SUBJECTIVE:  She continues to have right-sided pain with movement it radiates from her right groin through to her back and down her hip.  She states that the pain medications have helped some she continues to have pain.  She states that the \"fatty tumor\" in her right groin makes it even more difficult to walk.  States that she has been up since hospitalized and it has improved some since admission.  She remains on 2 L nasal cannula.  No overnight events or issues.    CUMULATIVE HOSPITAL STAY:  The patient is a 73 y.o. female with PMH of A-fib on Eliquis, CVA, CHF, hypertension, hyperlipidemia, anxiety, and COPD who presented to [facility] ED on 8/19/2024 with complaints of right lower back and flank pain radiating to her groin and down her right thigh.  Reported had worsened over 2 days prior to admission.  She thought she might have strained a muscle due to sleeping in a new bed and having to pull herself up frequently.  She denied falls or traumas.  She reported chronic constipation but denied bowel or bladder dysfunction.  Denied fever, chills, nausea, vomiting, diarrhea or dysuria.   Further ED workup revealed normal chemistries.  Total bilirubin 2.7.  H&H 11.3/35.8 with a platelet count of 131.  WBC 8.0.  UA negative for infection.  CT of the abdomen pelvis showed asymmetrical swelling of the left psoas muscle and stranding in the left retroperitoneum.  Small amount of intramuscular hemorrhage is possible.  Cholelithiasis without acute cholecystitis.  Splenomegaly.  She received multiple doses of skeletal  ABDOMEN PELVIS W IV CONTRAST Additional Contrast? None    Result Date: 8/20/2024  Asymmetric swelling of the left psoas muscle and stranding in the left retroperitoneum.  Small amount of intramuscular hemorrhage is possible, though no discrete hematoma is visible.  Colonic diverticulosis without acute diverticulitis.  Cholelithiasis without acute cholecystitis.  Cardiomegaly.  ASCVD.  Small hiatal hernia.  Mild splenomegaly.  All CT scans are performed using dose optimization techniques as appropriate to the performed exam and include at least one of the following: Automated exposure control, adjustment of the mA and/or kV according to size, and the use of iterative reconstruction technique.  ______________________________________ Electronically signed by: ANNALISA COSME M.D. Date:     08/20/2024 Time:    00:49          Assessment/Plan   Principal Problem:    Intractable abdominal pain/Abnormal abdominal CT scan   -She continues to require significant amounts of pain medication-all po at this time   -CT of the abdomen pelvis with IV contrast asymmetric swelling of the left psoas muscle and stranding in the left retroperitoneum.  Small amount of intramuscular hemorrhage is possible although no discrete hematoma visible   -Abdominal pain is felt to be a radiation from hip and back   -PRN stool softeners available   -inflammatory markers   -Daily labs   -Monitor vital signs    Active Problems:    Morbidly obese (HCC)   -Complicates care      Atrial fibrillation (HCC)   -rate well controlled   -continue home rate control meds   -Continue home Eliquis    Intractable back pain   -MRI of the L-spine to r/o spinal process such as hematoma, paraspinal or epidural infection or anatomical abnormal that could be causing such severe pain.   -Continue oral and IV pain meds   -Musculoskeletal muscle relaxants added   -Continue scheduled acetaminophen   -PT/OT   -Fall precautions      Ambulatory dysfunction   -PT/OT      Leg mass,

## 2024-08-21 NOTE — PROGRESS NOTES
Physical Therapy  Facility/Department: [facility] SURG SERVICES  Physical Therapy Initial Assessment    Name: Lashonda Milian  : 1950  MRN: [id number]  Date of Service: 2024    Discharge Recommendations:  Continue to assess pending progress, 24 hour supervision or assist, Patient would benefit from continued therapy after discharge          Patient Diagnosis(es): The primary encounter diagnosis was Back strain, initial encounter. A diagnosis of Ambulatory dysfunction was also pertinent to this visit.  Past Medical History:  has a past medical history of Arthritis, Atrial fibrillation (HCC), Cerebral artery occlusion with cerebral infarction (HCC), CHF (congestive heart failure) (HCC), COPD (chronic obstructive pulmonary disease) (HCC), Hyperlipidemia, Hypertension, On continuous oral anticoagulation, Pneumonia, and Thyroid disease.  Past Surgical History:  has a past surgical history that includes tumor removal; Tonsillectomy (Bilateral); Cardioversion (); and Hysterectomy, total abdominal (N/A).    Assessment   Body Structures, Functions, Activity Limitations Requiring Skilled Therapeutic Intervention: Decreased functional mobility ;Decreased ADL status;Decreased ROM;Decreased safe awareness;Decreased cognition;Decreased strength;Decreased balance;Decreased endurance;Increased pain;Decreased posture  Assessment: Pt. will benefit from cont. PT to decrease impairments. Pt. a fall risk due to pain and weakness. She needs to attempt mobility with RW, gait belt and staff A. Pt. needs 24 hr care currently. Pt. a little anxious with mobility.  Treatment Diagnosis: impaired gait and mobility  Therapy Prognosis: Fair  Decision Making: Medium Complexity  Barriers to Learning: cognition  Requires PT Follow-Up: Yes  Activity Tolerance  Activity Tolerance: Patient limited by fatigue;Patient limited by pain     Plan   Physical Therapy Plan  General Plan: 6-7 times per week  Therapy Duration: 2 Weeks  Current Treatment  all times  Hearing  Hearing: Within functional limits    Cognition   Orientation  Overall Orientation Status: Within Functional Limits  Cognition  Overall Cognitive Status: Exceptions  Following Commands: Follows one step commands with increased time;Follows one step commands with repetition;Impaired  Attention Span: Impaired;Attends with cues to redirect  Memory: Impaired;Appears intact  Safety Judgement: Decreased awareness of need for safety;Decreased awareness of need for assistance  Problem Solving: Assistance required to generate solutions;Assistance required to implement solutions  Insights: Impaired  Initiation: Impaired  Sequencing: Impaired     Objective   Temp: 98.4 °F (36.9 °C)  Pulse: 51  Heart Rate Source: Monitor  Respirations: 18  SpO2: 96 %  O2 Device: Nasal cannula  BP: (!) 116/52  MAP (Calculated): 73  BP Location: Left lower arm  Patient Position: Sitting     Observation/Palpation  Posture: Fair  Observation: purewick, tremors BUEs, R worse than L        AROM RLE (degrees)  RLE AROM: Exceptions  RLE General AROM: 0-90 knee and hip, ankle to neutral DF--limited by soft body habitus  AROM LLE (degrees)  LLE AROM : Exceptions  LLE General AROM: 0-90 knee and hip, ankle to neutral DF-limited by soft body habitus  Strength RLE  Strength RLE: Exception  Comment: grossly 3+/5  Strength LLE  Strength LLE: Exception  Comment: grossly 3+/5        Balance  Sitting: Impaired  Sitting - Static: Fair (occasional)  Sitting - Dynamic: Fair (occasional)  Standing: Impaired  Standing - Static: Occasional  Standing - Dynamic: Occasional  Bed mobility  Supine to Sit: Moderate assistance;2 Person assistance  Sit to Supine: Unable to assess  Scooting: Moderate assistance;2 Person assistance  Transfers  Sit to Stand: Moderate Assistance;2 Person Assistance  Stand to Sit: Moderate Assistance;2 Person Assistance  Comment: v. cues for hand placement during transfers  Ambulation  Surface: Level tile  Device: Rolling

## 2024-08-21 NOTE — PROGRESS NOTES
Occupational Therapy Initial Assessment  Date: 2024   Patient Name: Lashonda Milian  MRN: [id number]     : 1950    Date of Service: 2024    Discharge Recommendations:  Continue to assess pending progress       Assessment   Performance deficits / Impairments: Decreased ADL status;Decreased functional mobility ;Decreased high-level IADLs;Decreased endurance;Decreased ROM  Assessment: Pt benefits from skilled OT to address decreased pt I to complete functional mobility, balance, endurance, and ADL tasks s/p hosptilization intractable abdominal pain. Mod A x 2 transfers sit to stand with RW; Max A for LE hygiene and ADL tasks of toileting . Recommend ongoing OT for safe d/c planning and to decrease burden of care.  Prognosis: Good  Decision Making: Low Complexity  REQUIRES OT FOLLOW-UP: Yes  Activity Tolerance  Activity Tolerance: Patient Tolerated treatment well              Patient Diagnosis(es): The primary encounter diagnosis was Back strain, initial encounter. A diagnosis of Ambulatory dysfunction was also pertinent to this visit.    Past Medical History:   Past Medical History:   Diagnosis Date    Arthritis     Atrial fibrillation (HCC)     Paroxysmal A Fib    Cerebral artery occlusion with cerebral infarction (HCC)     TWICE: once in  or , then had another one     CHF (congestive heart failure) (HCC)     COPD (chronic obstructive pulmonary disease) (HCC)     Hyperlipidemia     Hypertension     On continuous oral anticoagulation     Apixaban    Pneumonia     Thyroid disease         Past Surgical History:   Past Surgical History:   Procedure Laterality Date    CARDIOVERSION      HYSTERECTOMY, TOTAL ABDOMINAL (CERVIX REMOVED) N/A     With removal of tumor, ~, was a DANIS and BSO    TONSILLECTOMY Bilateral     at age 17 or 18, withOUT Adneoids as per pt    TUMOR REMOVAL      intraabdominal, ~, states she was told it was feeding off her bowel         assistance  UE Dressing: Minimal assistance  LE Dressing: Maximum assistance  Toileting: Maximum assistance  Functional Mobility: Minimal assistance        Bed mobility  Supine to Sit: Moderate assistance;2 Person assistance  Sit to Supine: Unable to assess  Scooting: Moderate assistance;2 Person assistance  Transfers  Sit to stand: Moderate assistance;2 Person assistance  Stand to sit: Moderate assistance;2 Person assistance     Cognition  Overall Cognitive Status: Exceptions  Following Commands: Follows one step commands with increased time;Follows one step commands with repetition;Impaired  Attention Span: Impaired;Attends with cues to redirect  Memory: Impaired;Appears intact  Safety Judgement: Decreased awareness of need for safety;Decreased awareness of need for assistance  Problem Solving: Assistance required to generate solutions;Assistance required to implement solutions  Insights: Impaired  Initiation: Impaired  Sequencing: Impaired        LUE AROM (degrees)  LUE AROM : WFL  Left Hand AROM (degrees)  Left Hand AROM: WFL  RUE AROM (degrees)  RUE AROM : WFL  Right Hand AROM (degrees)  Right Hand AROM: WFL        Plan   Occupational Therapy Plan  Times Per Week: 3-5x/wk       Goals  Short Term Goals  Time Frame for Short Term Goals: 1 week  Short Term Goal 1: Pt will be Modified I for toileting task/transfer  Short Term Goal 2: Pt will be Modified I for functional mobility to/from bathroom  Short Term Goal 3: Pt will be able to complete grooming tasks standing at sink with Modified I and without LOB  Short Term Goal 4: Pt will be Modified I for LE dressing to don/doff socks with AE prn  Patient Goals   Patient goals : Pt wishes to return home        Electronically signed by Rosy Yeung OT on 8/21/2024 at 2:34 PM

## 2024-08-22 PROBLEM — R52 INTRACTABLE PAIN: Status: ACTIVE | Noted: 2024-08-22

## 2024-08-22 LAB
ANION GAP SERPL CALCULATED.3IONS-SCNC: 11 MMOL/L (ref 7–19)
BASOPHILS # BLD: 0 K/UL (ref 0–0.2)
BASOPHILS NFR BLD: 0.1 % (ref 0–1)
BUN SERPL-MCNC: 26 MG/DL (ref 8–23)
CALCIUM SERPL-MCNC: 8 MG/DL (ref 8.8–10.2)
CHLORIDE SERPL-SCNC: 99 MMOL/L (ref 98–111)
CO2 SERPL-SCNC: 24 MMOL/L (ref 22–29)
CREAT SERPL-MCNC: 0.7 MG/DL (ref 0.5–0.9)
EOSINOPHIL # BLD: 0.1 K/UL (ref 0–0.6)
EOSINOPHIL NFR BLD: 1.4 % (ref 0–5)
ERYTHROCYTE [DISTWIDTH] IN BLOOD BY AUTOMATED COUNT: 17.2 % (ref 11.5–14.5)
GLUCOSE SERPL-MCNC: 123 MG/DL (ref 74–109)
HCT VFR BLD AUTO: 32.4 % (ref 37–47)
HGB BLD-MCNC: 10.3 G/DL (ref 12–16)
IMM GRANULOCYTES # BLD: 0.1 K/UL
LYMPHOCYTES # BLD: 0.5 K/UL (ref 1.1–4.5)
LYMPHOCYTES NFR BLD: 6.1 % (ref 20–40)
MCH RBC QN AUTO: 30.1 PG (ref 27–31)
MCHC RBC AUTO-ENTMCNC: 31.8 G/DL (ref 33–37)
MCV RBC AUTO: 94.7 FL (ref 81–99)
MONOCYTES # BLD: 0.9 K/UL (ref 0–0.9)
MONOCYTES NFR BLD: 11.9 % (ref 0–10)
NEUTROPHILS # BLD: 6.2 K/UL (ref 1.5–7.5)
NEUTS SEG NFR BLD: 79.9 % (ref 50–65)
PLATELET # BLD AUTO: 130 K/UL (ref 130–400)
PMV BLD AUTO: 12.1 FL (ref 9.4–12.3)
POTASSIUM SERPL-SCNC: 3.9 MMOL/L (ref 3.5–5)
RBC # BLD AUTO: 3.42 M/UL (ref 4.2–5.4)
SODIUM SERPL-SCNC: 134 MMOL/L (ref 136–145)
WBC # BLD AUTO: 7.7 K/UL (ref 4.8–10.8)

## 2024-08-22 PROCEDURE — 99223 1ST HOSP IP/OBS HIGH 75: CPT | Performed by: NEUROLOGICAL SURGERY

## 2024-08-22 PROCEDURE — 2580000003 HC RX 258: Performed by: HOSPITALIST

## 2024-08-22 PROCEDURE — 97530 THERAPEUTIC ACTIVITIES: CPT

## 2024-08-22 PROCEDURE — 2700000000 HC OXYGEN THERAPY PER DAY

## 2024-08-22 PROCEDURE — 85025 COMPLETE CBC W/AUTO DIFF WBC: CPT

## 2024-08-22 PROCEDURE — 97116 GAIT TRAINING THERAPY: CPT

## 2024-08-22 PROCEDURE — 97535 SELF CARE MNGMENT TRAINING: CPT

## 2024-08-22 PROCEDURE — 1200000000 HC SEMI PRIVATE

## 2024-08-22 PROCEDURE — 80048 BASIC METABOLIC PNL TOTAL CA: CPT

## 2024-08-22 PROCEDURE — 6370000000 HC RX 637 (ALT 250 FOR IP): Performed by: NURSE PRACTITIONER

## 2024-08-22 PROCEDURE — 94760 N-INVAS EAR/PLS OXIMETRY 1: CPT

## 2024-08-22 PROCEDURE — 6370000000 HC RX 637 (ALT 250 FOR IP): Performed by: HOSPITALIST

## 2024-08-22 PROCEDURE — 36415 COLL VENOUS BLD VENIPUNCTURE: CPT

## 2024-08-22 RX ADMIN — SERTRALINE HYDROCHLORIDE 50 MG: 50 TABLET ORAL at 09:54

## 2024-08-22 RX ADMIN — APIXABAN 5 MG: 5 TABLET, FILM COATED ORAL at 09:54

## 2024-08-22 RX ADMIN — AMIODARONE HYDROCHLORIDE 200 MG: 200 TABLET ORAL at 09:54

## 2024-08-22 RX ADMIN — ACETAMINOPHEN 1000 MG: 500 TABLET ORAL at 21:32

## 2024-08-22 RX ADMIN — OXYCODONE 5 MG: 5 TABLET ORAL at 09:54

## 2024-08-22 RX ADMIN — ACETAMINOPHEN 1000 MG: 500 TABLET ORAL at 04:39

## 2024-08-22 RX ADMIN — TIZANIDINE 4 MG: 4 TABLET ORAL at 13:33

## 2024-08-22 RX ADMIN — Medication 5 MG: at 21:32

## 2024-08-22 RX ADMIN — METHIMAZOLE 5 MG: 5 TABLET ORAL at 21:32

## 2024-08-22 RX ADMIN — APIXABAN 5 MG: 5 TABLET, FILM COATED ORAL at 21:32

## 2024-08-22 RX ADMIN — METHIMAZOLE 5 MG: 5 TABLET ORAL at 09:54

## 2024-08-22 RX ADMIN — METOPROLOL TARTRATE 50 MG: 50 TABLET, FILM COATED ORAL at 09:54

## 2024-08-22 RX ADMIN — SODIUM CHLORIDE, PRESERVATIVE FREE 10 ML: 5 INJECTION INTRAVENOUS at 13:36

## 2024-08-22 RX ADMIN — ATORVASTATIN CALCIUM 40 MG: 40 TABLET, FILM COATED ORAL at 21:32

## 2024-08-22 RX ADMIN — SODIUM CHLORIDE, PRESERVATIVE FREE 10 ML: 5 INJECTION INTRAVENOUS at 21:33

## 2024-08-22 RX ADMIN — ACETAMINOPHEN 1000 MG: 500 TABLET ORAL at 13:34

## 2024-08-22 RX ADMIN — TIZANIDINE 4 MG: 4 TABLET ORAL at 21:32

## 2024-08-22 ASSESSMENT — PAIN DESCRIPTION - LOCATION
LOCATION: GROIN
LOCATION: GROIN
LOCATION: GROIN;BACK;ABDOMEN

## 2024-08-22 ASSESSMENT — PAIN DESCRIPTION - ORIENTATION
ORIENTATION: LEFT
ORIENTATION: MID;LEFT
ORIENTATION: RIGHT;LEFT

## 2024-08-22 ASSESSMENT — PAIN SCALES - GENERAL
PAINLEVEL_OUTOF10: 6
PAINLEVEL_OUTOF10: 7
PAINLEVEL_OUTOF10: 7

## 2024-08-22 ASSESSMENT — PAIN DESCRIPTION - FREQUENCY
FREQUENCY: CONTINUOUS
FREQUENCY: CONTINUOUS

## 2024-08-22 ASSESSMENT — PAIN DESCRIPTION - ONSET: ONSET: ON-GOING

## 2024-08-22 ASSESSMENT — PAIN DESCRIPTION - DESCRIPTORS
DESCRIPTORS: ACHING;DISCOMFORT;SHOOTING
DESCRIPTORS: ACHING;SHOOTING
DESCRIPTORS: THROBBING

## 2024-08-22 ASSESSMENT — PAIN - FUNCTIONAL ASSESSMENT
PAIN_FUNCTIONAL_ASSESSMENT: PREVENTS OR INTERFERES SOME ACTIVE ACTIVITIES AND ADLS
PAIN_FUNCTIONAL_ASSESSMENT: PREVENTS OR INTERFERES SOME ACTIVE ACTIVITIES AND ADLS

## 2024-08-22 ASSESSMENT — PAIN DESCRIPTION - PAIN TYPE
TYPE: ACUTE PAIN
TYPE: ACUTE PAIN

## 2024-08-22 NOTE — PROGRESS NOTES
Physical Therapy  Name: Lashonda Milian  MRN:  [id number]  Date of service:  8/22/2024    Attempt this am and patient just transferred to chair with nursing staff.  Physical Therapy will continue to follow and progress as able.    Electronically signed by Tiki Barrett PTA on 8/22/2024 at 2:22 PM

## 2024-08-22 NOTE — CONSULTS
potassium chloride (KLOR-CON M) extended release tablet 40 mEq, 40 mEq, Oral, PRN **OR** potassium bicarb-citric acid (EFFER-K) effervescent tablet 40 mEq, 40 mEq, Oral, PRN **OR** potassium chloride 10 mEq/100 mL IVPB (Peripheral Line), 10 mEq, IntraVENous, PRN, Bulmaro Andino MD    magnesium sulfate 2000 mg in 50 mL IVPB premix, 2,000 mg, IntraVENous, PRN, Bulmaro Andino MD    ondansetron (ZOFRAN-ODT) disintegrating tablet 4 mg, 4 mg, Oral, Q8H PRN **OR** ondansetron (ZOFRAN) injection 4 mg, 4 mg, IntraVENous, Q6H PRN, Bulmaro Andino MD  Niacin and related    SOCIAL HISTORY  Social History     Tobacco Use   Smoking Status Former    Types: Cigarettes    Passive exposure: Past (from her )   Smokeless Tobacco Never   Tobacco Comments    Smoked less than one pack in her life, started at age 15 in 1965     Social History     Substance and Sexual Activity   Alcohol Use Yes    Comment: just 1-2 at parties, never heavy         Family History   Problem Relation Age of Onset    Pacemaker Mother     Heart Disease Mother         never tobacco    No Known Problems Father     No Known Problems Paternal Grandmother     No Known Problems Paternal Grandfather     No Known Problems Maternal Grandfather          of old age    No Known Problems Maternal Grandmother          of old age    No Known Problems Son     No Known Problems Son          REVIEW OF SYSTEMS:  Constitutional: No fevers No chills  Neck:No stiffness  Respiratory: No shortness of breath  Cardiovascular: No chest pain No palpitations  Gastrointestinal: No abdominal pain    Genitourinary: No Dysuria  Neurological: No headache, no confusion  All other systems are reviewed and are negative.      PHYSICAL EXAM:  Vitals:    24 0954   BP:    Pulse:    Resp: 18   Temp:    SpO2:        Constitutional: The patient appears well-developed and well-nourished.   Eyes - conjunctiva normal.  Conjugate Gaze  Ear, nose, throat - No scars, masses, or lesions  of the lumbar spine was performed using T1, T2, inversion recovery and postcontrast T1W sequences.     Comparison CT scan of the abdomen pelvis dated 08/20/2024.     FINDINGS:  There is slight curvature of the lumbar spine to the left.  There is no definite bone marrow edema seen within the lumbar spine.  There is mild anterior subluxation of L4 on L5 measuring approximately 5 mm.  There is no pars defect.  There is   facet arthropathy seen throughout the lower lumbar spine.  Five lumbar-type vertebral bodies are seen.  The lumbar spinal cord demonstrates normal signal on T2W images.  The conus medullaris is located at the L2 level.     Segmental analysis:     T12-L1:  The the central canal and neural foramina appear patent.     L1-L2:  There is mild disc bulge.  The central canal appears patent.  There is superimposed facet arthropathy resulting in mild stenosis of the neural foramina.     L2-L3:  There is disc bulge and mild to moderate facet arthropathy resulting in mild central canal and lateral recess stenosis.  There is moderate to severe right and moderate left foraminal stenosis.  There is a superimposed right paracentral inferior   disc extrusion measuring approximately 4.8 mm AP, 14 mm in height causing right lateral recess stenosis posterior to the L3 vertebral body.     L3-L4:  The central canal appears patent.  There is mild to moderate facet arthropathy resulting in moderate bilateral foraminal stenosis.     L4-L5:  There is disc bulge and moderate facet arthropathy.  The central canal and lateral recesses appear adequately patent.  There is moderate bilateral foraminal stenosis.     L5-S1:  The central canal and lateral recesses appear patent.  There is moderate to severe facet arthropathy resulting in mild to moderate bilateral foraminal stenosis.     No abnormal enhancement is identified within the lumbar spinal cord or cauda equina on postcontrast images.     IMPRESSION:  No definite evidence for  diskitis or osteomyelitis seen within the lumbar spine.     At the L2-L3 level there is a right paracentral inferior disc extrusion as described above measuring 14 mm in height, 4.8 mm AP, and the findings are causing right lateral recess stenosis posterior to the L3 vertebral body.     There is disc bulge and facet arthropathy seen throughout the lumbar spine as described above resulting in mild central canal and lateral recess stenosis at L2-L3.     Mild grade 1 degenerative spondylolisthesis seen at L4-L5.              ______________________________________   Electronically signed by: SHANDRA JOHNSON M.D.  Date:     08/21/2024  Time:    16:47            Exam Ended: 08/21/24 16:39 CDT Last Resulted: 08/21/24 16:57 CDT          I have personally reviewed the images and my interpretation is:  At L2-3 there is an inferiorly extruded disc fragment that is more eccentric to the right.  This results in moderate to severe spinal canal stenosis.  There is a spondylolisthesis noted L4-5 pending results in moderate bilateral foraminal stenosis.  Of note, there are some increase signal change on the STIR sequences within the L3 and L4 spinous processes.        IMPRESSION  73-year-old with severe back and bilateral groin pain without any significant weakness noted on neurologic exam and an inferiorly extruded disc herniation at L2-3    RECOMMENDATIONS:    Lashonda and I had a long discussion.  I described the various treatment options.  I described the nature of a decompressive laminectomy/microdiscectomy for removal of the disc herniation and treatment of the stenosis at L2-3.  I explained that she would need to stop the Eliquis medications at least 3 days prior to the surgery.  I described nonoperative treatments that included various types of medications and physical therapy.  At this time she does not wish to move forward with any surgical intervention which I find reasonable given her lack of major neurologic deficits and

## 2024-08-22 NOTE — CARE COORDINATION
[facility] is not contracted with patient's insurance. Patient/son notified. Prefer for patient to return home with home health with PT/OT. They requested [facility].  Electronically signed by Guille Ash RN, BSN on 8/22/2024 at 3:17 PM

## 2024-08-22 NOTE — PROGRESS NOTES
Occupational Therapy     08/22/24 1515   OT Whiteboard Notes   Therapy Whiteboard Requires Jo Bassett for safety due to almost falling during recent therapy activity.     Electronically signed by KATERIN Garcia on 8/22/2024 at 3:16 PM

## 2024-08-22 NOTE — PROGRESS NOTES
Occupational Therapy  Pt getting up to recliner with PCA upon arrival for therapy. Pt emotional and in increased pain. Nursing aware. Will continue to follow as able. Electronically signed by KATERIN Garcia on 8/22/2024 at 1:08 PM

## 2024-08-22 NOTE — CARE COORDINATION
Case Management Assessment  Initial Evaluation    Date/Time of Evaluation: 8/22/2024 1:16 PM  Assessment Completed by: Guille Ash RN, BSN    If patient is discharged prior to next notation, then this note serves as note for discharge by case management.    Patient Name: Lashonda Milian                   YOB: 1950  Diagnosis: Intractable abdominal pain [R10.9]  Back strain, initial encounter [S39.012A]  Ambulatory dysfunction [R26.2]                   Date / Time: 8/19/2024 10:39 PM    Patient Admission Status: Observation   Readmission Risk (Low < 19, Mod (19-27), High > 27): No data recorded  Current PCP: Nick Martinez MD  PCP verified by CM? Yes    Chart Reviewed: Yes      History Provided by: Patient, Medical Record  Patient Orientation: Alert and Oriented    Patient Cognition: Alert    Hospitalization in the last 30 days (Readmission):  No    If yes, Readmission Assessment in  Navigator will be completed.    Advance Directives:      Code Status: Full Code   Patient's Primary Decision Maker is: Legal Next of Kin    Primary Decision Maker: Babita Milian - Grandchild - [phone]    Secondary Decision Maker: Daniel Milian - Child - [phone]    Discharge Planning:    Patient lives with: (P) Family Members Type of Home: (P) Skilled Nursing Facility  Primary Care Giver: Self  Patient Support Systems include: Children, Family Members   Current Financial resources: Medicare  Current community resources: None  Current services prior to admission: (P) Durable Medical Equipment            Current DME: (P) Walker            Type of Home Care services:  (P) None    ADLS  Prior functional level: Independent in ADLs/IADLs  Current functional level: Assistance with the following:, Bathing, Toileting, Mobility    PT AM-PAC:   /24  OT AM-PAC:   /24    Family can provide assistance at DC: No  Would you like Case Management to discuss the discharge plan with any other family members/significant  Representative Name:          08/22/24 1311   Service Assessment   Patient Orientation Alert and Oriented   Cognition Alert   History Provided By Patient;Medical Record   Primary Caregiver Self   Accompanied By/Relationship no one   Support Systems Children;Family Members   Patient's Healthcare Decision Maker is: Legal Next of Kin   PCP Verified by CM Yes   Last Visit to PCP Within last 3 months   Prior Functional Level Independent in ADLs/IADLs   Current Functional Level Assistance with the following:;Bathing;Toileting;Mobility   Can patient return to prior living arrangement Unknown at present   Ability to make needs known: Good   Family able to assist with home care needs: No   Would you like for me to discuss the discharge plan with any other family members/significant others, and if so, who? No   Financial Resources Medicare   Community Resources None   CM/SW Referral Other (see comment)  (none)   Social/Functional History   Lives With Family  (grandchildren)   Type of Home House   Home Layout One level   Home Access Level entry   Bathroom Shower/Tub Walk-in shower   Bathroom Toilet Handicap height   Bathroom Equipment Shower chair;Grab bars in shower   Bathroom Accessibility Walker accessible   Home Equipment Rollator   Receives Help From Family   ADL Assistance Needs assistance  (\"I clean up the best I can\")   Bath Moderate assistance   Dressing Minimal assistance   Grooming Independent   Feeding Independent   Toileting Needs assistance   Homemaking Assistance Needs assistance   Meal Prep Maximal   Laundry Maximal   Cleaning Maximal   Gardening Unable to assess (comment)   Yard Work Unable to assess (comment)   Driving Unable to assess (comment)   Shopping Maximal    Unable to assess (comment)   Other (Comment) Unable to assess (comment)   Homemaking Responsibilities No   Ambulation Assistance Needs assistance   Transfer Assistance Needs assistance   Active  No   Patient's  Info family

## 2024-08-22 NOTE — PROGRESS NOTES
Occupational Therapy     08/22/24 1400   Subjective   Subjective Pt in bed upon arrival for therapy. Pt agreeable to participate.   Pain Assessment   Pain Assessment 0-10   Pain Level 7   Pain Location Groin;Back;Abdomen   Pain Orientation Mid;Left   Pain Descriptors Aching;Discomfort;Shooting   Functional Pain Assessment Prevents or interferes some active activities and ADLs   Pain Type Acute pain   Pain Frequency Continuous   Pain Onset On-going   Non-Pharmaceutical Pain Intervention(s) Ambulation/Increased Activity;Repositioned;Rest;Emotional support   Response to Pain Intervention Pain improved but above pain goal   Cognition   Overall Cognitive Status Exceptions   Arousal/Alertness Appears intact   Following Commands Impaired;Inconsistently follows commands   Attention Span Difficulty attending to directions;Difficulty dividing attention   Memory Decreased recall of recent events   Safety Judgement Impaired   Problem Solving Assistance required to generate solutions;Assistance required to implement solutions   Insights Impaired   Initiation Impaired   Sequencing Impaired   Orientation   Overall Orientation Status WFL   Bed Mobility Training   Bed Mobility Training Yes   Overall Level of Assistance Moderate assistance;Maximum assistance;Assist X2   Interventions Verbal cues;Tactile cues;Manual cues   Sit to Supine Moderate assistance;Maximum assistance;Assist X2   Scooting Moderate assistance;Assist X2   Transfer Training   Transfer Training Yes   Overall Level of Assistance Moderate assistance;Maximum assistance;Assist X2   Interventions Verbal cues;Tactile cues;Manual cues   Sit to Stand Moderate assistance;Maximum assistance;Assist X2   Stand to Sit Moderate assistance;Maximum assistance;Assist X2   Toilet Transfer Minimum assistance;Moderate assistance;Assist X2   Balance   Sitting Intact   Standing Impaired   Standing - Static Occasional   Standing - Dynamic Poor   ADL   Feeding Setup;Independent   Grooming  Setup;Supervision   Grooming Skilled Clinical Factors requires initiation   UE Bathing Minimal assistance;Moderate assistance   LE Bathing Maximum assistance   UE Dressing Minimal assistance;Moderate assistance   LE Dressing Maximum assistance   Toileting Maximum assistance   Toileting Skilled Clinical Factors for clothing management and clean up   Functional Mobility Moderate assistance;Maximum assistance   Functional Mobility Skilled Clinical Factors x2 for safety; needs to use Jo Stedy   Additional Comments increased anxiety, decreased activity tolerance, limited by pain.   Assessment   Assessment Tx focused on STS mobility from recliner requiring several attempts and Mod-Max assist x2 from lower recliner at RW level. Pt able to perform functional mobility from recliner to bathroom at RW level with Min assist x2 for safety. Pt required Max assist for clothing management and clean up during toileting. Pt required Min assist for STS from raised toilet seat using a grab bar. Pt assisted back to recliner but was unable to fully turn and sit in recliner before giving out. 2 therapy staff and 2 nursing staff required to safetly get patient into recliner safetly. Jo Stekathy used with 3 assist to get patient back to bed. Pt requird Mod-Max assist x2 for bed mobility and repositioning.   Activity Tolerance Patient limited by fatigue;Patient limited by pain   Discharge Recommendations Patient would benefit from continued therapy after discharge;24 hour supervision or assist   Occupational Therapy Plan   Times Per Week 3-5x/wk   Times Per Day Once a day   OT Plan of Care   Thursday X     Electronically signed by KATERIN Garcia on 8/22/2024 at 3:14 PM

## 2024-08-22 NOTE — CARE COORDINATION
IMM Letter given to patient's sonDaniel. Reviewed, discussed, answered questions, and signed by patient's sonDaniel. Signed copy placed in patient's chart.     08/22/24 1515   IMM Letter   IMM Letter given to Patient/Family/Significant other/Guardian/POA/by: given to patient/son by ALAN ROBB RN BSN CM   IMM Letter date given: 08/22/24   IMM Letter time given: 5700

## 2024-08-22 NOTE — PROGRESS NOTES
[facility]      Patient:  Lashonda Milian  YOB: 1950  Date of Service: 8/22/2024  MRN: [id number]   Acct: [id number]   Primary Care Physician: Nick Martinez MD  Advance Directive: Full Code  Admit Date: 8/19/2024       Hospital Day: 0  Portions of this note have been copied forward, however, changed to reflect the most current clinical status of this patient.    CHIEF COMPLAINT back pain    SUBJECTIVE:  She continues to have severe pain and difficulty ambulating. She states that muscle relaxants have helped. She is anxious to be discharged, however, she is afraid she will fall. Remains on 2LNC, VSS. No issues or events overnight.     CUMULATIVE HOSPITAL STAY:  The patient is a 73 y.o. female with PMH of A-fib on Eliquis, CVA, CHF, hypertension, hyperlipidemia, anxiety, and COPD who presented to [facility] ED on 8/19/2024 with complaints of right lower back and flank pain radiating to her groin and down her right thigh.  Reported had worsened over 2 days prior to admission.  She thought she might have strained a muscle due to sleeping in a new bed and having to pull herself up frequently.  She denied falls or traumas.  She reported chronic constipation but denied bowel or bladder dysfunction.  Denied fever, chills, nausea, vomiting, diarrhea or dysuria.     Further ED workup revealed normal chemistries.  Total bilirubin 2.7.  H&H 11.3/35.8 with a platelet count of 131.  WBC 8.0.  UA negative for infection.  CT of the abdomen pelvis showed asymmetrical swelling of the left psoas muscle and stranding in the left retroperitoneum.  Small amount of intramuscular hemorrhage is possible.  Cholelithiasis without acute cholecystitis.  Splenomegaly.  She received multiple doses of skeletal muscle relaxants, steroids and narcotics and was unable to ambulate or use a wheelchair where she normally ambulates with a walker without difficulty.  The patient was admitted to hospital medicine for ambulatory  dysfunction and intractable back pain.    She was placed on scheduled acetaminophen and given with oral and IV pain medication. She continues to have pain and ambulation difficulty. She reports that \"this fatty tumor in my right leg makes walking schneider because it hurts\". MRI right femur and MRI L-spine ordered due to severity and persistence of pain.   MRI of the L spine-L2-L-3 level there is a right paracentral inferior disc extrusion measuring 14mm in height 4.8mm AP-the findings are causing right lateral stenosis posterior to the L3 vertebral body.   MRI femur 4.3 cm fat signal intensity lesion in the proximal right thigh anteromedial superficial soft tissues-most c/w lipoma. Superficial soft tissue edema about the pelvis and thighs. Nonspecific muscle edema in the proximal thighs. Heterogenous bone marrow non-specific.-Neurosurgery will be consulted due to the patients ongoing severity of her pain.     Case management consulted with discharge planning     Inflammatory markers added CRP-9.7 and Sed-81.  Chemistry, vitals and CBC remain relatively unchanged.  Currently on 2 L nasal cannula.    Review of Systems:   Review of Systems   Constitutional:  Positive for fatigue.   Musculoskeletal:  Positive for back pain, gait problem and myalgias.   Neurological:  Positive for weakness.       14 point review of systems is negative except as specifically addressed above.      Objective:   VITALS:  BP (!) 112/90   Pulse (!) 112   Temp 97 °F (36.1 °C)   Resp 18   Ht 1.575 m (5' 2\")   Wt 115 kg (253 lb 8 oz)   SpO2 94%   BMI 46.37 kg/m²   24HR INTAKE/OUTPUT:    Intake/Output Summary (Last 24 hours) at 8/22/2024 1547  Last data filed at 8/22/2024 1026  Gross per 24 hour   Intake 25 ml   Output --   Net 25 ml       Physical Exam  Constitutional:       Appearance: She is obese. She is ill-appearing.   HENT:      Head: Normocephalic.      Mouth/Throat:      Mouth: Mucous membranes are moist.   Eyes:      Pupils: Pupils

## 2024-08-22 NOTE — PROGRESS NOTES
Physician Progress Note      PATIENT:               CAROLINA HUGHES  [facility] #:                  [id number]  :                       1950  ADMIT DATE:       2024 10:39 PM  DISCH DATE:  RESPONDING  PROVIDER #:        STEVE HAND          QUERY TEXT:    Patient admitted with low back pain, noted to have AFIB and on Eliquis.. If   possible, please document in progress notes and discharge summary if you are   evaluating and/or treating any of the following:?  ?  The medical record reflects the following:  Risk Factors: CHF, CVA, HTN  Clinical Indicators: Female, Age: 73, AFIB  Treatment: Eliquis.    Melissa Fraser, RN-BSN, CRCR  Clinical   [email or web address]  Options provided:  -- Secondary hypercoagulable state in a patient with atrial fibrillation  -- Other - I will add my own diagnosis  -- Disagree - Not applicable / Not valid  -- Disagree - Clinically unable to determine / Unknown  -- Refer to Clinical Documentation Reviewer    PROVIDER RESPONSE TEXT:    This patient has secondary hypercoagulable state in a patient with atrial   fibrillation.    Query created by: Joceline Fraser on 2024 3:14 PM      Electronically signed by:  STEVE HAND 2024 3:18 PM

## 2024-08-23 LAB
A BAUMANNII DNA BLD POS QL NAA+NON-PROBE: NOT DETECTED
ANION GAP SERPL CALCULATED.3IONS-SCNC: 10 MMOL/L (ref 7–19)
BASOPHILS # BLD: 0 K/UL (ref 0–0.2)
BASOPHILS NFR BLD: 0.3 % (ref 0–1)
BUN SERPL-MCNC: 21 MG/DL (ref 8–23)
C ALBICANS DNA BLD POS QL NAA+NON-PROBE: NOT DETECTED
C AURIS DNA BLD POS QL NAA+PROBE: NOT DETECTED
C GLABRATA DNA BLD POS QL NAA+NON-PROBE: NOT DETECTED
C KRUSEI DNA BLD POS QL NAA+NON-PROBE: NOT DETECTED
C PARAP DNA BLD POS QL NAA+NON-PROBE: NOT DETECTED
C TROPICLS DNA BLD POS QL NAA+NON-PROBE: NOT DETECTED
CALCIUM SERPL-MCNC: 8.1 MG/DL (ref 8.8–10.2)
CHLORIDE SERPL-SCNC: 99 MMOL/L (ref 98–111)
CO2 SERPL-SCNC: 23 MMOL/L (ref 22–29)
CREAT SERPL-MCNC: 0.6 MG/DL (ref 0.5–0.9)
CRYPTOCOCCUS NEOFORMANS/GATTII BY PCR: NOT DETECTED
E CLOAC COMP DNA BLD POS NAA+NON-PROBE: NOT DETECTED
E COLI DNA BLD POS QL NAA+NON-PROBE: NOT DETECTED
E FAECALIS DNA BLD POS QL NAA+PROBE: NOT DETECTED
E FAECIUM DNA BLD POS QL NAA+PROBE: NOT DETECTED
ENTEROBACT DNA BLD POS QL NAA+NON-PROBE: NOT DETECTED
ENTEROCOC DNA BLD POS QL NAA+NON-PROBE: NOT DETECTED
EOSINOPHIL # BLD: 0.1 K/UL (ref 0–0.6)
EOSINOPHIL NFR BLD: 1.8 % (ref 0–5)
ERYTHROCYTE [DISTWIDTH] IN BLOOD BY AUTOMATED COUNT: 17.1 % (ref 11.5–14.5)
GLUCOSE SERPL-MCNC: 109 MG/DL (ref 70–99)
GN BLD CULTURE PNL BLD POS NAA+PROBE: NOT DETECTED
GP B STREP DNA BLD POS QL NAA+NON-PROBE: NOT DETECTED
HCT VFR BLD AUTO: 32 % (ref 37–47)
HGB BLD-MCNC: 10.1 G/DL (ref 12–16)
IMM GRANULOCYTES # BLD: 0.1 K/UL
K OXYTOCA DNA BLD POS QL NAA+NON-PROBE: NOT DETECTED
K PNEUMON DNA SPEC QL NAA+PROBE: NOT DETECTED
K. AEROGENES DNA SPEC QL NAA+PROBE: NOT DETECTED
L MONOCYTOG DNA BLD POS QL NAA+NON-PROBE: NOT DETECTED
LYMPHOCYTES # BLD: 0.6 K/UL (ref 1.1–4.5)
LYMPHOCYTES NFR BLD: 8.3 % (ref 20–40)
MCH RBC QN AUTO: 29.6 PG (ref 27–31)
MCHC RBC AUTO-ENTMCNC: 31.6 G/DL (ref 33–37)
MCV RBC AUTO: 93.8 FL (ref 81–99)
MECA+MECC ISLT/SPM QL: DETECTED
MONOCYTES # BLD: 1 K/UL (ref 0–0.9)
MONOCYTES NFR BLD: 13.9 % (ref 0–10)
N MEN DNA BLD POS QL NAA+NON-PROBE: NOT DETECTED
NEUTROPHILS # BLD: 5.3 K/UL (ref 1.5–7.5)
NEUTS SEG NFR BLD: 75 % (ref 50–65)
P AERUGINOSA DNA BLD POS NAA+NON-PROBE: NOT DETECTED
PLATELET # BLD AUTO: 129 K/UL (ref 130–400)
PMV BLD AUTO: 12.1 FL (ref 9.4–12.3)
POTASSIUM SERPL-SCNC: 4.1 MMOL/L (ref 3.5–5)
PROTEUS SP DNA BLD POS QL NAA+NON-PROBE: NOT DETECTED
RBC # BLD AUTO: 3.41 M/UL (ref 4.2–5.4)
S AUREUS DNA BLD POS QL NAA+NON-PROBE: DETECTED
S AUREUS+CONS DNA BLD POS NAA+NON-PROBE: DETECTED
S EPIDERMIDIS DNA BLD POS QL NAA+PROBE: NOT DETECTED
S LUGDUNENSIS DNA BLD POS QL NAA+PROBE: NOT DETECTED
S MALTOPH DNA BLD POS QL NAA+PROBE: NOT DETECTED
S MARCESCENS DNA BLD POS NAA+NON-PROBE: NOT DETECTED
S PNEUM DNA BLD POS QL NAA+NON-PROBE: NOT DETECTED
S PYO DNA BLD POS QL NAA+NON-PROBE: NOT DETECTED
SALMONELLA DNA BLD POS QL NAA+PROBE: NOT DETECTED
SODIUM SERPL-SCNC: 132 MMOL/L (ref 136–145)
STREPTOCOCCUS DNA BLD POS NAA+NON-PROBE: NOT DETECTED
WBC # BLD AUTO: 7.1 K/UL (ref 4.8–10.8)

## 2024-08-23 PROCEDURE — 6360000002 HC RX W HCPCS: Performed by: NURSE PRACTITIONER

## 2024-08-23 PROCEDURE — 97530 THERAPEUTIC ACTIVITIES: CPT

## 2024-08-23 PROCEDURE — 80048 BASIC METABOLIC PNL TOTAL CA: CPT

## 2024-08-23 PROCEDURE — 36415 COLL VENOUS BLD VENIPUNCTURE: CPT

## 2024-08-23 PROCEDURE — 6370000000 HC RX 637 (ALT 250 FOR IP): Performed by: NURSE PRACTITIONER

## 2024-08-23 PROCEDURE — 99232 SBSQ HOSP IP/OBS MODERATE 35: CPT | Performed by: NEUROLOGICAL SURGERY

## 2024-08-23 PROCEDURE — 87040 BLOOD CULTURE FOR BACTERIA: CPT

## 2024-08-23 PROCEDURE — 2580000003 HC RX 258: Performed by: HOSPITALIST

## 2024-08-23 PROCEDURE — 1200000000 HC SEMI PRIVATE

## 2024-08-23 PROCEDURE — 6370000000 HC RX 637 (ALT 250 FOR IP): Performed by: HOSPITALIST

## 2024-08-23 PROCEDURE — 87077 CULTURE AEROBIC IDENTIFY: CPT

## 2024-08-23 PROCEDURE — 87186 SC STD MICRODIL/AGAR DIL: CPT

## 2024-08-23 PROCEDURE — 86403 PARTICLE AGGLUT ANTBDY SCRN: CPT

## 2024-08-23 PROCEDURE — 2700000000 HC OXYGEN THERAPY PER DAY

## 2024-08-23 PROCEDURE — 85025 COMPLETE CBC W/AUTO DIFF WBC: CPT

## 2024-08-23 PROCEDURE — 94760 N-INVAS EAR/PLS OXIMETRY 1: CPT

## 2024-08-23 PROCEDURE — 87150 DNA/RNA AMPLIFIED PROBE: CPT

## 2024-08-23 RX ORDER — DOCUSATE SODIUM 100 MG/1
100 CAPSULE, LIQUID FILLED ORAL 2 TIMES DAILY
Status: DISCONTINUED | OUTPATIENT
Start: 2024-08-23 | End: 2024-08-31 | Stop reason: HOSPADM

## 2024-08-23 RX ORDER — LACTULOSE 10 G/15ML
10 SOLUTION ORAL 2 TIMES DAILY
Status: DISCONTINUED | OUTPATIENT
Start: 2024-08-23 | End: 2024-08-31 | Stop reason: HOSPADM

## 2024-08-23 RX ORDER — DEXAMETHASONE SODIUM PHOSPHATE 10 MG/ML
4 INJECTION, SOLUTION INTRAMUSCULAR; INTRAVENOUS EVERY 8 HOURS
Status: DISCONTINUED | OUTPATIENT
Start: 2024-08-23 | End: 2024-08-29

## 2024-08-23 RX ADMIN — TIZANIDINE 4 MG: 4 TABLET ORAL at 05:28

## 2024-08-23 RX ADMIN — ACETAMINOPHEN 1000 MG: 500 TABLET ORAL at 13:18

## 2024-08-23 RX ADMIN — SERTRALINE HYDROCHLORIDE 50 MG: 50 TABLET ORAL at 10:40

## 2024-08-23 RX ADMIN — BUSPIRONE HYDROCHLORIDE 10 MG: 10 TABLET ORAL at 20:50

## 2024-08-23 RX ADMIN — SODIUM CHLORIDE, PRESERVATIVE FREE 10 ML: 5 INJECTION INTRAVENOUS at 20:51

## 2024-08-23 RX ADMIN — POLYETHYLENE GLYCOL 3350 17 G: 17 POWDER, FOR SOLUTION ORAL at 10:39

## 2024-08-23 RX ADMIN — ACETAMINOPHEN 1000 MG: 500 TABLET ORAL at 20:50

## 2024-08-23 RX ADMIN — METHIMAZOLE 5 MG: 5 TABLET ORAL at 10:40

## 2024-08-23 RX ADMIN — APIXABAN 5 MG: 5 TABLET, FILM COATED ORAL at 10:40

## 2024-08-23 RX ADMIN — Medication 5 MG: at 20:50

## 2024-08-23 RX ADMIN — METOPROLOL TARTRATE 50 MG: 50 TABLET, FILM COATED ORAL at 20:50

## 2024-08-23 RX ADMIN — METHIMAZOLE 5 MG: 5 TABLET ORAL at 20:50

## 2024-08-23 RX ADMIN — ACETAMINOPHEN 1000 MG: 500 TABLET ORAL at 05:28

## 2024-08-23 RX ADMIN — ATORVASTATIN CALCIUM 40 MG: 40 TABLET, FILM COATED ORAL at 20:50

## 2024-08-23 RX ADMIN — AMIODARONE HYDROCHLORIDE 200 MG: 200 TABLET ORAL at 10:40

## 2024-08-23 RX ADMIN — BUSPIRONE HYDROCHLORIDE 10 MG: 10 TABLET ORAL at 10:40

## 2024-08-23 RX ADMIN — DOCUSATE SODIUM 100 MG: 100 CAPSULE, LIQUID FILLED ORAL at 10:40

## 2024-08-23 RX ADMIN — DEXAMETHASONE SODIUM PHOSPHATE 4 MG: 10 INJECTION, SOLUTION INTRAMUSCULAR; INTRAVENOUS at 20:52

## 2024-08-23 RX ADMIN — APIXABAN 5 MG: 5 TABLET, FILM COATED ORAL at 20:50

## 2024-08-23 RX ADMIN — DEXAMETHASONE SODIUM PHOSPHATE 4 MG: 10 INJECTION, SOLUTION INTRAMUSCULAR; INTRAVENOUS at 13:19

## 2024-08-23 RX ADMIN — SODIUM CHLORIDE, PRESERVATIVE FREE 10 ML: 5 INJECTION INTRAVENOUS at 10:40

## 2024-08-23 RX ADMIN — DOCUSATE SODIUM 100 MG: 100 CAPSULE, LIQUID FILLED ORAL at 20:50

## 2024-08-23 RX ADMIN — LACTULOSE 10 G: 10 SOLUTION ORAL at 10:39

## 2024-08-23 NOTE — PROGRESS NOTES
Occupational Therapy Intervention  Date: 2024   Patient Name: Lashonda Milian  MRN: [id number]     : 1950    Date of Service: 2024    Discharge Recommendations:  Continue to assess pending progress, 24 hour supervision or assist, Patient would benefit from continued therapy after discharge       Assessment   Performance deficits / Impairments: Decreased ADL status;Decreased functional mobility ;Decreased high-level IADLs;Decreased endurance;Decreased ROM  Assessment: Treatment completed. Pt required max Ax2 for bed mobility. Pt had just recently completed her second bowel movement with total bed change per Nurse Juarez. Pt was anxious about getting to EOB. Pt reported significant pain in B groins and back. Attempted to get pt to EOB and midway through, pt stated she felt like she was going to have another bowel movement and was laid back down. Encouraged pt to move B UE while sitting up in bed to promote activity. Pt would benefit from continued skilled therapy and encouragement for participation.  Treatment Diagnosis: intractable abdominal pain  Activity Tolerance  Activity Tolerance: Patient limited by fatigue;Patient limited by pain              Patient Diagnosis(es): The primary encounter diagnosis was Back strain, initial encounter. A diagnosis of Ambulatory dysfunction was also pertinent to this visit.    Past Medical History:   Past Medical History:   Diagnosis Date    Arthritis     Atrial fibrillation (HCC)     Paroxysmal A Fib    Cerebral artery occlusion with cerebral infarction (HCC)     TWICE: once in  or , then had another one     CHF (congestive heart failure) (HCC)     COPD (chronic obstructive pulmonary disease) (HCC)     Hyperlipidemia     Hypertension     On continuous oral anticoagulation     Apixaban    Pneumonia     Thyroid disease         Past Surgical History:   Past Surgical History:   Procedure Laterality Date    CARDIOVERSION      HYSTERECTOMY, TOTAL  socks with AE prn  Patient Goals   Patient goals : Pt wishes to return home        (30 minutes)  Rosy Jennings OTR/L  Electronically signed by Rosy ARTEAGA/L on 8/23/2024 at 3:13 PM.

## 2024-08-23 NOTE — PROGRESS NOTES
[facility]      Patient:  Lashonda Milian  YOB: 1950  Date of Service: 8/23/2024  MRN: [id number]   Acct: [id number]   Primary Care Physician: Nick Martinez MD  Advance Directive: Full Code  Admit Date: 8/19/2024       Hospital Day: 1  Portions of this note have been copied forward, however, changed to reflect the most current clinical status of this patient.    CHIEF COMPLAINT back pain    SUBJECTIVE:  She continues to have severe pain and difficulty ambulating. She states that muscle relaxants have helped, but she is still unable to sit up due to pain. She is reporting that she is having hallucinations and she is aware of them. Currently on 2LNC.     CUMULATIVE HOSPITAL STAY:  The patient is a 73 y.o. female with PMH of A-fib on Eliquis, CVA, CHF, hypertension, hyperlipidemia, anxiety, and COPD who presented to [facility] ED on 8/19/2024 with complaints of right lower back and flank pain radiating to her groin and down her right thigh.  Reported had worsened over 2 days prior to admission.  She thought she might have strained a muscle due to sleeping in a new bed and having to pull herself up frequently.  She denied falls or traumas.  She reported chronic constipation but denied bowel or bladder dysfunction.  Denied fever, chills, nausea, vomiting, diarrhea or dysuria.     Further ED workup revealed normal chemistries.  Total bilirubin 2.7.  H&H 11.3/35.8 with a platelet count of 131.  WBC 8.0.  UA negative for infection.  CT of the abdomen pelvis showed asymmetrical swelling of the left psoas muscle and stranding in the left retroperitoneum.  Small amount of intramuscular hemorrhage is possible.  Cholelithiasis without acute cholecystitis.  Splenomegaly.  She received multiple doses of skeletal muscle relaxants, steroids and narcotics and was unable to ambulate or use a wheelchair where she normally ambulates with a walker without difficulty.  The patient was admitted to hospital medicine for  INTAKE/OUTPUT:    Intake/Output Summary (Last 24 hours) at 8/23/2024 1143  Last data filed at 8/23/2024 0804  Gross per 24 hour   Intake --   Output 550 ml   Net -550 ml       Physical Exam  Constitutional:       Appearance: She is obese. She is ill-appearing.   HENT:      Head: Normocephalic.      Mouth/Throat:      Mouth: Mucous membranes are moist.   Eyes:      Pupils: Pupils are equal, round, and reactive to light.   Cardiovascular:      Rate and Rhythm: Normal rate. Rhythm irregularly irregular.      Pulses: Normal pulses.      Heart sounds: Normal heart sounds.   Pulmonary:      Effort: Pulmonary effort is normal.      Breath sounds: Normal breath sounds.   Abdominal:      General: Bowel sounds are normal. There is distension.   Musculoskeletal:      Right lower leg: Deformity and tenderness present. Edema present.      Left lower leg: Edema present.        Legs:    Skin:     General: Skin is warm.   Neurological:      General: No focal deficit present.      Motor: Weakness (Diffuse) present.             Medications:      sodium chloride        docusate sodium  100 mg Oral BID    lactulose  10 g Oral BID    dexAMETHasone  4 mg IntraVENous Q8H    acetaminophen  1,000 mg Oral 3 times per day    amiodarone  200 mg Oral BID    apixaban  5 mg Oral BID    atorvastatin  40 mg Oral Nightly    fluticasone  2 spray Each Nostril Daily    methIMAzole  5 mg Oral BID    sertraline  50 mg Oral Daily    metoprolol tartrate  50 mg Oral BID    sodium chloride flush  5-40 mL IntraVENous 2 times per day     sodium phosphate, tiZANidine, naloxone, oxyCODONE, polyethylene glycol, melatonin, calcium carbonate, busPIRone, sodium chloride flush, sodium chloride, potassium chloride **OR** potassium alternative oral replacement **OR** potassium chloride, magnesium sulfate, ondansetron **OR** ondansetron  ADULT DIET; Regular; 4 carb choices (60 gm/meal); Low Fat/Low Chol/High Fiber/NIMO; No Added Salt (3-4 gm)     Lab and other Data:      Recent Labs     08/21/24 0318 08/22/24  0510 08/23/24  0327   WBC 8.4 7.7 7.1   HGB 10.4* 10.3* 10.1*    130 129*     Recent Labs     08/21/24 0318 08/22/24  0510 08/23/24  0327    134* 132*   K 4.1 3.9 4.1    99 99   CO2 25 24 23   BUN 19 26* 21   CREATININE 0.6 0.7 0.6   GLUCOSE 135* 123* 109*     No results for input(s): \"AST\", \"ALT\", \"BILITOT\", \"ALKPHOS\" in the last 72 hours.    Invalid input(s): \"ALB\"    Troponin T: No results for input(s): \"TROPONINI\" in the last 72 hours.  Pro-BNP: No results for input(s): \"BNP\" in the last 72 hours.  INR: No results for input(s): \"INR\" in the last 72 hours.  UA:  No results for input(s): \"NITRITE\", \"COLORU\", \"PHUR\", \"LABCAST\", \"WBCUA\", \"RBCUA\", \"MUCUS\", \"TRICHOMONAS\", \"YEAST\", \"BACTERIA\", \"CLARITYU\", \"SPECGRAV\", \"LEUKOCYTESUR\", \"UROBILINOGEN\", \"BILIRUBINUR\", \"BLOODU\", \"GLUCOSEU\", \"AMORPHOUS\" in the last 72 hours.    Invalid input(s): \"KETONESU\"    A1C: No results for input(s): \"LABA1C\" in the last 72 hours.  ABG:No results for input(s): \"PHART\", \"LUH1GLZ\", \"PO2ART\", \"TQX1GNN\", \"BEART\", \"HGBAE\", \"A6MFESXV\", \"CARBOXHGBART\" in the last 72 hours.    RAD:   CT ABDOMEN PELVIS W IV CONTRAST Additional Contrast? None    Result Date: 8/20/2024  Asymmetric swelling of the left psoas muscle and stranding in the left retroperitoneum.  Small amount of intramuscular hemorrhage is possible, though no discrete hematoma is visible.  Colonic diverticulosis without acute diverticulitis.  Cholelithiasis without acute cholecystitis.  Cardiomegaly.  ASCVD.  Small hiatal hernia.  Mild splenomegaly.  All CT scans are performed using dose optimization techniques as appropriate to the performed exam and include at least one of the following: Automated exposure control, adjustment of the mA and/or kV according to size, and the use of iterative reconstruction technique.  ______________________________________ Electronically signed by: ANNALISA COSME M.D. Date:     08/20/2024

## 2024-08-23 NOTE — PROGRESS NOTES
NEUROSURGERY  Progress Note    INTERVAL HISTORY: Patient complains of visual hallucinations this a.m.  She describes a Yordan and walking into the room and then subsequently being gone.  In addition, she states that one of her family members was at the bedside which was also not there.  Delirium?  She states her pain is essentially unchanged.  She complains of bilateral groin pain and low back pain.  No new focal weakness in the lower extremities.  She states she has not had a bowel movement in 5 days.    CHIEF COMPLAINT: Bilateral hip and low back pain    HISTORY OF PRESENT ILLNESS:      The patient is a 73 y.o. female with A-fib on Eliquis, previous CVA, hypertension, CHF, anxiety, COPD who presented to the ER on 8/19/2024 with low back/flank pain and bilateral groin pain.  It appears, according to previous medical records that this pain has been present for at least 3 months.  She states that for 5 days ago [place]  She denies any numbness.  She states her low back pain and groin pain are essentially equal in intensity.  She states walking and standing exacerbates her pain.  Due to her severe pain, she is has been admitted to hospital.    An MRI of the lumbar spine was obtained and revealed a inferiorly extruded disc herniation at L2-3 along with stenosis for which I was asked to evaluate.        The patient does take anticoagulation medication.  Eliquis      Past Medical History:   Diagnosis Date    Arthritis     Atrial fibrillation (HCC)     Paroxysmal A Fib    Cerebral artery occlusion with cerebral infarction (HCC)     TWICE: once in 2012 or 2013, then had another one 2021    CHF (congestive heart failure) (HCC)     COPD (chronic obstructive pulmonary disease) (HCC)     Hyperlipidemia     Hypertension     On continuous oral anticoagulation     Apixaban    Pneumonia     Thyroid disease        Past Surgical History:   Procedure Laterality Date    CARDIOVERSION  2020    HYSTERECTOMY, TOTAL  50 mg, Oral, Daily, Bulmaro Andino MD, 50 mg at 24 0954    metoprolol tartrate (LOPRESSOR) tablet 50 mg, 50 mg, Oral, BID, Bulmaro Andino MD, 50 mg at 24 0954    sodium chloride flush 0.9 % injection 5-40 mL, 5-40 mL, IntraVENous, 2 times per day, Bulmaro Andino MD, 10 mL at 24 2133    sodium chloride flush 0.9 % injection 5-40 mL, 5-40 mL, IntraVENous, PRN, Bulmaro Andino MD    0.9 % sodium chloride infusion, , IntraVENous, PRN, Bulmaro Andino MD    potassium chloride (KLOR-CON M) extended release tablet 40 mEq, 40 mEq, Oral, PRN **OR** potassium bicarb-citric acid (EFFER-K) effervescent tablet 40 mEq, 40 mEq, Oral, PRN **OR** potassium chloride 10 mEq/100 mL IVPB (Peripheral Line), 10 mEq, IntraVENous, PRN, Bulmaro Andino MD    magnesium sulfate 2000 mg in 50 mL IVPB premix, 2,000 mg, IntraVENous, PRN, Bulmaro Andino MD    ondansetron (ZOFRAN-ODT) disintegrating tablet 4 mg, 4 mg, Oral, Q8H PRN **OR** ondansetron (ZOFRAN) injection 4 mg, 4 mg, IntraVENous, Q6H PRN, Bulmaro Andino MD  Niacin and related    SOCIAL HISTORY  Social History     Tobacco Use   Smoking Status Former    Types: Cigarettes    Passive exposure: Past (from her )   Smokeless Tobacco Never   Tobacco Comments    Smoked less than one pack in her life, started at age 15 in 1965     Social History     Substance and Sexual Activity   Alcohol Use Yes    Comment: just 1-2 at parties, never heavy         Family History   Problem Relation Age of Onset    Pacemaker Mother     Heart Disease Mother         never tobacco    No Known Problems Father     No Known Problems Paternal Grandmother     No Known Problems Paternal Grandfather     No Known Problems Maternal Grandfather          of old age    No Known Problems Maternal Grandmother          of old age    No Known Problems Son     No Known Problems Son          REVIEW OF SYSTEMS:  Constitutional: No fevers No chills  Neck:No stiffness  Respiratory: No shortness of  stenosis.     L4-L5:  There is disc bulge and moderate facet arthropathy.  The central canal and lateral recesses appear adequately patent.  There is moderate bilateral foraminal stenosis.     L5-S1:  The central canal and lateral recesses appear patent.  There is moderate to severe facet arthropathy resulting in mild to moderate bilateral foraminal stenosis.     No abnormal enhancement is identified within the lumbar spinal cord or cauda equina on postcontrast images.     IMPRESSION:  No definite evidence for diskitis or osteomyelitis seen within the lumbar spine.     At the L2-L3 level there is a right paracentral inferior disc extrusion as described above measuring 14 mm in height, 4.8 mm AP, and the findings are causing right lateral recess stenosis posterior to the L3 vertebral body.     There is disc bulge and facet arthropathy seen throughout the lumbar spine as described above resulting in mild central canal and lateral recess stenosis at L2-L3.     Mild grade 1 degenerative spondylolisthesis seen at L4-L5.              ______________________________________   Electronically signed by: SHANDRA JOHNSON M.D.  Date:     08/21/2024  Time:    16:47            Exam Ended: 08/21/24 16:39 CDT Last Resulted: 08/21/24 16:57 CDT          I have personally reviewed the images and my interpretation is:  At L2-3 there is an inferiorly extruded disc fragment that is more eccentric to the right.  This results in moderate to severe spinal canal stenosis.  There is a spondylolisthesis noted L4-5 pending results in moderate bilateral foraminal stenosis.  Of note, there are some increase signal change on the STIR sequences within and around the L3 and L4 spinous processes.        IMPRESSION  73-year-old with severe back and bilateral groin pain without any significant weakness noted on neurologic exam and an inferiorly extruded disc herniation at L2-3    RECOMMENDATIONS:    Will attempt to move forward with conservative treatments

## 2024-08-23 NOTE — PROGRESS NOTES
Physical Therapy     08/23/24 1421   Restrictions/Precautions   Restrictions/Precautions Fall Risk   Required Braces or Orthoses? No   General   Diagnosis back strain, abd dysfunction, abdominal pain   Subjective   Subjective Pt agreed to therapy.   Vitals   O2 Device Nasal cannula   Subjective   Pain 10/10 B groins/back-RN notified.   Bed mobility   Supine to Sit Moderate assistance;Maximum assistance;2 Person assistance   Sit to Supine Maximum assistance;2 Person assistance   Scooting Maximal assistance;2 Person assistance   Bed Mobility Comments attempted to sit EOB. pt became very anxious and in pain- declined to sit EOB. nursing present prior to tx and stated pt had had frequent bowel movements today with pt feeling like she was going to have a bowel movement if she continued to move. educated pt on bed ex to perform and importance of working with therapy to regain strength/mobility. use of pillows under BUE due to pt c/o painin bilat shoulders.   Short Term Goals   Time Frame for Short Term Goals 2 wks   Short Term Goal 1 supine to sit indep   Short Term Goal 2 sit to stand indep   Short Term Goal 3 amb. 100' with RW SBA   Short Term Goal 4 bed to chair SBA   Activity Tolerance   Activity Tolerance Treatment limited secondary to medical complications;Patient limited by pain   Assessment   Assessment pt limited by pain and frequent bowel movements. left with bed alarm on and all needs in reach.   PT Plan of Care   Friday X   Safety Devices   Type of Devices Bed alarm in place;Call light within reach;Left in bed     Electronically signed by Arnold Gordon PTA on 8/23/2024 at 3:02 PM

## 2024-08-23 NOTE — PLAN OF CARE
Problem: Discharge Planning  Goal: Discharge to home or other facility with appropriate resources  Outcome: Progressing  Flowsheets (Taken 8/22/2024 2136)  Discharge to home or other facility with appropriate resources: Identify barriers to discharge with patient and caregiver     Problem: Safety - Adult  Goal: Free from fall injury  Outcome: Progressing  Flowsheets (Taken 8/22/2024 2243)  Free From Fall Injury: Instruct family/caregiver on patient safety     Problem: ABCDS Injury Assessment  Goal: Absence of physical injury  Outcome: Progressing  Flowsheets (Taken 8/22/2024 2243)  Absence of Physical Injury: Implement safety measures based on patient assessment     Problem: Chronic Conditions and Co-morbidities  Goal: Patient's chronic conditions and co-morbidity symptoms are monitored and maintained or improved  Outcome: Progressing  Flowsheets (Taken 8/22/2024 2136)  Care Plan - Patient's Chronic Conditions and Co-Morbidity Symptoms are Monitored and Maintained or Improved: Monitor and assess patient's chronic conditions and comorbid symptoms for stability, deterioration, or improvement     Problem: Pain  Goal: Verbalizes/displays adequate comfort level or baseline comfort level  Outcome: Progressing

## 2024-08-24 ENCOUNTER — APPOINTMENT (OUTPATIENT)
Dept: CT IMAGING | Age: 74
End: 2024-08-24
Payer: MEDICARE

## 2024-08-24 ENCOUNTER — APPOINTMENT (OUTPATIENT)
Age: 74
End: 2024-08-24
Payer: MEDICARE

## 2024-08-24 LAB
ANION GAP SERPL CALCULATED.3IONS-SCNC: 11 MMOL/L (ref 7–19)
BASOPHILS # BLD: 0 K/UL (ref 0–0.2)
BASOPHILS NFR BLD: 0.1 % (ref 0–1)
BUN SERPL-MCNC: 15 MG/DL (ref 8–23)
CALCIUM SERPL-MCNC: 8.1 MG/DL (ref 8.8–10.2)
CHLORIDE SERPL-SCNC: 102 MMOL/L (ref 98–111)
CO2 SERPL-SCNC: 25 MMOL/L (ref 22–29)
CREAT SERPL-MCNC: 0.5 MG/DL (ref 0.5–0.9)
ECHO AO ASC DIAM: 2.8 CM
ECHO AO ASCENDING AORTA INDEX: 1.33 CM/M2
ECHO AO ROOT DIAM: 2.5 CM
ECHO AO ROOT INDEX: 1.18 CM/M2
ECHO AO SINUS VALSALVA DIAM: 2.3 CM
ECHO AO SINUS VALSALVA INDEX: 1.09 CM/M2
ECHO AO ST JNCT DIAM: 2.5 CM
ECHO AV AREA PEAK VELOCITY: 1.5 CM2
ECHO AV AREA VTI: 1.4 CM2
ECHO AV AREA/BSA PEAK VELOCITY: 0.7 CM2/M2
ECHO AV AREA/BSA VTI: 0.7 CM2/M2
ECHO AV MEAN GRADIENT: 8 MMHG
ECHO AV MEAN VELOCITY: 1.3 M/S
ECHO AV PEAK GRADIENT: 14 MMHG
ECHO AV PEAK VELOCITY: 1.9 M/S
ECHO AV VELOCITY RATIO: 0.47
ECHO AV VTI: 45.3 CM
ECHO BSA: 2.18 M2
ECHO EST RA PRESSURE: 15 MMHG
ECHO IVC PROX: 2.5 CM
ECHO LA AREA 2C: 38.4 CM2
ECHO LA AREA 4C: 30.8 CM2
ECHO LA DIAMETER INDEX: 2.23 CM/M2
ECHO LA DIAMETER: 4.7 CM
ECHO LA MAJOR AXIS: 6.5 CM
ECHO LA MINOR AXIS: 7.1 CM
ECHO LA TO AORTIC ROOT RATIO: 1.88
ECHO LA VOL BP: 146 ML (ref 22–52)
ECHO LA VOL MOD A2C: 169 ML (ref 22–52)
ECHO LA VOL MOD A4C: 118 ML (ref 22–52)
ECHO LA VOL/BSA BIPLANE: 69 ML/M2 (ref 16–34)
ECHO LA VOLUME INDEX MOD A2C: 80 ML/M2 (ref 16–34)
ECHO LA VOLUME INDEX MOD A4C: 56 ML/M2 (ref 16–34)
ECHO LV E' LATERAL VELOCITY: 10 CM/S
ECHO LV E' SEPTAL VELOCITY: 9 CM/S
ECHO LV EDV A2C: 203 ML
ECHO LV EDV A4C: 140 ML
ECHO LV EDV INDEX A4C: 66 ML/M2
ECHO LV EDV NDEX A2C: 96 ML/M2
ECHO LV EJECTION FRACTION A2C: 72 %
ECHO LV EJECTION FRACTION A4C: 67 %
ECHO LV EJECTION FRACTION BIPLANE: 69 % (ref 55–100)
ECHO LV ESV A2C: 58 ML
ECHO LV ESV A4C: 46 ML
ECHO LV ESV INDEX A2C: 27 ML/M2
ECHO LV ESV INDEX A4C: 22 ML/M2
ECHO LV FRACTIONAL SHORTENING: 33 % (ref 28–44)
ECHO LV INTERNAL DIMENSION DIASTOLE INDEX: 2.75 CM/M2
ECHO LV INTERNAL DIMENSION DIASTOLIC: 5.8 CM (ref 3.9–5.3)
ECHO LV INTERNAL DIMENSION SYSTOLIC INDEX: 1.85 CM/M2
ECHO LV INTERNAL DIMENSION SYSTOLIC: 3.9 CM
ECHO LV IVSD: 1 CM (ref 0.6–0.9)
ECHO LV MASS 2D: 233.1 G (ref 67–162)
ECHO LV MASS INDEX 2D: 110.5 G/M2 (ref 43–95)
ECHO LV POSTERIOR WALL DIASTOLIC: 1 CM (ref 0.6–0.9)
ECHO LV RELATIVE WALL THICKNESS RATIO: 0.34
ECHO LVOT AREA: 3.1 CM2
ECHO LVOT AV VTI INDEX: 0.44
ECHO LVOT DIAM: 2 CM
ECHO LVOT MEAN GRADIENT: 2 MMHG
ECHO LVOT PEAK GRADIENT: 3 MMHG
ECHO LVOT PEAK VELOCITY: 0.9 M/S
ECHO LVOT STROKE VOLUME INDEX: 29.6 ML/M2
ECHO LVOT SV: 62.5 ML
ECHO LVOT VTI: 19.9 CM
ECHO MV A VELOCITY: 0.49 M/S
ECHO MV E DECELERATION TIME (DT): 174 MS
ECHO MV E VELOCITY: 1.33 M/S
ECHO MV E/A RATIO: 2.71
ECHO MV E/E' LATERAL: 13.3
ECHO MV E/E' RATIO (AVERAGED): 14.04
ECHO MV E/E' SEPTAL: 14.78
ECHO PULMONARY ARTERY END DIASTOLIC PRESSURE: 5 MMHG
ECHO PV REGURGITANT MAX VELOCITY: 1.1 M/S
ECHO RA AREA 4C: 21.5 CM2
ECHO RA END SYSTOLIC VOLUME APICAL 4 CHAMBER INDEX BSA: 32 ML/M2
ECHO RA VOLUME: 68 ML
ECHO RIGHT VENTRICULAR SYSTOLIC PRESSURE (RVSP): 66 MMHG
ECHO RV BASAL DIMENSION: 4.4 CM
ECHO RV INTERNAL DIMENSION: 4.5 CM
ECHO RV LONGITUDINAL DIMENSION: 7.2 CM
ECHO RV MID DIMENSION: 3.9 CM
ECHO RV TAPSE: 2 CM (ref 1.7–?)
ECHO TV REGURGITANT MAX VELOCITY: 3.58 M/S
ECHO TV REGURGITANT PEAK GRADIENT: 51 MMHG
EOSINOPHIL # BLD: 0 K/UL (ref 0–0.6)
EOSINOPHIL NFR BLD: 0 % (ref 0–5)
ERYTHROCYTE [DISTWIDTH] IN BLOOD BY AUTOMATED COUNT: 16.7 % (ref 11.5–14.5)
GLUCOSE SERPL-MCNC: 132 MG/DL (ref 70–99)
HCT VFR BLD AUTO: 33.3 % (ref 37–47)
HGB BLD-MCNC: 10.9 G/DL (ref 12–16)
IMM GRANULOCYTES # BLD: 0 K/UL
LYMPHOCYTES # BLD: 0.4 K/UL (ref 1.1–4.5)
LYMPHOCYTES NFR BLD: 6.1 % (ref 20–40)
MCH RBC QN AUTO: 29.6 PG (ref 27–31)
MCHC RBC AUTO-ENTMCNC: 32.7 G/DL (ref 33–37)
MCV RBC AUTO: 90.5 FL (ref 81–99)
MONOCYTES # BLD: 0.3 K/UL (ref 0–0.9)
MONOCYTES NFR BLD: 4.3 % (ref 0–10)
NEUTROPHILS # BLD: 6.2 K/UL (ref 1.5–7.5)
NEUTS SEG NFR BLD: 88.9 % (ref 50–65)
PLATELET # BLD AUTO: 139 K/UL (ref 130–400)
PMV BLD AUTO: 11.9 FL (ref 9.4–12.3)
POTASSIUM SERPL-SCNC: 4.3 MMOL/L (ref 3.5–5)
RBC # BLD AUTO: 3.68 M/UL (ref 4.2–5.4)
SODIUM SERPL-SCNC: 138 MMOL/L (ref 136–145)
WBC # BLD AUTO: 6.9 K/UL (ref 4.8–10.8)

## 2024-08-24 PROCEDURE — 2580000003 HC RX 258: Performed by: HOSPITALIST

## 2024-08-24 PROCEDURE — 71260 CT THORAX DX C+: CPT

## 2024-08-24 PROCEDURE — 94760 N-INVAS EAR/PLS OXIMETRY 1: CPT

## 2024-08-24 PROCEDURE — 85025 COMPLETE CBC W/AUTO DIFF WBC: CPT

## 2024-08-24 PROCEDURE — 93306 TTE W/DOPPLER COMPLETE: CPT

## 2024-08-24 PROCEDURE — 6360000002 HC RX W HCPCS: Performed by: NURSE PRACTITIONER

## 2024-08-24 PROCEDURE — 6370000000 HC RX 637 (ALT 250 FOR IP): Performed by: NURSE PRACTITIONER

## 2024-08-24 PROCEDURE — 80048 BASIC METABOLIC PNL TOTAL CA: CPT

## 2024-08-24 PROCEDURE — 87040 BLOOD CULTURE FOR BACTERIA: CPT

## 2024-08-24 PROCEDURE — 86403 PARTICLE AGGLUT ANTBDY SCRN: CPT

## 2024-08-24 PROCEDURE — 70488 CT MAXILLOFACIAL W/O & W/DYE: CPT

## 2024-08-24 PROCEDURE — 2700000000 HC OXYGEN THERAPY PER DAY

## 2024-08-24 PROCEDURE — 99232 SBSQ HOSP IP/OBS MODERATE 35: CPT | Performed by: NURSE PRACTITIONER

## 2024-08-24 PROCEDURE — 6360000002 HC RX W HCPCS: Performed by: HOSPITALIST

## 2024-08-24 PROCEDURE — 36415 COLL VENOUS BLD VENIPUNCTURE: CPT

## 2024-08-24 PROCEDURE — 1200000000 HC SEMI PRIVATE

## 2024-08-24 PROCEDURE — 6360000004 HC RX CONTRAST MEDICATION: Performed by: NURSE PRACTITIONER

## 2024-08-24 PROCEDURE — 6370000000 HC RX 637 (ALT 250 FOR IP): Performed by: HOSPITALIST

## 2024-08-24 RX ORDER — IOPAMIDOL 755 MG/ML
70 INJECTION, SOLUTION INTRAVASCULAR
Status: COMPLETED | OUTPATIENT
Start: 2024-08-24 | End: 2024-08-24

## 2024-08-24 RX ADMIN — IOPAMIDOL 70 ML: 755 INJECTION, SOLUTION INTRAVENOUS at 15:14

## 2024-08-24 RX ADMIN — ACETAMINOPHEN 1000 MG: 500 TABLET ORAL at 04:20

## 2024-08-24 RX ADMIN — DEXAMETHASONE SODIUM PHOSPHATE 4 MG: 10 INJECTION, SOLUTION INTRAMUSCULAR; INTRAVENOUS at 20:26

## 2024-08-24 RX ADMIN — AMIODARONE HYDROCHLORIDE 200 MG: 200 TABLET ORAL at 20:26

## 2024-08-24 RX ADMIN — ACETAMINOPHEN 1000 MG: 500 TABLET ORAL at 20:26

## 2024-08-24 RX ADMIN — METHIMAZOLE 5 MG: 5 TABLET ORAL at 10:03

## 2024-08-24 RX ADMIN — METOPROLOL TARTRATE 50 MG: 50 TABLET, FILM COATED ORAL at 20:26

## 2024-08-24 RX ADMIN — ACETAMINOPHEN 1000 MG: 500 TABLET ORAL at 13:58

## 2024-08-24 RX ADMIN — DEXAMETHASONE SODIUM PHOSPHATE 4 MG: 10 INJECTION, SOLUTION INTRAMUSCULAR; INTRAVENOUS at 13:58

## 2024-08-24 RX ADMIN — SODIUM CHLORIDE, PRESERVATIVE FREE 10 ML: 5 INJECTION INTRAVENOUS at 09:47

## 2024-08-24 RX ADMIN — APIXABAN 5 MG: 5 TABLET, FILM COATED ORAL at 20:26

## 2024-08-24 RX ADMIN — ATORVASTATIN CALCIUM 40 MG: 40 TABLET, FILM COATED ORAL at 20:26

## 2024-08-24 RX ADMIN — SERTRALINE HYDROCHLORIDE 50 MG: 50 TABLET ORAL at 09:39

## 2024-08-24 RX ADMIN — METHIMAZOLE 5 MG: 5 TABLET ORAL at 20:26

## 2024-08-24 RX ADMIN — VANCOMYCIN HYDROCHLORIDE 2500 MG: 10 INJECTION, POWDER, LYOPHILIZED, FOR SOLUTION INTRAVENOUS at 00:50

## 2024-08-24 RX ADMIN — OXYCODONE 2.5 MG: 5 TABLET ORAL at 10:03

## 2024-08-24 RX ADMIN — VANCOMYCIN HYDROCHLORIDE 1000 MG: 10 INJECTION, POWDER, LYOPHILIZED, FOR SOLUTION INTRAVENOUS at 14:05

## 2024-08-24 RX ADMIN — AMIODARONE HYDROCHLORIDE 200 MG: 200 TABLET ORAL at 09:47

## 2024-08-24 RX ADMIN — DEXAMETHASONE SODIUM PHOSPHATE 4 MG: 10 INJECTION, SOLUTION INTRAMUSCULAR; INTRAVENOUS at 04:20

## 2024-08-24 RX ADMIN — BUSPIRONE HYDROCHLORIDE 10 MG: 10 TABLET ORAL at 10:03

## 2024-08-24 RX ADMIN — APIXABAN 5 MG: 5 TABLET, FILM COATED ORAL at 09:39

## 2024-08-24 RX ADMIN — DOCUSATE SODIUM 100 MG: 100 CAPSULE, LIQUID FILLED ORAL at 09:39

## 2024-08-24 ASSESSMENT — PAIN SCALES - GENERAL
PAINLEVEL_OUTOF10: 9
PAINLEVEL_OUTOF10: 7
PAINLEVEL_OUTOF10: 4

## 2024-08-24 ASSESSMENT — PAIN DESCRIPTION - LOCATION
LOCATION: BACK

## 2024-08-24 ASSESSMENT — PAIN DESCRIPTION - DESCRIPTORS
DESCRIPTORS: ACHING

## 2024-08-24 ASSESSMENT — PAIN DESCRIPTION - ORIENTATION: ORIENTATION: MID

## 2024-08-24 NOTE — PROGRESS NOTES
NEUROSURGERY  Progress Note    INTERVAL HISTORY: Blood cultures positive for staph aureus.  She continues to have low back pain that she rates a 7 out of 10.  Infectious disease already consulted.  No new changes.    CHIEF COMPLAINT: Bilateral hip and low back pain    HISTORY OF PRESENT ILLNESS:      The patient is a 73 y.o. female with A-fib on Eliquis, previous CVA, hypertension, CHF, anxiety, COPD who presented to the ER on 8/19/2024 with low back/flank pain and bilateral groin pain.  It appears, according to previous medical records that this pain has been present for at least 3 months.  She states that for 5 days ago [place].  She denies any numbness.  She states her low back pain and groin pain are essentially equal in intensity.  She states walking and standing exacerbates her pain.  Due to her severe pain, she is has been admitted to hospital.    An MRI of the lumbar spine was obtained and revealed a inferiorly extruded disc herniation at L2-3 along with stenosis for which I was asked to evaluate.        The patient does take anticoagulation medication.  Eliquis      Past Medical History:   Diagnosis Date    Arthritis     Atrial fibrillation (HCC)     Paroxysmal A Fib    Cerebral artery occlusion with cerebral infarction (HCC)     TWICE: once in 2012 or 2013, then had another one 2021    CHF (congestive heart failure) (HCC)     COPD (chronic obstructive pulmonary disease) (HCC)     Hyperlipidemia     Hypertension     On continuous oral anticoagulation     Apixaban    Pneumonia     Thyroid disease        Past Surgical History:   Procedure Laterality Date    CARDIOVERSION  2020    HYSTERECTOMY, TOTAL ABDOMINAL (CERVIX REMOVED) N/A     With removal of tumor, ~2014, was a DANIS and BSO    TONSILLECTOMY Bilateral     at age 17 or 18, withOUT Adneoids as per pt    TUMOR REMOVAL      intraabdominal, ~2014, states she was told it was feeding off her bowel        MEDICATIONS    Current  CALCIUM 8.1 (L) 08/24/2024    BILITOT 2.7 (H) 08/19/2024    ALKPHOS 82 08/19/2024    AST 25 08/19/2024    ALT 13 08/19/2024    LABGLOM >90 08/24/2024    GFRAA >59 12/06/2021     Lab Results   Component Value Date    INR 1.36 (H) 05/30/2023    INR 1.19 (H) 10/02/2020    PROTIME 16.3 (H) 05/30/2023    PROTIME 15.1 (H) 10/02/2020         IMAGING:    Narrative & Impression  EXAM:  MRI OF THE LUMBAR SPINE WITHOUT AND WITH CONTRAST     HISTORY:  Severe back pain.  Possible diskitis with paraspinal abscess     TECHNIQUE:  Multiplanar imaging of the lumbar spine was performed using T1, T2, inversion recovery and postcontrast T1W sequences.     Comparison CT scan of the abdomen pelvis dated 08/20/2024.     FINDINGS:  There is slight curvature of the lumbar spine to the left.  There is no definite bone marrow edema seen within the lumbar spine.  There is mild anterior subluxation of L4 on L5 measuring approximately 5 mm.  There is no pars defect.  There is   facet arthropathy seen throughout the lower lumbar spine.  Five lumbar-type vertebral bodies are seen.  The lumbar spinal cord demonstrates normal signal on T2W images.  The conus medullaris is located at the L2 level.     Segmental analysis:     T12-L1:  The the central canal and neural foramina appear patent.     L1-L2:  There is mild disc bulge.  The central canal appears patent.  There is superimposed facet arthropathy resulting in mild stenosis of the neural foramina.     L2-L3:  There is disc bulge and mild to moderate facet arthropathy resulting in mild central canal and lateral recess stenosis.  There is moderate to severe right and moderate left foraminal stenosis.  There is a superimposed right paracentral inferior   disc extrusion measuring approximately 4.8 mm AP, 14 mm in height causing right lateral recess stenosis posterior to the L3 vertebral body.     L3-L4:  The central canal appears patent.  There is mild to moderate facet arthropathy resulting in

## 2024-08-24 NOTE — PROGRESS NOTES
[facility]      Patient:  Lashonda Milian  YOB: 1950  Date of Service: 8/24/2024  MRN: [id number]   Acct: [id number]   Primary Care Physician: Nick Martinez MD  Advance Directive: Full Code  Admit Date: 8/19/2024       Hospital Day: 2  Portions of this note have been copied forward, however, changed to reflect the most current clinical status of this patient.    CHIEF COMPLAINT back pain    SUBJECTIVE: She is much more awake and alert. She states that her pain has improved and she is not having hallucinations. She remains afebrile.     CUMULATIVE HOSPITAL STAY:  The patient is a 73 y.o. female with PMH of A-fib on Eliquis, CVA, CHF, hypertension, hyperlipidemia, anxiety, and COPD who presented to [facility] ED on 8/19/2024 with complaints of right lower back and flank pain radiating to her groin and down her right thigh.  Reported had worsened over 2 days prior to admission.  She thought she might have strained a muscle due to sleeping in a new bed and having to pull herself up frequently.  She denied falls or traumas.  She reported chronic constipation but denied bowel or bladder dysfunction.  Denied fever, chills, nausea, vomiting, diarrhea or dysuria.     Further ED workup revealed normal chemistries.  Total bilirubin 2.7.  H&H 11.3/35.8 with a platelet count of 131.  WBC 8.0.  UA negative for infection.  CT of the abdomen pelvis showed asymmetrical swelling of the left psoas muscle and stranding in the left retroperitoneum.  Small amount of intramuscular hemorrhage is possible.  Cholelithiasis without acute cholecystitis.  Splenomegaly.  She received multiple doses of skeletal muscle relaxants, steroids and narcotics and was unable to ambulate or use a wheelchair where she normally ambulates with a walker without difficulty.  The patient was admitted to hospital medicine for ambulatory dysfunction and intractable back pain.    She was placed on scheduled acetaminophen and given with oral and  Detected Not Detected   Proteus species by PCR  Not Detected Not Detected   Pseudomonas aeruginosa by PCR  Not Detected Not Detected   Salmonella species by PCR  Not Detected Not Detected   Streptococcus agalactiae by PCR  Not Detected Not Detected   Staphylococcus aureus by PCR  Not Detected DETECTED Panic    Staphylococcus epidermidis by PCR  Not Detected Not Detected   Staphylococcus lugdunensis by PCR  Not Detected Not Detected   Staphylococcus species by PCR  Not Detected DETECTED Panic    Serratia marcescens by PCR  Not Detected Not Detected   Streptococcus pneumoniae by PCR  Not Detected Not Detected   Streptococcus pyogenes  by PCR  Not Detected Not Detected   Streptococcus species by PCR  Not Detected Not Detected   Stenotrophomonas maltophilia by PCR  Not Detected Not Detected   Candida albicans by PCR  Not Detected Not Detected   Candida auris by PCR  Not Detected Not Detected   Candida glabrata by PCR  Not Detected Not Detected   Candida krusei by PCR  Not Detected Not Detected   Candida parapsilosis by PCR  Not Detected Not Detected   Candida tropicalis by PCR  Not Detected Not Detected   Cryptococcus neoformans/gattii by PCR  Not Detected Not Detected   Methicillin Resistance mecA/C and MREJ by PCR  Not Detected DETECTED Panic    Resulting Agency [facility]         Assessment/Plan   Principal Problem:  Bacteremia   -BC x2 on 8/23/24 + for staph species with MRSA   -Vanco-pharmacy to dose   -No clear source current identified   -Denies any implants such as prosthesis or ports   -CT sinus and chest with IV contrast   -2D echo   -Consult ID   -Repeat blood cultures      Active Problems:  Inntractable abdominal pain/Abnormal abdominal CT scan   -pain medication-all po at this time   -CT of the abdomen pelvis with IV contrast asymmetric swelling of the left psoas muscle and stranding in the left retroperitoneum.  Small amount of intramuscular hemorrhage is possible although no discrete  hematoma visible   -Abdominal pain is felt to be a radiation from hip and back   -PRN stool softeners available   -inflammatory markers-elevated   -Daily labs   -Monitor vital signs      Morbidly obese (HCC)   -Complicates care      Atrial fibrillation (HCC)   -rate well controlled   -continue home rate control meds   -Continue home Eliquis    Intractable back pain   -MRI of the L-spine to r/o L2-L-3 level there is a right paracentral inferior disc extrusion measuring 14mm in height 4.8mm AP-the findings are causing right lateral stenosis posterior to the L3 vertebral body.    -Continue oral  pain meds   -Musculoskeletal muscle relaxants    -Continue scheduled acetaminophen   -PT/OT   -Fall precautions   -Case management for discharge planning   -Neurosurgery consulted due to severity of pain-declines surgery-recommends non-surgical treatment.   -Decadron IV Q8 hours ordered due to pain severity with improvement in pain      Ambulatory dysfunction   -PT/OT      Leg mass, right    -MRI of the right femur-4.3 cm fat signal intensity lesion in the proximal right thigh anteromedial superficial soft tissues-most c/w lipoma. Superficial soft tissue edema about the pelvis and thighs. Nonspecific muscle edema in the proximal thighs. Heterogenous bone marrow non-specific    Constipation   -Improved   -Bowel regimen with PRN softeners   -Lactulose       Resolved Problems:    * No resolved hospital problems. *      DVT Prophylaxis: eliquis     Disposition: RAMBO Lindsey, LALITA, 8/24/2024 3:39 PM

## 2024-08-24 NOTE — PLAN OF CARE
Problem: Discharge Planning  Goal: Discharge to home or other facility with appropriate resources  Outcome: Progressing  Flowsheets (Taken 8/23/2024 2057)  Discharge to home or other facility with appropriate resources: Identify barriers to discharge with patient and caregiver     Problem: Safety - Adult  Goal: Free from fall injury  Outcome: Progressing  Flowsheets (Taken 8/23/2024 2204)  Free From Fall Injury: Instruct family/caregiver on patient safety     Problem: ABCDS Injury Assessment  Goal: Absence of physical injury  Outcome: Progressing  Flowsheets (Taken 8/23/2024 2204)  Absence of Physical Injury: Implement safety measures based on patient assessment     Problem: Chronic Conditions and Co-morbidities  Goal: Patient's chronic conditions and co-morbidity symptoms are monitored and maintained or improved  Outcome: Progressing  Flowsheets (Taken 8/23/2024 2057)  Care Plan - Patient's Chronic Conditions and Co-Morbidity Symptoms are Monitored and Maintained or Improved: Monitor and assess patient's chronic conditions and comorbid symptoms for stability, deterioration, or improvement     Problem: Pain  Goal: Verbalizes/displays adequate comfort level or baseline comfort level  Outcome: Progressing     Problem: Skin/Tissue Integrity  Goal: Absence of new skin breakdown  Description: 1.  Monitor for areas of redness and/or skin breakdown  2.  Assess vascular access sites hourly  3.  Every 4-6 hours minimum:  Change oxygen saturation probe site  4.  Every 4-6 hours:  If on nasal continuous positive airway pressure, respiratory therapy assess nares and determine need for appliance change or resting period.  Outcome: Progressing

## 2024-08-24 NOTE — CONSULTS
INFECTIOUS DISEASES CONSULT NOTE    Patient:  Lashonda Milian 73 y.o. female  ROOM # [unfilled]  YOB: 1950  MRN: [id number]  CSN:  [id number]  Admit date: 8/19/2024   Admitting Physician: Teresa Urbina MD  Primary Care Physician: Nick Martinez MD  REFERRING PROVIDER: No ref. provider found    Reason for Consultation: \"MRSA bacteremia-source unclear\"    History of Present Illness/Chief Complaint: Pleasant 73-year-old woman.  History is obtained from patient and chart review.  Asked to evaluate her because of MRSA bloodstream infection.  When asked when her symptoms started she indicated, \"I had a stroke in the past so you will have to give me a minute.\"  It was a little hard to pinpoint the onset of symptoms that led to her current hospitalization.  She indicates she has had some chronic pain in the back and hips.  She described [place] where she fell backwards and hit her buttocks on a tile floor.  I could not tell from her description exactly when that fall occurred.  In terms of her pain she indicates it started more on the left side in the buttock and then went to the hip and groins and traveled somewhat to the right.  She does not describe any problems with dysuria, urinary frequency, or hematuria.  She has not had any abdominal pain or diarrhea.  She does not describe fevers.  Her admit H&P indicates she has had some sweating at night, but she did not indicate that to me.  She has had some chills but from what she describes a seemed fairly mild.  In addition to the above symptoms she has had fatigue.  She reports no recent boils or cellulitis.  She does not report any productive cough or sputum production.  She has had some chronic dyspnea with exertion.  She has not had chest pain or chest pressure.  Her blood cultures this admission have grown Staphylococcus aureus.  Infectious disease asked to evaluate and offer recommendations.    Current Scheduled Medications:     vancomycin  1,000 mg IntraVENous Q12H    docusate sodium  100 mg Oral BID    lactulose  10 g Oral BID    dexAMETHasone  4 mg IntraVENous Q8H    vancomycin (VANCOCIN) intermittent dosing (placeholder)   Other RX Placeholder    acetaminophen  1,000 mg Oral 3 times per day    amiodarone  200 mg Oral BID    apixaban  5 mg Oral BID    atorvastatin  40 mg Oral Nightly    fluticasone  2 spray Each Nostril Daily    methIMAzole  5 mg Oral BID    sertraline  50 mg Oral Daily    metoprolol tartrate  50 mg Oral BID    sodium chloride flush  5-40 mL IntraVENous 2 times per day     Current PRN Medications:  tiZANidine, naloxone, oxyCODONE, polyethylene glycol, melatonin, calcium carbonate, busPIRone, sodium chloride flush, sodium chloride, potassium chloride **OR** potassium alternative oral replacement **OR** potassium chloride, magnesium sulfate, ondansetron **OR** ondansetron    Allergies:    Allergies   Allergen Reactions    Niacin And Related Rash     Past Medical History: She reports a prior history of stroke.  Congestive heart failure.  Chronic obstructive pulmonary disease.  Hypertension.  Atrial fibrillation.  Hyperlipidemia.  Pneumonia.  Thyroid dysfunction.    Past Surgical History: Hysterectomy.  Tonsillectomy.    Social History: No significant tobacco use.  No history of heavy alcohol use.  No illicit drug use.    Family History: Pacemaker placement.  Heart disease.    Exposure History: No close contacts have been ill.    Review of Systems: She did not describe symptoms to suggest TIA.    Vital Signs:  BP (!) 129/56   Pulse 59   Temp 98.4 °F (36.9 °C) (Temporal)   Resp 16   Ht 1.575 m (5' 2\")   Wt 115 kg (253 lb 8 oz)   SpO2 93%   BMI 46.37 kg/m²  Temp (24hrs), Av.5 °F (36.4 °C), Min:96.8 °F (36 °C), Max:98.4 °F (36.9 °C)    Physical Exam:   Vital signs reviewed.  Alert, pleasant, no distress  Sclera nonicteric without conjunctival hemorrhages  Lungs clear without crackles or wheezes  Heart regular rhythm  without murmur  Abdomen is soft and nontender without hepatosplenomegaly  Extremities no splinter hemorrhages, Janeway lesions, or Osler's nodes  Extremities with trace edema  Did not find any focal neurological deficits  No clonus with forced dorsiflexion of either foot    Lab Results:  CBC:   Recent Labs     08/22/24  0510 08/23/24  0327 08/24/24  0248   WBC 7.7 7.1 6.9   HGB 10.3* 10.1* 10.9*   HCT 32.4* 32.0* 33.3*    129* 139   LYMPHOPCT 6.1* 8.3* 6.1*   MONOPCT 11.9* 13.9* 4.3     CMP:   Recent Labs     08/22/24  0510 08/23/24  0327 08/24/24  0248   * 132* 138   K 3.9 4.1 4.3   CL 99 99 102   CO2 24 23 25   BUN 26* 21 15   CREATININE 0.7 0.6 0.5   CALCIUM 8.0* 8.1* 8.1*   GLUCOSE 123* 109* 132*     Culture:   Blood cultures August 23, 2024-gram-positive cocci in clusters  Blood cultures August 23, 2024-gram-positive cocci in clusters  (Molecular identification-MRSA)  Blood cultures August 24, 2024-pending    Radiology:     CT scan of the abdomen and pelvis with contrast on August 20, 2024:  IMPRESSION:  Asymmetric swelling of the left psoas muscle and stranding in the left retroperitoneum.  Small amount of intramuscular hemorrhage is possible, though no discrete hematoma is visible.  Colonic diverticulosis without acute diverticulitis.  Cholelithiasis without acute cholecystitis.  Cardiomegaly.  ASCVD.  Small hiatal hernia.  Mild splenomegaly.    MRI of right femur with and without contrast August 21, 2024:  FINDINGS:  Heterogeneous bone marrow, nonspecific.  No findings suggesting acute fracture or significant stress reaction.  Degenerative changes to the right hip including joint space narrowing and subchondral cyst formation.  Less pronounced degenerative changes to the left hip.  Degenerative changes to the pubic symphysis.  4.3 x 4.2 cm fat signal intensity lesion in the anterior right thigh subcutaneous tissues in the area of concern.  There are thin enhancing septa.  No enhancing  get exacerbation of chronic pain in the setting of bloodstream infection.  She has had some increased back and hip area pain, but no definite abscess or infection on the above imaging.  May need to repeat imaging if symptoms persist to look for interval change and also to make sure MRI cuts through the lumbar spine extend enough laterally to  any possible psoas abscess.  Would recommend follow-up blood cultures to make sure bloodstream infection is clearing.  Continue to monitor signs and symptoms for metastatic focus of infection/abscess.  Consider NABOR for further evaluation particularly if blood cultures remain positive.  Likely planning for 6-week course of intravenous antibiotic treatment.  Continue supportive care.  Continue to follow-up.    SAMSON MORSE MD  08/24/24  3:57 PM

## 2024-08-24 NOTE — PROGRESS NOTES
[facility]   Pharmacy Pharmacokinetic Monitoring Service - Vancomycin     Lashonda Milian is a 73 y.o. female starting on vancomycin therapy for bacteremia. Pharmacy consulted by Dr Andino for monitoring and adjustment.    Target Concentration: Goal AUC/LAKHWINDER 400-600 mg*hr/L    Additional Antimicrobials: none    Pertinent Laboratory Values:   Wt Readings from Last 1 Encounters:   08/21/24 115 kg (253 lb 8 oz)     Temp Readings from Last 1 Encounters:   08/23/24 97.9 °F (36.6 °C) (Oral)     Estimated Creatinine Clearance: 103 mL/min (based on SCr of 0.6 mg/dL).  Recent Labs     08/22/24  0510 08/23/24  0327   CREATININE 0.7 0.6   BUN 26* 21   WBC 7.7 7.1       Pertinent Cultures:  Culture Date Source Results   8/23/24 Blood x2 G(+) cocci in clusters resembling staph   MRSA Nasal Swab: N/A. Non-respiratory infection.    Plan:  Dosing recommendations based on Bayesian software  Start vancomycin 2500mg IV load followed by 1000mg IV q12h  Anticipated AUC of 533 and trough concentration of 15.4 at steady state  Renal labs as indicated   Vancomycin concentration ordered for 8/25 @ 1230   Pharmacy will continue to monitor patient and adjust therapy as indicated    Thank you for the consult,  Becca Stockton RPH  8/23/2024 11:58 PM

## 2024-08-25 ENCOUNTER — OUTSIDE FACILITY SERVICE (OUTPATIENT)
Age: 74
End: 2024-08-25
Payer: MEDICARE

## 2024-08-25 PROBLEM — B95.62 MRSA BACTEREMIA: Status: ACTIVE | Noted: 2024-08-25

## 2024-08-25 PROBLEM — R78.81 MRSA BACTEREMIA: Status: ACTIVE | Noted: 2024-08-25

## 2024-08-25 LAB
ANION GAP SERPL CALCULATED.3IONS-SCNC: 11 MMOL/L (ref 7–19)
BACTERIA BLD CULT ORG #2: ABNORMAL
BACTERIA BLD CULT ORG #2: ABNORMAL
BASOPHILS # BLD: 0 K/UL (ref 0–0.2)
BASOPHILS NFR BLD: 0.1 % (ref 0–1)
BUN SERPL-MCNC: 22 MG/DL (ref 8–23)
CALCIUM SERPL-MCNC: 8.5 MG/DL (ref 8.8–10.2)
CHLORIDE SERPL-SCNC: 101 MMOL/L (ref 98–111)
CO2 SERPL-SCNC: 25 MMOL/L (ref 22–29)
CREAT SERPL-MCNC: 0.5 MG/DL (ref 0.5–0.9)
EOSINOPHIL # BLD: 0 K/UL (ref 0–0.6)
EOSINOPHIL NFR BLD: 0 % (ref 0–5)
ERYTHROCYTE [DISTWIDTH] IN BLOOD BY AUTOMATED COUNT: 16.7 % (ref 11.5–14.5)
GLUCOSE SERPL-MCNC: 145 MG/DL (ref 70–99)
HCT VFR BLD AUTO: 35.6 % (ref 37–47)
HGB BLD-MCNC: 11.4 G/DL (ref 12–16)
IMM GRANULOCYTES # BLD: 0.1 K/UL
LYMPHOCYTES # BLD: 0.5 K/UL (ref 1.1–4.5)
LYMPHOCYTES NFR BLD: 6 % (ref 20–40)
MCH RBC QN AUTO: 29.7 PG (ref 27–31)
MCHC RBC AUTO-ENTMCNC: 32 G/DL (ref 33–37)
MCV RBC AUTO: 92.7 FL (ref 81–99)
MONOCYTES # BLD: 0.4 K/UL (ref 0–0.9)
MONOCYTES NFR BLD: 5.1 % (ref 0–10)
MRSA DNA SPEC QL NAA+PROBE: DETECTED
NEUTROPHILS # BLD: 7.2 K/UL (ref 1.5–7.5)
NEUTS SEG NFR BLD: 87.8 % (ref 50–65)
ORGANISM: ABNORMAL
PLATELET # BLD AUTO: 180 K/UL (ref 130–400)
PMV BLD AUTO: 11.8 FL (ref 9.4–12.3)
POTASSIUM SERPL-SCNC: 4.3 MMOL/L (ref 3.5–5)
RBC # BLD AUTO: 3.84 M/UL (ref 4.2–5.4)
SODIUM SERPL-SCNC: 137 MMOL/L (ref 136–145)
VANCOMYCIN TROUGH SERPL-MCNC: 12 UG/ML (ref 10–20)
WBC # BLD AUTO: 8.2 K/UL (ref 4.8–10.8)

## 2024-08-25 PROCEDURE — 80202 ASSAY OF VANCOMYCIN: CPT

## 2024-08-25 PROCEDURE — 85025 COMPLETE CBC W/AUTO DIFF WBC: CPT

## 2024-08-25 PROCEDURE — 87641 MR-STAPH DNA AMP PROBE: CPT

## 2024-08-25 PROCEDURE — 87040 BLOOD CULTURE FOR BACTERIA: CPT

## 2024-08-25 PROCEDURE — 94760 N-INVAS EAR/PLS OXIMETRY 1: CPT

## 2024-08-25 PROCEDURE — 1200000000 HC SEMI PRIVATE

## 2024-08-25 PROCEDURE — 2700000000 HC OXYGEN THERAPY PER DAY

## 2024-08-25 PROCEDURE — 36415 COLL VENOUS BLD VENIPUNCTURE: CPT

## 2024-08-25 PROCEDURE — 2580000003 HC RX 258: Performed by: HOSPITALIST

## 2024-08-25 PROCEDURE — 86403 PARTICLE AGGLUT ANTBDY SCRN: CPT

## 2024-08-25 PROCEDURE — 6360000002 HC RX W HCPCS: Performed by: NURSE PRACTITIONER

## 2024-08-25 PROCEDURE — 80048 BASIC METABOLIC PNL TOTAL CA: CPT

## 2024-08-25 PROCEDURE — 6370000000 HC RX 637 (ALT 250 FOR IP): Performed by: NURSE PRACTITIONER

## 2024-08-25 PROCEDURE — 6360000002 HC RX W HCPCS: Performed by: HOSPITALIST

## 2024-08-25 PROCEDURE — 99231 SBSQ HOSP IP/OBS SF/LOW 25: CPT | Performed by: NURSE PRACTITIONER

## 2024-08-25 PROCEDURE — 6370000000 HC RX 637 (ALT 250 FOR IP): Performed by: HOSPITALIST

## 2024-08-25 RX ADMIN — ACETAMINOPHEN 1000 MG: 500 TABLET ORAL at 20:34

## 2024-08-25 RX ADMIN — AMIODARONE HYDROCHLORIDE 200 MG: 200 TABLET ORAL at 09:01

## 2024-08-25 RX ADMIN — DEXAMETHASONE SODIUM PHOSPHATE 4 MG: 10 INJECTION, SOLUTION INTRAMUSCULAR; INTRAVENOUS at 06:12

## 2024-08-25 RX ADMIN — VANCOMYCIN HYDROCHLORIDE 1000 MG: 10 INJECTION, POWDER, LYOPHILIZED, FOR SOLUTION INTRAVENOUS at 01:54

## 2024-08-25 RX ADMIN — METOPROLOL TARTRATE 50 MG: 50 TABLET, FILM COATED ORAL at 09:01

## 2024-08-25 RX ADMIN — ACETAMINOPHEN 1000 MG: 500 TABLET ORAL at 06:12

## 2024-08-25 RX ADMIN — DEXAMETHASONE SODIUM PHOSPHATE 4 MG: 10 INJECTION, SOLUTION INTRAMUSCULAR; INTRAVENOUS at 20:35

## 2024-08-25 RX ADMIN — METHIMAZOLE 5 MG: 5 TABLET ORAL at 09:02

## 2024-08-25 RX ADMIN — APIXABAN 5 MG: 5 TABLET, FILM COATED ORAL at 20:34

## 2024-08-25 RX ADMIN — FLUTICASONE PROPIONATE 2 SPRAY: 50 SPRAY, METERED NASAL at 09:02

## 2024-08-25 RX ADMIN — METHIMAZOLE 5 MG: 5 TABLET ORAL at 20:34

## 2024-08-25 RX ADMIN — TIZANIDINE 4 MG: 4 TABLET ORAL at 15:54

## 2024-08-25 RX ADMIN — APIXABAN 5 MG: 5 TABLET, FILM COATED ORAL at 09:01

## 2024-08-25 RX ADMIN — ACETAMINOPHEN 1000 MG: 500 TABLET ORAL at 13:21

## 2024-08-25 RX ADMIN — AMIODARONE HYDROCHLORIDE 200 MG: 200 TABLET ORAL at 20:34

## 2024-08-25 RX ADMIN — SERTRALINE HYDROCHLORIDE 50 MG: 50 TABLET ORAL at 09:02

## 2024-08-25 RX ADMIN — VANCOMYCIN HYDROCHLORIDE 1000 MG: 10 INJECTION, POWDER, LYOPHILIZED, FOR SOLUTION INTRAVENOUS at 13:22

## 2024-08-25 RX ADMIN — DOCUSATE SODIUM 100 MG: 100 CAPSULE, LIQUID FILLED ORAL at 09:02

## 2024-08-25 RX ADMIN — DEXAMETHASONE SODIUM PHOSPHATE 4 MG: 10 INJECTION, SOLUTION INTRAMUSCULAR; INTRAVENOUS at 13:22

## 2024-08-25 RX ADMIN — ATORVASTATIN CALCIUM 40 MG: 40 TABLET, FILM COATED ORAL at 20:34

## 2024-08-25 RX ADMIN — METOPROLOL TARTRATE 50 MG: 50 TABLET, FILM COATED ORAL at 20:34

## 2024-08-25 ASSESSMENT — PAIN SCALES - GENERAL
PAINLEVEL_OUTOF10: 3
PAINLEVEL_OUTOF10: 3

## 2024-08-25 ASSESSMENT — PAIN DESCRIPTION - DESCRIPTORS
DESCRIPTORS: ACHING
DESCRIPTORS: ACHING

## 2024-08-25 ASSESSMENT — PAIN DESCRIPTION - ORIENTATION
ORIENTATION: MID
ORIENTATION: MID

## 2024-08-25 ASSESSMENT — PAIN DESCRIPTION - LOCATION
LOCATION: HEAD
LOCATION: BACK

## 2024-08-25 NOTE — PROGRESS NOTES
[facility]   Pharmacy Pharmacokinetic Monitoring Service - Vancomycin    Consulting Provider: Dr. Andino   Indication: bacteremia  Target Concentration: Goal AUC/LAKHWINDER 400-600 mg*hr/L  Day of Therapy: 2  Additional Antimicrobials: N/A    Pertinent Laboratory Values:   Wt Readings from Last 1 Encounters:   08/21/24 115 kg (253 lb 8 oz)     Temp Readings from Last 1 Encounters:   08/25/24 97.2 °F (36.2 °C) (Temporal)     Estimated Creatinine Clearance: 124 mL/min (based on SCr of 0.5 mg/dL).  Recent Labs     08/24/24  0248 08/25/24  0321 08/25/24  0322   CREATININE 0.5  --  0.5   BUN 15  --  22   WBC 6.9 8.2  --      Procalcitonin: N/A    Pertinent Cultures:  Culture Date Source Results   08/23/24 Blood MRSA   MRSA Nasal Swab: N/A. Non-respiratory infection.    Recent vancomycin administrations                     vancomycin (VANCOCIN) 1000 mg in sodium chloride 0.9% 250 mL IVPB (mg) 1,000 mg New Bag 08/25/24 1322     1,000 mg New Bag  0154     1,000 mg New Bag 08/24/24 1405    vancomycin (VANCOCIN) 2,500 mg in sodium chloride 0.9 % 500 mL IVPB (mg) 2,500 mg New Bag 08/24/24 0050                    Assessment:  Date/Time Current Dose Concentration Timing of Concentration (h) AUC   08/25/24 1000 mg IV Q 12 hours 12 11 H  22 M 406   Note: Serum concentrations collected for AUC dosing may appear elevated if collected in close proximity to the dose administered, this is not necessarily an indication of toxicity    Plan:  Current dosing regimen is therapeutic  Increase dose to 1500 mg IV  Q 12 hours  Repeat vancomycin concentration ordered for 08/27/24 @ 0100   Pharmacy will continue to monitor patient and adjust therapy as indicated    Thank you for the consult,  Homer Oliver [facility]  8/25/2024 2:08 PM

## 2024-08-25 NOTE — PROGRESS NOTES
NEUROSURGERY  Progress Note    INTERVAL HISTORY: Blood cultures positive for staph aureus MRSA.  She continues to have low back pain that she rates a 7 out of 10.  Continues to have bilateral hip and groin pain.  Infectious disease evaluated.      CHIEF COMPLAINT: Bilateral hip and low back pain    HISTORY OF PRESENT ILLNESS:      The patient is a 73 y.o. female with A-fib on Eliquis, previous CVA, hypertension, CHF, anxiety, COPD who presented to the ER on 8/19/2024 with low back/flank pain and bilateral groin pain.  It appears, according to previous medical records that this pain has been present for at least 3 months.  She states that for 5 days [place]  She denies any numbness.  She states her low back pain and groin pain are essentially equal in intensity.  She states walking and standing exacerbates her pain.  Due to her severe pain, she is has been admitted to hospital.    An MRI of the lumbar spine was obtained and revealed a inferiorly extruded disc herniation at L2-3 along with stenosis for which I was asked to evaluate.        The patient does take anticoagulation medication.  Eliquis      Past Medical History:   Diagnosis Date    Arthritis     Atrial fibrillation (HCC)     Paroxysmal A Fib    Cerebral artery occlusion with cerebral infarction (HCC)     TWICE: once in 2012 or 2013, then had another one 2021    CHF (congestive heart failure) (HCC)     COPD (chronic obstructive pulmonary disease) (HCC)     Hyperlipidemia     Hypertension     On continuous oral anticoagulation     Apixaban    Pneumonia     Thyroid disease        Past Surgical History:   Procedure Laterality Date    CARDIOVERSION  2020    HYSTERECTOMY, TOTAL ABDOMINAL (CERVIX REMOVED) N/A     With removal of tumor, ~2014, was a DANIS and BSO    TONSILLECTOMY Bilateral     at age 17 or 18, withOUT Adneoids as per pt    TUMOR REMOVAL      intraabdominal, ~2014, states she was told it was feeding off her bowel         MEDICATIONS    Current Facility-Administered Medications:     vancomycin (VANCOCIN) 1000 mg in sodium chloride 0.9% 250 mL IVPB, 1,000 mg, IntraVENous, Q12H, Bulmaro Andino MD, Stopped at 08/25/24 0301    docusate sodium (COLACE) capsule 100 mg, 100 mg, Oral, BID, Vanessa Fierro APRN, 100 mg at 08/24/24 0939    lactulose (CHRONULAC) 10 GM/15ML solution 10 g, 10 g, Oral, BID, Shereen Lindsey APRN, 10 g at 08/23/24 1039    dexAMETHasone (PF) (DECADRON) injection 4 mg, 4 mg, IntraVENous, Q8H, Shereen Lindsey APRN, 4 mg at 08/25/24 0612    vancomycin (VANCOCIN) intermittent dosing (placeholder), , Other, RX Placeholder, Bulmaro Andino MD    tiZANidine (ZANAFLEX) tablet 4 mg, 4 mg, Oral, Q6H PRN, Shereen Lindsey APRN, 4 mg at 08/23/24 0528    naloxone (NARCAN) injection 0.4 mg, 0.4 mg, IntraVENous, PRN, Bulmaro Andino MD    acetaminophen (TYLENOL) tablet 1,000 mg, 1,000 mg, Oral, 3 times per day, Bulmaro Andino MD, 1,000 mg at 08/25/24 0612    oxyCODONE (ROXICODONE) immediate release tablet 2.5 mg, 2.5 mg, Oral, Q4H PRN, Bulmaro Andino MD, 2.5 mg at 08/24/24 1003    polyethylene glycol (GLYCOLAX) packet 17 g, 17 g, Oral, Daily PRN, Bulmaro Andino MD, 17 g at 08/23/24 1039    melatonin disintegrating tablet 5 mg, 5 mg, Oral, Nightly PRN, Bulmaro Andino MD, 5 mg at 08/23/24 2050    calcium carbonate (TUMS) chewable tablet 500 mg, 500 mg, Oral, TID PRN, Bulmaro Andino MD    amiodarone (CORDARONE) tablet 200 mg, 200 mg, Oral, BID, Bulmaro Andino MD, 200 mg at 08/24/24 2026    apixaban (ELIQUIS) tablet 5 mg, 5 mg, Oral, BID, Bulmaro Andino MD, 5 mg at 08/24/24 2026    atorvastatin (LIPITOR) tablet 40 mg, 40 mg, Oral, Nightly, Bulmaro Andino MD, 40 mg at 08/24/24 2026    busPIRone (BUSPAR) tablet 10 mg, 10 mg, Oral, TID PRN, Bulmaro Andino MD, 10 mg at 08/24/24 1003    fluticasone (FLONASE) 50 MCG/ACT nasal spray 2 spray, 2 spray, Each Nostril, Daily, Bulmaro Andino MD    methIMAzole (TAPAZOLE) tablet  08/25/2024    CREATININE 0.5 08/25/2024    GLUCOSE 145 (H) 08/25/2024    CALCIUM 8.5 (L) 08/25/2024    BILITOT 2.7 (H) 08/19/2024    ALKPHOS 82 08/19/2024    AST 25 08/19/2024    ALT 13 08/19/2024    LABGLOM >90 08/25/2024    GFRAA >59 12/06/2021     Lab Results   Component Value Date    INR 1.36 (H) 05/30/2023    INR 1.19 (H) 10/02/2020    PROTIME 16.3 (H) 05/30/2023    PROTIME 15.1 (H) 10/02/2020         IMAGING:    Narrative & Impression  EXAM:  MRI OF THE LUMBAR SPINE WITHOUT AND WITH CONTRAST     HISTORY:  Severe back pain.  Possible diskitis with paraspinal abscess     TECHNIQUE:  Multiplanar imaging of the lumbar spine was performed using T1, T2, inversion recovery and postcontrast T1W sequences.     Comparison CT scan of the abdomen pelvis dated 08/20/2024.     FINDINGS:  There is slight curvature of the lumbar spine to the left.  There is no definite bone marrow edema seen within the lumbar spine.  There is mild anterior subluxation of L4 on L5 measuring approximately 5 mm.  There is no pars defect.  There is   facet arthropathy seen throughout the lower lumbar spine.  Five lumbar-type vertebral bodies are seen.  The lumbar spinal cord demonstrates normal signal on T2W images.  The conus medullaris is located at the L2 level.     Segmental analysis:     T12-L1:  The the central canal and neural foramina appear patent.     L1-L2:  There is mild disc bulge.  The central canal appears patent.  There is superimposed facet arthropathy resulting in mild stenosis of the neural foramina.     L2-L3:  There is disc bulge and mild to moderate facet arthropathy resulting in mild central canal and lateral recess stenosis.  There is moderate to severe right and moderate left foraminal stenosis.  There is a superimposed right paracentral inferior   disc extrusion measuring approximately 4.8 mm AP, 14 mm in height causing right lateral recess stenosis posterior to the L3 vertebral body.     L3-L4:  The central canal  appears patent.  There is mild to moderate facet arthropathy resulting in moderate bilateral foraminal stenosis.     L4-L5:  There is disc bulge and moderate facet arthropathy.  The central canal and lateral recesses appear adequately patent.  There is moderate bilateral foraminal stenosis.     L5-S1:  The central canal and lateral recesses appear patent.  There is moderate to severe facet arthropathy resulting in mild to moderate bilateral foraminal stenosis.     No abnormal enhancement is identified within the lumbar spinal cord or cauda equina on postcontrast images.     IMPRESSION:  No definite evidence for diskitis or osteomyelitis seen within the lumbar spine.     At the L2-L3 level there is a right paracentral inferior disc extrusion as described above measuring 14 mm in height, 4.8 mm AP, and the findings are causing right lateral recess stenosis posterior to the L3 vertebral body.     There is disc bulge and facet arthropathy seen throughout the lumbar spine as described above resulting in mild central canal and lateral recess stenosis at L2-L3.     Mild grade 1 degenerative spondylolisthesis seen at L4-L5.              ______________________________________   Electronically signed by: SHANDRA JOHNSON M.D.  Date:     08/21/2024  Time:    16:47            Exam Ended: 08/21/24 16:39 CDT Last Resulted: 08/21/24 16:57 CDT          I have personally reviewed the images and my interpretation is:  At L2-3 there is an inferiorly extruded disc fragment that is more eccentric to the right.  This results in moderate to severe spinal canal stenosis.  There is a spondylolisthesis noted L4-5 pending results in moderate bilateral foraminal stenosis.  Of note, there are some increase signal change on the STIR sequences within and around the L3 and L4 spinous processes.          NABOR (8/24/2024)  Interpretation Summary  Show Result Comparison     Left Ventricle: Normal left ventricular systolic function with a visually estimated

## 2024-08-25 NOTE — PROGRESS NOTES
Infectious Diseases Progress Note    Patient:  Lashonda Milian  YOB: 1950  MRN: [id number]   Admit date: 8/19/2024   Admitting Physician: Teresa Urbina MD  Primary Care Physician: Nick Martinez MD    Chief Complaint/Interval History: She is up to chair.  She indicates she is feeling somewhat better.  She again reiterated she had had some left hip/lateral lower back discomfort.  It spread into the groins and both sides including the right.  She indicates that discomfort is shown some improvement over the last day or 2.  She had additional blood cultures from yesterday turned positive for gram-positive cocci in clusters.  She is tolerating vancomycin without side effect.    In/Out    Intake/Output Summary (Last 24 hours) at 8/25/2024 1036  Last data filed at 8/25/2024 0031  Gross per 24 hour   Intake 120 ml   Output 450 ml   Net -330 ml     Allergies:   Allergies   Allergen Reactions    Niacin And Related Rash     Current Meds: vancomycin (VANCOCIN) 1000 mg in sodium chloride 0.9% 250 mL IVPB, Q12H  docusate sodium (COLACE) capsule 100 mg, BID  lactulose (CHRONULAC) 10 GM/15ML solution 10 g, BID  dexAMETHasone (PF) (DECADRON) injection 4 mg, Q8H  vancomycin (VANCOCIN) intermittent dosing (placeholder), RX Placeholder  tiZANidine (ZANAFLEX) tablet 4 mg, Q6H PRN  naloxone (NARCAN) injection 0.4 mg, PRN  acetaminophen (TYLENOL) tablet 1,000 mg, 3 times per day  oxyCODONE (ROXICODONE) immediate release tablet 2.5 mg, Q4H PRN  polyethylene glycol (GLYCOLAX) packet 17 g, Daily PRN  melatonin disintegrating tablet 5 mg, Nightly PRN  calcium carbonate (TUMS) chewable tablet 500 mg, TID PRN  amiodarone (CORDARONE) tablet 200 mg, BID  apixaban (ELIQUIS) tablet 5 mg, BID  atorvastatin (LIPITOR) tablet 40 mg, Nightly  busPIRone (BUSPAR) tablet 10 mg, TID PRN  fluticasone (FLONASE) 50 MCG/ACT nasal spray 2 spray, Daily  methIMAzole (TAPAZOLE) tablet 5 mg, BID  sertraline (ZOLOFT) tablet 50 mg,  visible.  Colonic diverticulosis without acute diverticulitis.  Cholelithiasis without acute cholecystitis.  Cardiomegaly.  ASCVD.  Small hiatal hernia.  Mild splenomegaly.     MRI of right femur with and without contrast August 21, 2024:  FINDINGS:  Heterogeneous bone marrow, nonspecific.  No findings suggesting acute fracture or significant stress reaction.  Degenerative changes to the right hip including joint space narrowing and subchondral cyst formation.  Less pronounced degenerative changes to the left hip.  Degenerative changes to the pubic symphysis.  4.3 x 4.2 cm fat signal intensity lesion in the anterior right thigh subcutaneous tissues in the area of concern.  There are thin enhancing septa.  No enhancing nodularity.  Mild adjacent soft tissue edema.  Superficial soft tissue edema about the lower pelvis and proximal thighs.  Muscular edema in the proximal thighs on the right and left. Prominent inguinal lymph nodes, nonspecific.     IMPRESSION:  4.3 cm fat signal intensity lesion in the proximal right thigh anteromedial superficial soft tissues.  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  Continued clinical and imaging surveillance to ensure stability.  Superficial soft tissue edema about the pelvis and thighs, nonspecific.  There is also nonspecific muscle edema in the proximal thighs.  Heterogeneous bone marrow, nonspecific.  Attention at follow-up.  Degenerative changes to the hips, right greater than left.  Additional findings as described.     MRI lumbar spine with and without contrast August 21, 2024:  FINDINGS:  There is slight curvature of the lumbar spine to the left.  There is no definite bone marrow edema seen within the lumbar spine.  There is mild anterior subluxation of L4 on L5 measuring approximately 5 mm.  There is no pars defect.  There is   facet arthropathy seen throughout the lower lumbar spine.  Five lumbar-type vertebral bodies are seen.  The lumbar spinal cord  inspiration; therefore the estimated right atrial pressure is elevated (~15 mmHg).   Pericardium No pericardial effusion.        Additional Studies Reviewed:  None    Impression:  1.  MRSA bacteremia  2.  Back/hip pain-showing some improvement.  No definite evidence of discitis/vertebral osteomyelitis/paraspinal infection on imaging done at this point.  3.  Bilateral lower lobe atelectasis infiltrate on CT.  Infiltrate versus atelectasis.  4.  Obesity  5.  Diastolic dysfunction    Recommendations:  Continue to monitor symptoms  Encouraged that she has had some improvement in back and hip pain  Blood cultures appear to be remaining positive  Continue to watch for new localizing symptoms  Follow-up blood cultures  If blood cultures continue to remain positive consider NABOR  Repeat imaging if recurrent/persistent localized area of pain  Continue supportive care  Continue vancomycin  Continue to follow    SAMSON MORSE MD

## 2024-08-25 NOTE — PROGRESS NOTES
[facility]      Patient:  Lashonda Milian  YOB: 1950  Date of Service: 8/25/2024  MRN: [id number]   Acct: [id number]   Primary Care Physician: Nick aMrtinez MD  Advance Directive: Full Code  Admit Date: 8/19/2024       Hospital Day: 3  Portions of this note have been copied forward, however, changed to reflect the most current clinical status of this patient.    CHIEF COMPLAINT back pain    SUBJECTIVE: She is seen sitting in the chair. She states that her pain remains, but is significantly improved. She is alert and oriented x3. No issues or events overnight.     CUMULATIVE HOSPITAL STAY:  The patient is a 73 y.o. female with PMH of A-fib on Eliquis, CVA, CHF, hypertension, hyperlipidemia, anxiety, and COPD who presented to [facility] ED on 8/19/2024 with complaints of right lower back and flank pain radiating to her groin and down her right thigh.  Reported had worsened over 2 days prior to admission.  She thought she might have strained a muscle due to sleeping in a new bed and having to pull herself up frequently.  She denied falls or traumas.  She reported chronic constipation but denied bowel or bladder dysfunction.  Denied fever, chills, nausea, vomiting, diarrhea or dysuria.     Further ED workup revealed normal chemistries.  Total bilirubin 2.7.  H&H 11.3/35.8 with a platelet count of 131.  WBC 8.0.  UA negative for infection.  CT of the abdomen pelvis showed asymmetrical swelling of the left psoas muscle and stranding in the left retroperitoneum.  Small amount of intramuscular hemorrhage is possible.  Cholelithiasis without acute cholecystitis.  Splenomegaly.  She received multiple doses of skeletal muscle relaxants, steroids and narcotics and was unable to ambulate or use a wheelchair where she normally ambulates with a walker without difficulty.  The patient was admitted to hospital medicine for ambulatory dysfunction and intractable back pain.    She was placed on scheduled    Enterococcus faecalis by PCR  Not Detected Not Detected   Enterococcus faecium by PCR  Not Detected Not Detected   Escherichia coli by PCR  Not Detected Not Detected   Haemophilus Influenzae by PCR  Not Detected Not Detected   Klebsiella aerogenes by PCR  Not Detected Not Detected   Klebsiella oxytoca by PCR  Not Detected Not Detected   Klebsiella pneumoniae group by PCR  Not Detected Not Detected   Listeria monocytogenes by PCR  Not Detected Not Detected   Neisseria meningitidis by PCR  Not Detected Not Detected   Proteus species by PCR  Not Detected Not Detected   Pseudomonas aeruginosa by PCR  Not Detected Not Detected   Salmonella species by PCR  Not Detected Not Detected   Streptococcus agalactiae by PCR  Not Detected Not Detected   Staphylococcus aureus by PCR  Not Detected DETECTED Panic    Staphylococcus epidermidis by PCR  Not Detected Not Detected   Staphylococcus lugdunensis by PCR  Not Detected Not Detected   Staphylococcus species by PCR  Not Detected DETECTED Panic    Serratia marcescens by PCR  Not Detected Not Detected   Streptococcus pneumoniae by PCR  Not Detected Not Detected   Streptococcus pyogenes  by PCR  Not Detected Not Detected   Streptococcus species by PCR  Not Detected Not Detected   Stenotrophomonas maltophilia by PCR  Not Detected Not Detected   Candida albicans by PCR  Not Detected Not Detected   Candida auris by PCR  Not Detected Not Detected   Candida glabrata by PCR  Not Detected Not Detected   Candida krusei by PCR  Not Detected Not Detected   Candida parapsilosis by PCR  Not Detected Not Detected   Candida tropicalis by PCR  Not Detected Not Detected   Cryptococcus neoformans/gattii by PCR  Not Detected Not Detected   Methicillin Resistance mecA/C and MREJ by PCR  Not Detected DETECTED Panic    Resulting Agency [facility]           mponent    Blood Culture, Routine      Abnormal   Gram stain Aerobic and Anaerobic bottle:  Gram positive cocci in

## 2024-08-26 LAB
ANION GAP SERPL CALCULATED.3IONS-SCNC: 10 MMOL/L (ref 7–19)
BACTERIA BLD CULT: ABNORMAL
BASOPHILS # BLD: 0 K/UL (ref 0–0.2)
BASOPHILS NFR BLD: 0.1 % (ref 0–1)
BUN SERPL-MCNC: 23 MG/DL (ref 8–23)
CALCIUM SERPL-MCNC: 8.4 MG/DL (ref 8.8–10.2)
CHLORIDE SERPL-SCNC: 102 MMOL/L (ref 98–111)
CO2 SERPL-SCNC: 26 MMOL/L (ref 22–29)
CREAT SERPL-MCNC: 0.5 MG/DL (ref 0.5–0.9)
EOSINOPHIL # BLD: 0 K/UL (ref 0–0.6)
EOSINOPHIL NFR BLD: 0 % (ref 0–5)
ERYTHROCYTE [DISTWIDTH] IN BLOOD BY AUTOMATED COUNT: 16.8 % (ref 11.5–14.5)
GLUCOSE SERPL-MCNC: 156 MG/DL (ref 70–99)
HCT VFR BLD AUTO: 35.6 % (ref 37–47)
HGB BLD-MCNC: 11.4 G/DL (ref 12–16)
IMM GRANULOCYTES # BLD: 0.1 K/UL
LYMPHOCYTES # BLD: 0.6 K/UL (ref 1.1–4.5)
LYMPHOCYTES NFR BLD: 8.2 % (ref 20–40)
MCH RBC QN AUTO: 29.3 PG (ref 27–31)
MCHC RBC AUTO-ENTMCNC: 32 G/DL (ref 33–37)
MCV RBC AUTO: 91.5 FL (ref 81–99)
MONOCYTES # BLD: 0.4 K/UL (ref 0–0.9)
MONOCYTES NFR BLD: 6.1 % (ref 0–10)
NEUTROPHILS # BLD: 6 K/UL (ref 1.5–7.5)
NEUTS SEG NFR BLD: 84.3 % (ref 50–65)
ORGANISM: ABNORMAL
ORGANISM: ABNORMAL
PLATELET # BLD AUTO: 207 K/UL (ref 130–400)
PMV BLD AUTO: 11.3 FL (ref 9.4–12.3)
POTASSIUM SERPL-SCNC: 4.6 MMOL/L (ref 3.5–5)
RBC # BLD AUTO: 3.89 M/UL (ref 4.2–5.4)
SODIUM SERPL-SCNC: 138 MMOL/L (ref 136–145)
WBC # BLD AUTO: 7.1 K/UL (ref 4.8–10.8)

## 2024-08-26 PROCEDURE — 87040 BLOOD CULTURE FOR BACTERIA: CPT

## 2024-08-26 PROCEDURE — 6370000000 HC RX 637 (ALT 250 FOR IP): Performed by: NURSE PRACTITIONER

## 2024-08-26 PROCEDURE — 6370000000 HC RX 637 (ALT 250 FOR IP): Performed by: HOSPITALIST

## 2024-08-26 PROCEDURE — 1200000000 HC SEMI PRIVATE

## 2024-08-26 PROCEDURE — 36415 COLL VENOUS BLD VENIPUNCTURE: CPT

## 2024-08-26 PROCEDURE — 6360000002 HC RX W HCPCS: Performed by: NURSE PRACTITIONER

## 2024-08-26 PROCEDURE — 94760 N-INVAS EAR/PLS OXIMETRY 1: CPT

## 2024-08-26 PROCEDURE — 6360000002 HC RX W HCPCS: Performed by: HOSPITALIST

## 2024-08-26 PROCEDURE — 99231 SBSQ HOSP IP/OBS SF/LOW 25: CPT | Performed by: NEUROLOGICAL SURGERY

## 2024-08-26 PROCEDURE — 86403 PARTICLE AGGLUT ANTBDY SCRN: CPT

## 2024-08-26 PROCEDURE — 85025 COMPLETE CBC W/AUTO DIFF WBC: CPT

## 2024-08-26 PROCEDURE — 2580000003 HC RX 258: Performed by: HOSPITALIST

## 2024-08-26 PROCEDURE — 97530 THERAPEUTIC ACTIVITIES: CPT

## 2024-08-26 PROCEDURE — 80048 BASIC METABOLIC PNL TOTAL CA: CPT

## 2024-08-26 RX ADMIN — TIZANIDINE 4 MG: 4 TABLET ORAL at 08:57

## 2024-08-26 RX ADMIN — APIXABAN 5 MG: 5 TABLET, FILM COATED ORAL at 20:42

## 2024-08-26 RX ADMIN — SODIUM CHLORIDE, PRESERVATIVE FREE 10 ML: 5 INJECTION INTRAVENOUS at 08:53

## 2024-08-26 RX ADMIN — TIZANIDINE 4 MG: 4 TABLET ORAL at 21:04

## 2024-08-26 RX ADMIN — ACETAMINOPHEN 1000 MG: 500 TABLET ORAL at 06:11

## 2024-08-26 RX ADMIN — METHIMAZOLE 5 MG: 5 TABLET ORAL at 08:50

## 2024-08-26 RX ADMIN — AMIODARONE HYDROCHLORIDE 200 MG: 200 TABLET ORAL at 20:42

## 2024-08-26 RX ADMIN — Medication 1500 MG: at 13:36

## 2024-08-26 RX ADMIN — DEXAMETHASONE SODIUM PHOSPHATE 4 MG: 10 INJECTION, SOLUTION INTRAMUSCULAR; INTRAVENOUS at 04:43

## 2024-08-26 RX ADMIN — LACTULOSE 10 G: 10 SOLUTION ORAL at 08:50

## 2024-08-26 RX ADMIN — ACETAMINOPHEN 1000 MG: 500 TABLET ORAL at 20:42

## 2024-08-26 RX ADMIN — ACETAMINOPHEN 1000 MG: 500 TABLET ORAL at 13:32

## 2024-08-26 RX ADMIN — AMIODARONE HYDROCHLORIDE 200 MG: 200 TABLET ORAL at 08:50

## 2024-08-26 RX ADMIN — SERTRALINE HYDROCHLORIDE 50 MG: 50 TABLET ORAL at 08:51

## 2024-08-26 RX ADMIN — DOCUSATE SODIUM 100 MG: 100 CAPSULE, LIQUID FILLED ORAL at 20:42

## 2024-08-26 RX ADMIN — Medication 1500 MG: at 02:29

## 2024-08-26 RX ADMIN — METHIMAZOLE 5 MG: 5 TABLET ORAL at 20:42

## 2024-08-26 RX ADMIN — APIXABAN 5 MG: 5 TABLET, FILM COATED ORAL at 08:51

## 2024-08-26 RX ADMIN — DEXAMETHASONE SODIUM PHOSPHATE 4 MG: 10 INJECTION, SOLUTION INTRAMUSCULAR; INTRAVENOUS at 20:43

## 2024-08-26 RX ADMIN — DEXAMETHASONE SODIUM PHOSPHATE 4 MG: 10 INJECTION, SOLUTION INTRAMUSCULAR; INTRAVENOUS at 12:04

## 2024-08-26 RX ADMIN — SODIUM CHLORIDE, PRESERVATIVE FREE 10 ML: 5 INJECTION INTRAVENOUS at 20:54

## 2024-08-26 RX ADMIN — ATORVASTATIN CALCIUM 40 MG: 40 TABLET, FILM COATED ORAL at 20:42

## 2024-08-26 RX ADMIN — DOCUSATE SODIUM 100 MG: 100 CAPSULE, LIQUID FILLED ORAL at 08:50

## 2024-08-26 RX ADMIN — LACTULOSE 10 G: 10 SOLUTION ORAL at 20:42

## 2024-08-26 RX ADMIN — METOPROLOL TARTRATE 50 MG: 50 TABLET, FILM COATED ORAL at 08:51

## 2024-08-26 ASSESSMENT — PAIN DESCRIPTION - LOCATION: LOCATION: HEAD

## 2024-08-26 ASSESSMENT — ENCOUNTER SYMPTOMS: BACK PAIN: 1

## 2024-08-26 ASSESSMENT — PAIN SCALES - GENERAL
PAINLEVEL_OUTOF10: 8
PAINLEVEL_OUTOF10: 7
PAINLEVEL_OUTOF10: 4

## 2024-08-26 ASSESSMENT — PAIN DESCRIPTION - ORIENTATION: ORIENTATION: MID

## 2024-08-26 ASSESSMENT — PAIN DESCRIPTION - DESCRIPTORS: DESCRIPTORS: ACHING

## 2024-08-26 NOTE — PROGRESS NOTES
Occupational Therapy     08/26/24 1610   Subjective   Subjective Pt in bed upon arrival for therapy. Pt agreeable to participate.   Pain Assessment   Pain Assessment None - Denies Pain   Vitals   Temp 98.6 °F (37 °C)   Pulse 50   Respirations 16   SpO2 94 %   O2 Device Nasal cannula   /80   MAP (Calculated) 99   Cognition   Overall Cognitive Status Exceptions   Orientation   Overall Orientation Status WFL   Bed Mobility Training   Bed Mobility Training Yes   Overall Level of Assistance Minimum assistance   Interventions Verbal cues;Tactile cues;Manual cues   Supine to Sit Minimum assistance   Scooting Minimum assistance   Transfer Training   Transfer Training Yes   Overall Level of Assistance Minimum assistance;Moderate assistance   Interventions Verbal cues;Tactile cues;Manual cues   Sit to Stand Minimum assistance;Moderate assistance   Stand to Sit Minimum assistance;Moderate assistance   Bed to Chair Minimum assistance;Moderate assistance   Balance   Sitting Intact   Standing Impaired   Standing - Static Occasional   Standing - Dynamic Occasional;Poor   ADL   Feeding Setup;Independent   Grooming Setup;Supervision   Grooming Skilled Clinical Factors requires initiation   UE Bathing Minimal assistance;Moderate assistance   LE Bathing Maximum assistance   UE Dressing Minimal assistance;Moderate assistance   LE Dressing Maximum assistance   Toileting Maximum assistance   Toileting Skilled Clinical Factors for clothing management and clean up   Functional Mobility Moderate assistance;Maximum assistance   Functional Mobility Skilled Clinical Factors x2 for safety; needs to use Jo Stedy   Additional Comments increased anxiety, decreased activity tolerance, limited by pain.   Assessment   Assessment Tx focused on sitting EOB, STS mobility at RW level, functional mobility to recliner, patient able to take steps in place at RW level and t/f to recliner to sit up this pm. Chair alarm donned with all needs in reach.    Activity Tolerance Patient tolerated treatment well   Discharge Recommendations Patient would benefit from continued therapy after discharge;24 hour supervision or assist   Occupational Therapy Plan   Times Per Week 3-5x/wk   Times Per Day Once a day   OT Plan of Care   Monday X     Electronically signed by KATERIN Garcia on 8/26/2024 at 4:30 PM

## 2024-08-26 NOTE — PROGRESS NOTES
[facility]      Patient:  Lashonda Milian  YOB: 1950  Date of Service: 8/26/2024  MRN: [id number]   Acct: [id number]   Primary Care Physician: Nick Martinez MD  Advance Directive: Full Code  Admit Date: 8/19/2024       Hospital Day: 4  Portions of this note have been copied forward, however, changed to reflect the most current clinical status of this patient.    CHIEF COMPLAINT back pain     SUBJECTIVE:  no issues overnight, states back pain worsening after sitting up in chair yesterday     Cumulative hospital course   73-year-old female with AF on Eliquis, diastolic heart failure, CVA, HTN, hyperlipidemia, anxiety, COPD, with complaints of right lower back and flank pain radiating into her groin and down her right thigh.  Patient states that she does have chronic back and hip pain however has been progressively worsening over the past few days.  She did states she [place] where she [place] and hit her buttocks on the tile floor, did not recall when this event occurred.  Denied fever, urinary symptoms, productive cough, abdominal pain or diarrhea.  Workup in ER CT abdomen pelvis asymmetrical swelling of left psoas muscle and stranding in the left retroperitoneum small amount of intramuscular hemorrhage is possible, cholelithiasis without acute cholecystitis.  Patient had received multiple narcotics, muscle relaxant, and steroids and was unable to ambulate or utilize wheelchair she was admitted due to intractable pain and ambulatory dysfunction.  At baseline utilizes a walker without difficulty.  MRI right femur and lumbar spine ordered due to persistent pain.  MRI lumbar spine L2-L3 right paracentral inferior disc extrusion measuring 14 mm in height 4.8 mm AP causing right lateral stenosis posterior to the L3 vertebral body, MRI femur 4.3 cm fat signal intensity lesion in the proximal right thigh anterior medial superficial soft tissue most likely lipoma,  Staphylococcus  Culture in progress  Please notify Physician   Bottle volume = 8 ml  P      Resulting Agency [facility]                     Component     Culture, Blood 2        Abnormal   Gram stain Anaerobic bottle:  Gram positive cocci in clusters  resembling Staphylococcus  Culture in progress  Please notify Physician   Bottle volume = 9 ml  Gram stain Aerobic bottle:  Gram positive cocci in clusters  resembling Staphylococcus  Culture in progress  Please notify Physician   Bottle volume = 9 ml  P      Resulting Agency [facility]                   Component  Ref Range & Units     Acinetobacter calcoac baumannii complex by PCR  Not Detected Not Detected   Bacteroides fragilis by PCR  Not Detected Not Detected   Enterobacteriaceae by PCR  Not Detected Not Detected   Enterobacter cloacae complex by PCR  Not Detected Not Detected   Enterococcus faecalis by PCR  Not Detected Not Detected   Enterococcus faecium by PCR  Not Detected Not Detected   Escherichia coli by PCR  Not Detected Not Detected   Haemophilus Influenzae by PCR  Not Detected Not Detected   Klebsiella aerogenes by PCR  Not Detected Not Detected   Klebsiella oxytoca by PCR  Not Detected Not Detected   Klebsiella pneumoniae group by PCR  Not Detected Not Detected   Listeria monocytogenes by PCR  Not Detected Not Detected   Neisseria meningitidis by PCR  Not Detected Not Detected   Proteus species by PCR  Not Detected Not Detected   Pseudomonas aeruginosa by PCR  Not Detected Not Detected   Salmonella species by PCR  Not Detected Not Detected   Streptococcus agalactiae by PCR  Not Detected Not Detected   Staphylococcus aureus by PCR  Not Detected DETECTED Panic    Staphylococcus epidermidis by PCR  Not Detected Not Detected   Staphylococcus lugdunensis by PCR  Not Detected Not Detected   Staphylococcus species by PCR  Not Detected DETECTED Panic    Serratia marcescens by PCR  Not Detected Not Detected   Streptococcus  pneumoniae by PCR  Not Detected Not Detected   Streptococcus pyogenes  by PCR  Not Detected Not Detected   Streptococcus species by PCR  Not Detected Not Detected   Stenotrophomonas maltophilia by PCR  Not Detected Not Detected   Candida albicans by PCR  Not Detected Not Detected   Candida auris by PCR  Not Detected Not Detected   Candida glabrata by PCR  Not Detected Not Detected   Candida krusei by PCR  Not Detected Not Detected   Candida parapsilosis by PCR  Not Detected Not Detected   Candida tropicalis by PCR  Not Detected Not Detected   Cryptococcus neoformans/gattii by PCR  Not Detected Not Detected   Methicillin Resistance mecA/C and MREJ by PCR  Not Detected DETECTED Panic    Resulting Agency [facility]              mponent     Blood Culture, Routine        Abnormal   Gram stain Aerobic and Anaerobic bottle:  Gram positive cocci in clusters  resembling Staphylococcus  Culture in progress  Please notify Physician                 Echo: 8/24/24:  Left Ventricle: Normal left ventricular systolic function with a visually estimated EF of 55 - 60%. Pt did not have a functioning IV. Unable to give Definity. Grade III diastolic dysfunction with increased LAP.    Right Ventricle: Right ventricle is mildly dilated.    Mitral Valve: Annular calcification. Mildly thickened leaflets. Mild regurgitation.    Tricuspid Valve: Mild regurgitation. Severely elevated RVSP, consistent with severe pulmonary hypertension. The estimated RVSP is 66 mmHg.    Left Atrium: Left atrium is severely dilated.    Right Atrium: Right atrium is dilated.    Image quality is suboptimal.    Assessment/Plan   Principal Problem:    MRSA bacteremia   No clear source    I.D following    IV Vancomycin    CT sinus no acute or chronic sinusitis  CT chest bilateral lower lobe pneumonia with bilateral diffuse bronchiolitis vs pulmonary edema  ECHO EF 55-60%, grade III DD, severe pulm HTN   Repeat blood cultures    Contact precautions

## 2024-08-26 NOTE — PROGRESS NOTES
Physical Therapy  Name: Lashonda Milian  MRN:  [id number]  Date of service:  8/26/2024 08/26/24 1541   Restrictions/Precautions   Restrictions/Precautions Fall Risk   Required Braces or Orthoses? No   General   Family / Caregiver Present No   Referring Practitioner Bulmaro Andino MD   Subjective   Subjective agreeable to transfer to chair.   Bed Mobility   Supine to Sit Minimal assistance;Contact guard assistance   Transfers   Sit to Stand Moderate Assistance   Stand to Sit Moderate Assistance   Bed to Chair Moderate assistance   Ambulation   Surface Level tile   Device Rolling Walker   Assistance Moderate assistance   Gait Deviations Slow Ashli;Decreased step length;Decreased step height   Distance 5 feet   Comments tranfered to chair and able to take steps   Short Term Goals   Time Frame for Short Term Goals 2 wks   Short Term Goal 1 supine to sit indep   Short Term Goal 2 sit to stand indep   Short Term Goal 3 amb. 100' with RW SBA   Short Term Goal 4 bed to chair SBA   Conditions Requiring Skilled Therapeutic Intervention   Body Structures, Functions, Activity Limitations Requiring Skilled Therapeutic Intervention Decreased functional mobility ;Decreased ADL status;Decreased ROM;Decreased safe awareness;Decreased cognition;Decreased strength;Decreased balance;Decreased endurance;Increased pain;Decreased posture   Assessment pateint was able to take steps to chair with verbal cues for safety.   Treatment Diagnosis impaired gait and mobility   Therapy Prognosis Fair   Barriers to Learning cognition   Treatment Initiated  gait, transfers, bed mobility   Discharge Recommendations Continue to assess pending progress;24 hour supervision or assist   Activity Tolerance   Activity Tolerance Treatment limited secondary to medical complications;Patient limited by endurance;Patient limited by fatigue;Treatment limited secondary to decreased cognition   Physical Therapy Plan   General Plan 6-7 times per week

## 2024-08-26 NOTE — PROGRESS NOTES
NEUROSURGERY  Progress Note    INTERVAL HISTORY: Back and groin pain has significantly improved.  No major events noted overnight.  Remained stable neurologically.    CHIEF COMPLAINT: Bilateral hip and low back pain    HISTORY OF PRESENT ILLNESS:      The patient is a 73 y.o. female with A-fib on Eliquis, previous CVA, hypertension, CHF, anxiety, COPD who presented to the ER on 8/19/2024 with low back/flank pain and bilateral groin pain.  It appears, according to previous medical records that this pain has been present for at least 3 months.  She states that for 5 days ago [place]  She denies any numbness.  She states her low back pain and groin pain are essentially equal in intensity.  She states walking and standing exacerbates her pain.  Due to her severe pain, she is has been admitted to hospital.    An MRI of the lumbar spine was obtained and revealed a inferiorly extruded disc herniation at L2-3 along with stenosis for which I was asked to evaluate.        The patient does take anticoagulation medication.  Eliquis      Past Medical History:   Diagnosis Date    Arthritis     Atrial fibrillation (HCC)     Paroxysmal A Fib    Cerebral artery occlusion with cerebral infarction (HCC)     TWICE: once in 2012 or 2013, then had another one 2021    CHF (congestive heart failure) (HCC)     COPD (chronic obstructive pulmonary disease) (HCC)     Hyperlipidemia     Hypertension     On continuous oral anticoagulation     Apixaban    Pneumonia     Thyroid disease        Past Surgical History:   Procedure Laterality Date    CARDIOVERSION  2020    HYSTERECTOMY, TOTAL ABDOMINAL (CERVIX REMOVED) N/A     With removal of tumor, ~2014, was a DANIS and BSO    TONSILLECTOMY Bilateral     at age 17 or 18, withOUT Adneoids as per pt    TUMOR REMOVAL      intraabdominal, ~2014, states she was told it was feeding off her bowel        MEDICATIONS    Current Facility-Administered Medications:     vancomycin (VANCOCIN)  1500 mg in sodium chloride 0.9 % 250 mL IVPB, 1,500 mg, IntraVENous, Q12H, Bulmaro Andino MD, Stopped at 08/26/24 0436    docusate sodium (COLACE) capsule 100 mg, 100 mg, Oral, BID, Vanessa Fierro, APRN, 100 mg at 08/25/24 0902    lactulose (CHRONULAC) 10 GM/15ML solution 10 g, 10 g, Oral, BID, Shereen Lindsey, APRN, 10 g at 08/23/24 1039    dexAMETHasone (PF) (DECADRON) injection 4 mg, 4 mg, IntraVENous, Q8H, Shereen Lindsey, APRN, 4 mg at 08/26/24 0443    vancomycin (VANCOCIN) intermittent dosing (placeholder), , Other, RX Placeholder, Bulmaro Andino MD    tiZANidine (ZANAFLEX) tablet 4 mg, 4 mg, Oral, Q6H PRN, Shereen Lindsey APRN, 4 mg at 08/25/24 1554    naloxone (NARCAN) injection 0.4 mg, 0.4 mg, IntraVENous, PRN, Bulmaro Andino MD    acetaminophen (TYLENOL) tablet 1,000 mg, 1,000 mg, Oral, 3 times per day, Bulmaro Andino MD, 1,000 mg at 08/26/24 0611    oxyCODONE (ROXICODONE) immediate release tablet 2.5 mg, 2.5 mg, Oral, Q4H PRN, Bulmaro Andino MD, 2.5 mg at 08/24/24 1003    polyethylene glycol (GLYCOLAX) packet 17 g, 17 g, Oral, Daily PRN, Bulmaro Andino MD, 17 g at 08/23/24 1039    melatonin disintegrating tablet 5 mg, 5 mg, Oral, Nightly PRN, Bulmaro Andino MD, 5 mg at 08/23/24 2050    calcium carbonate (TUMS) chewable tablet 500 mg, 500 mg, Oral, TID PRN, Bulmaro Andino MD    amiodarone (CORDARONE) tablet 200 mg, 200 mg, Oral, BID, Bulmaro Andino MD, 200 mg at 08/25/24 2034    apixaban (ELIQUIS) tablet 5 mg, 5 mg, Oral, BID, Bulmaro Andino MD, 5 mg at 08/25/24 2034    atorvastatin (LIPITOR) tablet 40 mg, 40 mg, Oral, Nightly, Bulmaro Andino MD, 40 mg at 08/25/24 2034    busPIRone (BUSPAR) tablet 10 mg, 10 mg, Oral, TID PRN, Bulmaro Andino MD, 10 mg at 08/24/24 1003    fluticasone (FLONASE) 50 MCG/ACT nasal spray 2 spray, 2 spray, Each Nostril, Daily, Bulmaro Andino MD, 2 spray at 08/25/24 0902    methIMAzole (TAPAZOLE) tablet 5 mg, 5 mg, Oral, BID, Bulmaro Andino MD, 5 mg at

## 2024-08-27 ENCOUNTER — OUTSIDE FACILITY SERVICE (OUTPATIENT)
Age: 74
End: 2024-08-27
Payer: MEDICARE

## 2024-08-27 LAB
ANION GAP SERPL CALCULATED.3IONS-SCNC: 8 MMOL/L (ref 7–19)
BACTERIA BLD CULT ORG #2: ABNORMAL
BACTERIA BLD CULT: ABNORMAL
BACTERIA BLD CULT: ABNORMAL
BASOPHILS # BLD: 0 K/UL (ref 0–0.2)
BASOPHILS NFR BLD: 0.1 % (ref 0–1)
BUN SERPL-MCNC: 23 MG/DL (ref 8–23)
CALCIUM SERPL-MCNC: 8 MG/DL (ref 8.8–10.2)
CHLORIDE SERPL-SCNC: 100 MMOL/L (ref 98–111)
CO2 SERPL-SCNC: 26 MMOL/L (ref 22–29)
CREAT SERPL-MCNC: 0.6 MG/DL (ref 0.5–0.9)
EOSINOPHIL # BLD: 0 K/UL (ref 0–0.6)
EOSINOPHIL NFR BLD: 0 % (ref 0–5)
ERYTHROCYTE [DISTWIDTH] IN BLOOD BY AUTOMATED COUNT: 16.6 % (ref 11.5–14.5)
GLUCOSE SERPL-MCNC: 156 MG/DL (ref 70–99)
HCT VFR BLD AUTO: 37.2 % (ref 37–47)
HGB BLD-MCNC: 11.7 G/DL (ref 12–16)
IMM GRANULOCYTES # BLD: 0.1 K/UL
LYMPHOCYTES # BLD: 0.5 K/UL (ref 1.1–4.5)
LYMPHOCYTES NFR BLD: 7 % (ref 20–40)
MCH RBC QN AUTO: 28.9 PG (ref 27–31)
MCHC RBC AUTO-ENTMCNC: 31.5 G/DL (ref 33–37)
MCV RBC AUTO: 91.9 FL (ref 81–99)
MONOCYTES # BLD: 0.5 K/UL (ref 0–0.9)
MONOCYTES NFR BLD: 6.3 % (ref 0–10)
NEUTROPHILS # BLD: 6.1 K/UL (ref 1.5–7.5)
NEUTS SEG NFR BLD: 84.9 % (ref 50–65)
ORGANISM: ABNORMAL
ORGANISM: ABNORMAL
PLATELET # BLD AUTO: 217 K/UL (ref 130–400)
PMV BLD AUTO: 11 FL (ref 9.4–12.3)
POTASSIUM SERPL-SCNC: 4.5 MMOL/L (ref 3.5–5)
RBC # BLD AUTO: 4.05 M/UL (ref 4.2–5.4)
SODIUM SERPL-SCNC: 134 MMOL/L (ref 136–145)
VANCOMYCIN TROUGH SERPL-MCNC: 15.6 UG/ML (ref 10–20)
WBC # BLD AUTO: 7.2 K/UL (ref 4.8–10.8)

## 2024-08-27 PROCEDURE — 6370000000 HC RX 637 (ALT 250 FOR IP): Performed by: NURSE PRACTITIONER

## 2024-08-27 PROCEDURE — 85025 COMPLETE CBC W/AUTO DIFF WBC: CPT

## 2024-08-27 PROCEDURE — 6370000000 HC RX 637 (ALT 250 FOR IP): Performed by: HOSPITALIST

## 2024-08-27 PROCEDURE — 94760 N-INVAS EAR/PLS OXIMETRY 1: CPT

## 2024-08-27 PROCEDURE — 36415 COLL VENOUS BLD VENIPUNCTURE: CPT

## 2024-08-27 PROCEDURE — 6360000002 HC RX W HCPCS: Performed by: NURSE PRACTITIONER

## 2024-08-27 PROCEDURE — 80202 ASSAY OF VANCOMYCIN: CPT

## 2024-08-27 PROCEDURE — 6360000002 HC RX W HCPCS: Performed by: HOSPITALIST

## 2024-08-27 PROCEDURE — 97535 SELF CARE MNGMENT TRAINING: CPT

## 2024-08-27 PROCEDURE — 80048 BASIC METABOLIC PNL TOTAL CA: CPT

## 2024-08-27 PROCEDURE — 97530 THERAPEUTIC ACTIVITIES: CPT

## 2024-08-27 PROCEDURE — 1200000000 HC SEMI PRIVATE

## 2024-08-27 PROCEDURE — 2580000003 HC RX 258: Performed by: HOSPITALIST

## 2024-08-27 PROCEDURE — 99231 SBSQ HOSP IP/OBS SF/LOW 25: CPT | Performed by: NURSE PRACTITIONER

## 2024-08-27 RX ADMIN — VANCOMYCIN HYDROCHLORIDE 1250 MG: 10 INJECTION, POWDER, LYOPHILIZED, FOR SOLUTION INTRAVENOUS at 02:00

## 2024-08-27 RX ADMIN — DEXAMETHASONE SODIUM PHOSPHATE 4 MG: 10 INJECTION, SOLUTION INTRAMUSCULAR; INTRAVENOUS at 04:30

## 2024-08-27 RX ADMIN — DEXAMETHASONE SODIUM PHOSPHATE 4 MG: 10 INJECTION, SOLUTION INTRAMUSCULAR; INTRAVENOUS at 21:09

## 2024-08-27 RX ADMIN — LACTULOSE 10 G: 10 SOLUTION ORAL at 09:45

## 2024-08-27 RX ADMIN — VANCOMYCIN HYDROCHLORIDE 1250 MG: 10 INJECTION, POWDER, LYOPHILIZED, FOR SOLUTION INTRAVENOUS at 14:24

## 2024-08-27 RX ADMIN — ACETAMINOPHEN 1000 MG: 500 TABLET ORAL at 21:07

## 2024-08-27 RX ADMIN — SODIUM CHLORIDE, PRESERVATIVE FREE 10 ML: 5 INJECTION INTRAVENOUS at 21:32

## 2024-08-27 RX ADMIN — TIZANIDINE 4 MG: 4 TABLET ORAL at 19:13

## 2024-08-27 RX ADMIN — SERTRALINE HYDROCHLORIDE 50 MG: 50 TABLET ORAL at 09:45

## 2024-08-27 RX ADMIN — AMIODARONE HYDROCHLORIDE 200 MG: 200 TABLET ORAL at 09:45

## 2024-08-27 RX ADMIN — ATORVASTATIN CALCIUM 40 MG: 40 TABLET, FILM COATED ORAL at 21:07

## 2024-08-27 RX ADMIN — METHIMAZOLE 5 MG: 5 TABLET ORAL at 21:07

## 2024-08-27 RX ADMIN — ACETAMINOPHEN 1000 MG: 500 TABLET ORAL at 14:22

## 2024-08-27 RX ADMIN — ACETAMINOPHEN 1000 MG: 500 TABLET ORAL at 04:26

## 2024-08-27 RX ADMIN — METHIMAZOLE 5 MG: 5 TABLET ORAL at 09:45

## 2024-08-27 RX ADMIN — SODIUM CHLORIDE, PRESERVATIVE FREE 10 ML: 5 INJECTION INTRAVENOUS at 09:46

## 2024-08-27 RX ADMIN — OXYCODONE 2.5 MG: 5 TABLET ORAL at 16:40

## 2024-08-27 RX ADMIN — DOCUSATE SODIUM 100 MG: 100 CAPSULE, LIQUID FILLED ORAL at 09:45

## 2024-08-27 RX ADMIN — TIZANIDINE 4 MG: 4 TABLET ORAL at 04:24

## 2024-08-27 RX ADMIN — DEXAMETHASONE SODIUM PHOSPHATE 4 MG: 10 INJECTION, SOLUTION INTRAMUSCULAR; INTRAVENOUS at 12:20

## 2024-08-27 RX ADMIN — BUSPIRONE HYDROCHLORIDE 10 MG: 10 TABLET ORAL at 19:13

## 2024-08-27 RX ADMIN — OXYCODONE 2.5 MG: 5 TABLET ORAL at 22:39

## 2024-08-27 RX ADMIN — AMIODARONE HYDROCHLORIDE 200 MG: 200 TABLET ORAL at 21:06

## 2024-08-27 ASSESSMENT — ENCOUNTER SYMPTOMS: RESPIRATORY NEGATIVE: 1

## 2024-08-27 ASSESSMENT — PAIN DESCRIPTION - LOCATION
LOCATION: BACK

## 2024-08-27 ASSESSMENT — PAIN SCALES - GENERAL
PAINLEVEL_OUTOF10: 9
PAINLEVEL_OUTOF10: 6
PAINLEVEL_OUTOF10: 8
PAINLEVEL_OUTOF10: 7

## 2024-08-27 ASSESSMENT — PAIN DESCRIPTION - DESCRIPTORS
DESCRIPTORS: DISCOMFORT
DESCRIPTORS: ACHING
DESCRIPTORS: ACHING

## 2024-08-27 ASSESSMENT — PAIN SCALES - WONG BAKER: WONGBAKER_NUMERICALRESPONSE: HURTS A LITTLE BIT

## 2024-08-27 NOTE — PROGRESS NOTES
Infectious Diseases Progress Note    Patient:  Lashonda Milian  YOB: 1950  MRN: [id number]   Admit date: 8/19/2024   Admitting Physician: Teresa Urbina MD  Primary Care Physician: Nick Martinez MD    Chief Complaint/Interval History: She has some soreness and left hip and groin area and also right hip and groin area.  Right lateral hip and groin seem to be bothering her more.  She indicates she spent a lot of time up to chair yesterday and also time on the commode.  She is not describing lower extremity weakness or numbness.  Reviewed with her that her blood cultures are remaining positive.    In/Out    Intake/Output Summary (Last 24 hours) at 8/27/2024 0747  Last data filed at 8/27/2024 0635  Gross per 24 hour   Intake 450 ml   Output 1800 ml   Net -1350 ml     Allergies:   Allergies   Allergen Reactions    Niacin And Related Rash     Current Meds: vancomycin (VANCOCIN) 1,250 mg in sodium chloride 0.9 % 250 mL IVPB, Q12H  docusate sodium (COLACE) capsule 100 mg, BID  lactulose (CHRONULAC) 10 GM/15ML solution 10 g, BID  dexAMETHasone (PF) (DECADRON) injection 4 mg, Q8H  vancomycin (VANCOCIN) intermittent dosing (placeholder), RX Placeholder  tiZANidine (ZANAFLEX) tablet 4 mg, Q6H PRN  naloxone (NARCAN) injection 0.4 mg, PRN  acetaminophen (TYLENOL) tablet 1,000 mg, 3 times per day  oxyCODONE (ROXICODONE) immediate release tablet 2.5 mg, Q4H PRN  polyethylene glycol (GLYCOLAX) packet 17 g, Daily PRN  melatonin disintegrating tablet 5 mg, Nightly PRN  calcium carbonate (TUMS) chewable tablet 500 mg, TID PRN  amiodarone (CORDARONE) tablet 200 mg, BID  apixaban (ELIQUIS) tablet 5 mg, BID  atorvastatin (LIPITOR) tablet 40 mg, Nightly  busPIRone (BUSPAR) tablet 10 mg, TID PRN  fluticasone (FLONASE) 50 MCG/ACT nasal spray 2 spray, Daily  methIMAzole (TAPAZOLE) tablet 5 mg, BID  sertraline (ZOLOFT) tablet 50 mg, Daily  metoprolol tartrate (LOPRESSOR) tablet 50 mg, BID  sodium chloride flush 0.9 %  injection 5-40 mL, 2 times per day  sodium chloride flush 0.9 % injection 5-40 mL, PRN  0.9 % sodium chloride infusion, PRN  potassium chloride (KLOR-CON M) extended release tablet 40 mEq, PRN   Or  potassium bicarb-citric acid (EFFER-K) effervescent tablet 40 mEq, PRN   Or  potassium chloride 10 mEq/100 mL IVPB (Peripheral Line), PRN  magnesium sulfate 2000 mg in 50 mL IVPB premix, PRN  ondansetron (ZOFRAN-ODT) disintegrating tablet 4 mg, Q8H PRN   Or  ondansetron (ZOFRAN) injection 4 mg, Q6H PRN      Review of Systems see HPI    VitalSigns:  BP (!) 154/70   Pulse 55   Temp 98.2 °F (36.8 °C) (Temporal)   Resp 17   Ht 1.575 m (5' 2\")   Wt 116.8 kg (257 lb 8 oz)   SpO2 94%   BMI 47.10 kg/m²      Physical Exam  Line/IV site: No erythema, warmth, induration, or tenderness.  Heart distant heart sounds without definite murmur  Extremities no splinter hemorrhages or [place]ay lesions  She has intact strength and sensory findings of both lower extremities    Lab Results:  CBC:   Recent Labs     08/25/24  0321 08/26/24  0130 08/27/24  0030   WBC 8.2 7.1 7.2   HGB 11.4* 11.4* 11.7*    207 217     BMP:  Recent Labs     08/25/24  0322 08/26/24  0130 08/27/24  0030    138 134*   K 4.3 4.6 4.5    102 100   CO2 25 26 26   BUN 22 23 23   CREATININE 0.5 0.5 0.6   GLUCOSE 145* 156* 156*     Culture Results:  Blood cultures August 23, 2024-MRSA  Blood cultures August 24, 2024-MRSA  Blood cultures August 25, 2024-MRSA  Blood cultures August 26, 2024-gram-positive cocci in clusters  Susceptibility    Methicillin-Resistant Staphylococcus aureus (1)    Antibiotic Interpretation Microscan  Method Status    clindamycin Sensitive 0.25 mcg/mL BACTERIAL SUSCEPTIBILITY PANEL BY LAKHWINDER     doxycycline Sensitive <=0.5 mcg/mL BACTERIAL SUSCEPTIBILITY PANEL BY LAKHWINDER     erythromycin Resistant >=8 mcg/mL BACTERIAL SUSCEPTIBILITY PANEL BY LAKHWINDER     inducible clindamycin resistance  Neg mcg/mL BACTERIAL SUSCEPTIBILITY PANEL BY LAKHWINDER

## 2024-08-27 NOTE — PROGRESS NOTES
[facility]      Patient:  Lashonda Milian  YOB: 1950  Date of Service: 8/27/2024  MRN: [id number]   Acct: [id number]   Primary Care Physician: Nick Martinez MD  Advance Directive: Full Code  Admit Date: 8/19/2024       Hospital Day: 5  Portions of this note have been copied forward, however, changed to reflect the most current clinical status of this patient.  CHIEF COMPLAINT back pain      SUBJECTIVE: no issues overnight, did work with therapy yesterday and sat up in chair. Reports improvement of lower back pain      Cumulative hospital course   73-year-old female with AF on Eliquis, diastolic heart failure, CVA, HTN, hyperlipidemia, anxiety, COPD, with complaints of right lower back and flank pain radiating into her groin and down her right thigh.  Patient states that she does have chronic back and hip pain however has been progressively worsening over the past few days.  She did states she [place] where she [place] and hit her buttocks on the tile floor, did not recall when this event occurred.  Denied fever, urinary symptoms, productive cough, abdominal pain or diarrhea.  Workup in ER CT abdomen pelvis asymmetrical swelling of left psoas muscle and stranding in the left retroperitoneum small amount of intramuscular hemorrhage is possible, cholelithiasis without acute cholecystitis.  Patient had received multiple narcotics, muscle relaxant, and steroids and was unable to ambulate or utilize wheelchair she was admitted due to intractable pain and ambulatory dysfunction.  At baseline utilizes a walker without difficulty.  MRI right femur and lumbar spine ordered due to persistent pain.  MRI lumbar spine L2-L3 right paracentral inferior disc extrusion measuring 14 mm in height 4.8 mm AP causing right lateral stenosis posterior to the L3 vertebral body, MRI femur 4.3 cm fat signal intensity lesion in the proximal right thigh anterior medial superficial soft  lumbar spine L2-L3 right paracentral inferior disc extrusion measuring 14 mm in height 4.8 mm AP causing right lateral stenosis posterior to the L3 vertebral body   MRI femur 4.3 cm fat signal intensity lesion in the proximal right thigh anterior medial superficial soft tissue most likely lipoma, superficial soft tissue edema in the pelvis and thighs, nonspecific muscle edema in the proximal thighs, heterogeneous bone marrow nonspecific              IV Decadron every 8 hours initiated 8/23              As needed pain medication               Scheduled bowel regimen               8/26 last bowel movement               PT/OT following              Fall precautions               Bed alarm on     Atrial fibrillation              Currently NSR               Resumed Amiodarone, Lopressor               Resumed Eliquis    Hyperlipidemia              Resumed lipitor     Morbidly obese   complicates care   Hyperthyroidism               Resumed Methimazole      Antibiotic: Vancomycin       DVT Prophylaxis: SCD     Disposition TBD     Carroll Robbins, APRN - CNP, 8/27/2024 8:52 AM

## 2024-08-27 NOTE — PROGRESS NOTES
NEUROSURGERY  Progress Note    INTERVAL HISTORY: No new events overnight.  Continues to have groin pains.  Blood cultures remain positive.      CHIEF COMPLAINT: Bilateral hip and low back pain    HISTORY OF PRESENT ILLNESS:      The patient is a 73 y.o. female with A-fib on Eliquis, previous CVA, hypertension, CHF, anxiety, COPD who presented to the ER on 8/19/2024 with low back/flank pain and bilateral groin pain.  It appears, according to previous medical records that this pain has been present for at least 3 months.  She states that for 5 days [place]  She denies any numbness.  She states her low back pain and groin pain are essentially equal in intensity.  She states walking and standing exacerbates her pain.  Due to her severe pain, she is has been admitted to hospital.    An MRI of the lumbar spine was obtained and revealed a inferiorly extruded disc herniation at L2-3 along with stenosis for which I was asked to evaluate.        The patient does take anticoagulation medication.  Eliquis      Past Medical History:   Diagnosis Date    Arthritis     Atrial fibrillation (HCC)     Paroxysmal A Fib    Cerebral artery occlusion with cerebral infarction (HCC)     TWICE: once in 2012 or 2013, then had another one 2021    CHF (congestive heart failure) (HCC)     COPD (chronic obstructive pulmonary disease) (HCC)     Hyperlipidemia     Hypertension     On continuous oral anticoagulation     Apixaban    Pneumonia     Thyroid disease        Past Surgical History:   Procedure Laterality Date    CARDIOVERSION  2020    HYSTERECTOMY, TOTAL ABDOMINAL (CERVIX REMOVED) N/A     With removal of tumor, ~2014, was a DANIS and BSO    TONSILLECTOMY Bilateral     at age 17 or 18, withOUT Adneoids as per pt    TUMOR REMOVAL      intraabdominal, ~2014, states she was told it was feeding off her bowel        MEDICATIONS    Current Facility-Administered Medications:     vancomycin (VANCOCIN) 1,250 mg in sodium  is moderate to severe facet arthropathy resulting in mild to moderate bilateral foraminal stenosis.     No abnormal enhancement is identified within the lumbar spinal cord or cauda equina on postcontrast images.     IMPRESSION:  No definite evidence for diskitis or osteomyelitis seen within the lumbar spine.     At the L2-L3 level there is a right paracentral inferior disc extrusion as described above measuring 14 mm in height, 4.8 mm AP, and the findings are causing right lateral recess stenosis posterior to the L3 vertebral body.     There is disc bulge and facet arthropathy seen throughout the lumbar spine as described above resulting in mild central canal and lateral recess stenosis at L2-L3.     Mild grade 1 degenerative spondylolisthesis seen at L4-L5.              ______________________________________   Electronically signed by: SHANDRA JOHNSON M.D.  Date:     08/21/2024  Time:    16:47            Exam Ended: 08/21/24 16:39 CDT Last Resulted: 08/21/24 16:57 CDT          I have personally reviewed the images and my interpretation is:  At L2-3 there is an inferiorly extruded disc fragment that is more eccentric to the right.  This results in moderate to severe spinal canal stenosis.  There is a spondylolisthesis noted L4-5 pending results in moderate bilateral foraminal stenosis.  Of note, there are some increase signal change on the STIR sequences within and around the L3 and L4 spinous processes.          NABOR (8/24/2024)  Interpretation Summary  Show Result Comparison     Left Ventricle: Normal left ventricular systolic function with a visually estimated EF of 55 - 60%. Pt did not have a functioning IV. Unable to give Definity. Grade III diastolic dysfunction with increased LAP.    Right Ventricle: Right ventricle is mildly dilated.    Mitral Valve: Annular calcification. Mildly thickened leaflets. Mild regurgitation.    Tricuspid Valve: Mild regurgitation. Severely elevated RVSP, consistent with severe

## 2024-08-27 NOTE — PROGRESS NOTES
[facility]   Pharmacy Pharmacokinetic Monitoring Service - Vancomycin    Consulting Provider: Dr Andino  Indication: gram +cocci in clusters resembling staph in blood cultures (bacteremia)  Target Concentration: Goal AUC/LAKHWINDER 400-600 mg*hr/L  Day of Therapy: 4  Additional Antimicrobials: none    Pertinent Laboratory Values:   Wt Readings from Last 1 Encounters:   08/21/24 115 kg (253 lb 8 oz)     Temp Readings from Last 1 Encounters:   08/26/24 97.7 °F (36.5 °C) (Temporal)     Estimated Creatinine Clearance: 103 mL/min (based on SCr of 0.6 mg/dL).  Recent Labs     08/26/24  0130 08/27/24  0030   CREATININE 0.5 0.6   BUN 23 23   WBC 7.1 7.2       Pertinent Cultures:  Culture Date Source Results   8/23/24 Blood x2 MRSA (2 of 2)   8/24/24 Blood  MRSA   8/25/24 Blood  G(+) cocci in clusters resembling staph   8/26/24 Blood  G(+) cocci in clusters resembling staph   MRSA Nasal Swab:  non-respiratory infection but detected 8/25    Recent vancomycin administrations                     vancomycin (VANCOCIN) 1500 mg in sodium chloride 0.9 % 250 mL IVPB (mg) 1,500 mg New Bag 08/26/24 1336     1,500 mg New Bag  0229    vancomycin (VANCOCIN) 1000 mg in sodium chloride 0.9% 250 mL IVPB (mg) 1,000 mg New Bag 08/25/24 1322     1,000 mg New Bag  0154     1,000 mg New Bag 08/24/24 1405                    Assessment:  Date/Time Current Dose Concentration Timing of Concentration (h) AUC   8/27/24 0130 1500mg IV q12h 15.6 10 h 54 min 618   Note: Serum concentrations collected for AUC dosing may appear elevated if collected in close proximity to the dose administered, this is not necessarily an indication of toxicity    Plan:  Current dosing regimen is supra-therapeutic  \"Decrease dose to 1250mg IV q12h  Repeat vancomycin concentration ordered for 8/28 @ 1330   Pharmacy will continue to monitor patient and adjust therapy as indicated    Thank you for the consult,  Becca Stockton RPH  8/27/2024 1:13 AM

## 2024-08-27 NOTE — PROGRESS NOTES
Physical Therapy     08/27/24 1100   Restrictions/Precautions   Restrictions/Precautions Fall Risk   Required Braces or Orthoses? No   General   Diagnosis back strain, abd dysfunction, abdominal pain   Subjective   Subjective agreeable to tx   Subjective   Pain 5/10 back/groin   Bed mobility   Supine to Sit Contact guard assistance   Bed Mobility Comments with HOB elevated and heavy use of L guard rail   Transfers   Sit to Stand Contact guard assistance   Stand to Sit Contact guard assistance   Bed to Chair Contact guard assistance   Stand Pivot Transfers Contact guard assistance   Comment stand step transfer from EOB to BSC. Lana for clean up post void due to pt anxious/fear of falling/ sub par maximal efforts. constant cues for hand placement for all STSs   Ambulation   Surface Level tile   Device Rolling Walker   Assistance Contact guard assistance   Gait Deviations Slow Ashli;Decreased step length;Decreased step height   Distance 3' 5'   Comments EOB to BSC and from BSC to recliner with RW   Short Term Goals   Time Frame for Short Term Goals 2 wks   Short Term Goal 1 supine to sit indep   Short Term Goal 2 sit to stand indep   Short Term Goal 3 amb. 100' with RW SBA   Short Term Goal 4 bed to chair SBA   Activity Tolerance   Activity Tolerance Patient tolerated treatment well;Patient limited by fatigue   Assessment   Assessment pt limited by anxiety and pain at this time. would need chair follow if attempted further AMB. pt left in chair with alarm on and all needs in reach.   PT Plan of Care   Tuesday X   Safety Devices   Type of Devices Call light within reach;Chair alarm in place;Gait belt;Left in chair     Electronically signed by Arnold Gordon PTA on 8/27/2024 at 11:56 AM

## 2024-08-27 NOTE — PROGRESS NOTES
Occupational Therapy     08/27/24 1300   Subjective   Subjective Pt in bed upon arrival for therapy. Pt agreeable to participate.   Pain Assessment   Pain Assessment None - Denies Pain   Cognition   Overall Cognitive Status Exceptions   Orientation   Overall Orientation Status WFL   Bed Mobility Training   Bed Mobility Training Yes   Overall Level of Assistance Minimum assistance;Stand-by assistance   Interventions Verbal cues;Tactile cues;Manual cues   Supine to Sit Minimum assistance;Stand-by assistance   Scooting Stand-by assistance   Transfer Training   Transfer Training Yes   Overall Level of Assistance Minimum assistance   Interventions Verbal cues;Tactile cues;Manual cues   Sit to Stand Minimum assistance   Stand to Sit Minimum assistance   Bed to Chair Minimum assistance   Toilet Transfer Minimum assistance  (BSC)   Balance   Sitting Intact   Standing Impaired   Standing - Static Occasional   Standing - Dynamic Occasional;Poor   ADL   Feeding Setup;Independent   Grooming Setup;Supervision   Grooming Skilled Clinical Factors requires initiation   UE Bathing Minimal assistance;Moderate assistance   LE Bathing Maximum assistance   UE Dressing Minimal assistance;Moderate assistance   LE Dressing Maximum assistance   Toileting Moderate assistance;Maximum assistance   Toileting Skilled Clinical Factors BSC; requires increased assistance for clean up and clothing management   Functional Mobility Moderate assistance;Maximum assistance   Functional Mobility Skilled Clinical Factors x2 for safety; needs to use Jo Bassett   Assessment   Assessment Tx focused on sitting EOB to perform UB dressing (gown change). Pt instructed on BSC use and transfer to recliner following at  level.   Activity Tolerance Patient tolerated treatment well   Discharge Recommendations Patient would benefit from continued therapy after discharge;24 hour supervision or assist   Occupational Therapy Plan   Times Per Week 3-5x/wk   Times Per Day

## 2024-08-28 ENCOUNTER — HOSPITAL ENCOUNTER (OUTPATIENT)
Age: 74
Discharge: HOME | End: 2024-08-30
Attending: INTERNAL MEDICINE
Payer: MEDICARE

## 2024-08-28 ENCOUNTER — OUTSIDE FACILITY SERVICE (OUTPATIENT)
Age: 74
End: 2024-08-28
Payer: MEDICARE

## 2024-08-28 ENCOUNTER — APPOINTMENT (OUTPATIENT)
Age: 74
DRG: 551 | End: 2024-08-28
Attending: INTERNAL MEDICINE
Payer: MEDICARE

## 2024-08-28 ENCOUNTER — APPOINTMENT (OUTPATIENT)
Dept: NUCLEAR MEDICINE | Age: 74
End: 2024-08-28
Payer: MEDICARE

## 2024-08-28 VITALS
SYSTOLIC BLOOD PRESSURE: 108 MMHG | OXYGEN SATURATION: 97 % | DIASTOLIC BLOOD PRESSURE: 79 MMHG | HEART RATE: 63 BPM | RESPIRATION RATE: 18 BRPM

## 2024-08-28 DIAGNOSIS — B95.62 MRSA BACTEREMIA: Primary | ICD-10-CM

## 2024-08-28 DIAGNOSIS — R78.81 MRSA BACTEREMIA: Primary | ICD-10-CM

## 2024-08-28 PROBLEM — S39.012A STRAIN OF BACK: Status: ACTIVE | Noted: 2024-08-28

## 2024-08-28 LAB
ANION GAP SERPL CALCULATED.3IONS-SCNC: 9 MMOL/L (ref 7–19)
BASOPHILS # BLD: 0 K/UL (ref 0–0.2)
BASOPHILS NFR BLD: 0.4 % (ref 0–1)
BUN SERPL-MCNC: 22 MG/DL (ref 8–23)
CALCIUM SERPL-MCNC: 8.1 MG/DL (ref 8.8–10.2)
CHLORIDE SERPL-SCNC: 100 MMOL/L (ref 98–111)
CO2 SERPL-SCNC: 26 MMOL/L (ref 22–29)
CREAT SERPL-MCNC: 0.6 MG/DL (ref 0.5–0.9)
ECHO BSA: 2.18 M2
EOSINOPHIL # BLD: 0 K/UL (ref 0–0.6)
EOSINOPHIL NFR BLD: 0 % (ref 0–5)
ERYTHROCYTE [DISTWIDTH] IN BLOOD BY AUTOMATED COUNT: 16.5 % (ref 11.5–14.5)
GLUCOSE SERPL-MCNC: 148 MG/DL (ref 70–99)
HCT VFR BLD AUTO: 36.8 % (ref 37–47)
HGB BLD-MCNC: 11.8 G/DL (ref 12–16)
IMM GRANULOCYTES # BLD: 0.3 K/UL
LYMPHOCYTES # BLD: 0.6 K/UL (ref 1.1–4.5)
LYMPHOCYTES NFR BLD: 6.2 % (ref 20–40)
MCH RBC QN AUTO: 29.3 PG (ref 27–31)
MCHC RBC AUTO-ENTMCNC: 32.1 G/DL (ref 33–37)
MCV RBC AUTO: 91.3 FL (ref 81–99)
MONOCYTES # BLD: 0.3 K/UL (ref 0–0.9)
MONOCYTES NFR BLD: 3 % (ref 0–10)
NEUTROPHILS # BLD: 8.4 K/UL (ref 1.5–7.5)
NEUTS SEG NFR BLD: 87.6 % (ref 50–65)
PLATELET # BLD AUTO: 222 K/UL (ref 130–400)
PMV BLD AUTO: 10.8 FL (ref 9.4–12.3)
POTASSIUM SERPL-SCNC: 4.5 MMOL/L (ref 3.5–5)
RBC # BLD AUTO: 4.03 M/UL (ref 4.2–5.4)
SODIUM SERPL-SCNC: 135 MMOL/L (ref 136–145)
VANCOMYCIN TROUGH SERPL-MCNC: 18.2 UG/ML (ref 10–20)
WBC # BLD AUTO: 9.6 K/UL (ref 4.8–10.8)

## 2024-08-28 PROCEDURE — 6370000000 HC RX 637 (ALT 250 FOR IP): Performed by: NURSE PRACTITIONER

## 2024-08-28 PROCEDURE — 6370000000 HC RX 637 (ALT 250 FOR IP): Performed by: HOSPITALIST

## 2024-08-28 PROCEDURE — 99222 1ST HOSP IP/OBS MODERATE 55: CPT | Performed by: INTERNAL MEDICINE

## 2024-08-28 PROCEDURE — 6370000000 HC RX 637 (ALT 250 FOR IP): Performed by: INTERNAL MEDICINE

## 2024-08-28 PROCEDURE — 36415 COLL VENOUS BLD VENIPUNCTURE: CPT

## 2024-08-28 PROCEDURE — 6370000000 HC RX 637 (ALT 250 FOR IP)

## 2024-08-28 PROCEDURE — 6360000002 HC RX W HCPCS: Performed by: INTERNAL MEDICINE

## 2024-08-28 PROCEDURE — A9569 TECHNETIUM TC-99M AUTO WBC: HCPCS | Performed by: INTERNAL MEDICINE

## 2024-08-28 PROCEDURE — 6360000002 HC RX W HCPCS: Performed by: NURSE PRACTITIONER

## 2024-08-28 PROCEDURE — 99231 SBSQ HOSP IP/OBS SF/LOW 25: CPT | Performed by: NURSE PRACTITIONER

## 2024-08-28 PROCEDURE — 1200000000 HC SEMI PRIVATE

## 2024-08-28 PROCEDURE — 87186 SC STD MICRODIL/AGAR DIL: CPT

## 2024-08-28 PROCEDURE — 85025 COMPLETE CBC W/AUTO DIFF WBC: CPT

## 2024-08-28 PROCEDURE — 80202 ASSAY OF VANCOMYCIN: CPT

## 2024-08-28 PROCEDURE — 2580000003 HC RX 258: Performed by: HOSPITALIST

## 2024-08-28 PROCEDURE — 87040 BLOOD CULTURE FOR BACTERIA: CPT

## 2024-08-28 PROCEDURE — 86403 PARTICLE AGGLUT ANTBDY SCRN: CPT

## 2024-08-28 PROCEDURE — 93320 DOPPLER ECHO COMPLETE: CPT

## 2024-08-28 PROCEDURE — 94760 N-INVAS EAR/PLS OXIMETRY 1: CPT

## 2024-08-28 PROCEDURE — 3430000000 HC RX DIAGNOSTIC RADIOPHARMACEUTICAL: Performed by: INTERNAL MEDICINE

## 2024-08-28 PROCEDURE — 80048 BASIC METABOLIC PNL TOTAL CA: CPT

## 2024-08-28 PROCEDURE — 87077 CULTURE AEROBIC IDENTIFY: CPT

## 2024-08-28 PROCEDURE — 78802 RP LOCLZJ TUM WHBDY 1 D IMG: CPT

## 2024-08-28 PROCEDURE — 6360000002 HC RX W HCPCS: Performed by: HOSPITALIST

## 2024-08-28 RX ORDER — MIDAZOLAM HYDROCHLORIDE 1 MG/ML
INJECTION INTRAMUSCULAR; INTRAVENOUS PRN
Status: COMPLETED | OUTPATIENT
Start: 2024-08-28 | End: 2024-08-28

## 2024-08-28 RX ORDER — LIDOCAINE HYDROCHLORIDE 20 MG/ML
SOLUTION OROPHARYNGEAL PRN
Status: COMPLETED | OUTPATIENT
Start: 2024-08-28 | End: 2024-08-28

## 2024-08-28 RX ORDER — METOPROLOL TARTRATE 25 MG/1
25 TABLET, FILM COATED ORAL 2 TIMES DAILY
Status: DISCONTINUED | OUTPATIENT
Start: 2024-08-28 | End: 2024-08-31 | Stop reason: HOSPADM

## 2024-08-28 RX ADMIN — VANCOMYCIN HYDROCHLORIDE 1250 MG: 10 INJECTION, POWDER, LYOPHILIZED, FOR SOLUTION INTRAVENOUS at 01:31

## 2024-08-28 RX ADMIN — DEXAMETHASONE SODIUM PHOSPHATE 4 MG: 10 INJECTION, SOLUTION INTRAMUSCULAR; INTRAVENOUS at 21:08

## 2024-08-28 RX ADMIN — APIXABAN 5 MG: 5 TABLET, FILM COATED ORAL at 21:07

## 2024-08-28 RX ADMIN — OXYCODONE 2.5 MG: 5 TABLET ORAL at 13:12

## 2024-08-28 RX ADMIN — DEXAMETHASONE SODIUM PHOSPHATE 4 MG: 10 INJECTION, SOLUTION INTRAMUSCULAR; INTRAVENOUS at 11:32

## 2024-08-28 RX ADMIN — ACETAMINOPHEN 1000 MG: 500 TABLET ORAL at 21:07

## 2024-08-28 RX ADMIN — DOCUSATE SODIUM 100 MG: 100 CAPSULE, LIQUID FILLED ORAL at 21:08

## 2024-08-28 RX ADMIN — MIDAZOLAM 2 MG: 1 INJECTION INTRAMUSCULAR; INTRAVENOUS at 12:02

## 2024-08-28 RX ADMIN — LIDOCAINE HYDROCHLORIDE 15 ML: 20 SOLUTION ORAL; TOPICAL at 11:53

## 2024-08-28 RX ADMIN — EXAMETAZIME 25 MILLICURIE: KIT at 16:47

## 2024-08-28 RX ADMIN — ONDANSETRON 4 MG: 2 INJECTION INTRAMUSCULAR; INTRAVENOUS at 13:12

## 2024-08-28 RX ADMIN — MIDAZOLAM 1 MG: 1 INJECTION INTRAMUSCULAR; INTRAVENOUS at 12:05

## 2024-08-28 RX ADMIN — AMIODARONE HYDROCHLORIDE 200 MG: 200 TABLET ORAL at 11:31

## 2024-08-28 RX ADMIN — ATORVASTATIN CALCIUM 40 MG: 40 TABLET, FILM COATED ORAL at 21:08

## 2024-08-28 RX ADMIN — SODIUM CHLORIDE, PRESERVATIVE FREE 10 ML: 5 INJECTION INTRAVENOUS at 11:32

## 2024-08-28 RX ADMIN — DEXAMETHASONE SODIUM PHOSPHATE 4 MG: 10 INJECTION, SOLUTION INTRAMUSCULAR; INTRAVENOUS at 03:29

## 2024-08-28 RX ADMIN — VANCOMYCIN HYDROCHLORIDE 1250 MG: 10 INJECTION, POWDER, LYOPHILIZED, FOR SOLUTION INTRAVENOUS at 14:33

## 2024-08-28 RX ADMIN — OXYCODONE 2.5 MG: 5 TABLET ORAL at 21:08

## 2024-08-28 RX ADMIN — AMIODARONE HYDROCHLORIDE 200 MG: 200 TABLET ORAL at 21:08

## 2024-08-28 RX ADMIN — METHIMAZOLE 5 MG: 5 TABLET ORAL at 21:08

## 2024-08-28 RX ADMIN — MIDAZOLAM 1 MG: 1 INJECTION INTRAMUSCULAR; INTRAVENOUS at 12:03

## 2024-08-28 ASSESSMENT — PAIN SCALES - WONG BAKER: WONGBAKER_NUMERICALRESPONSE: HURTS A LITTLE BIT

## 2024-08-28 ASSESSMENT — ENCOUNTER SYMPTOMS
DIARRHEA: 0
SHORTNESS OF BREATH: 0
VOMITING: 0
GASTROINTESTINAL NEGATIVE: 1
RESPIRATORY NEGATIVE: 1
EYES NEGATIVE: 1
NAUSEA: 0

## 2024-08-28 ASSESSMENT — PAIN SCALES - GENERAL
PAINLEVEL_OUTOF10: 1
PAINLEVEL_OUTOF10: 10
PAINLEVEL_OUTOF10: 3

## 2024-08-28 ASSESSMENT — PAIN DESCRIPTION - DESCRIPTORS: DESCRIPTORS: SHOOTING;SPASM;SHARP

## 2024-08-28 ASSESSMENT — PAIN DESCRIPTION - LOCATION: LOCATION: BACK

## 2024-08-28 NOTE — PROGRESS NOTES
Infectious Diseases Progress Note    Patient:  Lashonda Milian  YOB: 1950  MRN: [id number]   Admit date: 8/19/2024   Admitting Physician: Teresa Urbina MD  Primary Care Physician: Nick Martinez MD    Chief Complaint/Interval History: She is without new symptoms.  Her hip and back symptoms are about the same.  She has no lower extremity weakness or numbness.  She has no problems with bowel or bladder function.  She indicates she is able to walk short distances with a walker.  She is not happy fever or chills.  She had been staying with her granddaughter.  She was not sure whether they would be able to assist with IV antibiotic treatment at home.  She seems to be tolerating vancomycin without difficulty.  Interval notes reviewed.  Preliminary report on NABOR demonstrated no vegetation. Nuclear medicine abscess localization study in progress.    In/Out    Intake/Output Summary (Last 24 hours) at 8/28/2024 1759  Last data filed at 8/28/2024 1723  Gross per 24 hour   Intake --   Output 2400 ml   Net -2400 ml     Allergies:   Allergies   Allergen Reactions    Niacin And Related Rash     Current Meds: metoprolol tartrate (LOPRESSOR) tablet 25 mg, BID  vancomycin (VANCOCIN) 1,250 mg in sodium chloride 0.9 % 250 mL IVPB, Q12H  docusate sodium (COLACE) capsule 100 mg, BID  lactulose (CHRONULAC) 10 GM/15ML solution 10 g, BID  dexAMETHasone (PF) (DECADRON) injection 4 mg, Q8H  vancomycin (VANCOCIN) intermittent dosing (placeholder), RX Placeholder  tiZANidine (ZANAFLEX) tablet 4 mg, Q6H PRN  naloxone (NARCAN) injection 0.4 mg, PRN  acetaminophen (TYLENOL) tablet 1,000 mg, 3 times per day  oxyCODONE (ROXICODONE) immediate release tablet 2.5 mg, Q4H PRN  polyethylene glycol (GLYCOLAX) packet 17 g, Daily PRN  melatonin disintegrating tablet 5 mg, Nightly PRN  calcium carbonate (TUMS) chewable tablet 500 mg, TID PRN  amiodarone (CORDARONE) tablet 200 mg, BID  apixaban (ELIQUIS) tablet 5 mg, BID  atorvastatin  mcg/mL BACTERIAL SUSCEPTIBILITY PANEL BY LAKHWINDER       inducible clindamycin resistance   Neg mcg/mL BACTERIAL SUSCEPTIBILITY PANEL BY LAKHWINDER       oxacillin Resistant >=4 mcg/mL BACTERIAL SUSCEPTIBILITY PANEL BY LAKHWINDER       tetracycline Sensitive <=1 mcg/mL BACTERIAL SUSCEPTIBILITY PANEL BY LAKHWINDER       trimethoprim-sulfamethoxazole Sensitive <=10 mcg/mL BACTERIAL SUSCEPTIBILITY PANEL BY LAKHWINDER       vancomycin Sensitive 1 mcg/mL BACTERIAL SUSCEPTIBILITY PANEL BY LAKHWINDER             Radiology: None    Additional Studies Reviewed:  Preliminary NABOR report:  Signed         Cardiac procedure transesophageal echocardiography performed tolerated well preliminary report  Left ventricular function normal  Mild leaflet thickening mitral valve with mild to moderate mitral regurgitation  Trileaflet aortic valve minimal sclerosis  Unremarkable tricuspid valve  Pulmonary valve poorly visualized  No obvious valvular vegetations  No intracardiac thrombus  Unremarkable aorta           Impression:  1.  MRSA bacteremia-blood cultures remaining positive.  Repeat blood cultures ordered for today and tomorrow morning.  Pulmonary report on NABOR demonstrated no vegetations.  Nuclear medicine abscess localization study in progress.  2. Back/hip pain-previous imaging without definite evidence of discitis or abscess.  3.  Bilateral lower lobe infiltrate on CT  4.  Obesity  5.  Diastolic dysfunction    Recommendations:  Continue vancomycin  Continue to watch for signs or symptoms suggestive of metastatic focus of infection  Continue to monitor exam and clinical course  Repeat blood cultures  Await results of abscess localization study   Would suggest  begin discussion with her.  She will need 6 weeks of IV antibiotic treatment.  She may have a hard time receiving IV antibiotic treatment at home.  She may not have enough consistent support at home to assist with her IV antibiotic treatment as she lives with her granddaughter.  She may need  temporary nursing home placement to receive physical therapy, IV antibiotic treatment, and assistance with general care.  Continue to follow    SAMSON MORSE MD

## 2024-08-28 NOTE — PLAN OF CARE
Problem: Discharge Planning  Goal: Discharge to home or other facility with appropriate resources  Outcome: Progressing  Flowsheets (Taken 8/27/2024 2107)  Discharge to home or other facility with appropriate resources: Identify barriers to discharge with patient and caregiver     Problem: Safety - Adult  Goal: Free from fall injury  Outcome: Progressing  Flowsheets (Taken 8/27/2024 2213)  Free From Fall Injury: Instruct family/caregiver on patient safety     Problem: ABCDS Injury Assessment  Goal: Absence of physical injury  Outcome: Progressing  Flowsheets (Taken 8/27/2024 2213)  Absence of Physical Injury: Implement safety measures based on patient assessment     Problem: Chronic Conditions and Co-morbidities  Goal: Patient's chronic conditions and co-morbidity symptoms are monitored and maintained or improved  Outcome: Progressing  Flowsheets  Taken 8/27/2024 2107 by Giorgi Johnston LPN  Care Plan - Patient's Chronic Conditions and Co-Morbidity Symptoms are Monitored and Maintained or Improved: Monitor and assess patient's chronic conditions and comorbid symptoms for stability, deterioration, or improvement  Taken 8/27/2024 1351 by Teagan Mesa, RN  Care Plan - Patient's Chronic Conditions and Co-Morbidity Symptoms are Monitored and Maintained or Improved:   Monitor and assess patient's chronic conditions and comorbid symptoms for stability, deterioration, or improvement   Collaborate with multidisciplinary team to address chronic and comorbid conditions and prevent exacerbation or deterioration     Problem: Pain  Goal: Verbalizes/displays adequate comfort level or baseline comfort level  Outcome: Progressing     Problem: Skin/Tissue Integrity  Goal: Absence of new skin breakdown  Description: 1.  Monitor for areas of redness and/or skin breakdown  2.  Assess vascular access sites hourly  3.  Every 4-6 hours minimum:  Change oxygen saturation probe site  4.  Every 4-6 hours:  If on nasal continuous positive  airway pressure, respiratory therapy assess nares and determine need for appliance change or resting period.  Outcome: Progressing

## 2024-08-28 NOTE — PROGRESS NOTES
Cardiac procedure transesophageal echocardiography performed tolerated well preliminary report  Left ventricular function normal  Mild leaflet thickening mitral valve with mild to moderate mitral regurgitation  Trileaflet aortic valve minimal sclerosis  Unremarkable tricuspid valve  Pulmonary valve poorly visualized  No obvious valvular vegetations  No intracardiac thrombus  Unremarkable aorta

## 2024-08-28 NOTE — PROGRESS NOTES
TRANSFER - OUT REPORT:    Verbal report given to 5th floor RN on Lashonda Milian being transferred to [facility] routine post-op       Report consisted of patient's Situation, Background, Assessment and   Recommendations(SBAR).     Information from the following report(s) Nurse Handoff Report was reviewed with the receiving nurse.    Opportunity for questions and clarification was provided.      Patient transported with:   Registered Nurse

## 2024-08-28 NOTE — PROGRESS NOTES
[facility]      Patient:  Lashonda Milian  YOB: 1950  Date of Service: 8/28/2024  MRN: [id number]   Acct: [id number]   Primary Care Physician: Nick Martinez MD  Advance Directive: Full Code  Admit Date: 8/19/2024       Hospital Day: 6  Portions of this note have been copied forward, however, changed to reflect the most current clinical status of this patient.  CHIEF COMPLAINT back pain      SUBJECTIVE: no issues overnight, waiting for NABOR, endorses left groin pain      Cumulative hospital course   73-year-old female with AF on Eliquis, diastolic heart failure, CVA, HTN, hyperlipidemia, anxiety, COPD, with complaints of right lower back and flank pain radiating into her groin and down her right thigh.  Patient states that she does have chronic back and hip pain however has been progressively worsening over the past few days.  She did states she [place] where she [place] backwards and hit her buttocks on the tile floor, did not recall when this event occurred.  Denied fever, urinary symptoms, productive cough, abdominal pain or diarrhea.  Workup in ER CT abdomen pelvis asymmetrical swelling of left psoas muscle and stranding in the left retroperitoneum small amount of intramuscular hemorrhage is possible, cholelithiasis without acute cholecystitis.  Patient had received multiple narcotics, muscle relaxant, and steroids and was unable to ambulate or utilize wheelchair she was admitted due to intractable pain and ambulatory dysfunction.  At baseline utilizes a walker without difficulty.  MRI right femur and lumbar spine ordered due to persistent pain.  MRI lumbar spine L2-L3 right paracentral inferior disc extrusion measuring 14 mm in height 4.8 mm AP causing right lateral stenosis posterior to the L3 vertebral body, MRI femur 4.3 cm fat signal intensity lesion in the proximal right thigh anterior medial superficial soft tissue most likely lipoma, superficial soft tissue  bronchiolitis versus pulmonary edema.  Cardiomegaly.  Incompletely visualized cholelithiasis.  Pulmonary artery hypertension.  All CT scans are performed using dose optimization techniques as appropriate to the performed exam and include at least one of the following: Automated exposure control, adjustment of the mA and/or kV according to size, and the use of iterative reconstruction technique.  ______________________________________ Electronically signed by: ALCIDES PLUMMER M.D. Date:     08/24/2024 Time:    15:30     MRI LUMBAR SPINE W WO CONTRAST    Result Date: 8/21/2024  No definite evidence for diskitis or osteomyelitis seen within the lumbar spine.  At the L2-L3 level there is a right paracentral inferior disc extrusion as described above measuring 14 mm in height, 4.8 mm AP, and the findings are causing right lateral recess stenosis posterior to the L3 vertebral body.  There is disc bulge and facet arthropathy seen throughout the lumbar spine as described above resulting in mild central canal and lateral recess stenosis at L2-L3.  Mild grade 1 degenerative spondylolisthesis seen at L4-L5.     ______________________________________ Electronically signed by: SHANDRA JOHNSON M.D. Date:     08/21/2024 Time:    16:47     MRI FEMUR RIGHT W WO CONTRAST    Result Date: 8/21/2024   4.3 cm fat signal intensity lesion in the proximal right thigh anteromedial superficial soft tissues.  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  Continued clinical and imaging surveillance to ensure stability.  Superficial soft tissue edema about the pelvis and thighs, nonspecific.  There is also nonspecific muscle edema in the proximal thighs.  Heterogeneous bone marrow, nonspecific.  Attention at follow-up.  Degenerative changes to the hips, right greater than left.  Additional findings as described.    ______________________________________ Electronically signed by: JOYCELYN RAYA D.O. Date:     08/21/2024 Time:    16:35

## 2024-08-28 NOTE — ADT AUTH CERT
Utilization Reviews       08/23/24    Last updated by Pauline Clark RN on 8/28/2024 0933     Review Status Created By   In Primary Pauline Clark RN       Review Type Associated Date   -- 8/28/2024      Criteria Review   DATE: 8/23/24  TYPE OF BED: Medical      Patient has or is expected to exceed 2 midnights of medically necessary care.      RELEVANT BASELINES:RA        PERTINENT UPDATES:  She continues to have severe pain and difficulty ambulating. She states that muscle relaxants have helped, but she is still unable to sit up due to pain. She is reporting that she is having hallucinations and she is aware of them. Currently on 2LNC.      VITALS:/55   Pulse 52   Temp 96.8 °F (36 °C) (Temporal)   Resp 18   Ht 1.575 m (5' 2\")   Wt 115 kg (253 lb 8 oz)   SpO2 97%  2 Liters NC  BMI 46.37 kg/m²         ABNL/PERTINENT LABS/RADIOLOGY/DIAGNOSTIC STUDIES:  Sodium: 132 (L)  Glucose: 109 (H)  Calcium: 8.1 (L)  RBC: 3.41 (L)  Hemoglobin Quant: 10.1 (L)  Hematocrit: 32.0 (L)  MCHC: 31.6 (L)  RDW: 17.1 (H)  Platelet Count: 129 (L)  Neutrophils %: 75.0 (H)  Lymphocyte %: 8.3 (L)  Monocytes %: 13.9 (H)  Lymphocytes Absolute: 0.6 (L)  Monocytes Absolute: 1.00 (H)     Staphylococcus species by PCR: DETECTED !!  Staphylococcus aureus by PCR: DETECTED !!  Methicillin Resistance mecA/C and MREJ by PCR: DETECTED !!        CULTURE, BLOOD 2: Rpt !  Culture, Blood 2:  Bottle volume = 9 ml   Bottle volume = 9 ml   !    Isolated from Aerobic and Anaerobic bottle        PHYSICAL EXAM:  Constitutional:       Appearance: She is obese. She is ill-appearing.   HENT:      Head: Normocephalic.      Mouth/Throat:      Mouth: Mucous membranes are moist.   Eyes:      Pupils: Pupils are equal, round, and reactive to light.   Cardiovascular:      Rate and Rhythm: Normal rate. Rhythm irregularly irregular.      Pulses: Normal pulses.      Heart sounds: Normal heart sounds.   Pulmonary:      Effort: Pulmonary effort is normal.      Breath  8/28/2024 0903     Review Status Created By   In [facility] Pauline Clark RN       Review Type Associated Date   -- 8/28/2024      Criteria Review   DATE: 08/22/24  TYPE OF BED: Medical      Patient has or is expected to exceed 2 midnights of medically necessary care.      RELEVANT BASELINES: RA     PERTINENT UPDATES:  continues to have severe pain and difficulty ambulating. She states that muscle relaxants have helped. She is anxious to be discharged, however, she is afraid she will fall. Remains on 2LNC     VITALS:/90   Pulse 112   Temp 97 °F (36.1 °C)   Resp 18   O2 94% 2 Liters NC  BMI 46.37 kg/m²         ABNL/PERTINENT LABS/RADIOLOGY/DIAGNOSTIC STUDIES:  Sodium: 134 (L)  BUN,BUNPL: 26 (H)  Glucose: 123 (H)  Calcium: 8.0 (L)  RBC: 3.42 (L)  Hemoglobin Quant: 10.3 (L)  Hematocrit: 32.4 (L)  MCHC: 31.8 (L)  RDW: 17.2 (H)  Neutrophils %: 79.9 (H)  Lymphocyte %: 6.1 (L)  Monocytes %: 11.9 (H)  Lymphocytes Absolute: 0.5 (L)        PHYSICAL EXAM:  Constitutional:       Appearance: She is obese. She is ill-appearing.   HENT:      Head: Normocephalic.      Mouth/Throat:      Mouth: Mucous membranes are moist.   Eyes:      Pupils: Pupils are equal, round, and reactive to light.   Cardiovascular:      Rate and Rhythm: Normal rate. Rhythm irregularly irregular.      Pulses: Normal pulses.      Heart sounds: Normal heart sounds.   Pulmonary:      Effort: Pulmonary effort is normal.      Breath sounds: Normal breath sounds.   Abdominal:      General: Bowel sounds are normal. There is distension.   Musculoskeletal:      Right lower leg: Deformity and tenderness present. Edema present.      Left lower leg: Edema present.        Legs:    Skin:     General: Skin is warm.   Neurological:      General: No focal deficit present.      Motor: Weakness (Diffuse) present.         MD CONSULTS/ASSESSMENT AND PLAN:  Internal Medicine  Assessment/Plan   Principal Problem:    Intractable abdominal pain/Abnormal abdominal CT scan

## 2024-08-28 NOTE — CONSULTS
occlusion of cerebral artery (HCC)           Past Medical History:  Past Medical History:   Diagnosis Date    Arthritis     Atrial fibrillation (HCC)     Paroxysmal A Fib    Cerebral artery occlusion with cerebral infarction (HCC)     TWICE: once in  or , then had another one     CHF (congestive heart failure) (HCC)     COPD (chronic obstructive pulmonary disease) (HCC)     Hyperlipidemia     Hypertension     On continuous oral anticoagulation     Apixaban    Pneumonia     Thyroid disease         Past Surgical History:  Past Surgical History:   Procedure Laterality Date    CARDIOVERSION      HYSTERECTOMY, TOTAL ABDOMINAL (CERVIX REMOVED) N/A     With removal of tumor, ~, was a DANIS and BSO    TONSILLECTOMY Bilateral     at age 17 or 18, withOUT Adneoids as per pt    TUMOR REMOVAL      intraabdominal, ~, states she was told it was feeding off her bowel       Past Family History:  Family History   Problem Relation Age of Onset    Pacemaker Mother     Heart Disease Mother         never tobacco    No Known Problems Father     No Known Problems Paternal Grandmother     No Known Problems Paternal Grandfather     No Known Problems Maternal Grandfather          of old age    No Known Problems Maternal Grandmother          of old age    No Known Problems Son     No Known Problems Son        Past Social History:  Social History     Socioeconomic History    Marital status:      Spouse name: Not on file    Number of children: 2    Years of education: Not on file    Highest education level: Not on file   Occupational History    Occupation: [employer]     Comment: Retired   Tobacco Use    Smoking status: Former     Types: Cigarettes     Passive exposure: Past (from her )    Smokeless tobacco: Never    Tobacco comments:     Smoked less than one pack in her life, started at age 15 in 1965   Vaping Use    Vaping status: Never Used    Passive vaping exposure: Yes (has a grand daughter  hip.  Degenerative changes to the pubic symphysis.  4.3 x 4.2 cm fat signal intensity lesion in the anterior right thigh subcutaneous tissues in the area of concern.  There are thin enhancing septa.  No enhancing nodularity.  Mild adjacent soft tissue edema.  Superficial soft tissue edema about the lower pelvis and proximal thighs.  Muscular edema in the proximal thighs on the right and left. Prominent inguinal lymph nodes, nonspecific.       4.3 cm fat signal intensity lesion in the proximal right thigh anteromedial superficial soft tissues.  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  Continued clinical and imaging surveillance to ensure stability.  Superficial soft tissue edema about the pelvis and thighs, nonspecific.  There is also nonspecific muscle edema in the proximal thighs.  Heterogeneous bone marrow, nonspecific.  Attention at follow-up.  Degenerative changes to the hips, right greater than left.  Additional findings as described.    ______________________________________ Electronically signed by: JOYCELYN RAYA D.O. Date:     08/21/2024 Time:    16:35     CT ABDOMEN PELVIS W IV CONTRAST Additional Contrast? None    Result Date: 8/20/2024  EXAM:  CT OF THE ABDOMEN AND PELVIS WITH CONTRAST  TECHNIQUE: CT of the abdomen and pelvis was performed with contrast.  Multiplanar reformats were performed.  HISTORY:  Right flank pain and nausea.  COMPARISON:  CT abdomen pelvis 05/30/2023.  FINDINGS: Imaged lower thorax: Mild bibasilar atelectasis.  Cardiomegaly.  Coronary calcifications.  Liver: Unremarkable.  Gallbladder/Bile Ducts: No biliary dilation. Cholelithiasis without acute cholecystitis.  Spleen: Mildly enlarged spleen.  Pancreas: Mild atrophy.  Adrenals: Unremarkable.  Kidneys/Ureters: Unremarkable.  Bowel/mesentery/peritoneum: Small hiatal hernia. Normal appendix. Colonic diverticulosis without acute diverticulitis.  No ascites or free air.  Retroperitoneum/vessels: No aortic aneurysm.  Asymmetric swelling of the left psoas muscle and fat stranding in the left retroperitoneum.  The relative hypodensity of the left psoas muscle compared to the right, though no discrete hematoma is seen.  Pelvis: Post hysterectomy.  Bladder wall thickness is normal.  Bones/body wall: Severe degenerative change of the right hip.  Mild levoconvex lumbar curvature.  Multilevel degenerative spondylosis.  Multilevel degenerative spondylosis.  Grade 1 degenerative listhesis at L4-5. 86 cm E R body wall.      Asymmetric swelling of the left psoas muscle and stranding in the left retroperitoneum.  Small amount of intramuscular hemorrhage is possible, though no discrete hematoma is visible.  Colonic diverticulosis without acute diverticulitis.  Cholelithiasis without acute cholecystitis.  Cardiomegaly.  ASCVD.  Small hiatal hernia.  Mild splenomegaly.  All CT scans are performed using dose optimization techniques as appropriate to the performed exam and include at least one of the following: Automated exposure control, adjustment of the mA and/or kV according to size, and the use of iterative reconstruction technique.  ______________________________________ Electronically signed by: ANNALISA COSME M.D. Date:     08/20/2024 Time:    00:49       Assessment:  Admission 8/20/2024 intractable back and abdominal pain  MRSA bacteremia  Morbidly obese  Paroxysmal atrial fibrillation  History of occipital infarction  Hypertension  Hyperlipidemia  Overactive bladder  History of major depression  Hypothyroidism  Diastolic dysfunction  Pulmonary nodule  Generalized anxiety disorder  COPD  Congestive heart failure  Osteoarthritis  History of pneumonia  Chronic anticoagulation with Eliquis  CT chest 8/24/2024 bilateral lower lobe pneumonia bilateral diffuse bronchiolitis versus pulmonary edema, cardiomegaly, incompletely visualized cholelithiasis, pulmonary artery hypertension  CT abdomen pelvis 8/20/2024 asymmetric swelling left psoas  muscle stranding left lower retroperitoneum small amount of intramuscular hemorrhage is possible although no discrete hematoma visible,: Diverticular disease without diverticulitis, cholelithiasis without acute cholecystitis, cardiomegaly, ASCVD, small hiatal hernia      Recommendations:  Proceed with transesophageal echocardiography advised indication alternatives benefits and risk patient agreeable plan today.  Amiodarone 200 mg p.o. twice daily  Eliquis 5 mg p.o. twice daily on hold  Atorvastatin 40 mg a day  Metoprolol 25 mg p.o. twice daily  Further comments to follow

## 2024-08-28 NOTE — PROGRESS NOTES
[facility]   Pharmacy Pharmacokinetic Monitoring Service - Vancomycin    Consulting Provider: Bulmaro Andino MD    Indication: Bacteremia (Staph aureus MRSA)  Target Concentration: Goal AUC/LAKHWINDER 400-600 mg*hr/L  Day of Therapy: 5  Additional Antimicrobials: None ordered at this time    Pertinent Laboratory Values:   Wt Readings from Last 1 Encounters:   08/28/24 114.8 kg (253 lb 3 oz)     Temp Readings from Last 1 Encounters:   08/28/24 97.2 °F (36.2 °C) (Temporal)     Estimated Creatinine Clearance: 103 mL/min (based on SCr of 0.6 mg/dL).  Recent Labs     08/27/24  0030 08/28/24  0331   CREATININE 0.6 0.6   BUN 23 22   WBC 7.2 9.6     Procalcitonin: None ordered at this time    Pertinent Cultures:  Culture Date Source Results   8/23/24 Blood x 2 MRSA   8/24/24 Blood x 1 MRSA   8/25/24 Blood x 1 MRSA   8/26/24 Blood x 1 MRSA   MRSA Nasal Swab:  non-respiratory infection but detected 8/25    Recent vancomycin administrations                     vancomycin (VANCOCIN) 1,250 mg in sodium chloride 0.9 % 250 mL IVPB (mg) 1,250 mg New Bag 08/28/24 0131     1,250 mg New Bag 08/27/24 1424     1,250 mg New Bag  0200    vancomycin (VANCOCIN) 1500 mg in sodium chloride 0.9 % 250 mL IVPB (mg) 1,500 mg New Bag 08/26/24 1336     1,500 mg New Bag  0229                    Assessment:  Date/Time Current Dose Concentration Timing of Concentration (h) AUC   08/28/24 1250 mg q 12 hrs 18.2 11 h 26 m 518 / 521   Note: Serum concentrations collected for AUC dosing may appear elevated if collected in close proximity to the dose administered, this is not necessarily an indication of toxicity    Loading dose: N/A  Regimen: 1250 mg IV every 12 hours.  Start time: 13:59 on 08/28/2024  Exposure target: AUC24 (range)400-600 mg/L.hr   [id number]: 518 mg/L.hr  AUC24,ss: 521 mg/L.hr  Probability of AUC24 > 400: 100 %  Ctrough,ss: 17.5 mg/L  Probability of Ctrough,ss > 20: 9 %    Plan:  Current dosing regimen is therapeutic  Continue  current dose  No repeat vancomycin concentration ordered at this time  Pharmacy will continue to monitor patient and adjust therapy as indicated    Thank you for the consult,  Ellen Pepper, [facility]  8/28/2024 1:51 PM

## 2024-08-28 NOTE — PROGRESS NOTES
NEUROSURGERY  Progress Note    INTERVAL HISTORY: No new events overnight.  She states her back pain is a little better.  Does still have groin pain.  EEG tech is hooking up leads now.  Possible NABOR today.      CHIEF COMPLAINT: Bilateral hip and low back pain    HISTORY OF PRESENT ILLNESS:      The patient is a 73 y.o. female with A-fib on Eliquis, previous CVA, hypertension, CHF, anxiety, COPD who presented to the ER on 8/19/2024 with low back/flank pain and bilateral groin pain.  It appears, according to previous medical records that this pain has been present for at least 3 months.  She states that for 5 days [place]  She denies any numbness.  She states her low back pain and groin pain are essentially equal in intensity.  She states walking and standing exacerbates her pain.  Due to her severe pain, she is has been admitted to hospital.    An MRI of the lumbar spine was obtained and revealed a inferiorly extruded disc herniation at L2-3 along with stenosis for which I was asked to evaluate.        The patient does take anticoagulation medication.  Eliquis      Past Medical History:   Diagnosis Date    Arthritis     Atrial fibrillation (HCC)     Paroxysmal A Fib    Cerebral artery occlusion with cerebral infarction (HCC)     TWICE: once in 2012 or 2013, then had another one 2021    CHF (congestive heart failure) (HCC)     COPD (chronic obstructive pulmonary disease) (HCC)     Hyperlipidemia     Hypertension     On continuous oral anticoagulation     Apixaban    Pneumonia     Thyroid disease        Past Surgical History:   Procedure Laterality Date    CARDIOVERSION  2020    HYSTERECTOMY, TOTAL ABDOMINAL (CERVIX REMOVED) N/A     With removal of tumor, ~2014, was a DANIS and BSO    TONSILLECTOMY Bilateral     at age 17 or 18, withOUT Adneoids as per pt    TUMOR REMOVAL      intraabdominal, ~2014, states she was told it was feeding off her bowel        MEDICATIONS    Current  bilateral foraminal stenosis.     L5-S1:  The central canal and lateral recesses appear patent.  There is moderate to severe facet arthropathy resulting in mild to moderate bilateral foraminal stenosis.     No abnormal enhancement is identified within the lumbar spinal cord or cauda equina on postcontrast images.     IMPRESSION:  No definite evidence for diskitis or osteomyelitis seen within the lumbar spine.     At the L2-L3 level there is a right paracentral inferior disc extrusion as described above measuring 14 mm in height, 4.8 mm AP, and the findings are causing right lateral recess stenosis posterior to the L3 vertebral body.     There is disc bulge and facet arthropathy seen throughout the lumbar spine as described above resulting in mild central canal and lateral recess stenosis at L2-L3.     Mild grade 1 degenerative spondylolisthesis seen at L4-L5.              ______________________________________   Electronically signed by: SHANDRA JOHNSON M.D.  Date:     08/21/2024  Time:    16:47            Exam Ended: 08/21/24 16:39 CDT Last Resulted: 08/21/24 16:57 CDT          I have personally reviewed the images and my interpretation is:  At L2-3 there is an inferiorly extruded disc fragment that is more eccentric to the right.  This results in moderate to severe spinal canal stenosis.  There is a spondylolisthesis noted L4-5 pending results in moderate bilateral foraminal stenosis.  Of note, there are some increase signal change on the STIR sequences within and around the L3 and L4 spinous processes.          ECHO TTE (8/24/2024)  Interpretation Summary  Show Result Comparison     Left Ventricle: Normal left ventricular systolic function with a visually estimated EF of 55 - 60%. Pt did not have a functioning IV. Unable to give Definity. Grade III diastolic dysfunction with increased LAP.    Right Ventricle: Right ventricle is mildly dilated.    Mitral Valve: Annular calcification. Mildly thickened leaflets. Mild

## 2024-08-29 ENCOUNTER — OUTSIDE FACILITY SERVICE (OUTPATIENT)
Age: 74
End: 2024-08-29
Payer: MEDICARE

## 2024-08-29 LAB
ANION GAP SERPL CALCULATED.3IONS-SCNC: 10 MMOL/L (ref 7–19)
BASOPHILS # BLD: 0.1 K/UL (ref 0–0.2)
BASOPHILS NFR BLD: 0.4 % (ref 0–1)
BUN SERPL-MCNC: 26 MG/DL (ref 8–23)
CALCIUM SERPL-MCNC: 7.9 MG/DL (ref 8.8–10.2)
CHLORIDE SERPL-SCNC: 101 MMOL/L (ref 98–111)
CO2 SERPL-SCNC: 25 MMOL/L (ref 22–29)
CREAT SERPL-MCNC: 0.6 MG/DL (ref 0.5–0.9)
EOSINOPHIL # BLD: 0 K/UL (ref 0–0.6)
EOSINOPHIL NFR BLD: 0 % (ref 0–5)
ERYTHROCYTE [DISTWIDTH] IN BLOOD BY AUTOMATED COUNT: 16.7 % (ref 11.5–14.5)
GLUCOSE SERPL-MCNC: 170 MG/DL (ref 70–99)
HCT VFR BLD AUTO: 38.9 % (ref 37–47)
HGB BLD-MCNC: 12.2 G/DL (ref 12–16)
IMM GRANULOCYTES # BLD: 0.3 K/UL
LYMPHOCYTES # BLD: 0.6 K/UL (ref 1.1–4.5)
LYMPHOCYTES NFR BLD: 4.3 % (ref 20–40)
MCH RBC QN AUTO: 28.5 PG (ref 27–31)
MCHC RBC AUTO-ENTMCNC: 31.4 G/DL (ref 33–37)
MCV RBC AUTO: 90.9 FL (ref 81–99)
MONOCYTES # BLD: 0.5 K/UL (ref 0–0.9)
MONOCYTES NFR BLD: 3.8 % (ref 0–10)
NEUTROPHILS # BLD: 12.1 K/UL (ref 1.5–7.5)
NEUTS SEG NFR BLD: 89.4 % (ref 50–65)
PLATELET # BLD AUTO: 257 K/UL (ref 130–400)
PMV BLD AUTO: 10.5 FL (ref 9.4–12.3)
POTASSIUM SERPL-SCNC: 4.7 MMOL/L (ref 3.5–5)
RBC # BLD AUTO: 4.28 M/UL (ref 4.2–5.4)
SODIUM SERPL-SCNC: 136 MMOL/L (ref 136–145)
WBC # BLD AUTO: 13.6 K/UL (ref 4.8–10.8)

## 2024-08-29 PROCEDURE — 2580000003 HC RX 258: Performed by: HOSPITALIST

## 2024-08-29 PROCEDURE — 6370000000 HC RX 637 (ALT 250 FOR IP)

## 2024-08-29 PROCEDURE — 87040 BLOOD CULTURE FOR BACTERIA: CPT

## 2024-08-29 PROCEDURE — 6360000002 HC RX W HCPCS: Performed by: HOSPITALIST

## 2024-08-29 PROCEDURE — 87077 CULTURE AEROBIC IDENTIFY: CPT

## 2024-08-29 PROCEDURE — 85025 COMPLETE CBC W/AUTO DIFF WBC: CPT

## 2024-08-29 PROCEDURE — 6360000002 HC RX W HCPCS: Performed by: NURSE PRACTITIONER

## 2024-08-29 PROCEDURE — 80048 BASIC METABOLIC PNL TOTAL CA: CPT

## 2024-08-29 PROCEDURE — 94760 N-INVAS EAR/PLS OXIMETRY 1: CPT

## 2024-08-29 PROCEDURE — 6370000000 HC RX 637 (ALT 250 FOR IP): Performed by: NURSE PRACTITIONER

## 2024-08-29 PROCEDURE — 1200000000 HC SEMI PRIVATE

## 2024-08-29 PROCEDURE — 6370000000 HC RX 637 (ALT 250 FOR IP): Performed by: HOSPITALIST

## 2024-08-29 PROCEDURE — 99231 SBSQ HOSP IP/OBS SF/LOW 25: CPT | Performed by: NURSE PRACTITIONER

## 2024-08-29 PROCEDURE — 36415 COLL VENOUS BLD VENIPUNCTURE: CPT

## 2024-08-29 PROCEDURE — 86403 PARTICLE AGGLUT ANTBDY SCRN: CPT

## 2024-08-29 RX ORDER — DEXAMETHASONE SODIUM PHOSPHATE 10 MG/ML
4 INJECTION, SOLUTION INTRAMUSCULAR; INTRAVENOUS EVERY 12 HOURS
Status: DISCONTINUED | OUTPATIENT
Start: 2024-08-29 | End: 2024-08-29

## 2024-08-29 RX ADMIN — METOPROLOL TARTRATE 25 MG: 25 TABLET, FILM COATED ORAL at 21:37

## 2024-08-29 RX ADMIN — VANCOMYCIN HYDROCHLORIDE 1250 MG: 10 INJECTION, POWDER, LYOPHILIZED, FOR SOLUTION INTRAVENOUS at 14:55

## 2024-08-29 RX ADMIN — LACTULOSE 10 G: 10 SOLUTION ORAL at 21:37

## 2024-08-29 RX ADMIN — AMIODARONE HYDROCHLORIDE 200 MG: 200 TABLET ORAL at 10:02

## 2024-08-29 RX ADMIN — SERTRALINE HYDROCHLORIDE 50 MG: 50 TABLET ORAL at 10:02

## 2024-08-29 RX ADMIN — AMIODARONE HYDROCHLORIDE 200 MG: 200 TABLET ORAL at 21:37

## 2024-08-29 RX ADMIN — METHIMAZOLE 5 MG: 5 TABLET ORAL at 21:37

## 2024-08-29 RX ADMIN — APIXABAN 5 MG: 5 TABLET, FILM COATED ORAL at 21:37

## 2024-08-29 RX ADMIN — OXYCODONE 2.5 MG: 5 TABLET ORAL at 21:12

## 2024-08-29 RX ADMIN — DOCUSATE SODIUM 100 MG: 100 CAPSULE, LIQUID FILLED ORAL at 21:37

## 2024-08-29 RX ADMIN — APIXABAN 5 MG: 5 TABLET, FILM COATED ORAL at 10:02

## 2024-08-29 RX ADMIN — ATORVASTATIN CALCIUM 40 MG: 40 TABLET, FILM COATED ORAL at 21:37

## 2024-08-29 RX ADMIN — METHIMAZOLE 5 MG: 5 TABLET ORAL at 10:02

## 2024-08-29 RX ADMIN — LACTULOSE 10 G: 10 SOLUTION ORAL at 10:08

## 2024-08-29 RX ADMIN — SODIUM CHLORIDE, PRESERVATIVE FREE 10 ML: 5 INJECTION INTRAVENOUS at 10:30

## 2024-08-29 RX ADMIN — OXYCODONE 2.5 MG: 5 TABLET ORAL at 10:05

## 2024-08-29 RX ADMIN — VANCOMYCIN HYDROCHLORIDE 1250 MG: 10 INJECTION, POWDER, LYOPHILIZED, FOR SOLUTION INTRAVENOUS at 02:12

## 2024-08-29 RX ADMIN — OXYCODONE 2.5 MG: 5 TABLET ORAL at 02:14

## 2024-08-29 RX ADMIN — DEXAMETHASONE SODIUM PHOSPHATE 4 MG: 10 INJECTION, SOLUTION INTRAMUSCULAR; INTRAVENOUS at 05:11

## 2024-08-29 RX ADMIN — METOPROLOL TARTRATE 25 MG: 25 TABLET, FILM COATED ORAL at 10:02

## 2024-08-29 RX ADMIN — ACETAMINOPHEN 1000 MG: 500 TABLET ORAL at 21:37

## 2024-08-29 RX ADMIN — ACETAMINOPHEN 1000 MG: 500 TABLET ORAL at 14:53

## 2024-08-29 RX ADMIN — DOCUSATE SODIUM 100 MG: 100 CAPSULE, LIQUID FILLED ORAL at 10:02

## 2024-08-29 RX ADMIN — ACETAMINOPHEN 1000 MG: 500 TABLET ORAL at 05:11

## 2024-08-29 ASSESSMENT — PAIN SCALES - GENERAL
PAINLEVEL_OUTOF10: 8
PAINLEVEL_OUTOF10: 8
PAINLEVEL_OUTOF10: 3
PAINLEVEL_OUTOF10: 2
PAINLEVEL_OUTOF10: 1
PAINLEVEL_OUTOF10: 3

## 2024-08-29 ASSESSMENT — ENCOUNTER SYMPTOMS: BACK PAIN: 1

## 2024-08-29 ASSESSMENT — PAIN DESCRIPTION - DESCRIPTORS
DESCRIPTORS: ACHING
DESCRIPTORS: ACHING

## 2024-08-29 ASSESSMENT — PAIN DESCRIPTION - LOCATION
LOCATION: BACK
LOCATION: BACK

## 2024-08-29 ASSESSMENT — PAIN DESCRIPTION - ORIENTATION
ORIENTATION: LOWER
ORIENTATION: LOWER

## 2024-08-29 NOTE — PROGRESS NOTES
NEUROSURGERY  Progress Note    INTERVAL HISTORY: No new events overnight. Completed her NABOR yesterday, no obvious valvular vegetations or thrombus.  NM abscess localization exam performed, awaiting results.  Back pain improving some.      CHIEF COMPLAINT: Bilateral hip and low back pain    HISTORY OF PRESENT ILLNESS:      The patient is a 73 y.o. female with A-fib on Eliquis, previous CVA, hypertension, CHF, anxiety, COPD who presented to the ER on 8/19/2024 with low back/flank pain and bilateral groin pain.  It appears, according to previous medical records that this pain has been present for at least 3 months.  She states that for 5 days ago [place].  She denies any numbness.  She states her low back pain and groin pain are essentially equal in intensity.  She states walking and standing exacerbates her pain.  Due to her severe pain, she is has been admitted to hospital.    An MRI of the lumbar spine was obtained and revealed a inferiorly extruded disc herniation at L2-3 along with stenosis for which I was asked to evaluate.        The patient does take anticoagulation medication.  Eliquis      Past Medical History:   Diagnosis Date    Arthritis     Atrial fibrillation (HCC)     Paroxysmal A Fib    Cerebral artery occlusion with cerebral infarction (HCC)     TWICE: once in 2012 or 2013, then had another one 2021    CHF (congestive heart failure) (HCC)     COPD (chronic obstructive pulmonary disease) (HCC)     Hyperlipidemia     Hypertension     On continuous oral anticoagulation     Apixaban    Pneumonia     Thyroid disease        Past Surgical History:   Procedure Laterality Date    CARDIOVERSION  2020    HYSTERECTOMY, TOTAL ABDOMINAL (CERVIX REMOVED) N/A     With removal of tumor, ~2014, was a DANIS and BSO    TONSILLECTOMY Bilateral     at age 17 or 18, withOUT Adneoids as per pt    TUMOR REMOVAL      intraabdominal, ~2014, states she was told it was feeding off her bowel         MCG/ACT nasal spray 2 spray, 2 spray, Each Nostril, Daily, Bulmaro Andino MD, 2 spray at 24 0902    methIMAzole (TAPAZOLE) tablet 5 mg, 5 mg, Oral, BID, Bulmaro Andino MD, 5 mg at 24 210    sertraline (ZOLOFT) tablet 50 mg, 50 mg, Oral, Daily, Bulmaro Andino MD, 50 mg at 24 0945    sodium chloride flush 0.9 % injection 5-40 mL, 5-40 mL, IntraVENous, 2 times per day, Bulmaro Andino MD, 10 mL at 24 1132    sodium chloride flush 0.9 % injection 5-40 mL, 5-40 mL, IntraVENous, PRN, Bulmaro Andino MD    0.9 % sodium chloride infusion, , IntraVENous, PRN, Bulmaro Andino MD    potassium chloride (KLOR-CON M) extended release tablet 40 mEq, 40 mEq, Oral, PRN **OR** potassium bicarb-citric acid (EFFER-K) effervescent tablet 40 mEq, 40 mEq, Oral, PRN **OR** potassium chloride 10 mEq/100 mL IVPB (Peripheral Line), 10 mEq, IntraVENous, PRN, Bulmaro Andino MD    magnesium sulfate 2000 mg in 50 mL IVPB premix, 2,000 mg, IntraVENous, PRN, Bulmaro Andino MD    ondansetron (ZOFRAN-ODT) disintegrating tablet 4 mg, 4 mg, Oral, Q8H PRN **OR** ondansetron (ZOFRAN) injection 4 mg, 4 mg, IntraVENous, Q6H PRN, Bulmaro Andino MD, 4 mg at 24 1312  Niacin and related    SOCIAL HISTORY  Social History     Tobacco Use   Smoking Status Former    Types: Cigarettes    Passive exposure: Past (from her )   Smokeless Tobacco Never   Tobacco Comments    Smoked less than one pack in her life, started at age 15 in 1965     Social History     Substance and Sexual Activity   Alcohol Use Yes    Comment: just 1-2 at parties, never heavy         Family History   Problem Relation Age of Onset    Pacemaker Mother     Heart Disease Mother         never tobacco    No Known Problems Father     No Known Problems Paternal Grandmother     No Known Problems Paternal Grandfather     No Known Problems Maternal Grandfather          of old age    No Known Problems Maternal Grandmother          of old age    No Known  bilateral foraminal stenosis.     L5-S1:  The central canal and lateral recesses appear patent.  There is moderate to severe facet arthropathy resulting in mild to moderate bilateral foraminal stenosis.     No abnormal enhancement is identified within the lumbar spinal cord or cauda equina on postcontrast images.     IMPRESSION:  No definite evidence for diskitis or osteomyelitis seen within the lumbar spine.     At the L2-L3 level there is a right paracentral inferior disc extrusion as described above measuring 14 mm in height, 4.8 mm AP, and the findings are causing right lateral recess stenosis posterior to the L3 vertebral body.     There is disc bulge and facet arthropathy seen throughout the lumbar spine as described above resulting in mild central canal and lateral recess stenosis at L2-L3.     Mild grade 1 degenerative spondylolisthesis seen at L4-L5.              ______________________________________   Electronically signed by: SHANDRA JOHNSON M.D.  Date:     08/21/2024  Time:    16:47            Exam Ended: 08/21/24 16:39 CDT Last Resulted: 08/21/24 16:57 CDT          I have personally reviewed the images and my interpretation is:  At L2-3 there is an inferiorly extruded disc fragment that is more eccentric to the right.  This results in moderate to severe spinal canal stenosis.  There is a spondylolisthesis noted L4-5 pending results in moderate bilateral foraminal stenosis.  Of note, there are some increase signal change on the STIR sequences within and around the L3 and L4 spinous processes.          ECHO TTE (8/24/2024)  Interpretation Summary  Show Result Comparison     Left Ventricle: Normal left ventricular systolic function with a visually estimated EF of 55 - 60%. Pt did not have a functioning IV. Unable to give Definity. Grade III diastolic dysfunction with increased LAP.    Right Ventricle: Right ventricle is mildly dilated.    Mitral Valve: Annular calcification. Mildly thickened leaflets. Mild

## 2024-08-29 NOTE — PROGRESS NOTES
[facility]      Patient:  Lashonda Milian  YOB: 1950  Date of Service: 8/29/2024  MRN: [id number]   Acct: [id number]   Primary Care Physician: Nick Martinez MD  Advance Directive: Full Code  Admit Date: 8/19/2024       Hospital Day: 7  Portions of this note have been copied forward, however, changed to reflect the most current clinical status of this patient.    CHIEF COMPLAINT back pain      SUBJECTIVE: no issues overnight, resting comfortably in bed    Cumulative hospital course   73-year-old female with AF on Eliquis, diastolic heart failure, CVA, HTN, hyperlipidemia, anxiety, COPD, with complaints of right lower back and flank pain radiating into her groin and down her right thigh.  Patient states that she does have chronic back and hip pain however has been progressively worsening over the past few days.  She did states she had a recent [place] where she fell backwards and hit her buttocks on the tile floor, did not recall when this event occurred.  Denied fever, urinary symptoms, productive cough, abdominal pain or diarrhea.  Workup in ER CT abdomen pelvis asymmetrical swelling of left psoas muscle and stranding in the left retroperitoneum small amount of intramuscular hemorrhage is possible, cholelithiasis without acute cholecystitis.  Patient had received multiple narcotics, muscle relaxant, and steroids and was unable to ambulate or utilize wheelchair she was admitted due to intractable pain and ambulatory dysfunction.  At baseline utilizes a walker without difficulty.  MRI right femur and lumbar spine ordered due to persistent pain.  MRI lumbar spine L2-L3 right paracentral inferior disc extrusion measuring 14 mm in height 4.8 mm AP causing right lateral stenosis posterior to the L3 vertebral body, MRI femur 4.3 cm fat signal intensity lesion in the proximal right thigh anterior medial superficial soft tissue most likely lipoma, superficial soft tissue edema in the pelvis  and thighs, nonspecific muscle edema in the proximal thighs, heterogeneous bone marrow nonspecific.  Neurosurgery consulted patient declined surgery, currently receiving opiates muscle relaxants and PT OT following.  Patient began having hallucinations pain medication adjusted.  Blood cultures obtained on 8/23 positive x 2 for staph with MRSA vancomycin initiated.  Repeat blood cultures on 8/25 positive for MRSA infectious disease consulted.  No clear source of bacteremia. imaging performed, CT sinus no acute or chronic sinusitis, CT chest bilateral lobe pneumonia with bilateral diffuse bronchiolitis versus pulmonary edema, cardiomegaly, echo EF 55 to 60%, grade 3 diastolic dysfunction, RV moderately dilated mild TR, severe pulmonary hypertension.  CRP 9.7 said rate 81.  Maintains on vancomycin. Cardiology consulted NABOR today no obvious valvular vegetations or thrombus. NM abscess localization showed increase activity L2-3 disc space concern for discitis/osteomyelitis. IV steroids discontinued.  consulted for placement.   Review of Systems:   Review of Systems   Constitutional:  Positive for activity change and fatigue.   Musculoskeletal:  Positive for arthralgias, back pain, gait problem and myalgias.   Neurological:  Positive for weakness.       14 point review of systems is negative except as specifically addressed above.      Objective:   VITALS:  BP (!) 144/70   Pulse 64   Temp 97.2 °F (36.2 °C) (Temporal)   Resp 15   Ht 1.575 m (5' 2\")   Wt 114.8 kg (253 lb 3 oz)   SpO2 94%   BMI 46.31 kg/m²   24HR INTAKE/OUTPUT:    Intake/Output Summary (Last 24 hours) at 8/29/2024 1443  Last data filed at 8/29/2024 0513  Gross per 24 hour   Intake 200 ml   Output 1200 ml   Net -1000 ml       Physical Exam  Vitals and nursing note reviewed.   Constitutional:       General: She is not in acute distress.     Appearance: Normal appearance. She is obese.   HENT:      Mouth/Throat:      Mouth: Mucous membranes

## 2024-08-29 NOTE — PROGRESS NOTES
Infectious Diseases Progress Note    Patient:  Lashonda Milian  YOB: 1950  MRN: [id number]   Admit date: 8/19/2024   Admitting Physician: Teresa Urbina MD  Primary Care Physician: Nick Martinez MD    Chief Complaint/Interval History: She is feeling a little bit better.  She still has some low back and hip pain.  She is without fever.  She had nuclear medicine abscess localization study.  See results below.  There does appear to be some increased uptake in the lumbar spine area.  She had had previous MRI of the lumbar spine with and without gadolinium 8 days ago that did not demonstrate paraspinal or psoas abscess.  She does not report any lower extremity weakness or numbness.    In/Out    Intake/Output Summary (Last 24 hours) at 8/29/2024 6754  Last data filed at 8/29/2024 0513  Gross per 24 hour   Intake 200 ml   Output 500 ml   Net -300 ml     Allergies:   Allergies   Allergen Reactions    Niacin And Related Rash     Current Meds: metoprolol tartrate (LOPRESSOR) tablet 25 mg, BID  vancomycin (VANCOCIN) 1,250 mg in sodium chloride 0.9 % 250 mL IVPB, Q12H  docusate sodium (COLACE) capsule 100 mg, BID  lactulose (CHRONULAC) 10 GM/15ML solution 10 g, BID  vancomycin (VANCOCIN) intermittent dosing (placeholder), RX Placeholder  tiZANidine (ZANAFLEX) tablet 4 mg, Q6H PRN  naloxone (NARCAN) injection 0.4 mg, PRN  acetaminophen (TYLENOL) tablet 1,000 mg, 3 times per day  oxyCODONE (ROXICODONE) immediate release tablet 2.5 mg, Q4H PRN  polyethylene glycol (GLYCOLAX) packet 17 g, Daily PRN  melatonin disintegrating tablet 5 mg, Nightly PRN  calcium carbonate (TUMS) chewable tablet 500 mg, TID PRN  amiodarone (CORDARONE) tablet 200 mg, BID  apixaban (ELIQUIS) tablet 5 mg, BID  atorvastatin (LIPITOR) tablet 40 mg, Nightly  busPIRone (BUSPAR) tablet 10 mg, TID PRN  fluticasone (FLONASE) 50 MCG/ACT nasal spray 2 spray, Daily  methIMAzole (TAPAZOLE) tablet 5 mg, BID  sertraline (ZOLOFT) tablet 50 mg,        erythromycin Resistant >=8 mcg/mL BACTERIAL SUSCEPTIBILITY PANEL BY LAKHWINDER       inducible clindamycin resistance   Neg mcg/mL BACTERIAL SUSCEPTIBILITY PANEL BY LAKHWINDER       oxacillin Resistant >=4 mcg/mL BACTERIAL SUSCEPTIBILITY PANEL BY LAKHWINDER       tetracycline Sensitive <=1 mcg/mL BACTERIAL SUSCEPTIBILITY PANEL BY LAKHWINDER       trimethoprim-sulfamethoxazole Sensitive <=10 mcg/mL BACTERIAL SUSCEPTIBILITY PANEL BY LAKHWINDER       vancomycin Sensitive 1 mcg/mL BACTERIAL SUSCEPTIBILITY PANEL BY LAKHWINDER                Radiology:     MRI lumbar spine with and without contrast on August 21, 2024:  IMPRESSION:  No definite evidence for diskitis or osteomyelitis seen within the lumbar spine.  At the L2-L3 level there is a right paracentral inferior disc extrusion as described above measuring 14 mm in height, 4.8 mm AP, and the findings are causing right lateral recess stenosis posterior to the L3 vertebral body.  There is disc bulge and facet arthropathy seen throughout the lumbar spine as described above resulting in mild central canal and lateral recess stenosis at L2-L3.  Mild grade 1 degenerative spondylolisthesis seen at L4-L5.    Nuclear medicine abscess localization study:  IMPRESSION:  1.  The examination demonstrates a generally physiologic distribution of activity in the body.  2.  The SPECT CT images show increased activity at the L2-3 disc space with extension into the contiguous endplate of L2 by hand upper body of the L3  suggesting diskitis/osteomyelitis.    Additional Studies Reviewed:  Transesophageal echocardiogram August 28, 2024:  Signed         Cardiac procedure transesophageal echocardiography performed tolerated well preliminary report  Left ventricular function normal  Mild leaflet thickening mitral valve with mild to moderate mitral regurgitation  Trileaflet aortic valve minimal sclerosis  Unremarkable tricuspid valve  Pulmonary valve poorly visualized  No obvious valvular vegetations  No intracardiac

## 2024-08-30 ENCOUNTER — APPOINTMENT (OUTPATIENT)
Dept: MRI IMAGING | Age: 74
End: 2024-08-30
Payer: MEDICARE

## 2024-08-30 ENCOUNTER — OUTSIDE FACILITY SERVICE (OUTPATIENT)
Age: 74
End: 2024-08-30
Payer: MEDICARE

## 2024-08-30 LAB
ANION GAP SERPL CALCULATED.3IONS-SCNC: 9 MMOL/L (ref 7–19)
BASOPHILS # BLD: 0 K/UL (ref 0–0.2)
BASOPHILS NFR BLD: 0.2 % (ref 0–1)
BUN SERPL-MCNC: 27 MG/DL (ref 8–23)
CALCIUM SERPL-MCNC: 7.7 MG/DL (ref 8.8–10.2)
CHLORIDE SERPL-SCNC: 100 MMOL/L (ref 98–111)
CO2 SERPL-SCNC: 27 MMOL/L (ref 22–29)
CREAT SERPL-MCNC: 0.5 MG/DL (ref 0.5–0.9)
ECHO BSA: 2.18 M2
EOSINOPHIL # BLD: 0 K/UL (ref 0–0.6)
EOSINOPHIL NFR BLD: 0.1 % (ref 0–5)
ERYTHROCYTE [DISTWIDTH] IN BLOOD BY AUTOMATED COUNT: 16.9 % (ref 11.5–14.5)
GLUCOSE SERPL-MCNC: 155 MG/DL (ref 70–99)
HCT VFR BLD AUTO: 37.5 % (ref 37–47)
HGB BLD-MCNC: 12 G/DL (ref 12–16)
IMM GRANULOCYTES # BLD: 0.2 K/UL
LYMPHOCYTES # BLD: 0.9 K/UL (ref 1.1–4.5)
LYMPHOCYTES NFR BLD: 5.6 % (ref 20–40)
MCH RBC QN AUTO: 29.7 PG (ref 27–31)
MCHC RBC AUTO-ENTMCNC: 32 G/DL (ref 33–37)
MCV RBC AUTO: 92.8 FL (ref 81–99)
MONOCYTES # BLD: 1.3 K/UL (ref 0–0.9)
MONOCYTES NFR BLD: 7.8 % (ref 0–10)
NEUTROPHILS # BLD: 13.8 K/UL (ref 1.5–7.5)
NEUTS SEG NFR BLD: 84.9 % (ref 50–65)
PLATELET # BLD AUTO: 240 K/UL (ref 130–400)
PMV BLD AUTO: 10.9 FL (ref 9.4–12.3)
POTASSIUM SERPL-SCNC: 4.1 MMOL/L (ref 3.5–5)
RBC # BLD AUTO: 4.04 M/UL (ref 4.2–5.4)
SODIUM SERPL-SCNC: 136 MMOL/L (ref 136–145)
WBC # BLD AUTO: 16.2 K/UL (ref 4.8–10.8)

## 2024-08-30 PROCEDURE — 72158 MRI LUMBAR SPINE W/O & W/DYE: CPT

## 2024-08-30 PROCEDURE — 99231 SBSQ HOSP IP/OBS SF/LOW 25: CPT | Performed by: NEUROLOGICAL SURGERY

## 2024-08-30 PROCEDURE — 6370000000 HC RX 637 (ALT 250 FOR IP): Performed by: NURSE PRACTITIONER

## 2024-08-30 PROCEDURE — 87040 BLOOD CULTURE FOR BACTERIA: CPT

## 2024-08-30 PROCEDURE — 6360000004 HC RX CONTRAST MEDICATION: Performed by: INTERNAL MEDICINE

## 2024-08-30 PROCEDURE — 94760 N-INVAS EAR/PLS OXIMETRY 1: CPT

## 2024-08-30 PROCEDURE — 6360000002 HC RX W HCPCS: Performed by: HOSPITALIST

## 2024-08-30 PROCEDURE — 6370000000 HC RX 637 (ALT 250 FOR IP): Performed by: HOSPITALIST

## 2024-08-30 PROCEDURE — 6370000000 HC RX 637 (ALT 250 FOR IP)

## 2024-08-30 PROCEDURE — 85025 COMPLETE CBC W/AUTO DIFF WBC: CPT

## 2024-08-30 PROCEDURE — 2580000003 HC RX 258: Performed by: HOSPITALIST

## 2024-08-30 PROCEDURE — 80048 BASIC METABOLIC PNL TOTAL CA: CPT

## 2024-08-30 PROCEDURE — 86403 PARTICLE AGGLUT ANTBDY SCRN: CPT

## 2024-08-30 PROCEDURE — 36415 COLL VENOUS BLD VENIPUNCTURE: CPT

## 2024-08-30 PROCEDURE — A9577 INJ MULTIHANCE: HCPCS | Performed by: INTERNAL MEDICINE

## 2024-08-30 PROCEDURE — 87186 SC STD MICRODIL/AGAR DIL: CPT

## 2024-08-30 PROCEDURE — 87077 CULTURE AEROBIC IDENTIFY: CPT

## 2024-08-30 PROCEDURE — 1200000000 HC SEMI PRIVATE

## 2024-08-30 RX ORDER — VANCOMYCIN HYDROCHLORIDE 1.25 G/25ML
1250 INJECTION, POWDER, LYOPHILIZED, FOR SOLUTION INTRAVENOUS EVERY 12 HOURS
Qty: 84 EACH | Refills: 0 | Status: SHIPPED | OUTPATIENT
Start: 2024-08-30 | End: 2024-10-11

## 2024-08-30 RX ORDER — OXYCODONE HYDROCHLORIDE 5 MG/1
2.5 TABLET ORAL EVERY 4 HOURS PRN
Qty: 9 TABLET | Refills: 0 | Status: SHIPPED | OUTPATIENT
Start: 2024-08-30 | End: 2024-09-02

## 2024-08-30 RX ORDER — METOPROLOL TARTRATE 25 MG/1
25 TABLET, FILM COATED ORAL 2 TIMES DAILY
Qty: 60 TABLET | Refills: 0 | Status: SHIPPED | OUTPATIENT
Start: 2024-08-30

## 2024-08-30 RX ADMIN — DOCUSATE SODIUM 100 MG: 100 CAPSULE, LIQUID FILLED ORAL at 21:41

## 2024-08-30 RX ADMIN — VANCOMYCIN HYDROCHLORIDE 1250 MG: 10 INJECTION, POWDER, LYOPHILIZED, FOR SOLUTION INTRAVENOUS at 02:21

## 2024-08-30 RX ADMIN — AMIODARONE HYDROCHLORIDE 200 MG: 200 TABLET ORAL at 21:41

## 2024-08-30 RX ADMIN — METHIMAZOLE 5 MG: 5 TABLET ORAL at 21:41

## 2024-08-30 RX ADMIN — ACETAMINOPHEN 1000 MG: 500 TABLET ORAL at 10:56

## 2024-08-30 RX ADMIN — SERTRALINE HYDROCHLORIDE 50 MG: 50 TABLET ORAL at 10:39

## 2024-08-30 RX ADMIN — APIXABAN 5 MG: 5 TABLET, FILM COATED ORAL at 10:30

## 2024-08-30 RX ADMIN — AMIODARONE HYDROCHLORIDE 200 MG: 200 TABLET ORAL at 10:30

## 2024-08-30 RX ADMIN — VANCOMYCIN HYDROCHLORIDE 1250 MG: 10 INJECTION, POWDER, LYOPHILIZED, FOR SOLUTION INTRAVENOUS at 14:26

## 2024-08-30 RX ADMIN — TIZANIDINE 4 MG: 4 TABLET ORAL at 14:29

## 2024-08-30 RX ADMIN — OXYCODONE 2.5 MG: 5 TABLET ORAL at 18:23

## 2024-08-30 RX ADMIN — GADOBENATE DIMEGLUMINE 20 ML: 529 INJECTION, SOLUTION INTRAVENOUS at 08:50

## 2024-08-30 RX ADMIN — LACTULOSE 10 G: 10 SOLUTION ORAL at 10:30

## 2024-08-30 RX ADMIN — ACETAMINOPHEN 1000 MG: 500 TABLET ORAL at 18:23

## 2024-08-30 RX ADMIN — DOCUSATE SODIUM 100 MG: 100 CAPSULE, LIQUID FILLED ORAL at 10:30

## 2024-08-30 RX ADMIN — ATORVASTATIN CALCIUM 40 MG: 40 TABLET, FILM COATED ORAL at 21:41

## 2024-08-30 RX ADMIN — METHIMAZOLE 5 MG: 5 TABLET ORAL at 10:30

## 2024-08-30 RX ADMIN — OXYCODONE 2.5 MG: 5 TABLET ORAL at 10:30

## 2024-08-30 RX ADMIN — METOPROLOL TARTRATE 25 MG: 25 TABLET, FILM COATED ORAL at 10:30

## 2024-08-30 ASSESSMENT — PAIN DESCRIPTION - LOCATION
LOCATION: BACK
LOCATION: BACK;GROIN

## 2024-08-30 ASSESSMENT — PAIN DESCRIPTION - DESCRIPTORS: DESCRIPTORS: ACHING;CRAMPING

## 2024-08-30 ASSESSMENT — PAIN DESCRIPTION - ORIENTATION: ORIENTATION: LOWER

## 2024-08-30 ASSESSMENT — PAIN SCALES - GENERAL
PAINLEVEL_OUTOF10: 10
PAINLEVEL_OUTOF10: 10

## 2024-08-30 ASSESSMENT — PAIN DESCRIPTION - PAIN TYPE: TYPE: ACUTE PAIN

## 2024-08-30 ASSESSMENT — PAIN DESCRIPTION - ONSET: ONSET: ON-GOING

## 2024-08-30 ASSESSMENT — PAIN DESCRIPTION - FREQUENCY: FREQUENCY: CONTINUOUS

## 2024-08-30 NOTE — CARE COORDINATION
Patient is awaiting transfer to a higher level of care. Awaiting place.  Electronically signed by Guille Ash RN, BSN on 8/30/2024 at 1:22 PM

## 2024-08-30 NOTE — PROGRESS NOTES
Infectious Diseases Progress Note    Patient:  Lashonda Milian  YOB: 1950  MRN: [id number]   Admit date: 8/19/2024   Admitting Physician: Teresa Urbina MD  Primary Care Physician: Nick Martinez MD    Chief Complaint/Interval History: She still has some low back and bilateral hip/groin pain.  Again she reports significant improvement from admission.  She overall is stable overnight.  She is not reporting any lower extremity weakness or numbness.  She has no pain or discomfort at her peripheral IV site.  She has no new localizing symptoms.  She was able to go for her follow-up MRI scan this morning.  Results are outlined below.  She has no cardiopulmonary symptoms.  She has good oral intake.    Pharmacy calculation for area under the curve for vancomycin at 1250 IV every 12 hours:  Assessment:  Date/Time Current Dose Concentration Timing of Concentration (h) AUC   08/28/24 1250 mg q 12 hrs 18.2 11 h 26 m 518 / 521   Note: Serum concentrations collected for AUC dosing may appear elevated if collected in close proximity to the dose administered, this is not necessarily an indication of toxicity    In/Out    Intake/Output Summary (Last 24 hours) at 8/30/2024 0748  Last data filed at 8/30/2024 0402  Gross per 24 hour   Intake 240 ml   Output 950 ml   Net -710 ml     Allergies:   Allergies   Allergen Reactions    Niacin And Related Rash     Current Meds: metoprolol tartrate (LOPRESSOR) tablet 25 mg, BID  vancomycin (VANCOCIN) 1,250 mg in sodium chloride 0.9 % 250 mL IVPB, Q12H  docusate sodium (COLACE) capsule 100 mg, BID  lactulose (CHRONULAC) 10 GM/15ML solution 10 g, BID  vancomycin (VANCOCIN) intermittent dosing (placeholder), RX Placeholder  tiZANidine (ZANAFLEX) tablet 4 mg, Q6H PRN  naloxone (NARCAN) injection 0.4 mg, PRN  acetaminophen (TYLENOL) tablet 1,000 mg, 3 times per day  oxyCODONE (ROXICODONE) immediate release tablet 2.5 mg, Q4H PRN  polyethylene glycol (GLYCOLAX) packet 17 g, Daily  susceptibility testing on her Staphylococcus aureus and also report susceptibility testing to daptomycin and ceftaroline.  Suspect the primary issue, however, with her persistently positive blood cultures is abscess as opposed to antibiotic.  2.  Obesity  3.  Diastolic dysfunction  4.  Leukocytosis-suspect secondary to psoas abscesses  Recommendations:  We may not have interventional radiology available here that could evaluate for drainage of psoas abscesses.  Neurosurgery following and will assess severity of epidural abscess and assess need for drainage.  Repeat blood cultures  Spoke with microbiology-they are going to repeat susceptibility on MRSA isolated from blood yesterday and they are also going to report susceptibility to daptomycin and ceftaroline.  Continue to monitor signs and symptoms for new metastatic focus of staph infection  Based on above findings with significant bilateral psoas abscesses, feel she may need transfer to tertiary care center for further evaluation spine surgery and interventional radiology.  Discussed with Dr. Derek MORSE MD

## 2024-08-30 NOTE — PROGRESS NOTES
NEUROSURGERY  Progress Note    INTERVAL HISTORY: No new events overnight.  Being transported to MRI this a.m.  Back and groin pain waxing waning.  No focal neurologic deficits.  Nuclear scan has identified some possible infectious processes in the spine, repeat MRI ordered by the infectious disease team.    CHIEF COMPLAINT: Bilateral hip and low back pain    HISTORY OF PRESENT ILLNESS:      The patient is a 73 y.o. female with A-fib on Eliquis, previous CVA, hypertension, CHF, anxiety, COPD who presented to the ER on 8/19/2024 with low back/flank pain and bilateral groin pain.  It appears, according to previous medical records that this pain has been present for at least 3 months.  She states that for 5 days [place]  She denies any numbness.  She states her low back pain and groin pain are essentially equal in intensity.  She states walking and standing exacerbates her pain.  Due to her severe pain, she is has been admitted to hospital.        The patient does take anticoagulation medication.  Eliquis      Past Medical History:   Diagnosis Date    Arthritis     Atrial fibrillation (HCC)     Paroxysmal A Fib    Cerebral artery occlusion with cerebral infarction (HCC)     TWICE: once in 2012 or 2013, then had another one 2021    CHF (congestive heart failure) (HCC)     COPD (chronic obstructive pulmonary disease) (HCC)     Hyperlipidemia     Hypertension     On continuous oral anticoagulation     Apixaban    Pneumonia     Thyroid disease        Past Surgical History:   Procedure Laterality Date    CARDIOVERSION  2020    HYSTERECTOMY, TOTAL ABDOMINAL (CERVIX REMOVED) N/A     With removal of tumor, ~2014, was a DANIS and BSO    TONSILLECTOMY Bilateral     at age 17 or 18, withOUT Adneoids as per pt    TUMOR REMOVAL      intraabdominal, ~2014, states she was told it was feeding off her bowel        MEDICATIONS    Current Facility-Administered Medications:     metoprolol tartrate (LOPRESSOR) tablet  LUMBAR SPINE WITHOUT AND WITH CONTRAST     HISTORY:  Severe back pain.  Possible diskitis with paraspinal abscess     TECHNIQUE:  Multiplanar imaging of the lumbar spine was performed using T1, T2, inversion recovery and postcontrast T1W sequences.     Comparison CT scan of the abdomen pelvis dated 08/20/2024.     FINDINGS:  There is slight curvature of the lumbar spine to the left.  There is no definite bone marrow edema seen within the lumbar spine.  There is mild anterior subluxation of L4 on L5 measuring approximately 5 mm.  There is no pars defect.  There is   facet arthropathy seen throughout the lower lumbar spine.  Five lumbar-type vertebral bodies are seen.  The lumbar spinal cord demonstrates normal signal on T2W images.  The conus medullaris is located at the L2 level.     Segmental analysis:     T12-L1:  The the central canal and neural foramina appear patent.     L1-L2:  There is mild disc bulge.  The central canal appears patent.  There is superimposed facet arthropathy resulting in mild stenosis of the neural foramina.     L2-L3:  There is disc bulge and mild to moderate facet arthropathy resulting in mild central canal and lateral recess stenosis.  There is moderate to severe right and moderate left foraminal stenosis.  There is a superimposed right paracentral inferior   disc extrusion measuring approximately 4.8 mm AP, 14 mm in height causing right lateral recess stenosis posterior to the L3 vertebral body.     L3-L4:  The central canal appears patent.  There is mild to moderate facet arthropathy resulting in moderate bilateral foraminal stenosis.     L4-L5:  There is disc bulge and moderate facet arthropathy.  The central canal and lateral recesses appear adequately patent.  There is moderate bilateral foraminal stenosis.     L5-S1:  The central canal and lateral recesses appear patent.  There is moderate to severe facet arthropathy resulting in mild to moderate bilateral foraminal stenosis.      No abnormal enhancement is identified within the lumbar spinal cord or cauda equina on postcontrast images.     IMPRESSION:  No definite evidence for diskitis or osteomyelitis seen within the lumbar spine.     At the L2-L3 level there is a right paracentral inferior disc extrusion as described above measuring 14 mm in height, 4.8 mm AP, and the findings are causing right lateral recess stenosis posterior to the L3 vertebral body.     There is disc bulge and facet arthropathy seen throughout the lumbar spine as described above resulting in mild central canal and lateral recess stenosis at L2-L3.     Mild grade 1 degenerative spondylolisthesis seen at L4-L5.              ______________________________________   Electronically signed by: SHANDRA JOHNSON M.D.  Date:     08/21/2024  Time:    16:47            Exam Ended: 08/21/24 16:39 CDT Last Resulted: 08/21/24 16:57 CDT          I have personally reviewed the images and my interpretation is:  At L2-3 there is an inferiorly extruded disc fragment that is more eccentric to the right.  This results in moderate to severe spinal canal stenosis.  There is a spondylolisthesis noted L4-5 pending results in moderate bilateral foraminal stenosis.  Of note, there are some increase signal change on the STIR sequences within and around the L3 and L4 spinous processes.          ECHO TTE (8/24/2024)  Interpretation Summary  Show Result Comparison     Left Ventricle: Normal left ventricular systolic function with a visually estimated EF of 55 - 60%. Pt did not have a functioning IV. Unable to give Definity. Grade III diastolic dysfunction with increased LAP.    Right Ventricle: Right ventricle is mildly dilated.    Mitral Valve: Annular calcification. Mildly thickened leaflets. Mild regurgitation.    Tricuspid Valve: Mild regurgitation. Severely elevated RVSP, consistent with severe pulmonary hypertension. The estimated RVSP is 66 mmHg.    Left Atrium: Left atrium is severely dilated.

## 2024-08-30 NOTE — DISCHARGE SUMMARY
Lashonda Milian  :  1950  MRN:  [id number]    Admit date:  2024  Discharge date:  24    Discharging Physician:  DR Urbina     Advance Directive: Full Code    Consults: IP CONSULT TO SOCIAL WORK  IP CONSULT TO NEUROSURGERY  PHARMACY TO DOSE VANCOMYCIN  IP CONSULT TO INFECTIOUS DISEASES  IP CONSULT TO CARDIOLOGY  IP CONSULT TO SOCIAL WORK     Primary Care Physician:  Nick Martinez MD    Discharge Diagnoses:  Principal Problem:    MRSA bacteremia  Active Problems:    Morbidly obese (HCC)    Atrial fibrillation (HCC)    Strain of back    Intractable back pain    Intractable abdominal pain    Ambulatory dysfunction    Leg mass, right    Abnormal abdominal CT scan  Resolved Problems:    * No resolved hospital problems. *      Portions of this note have been copied forward, however, changed to reflect the most current clinical status of this patient.  Hospital Course:   73-year-old female with AF on Eliquis, diastolic heart failure, CVA, HTN, hyperlipidemia, anxiety, COPD, with complaints of right lower back and flank pain radiating into her groin and down her right thigh. Patient stated that she does have chronic back and hip pain however has been progressively worsening over the past few days.  She did state she had a recent [place] a [place] where she fell backwards and hit her buttocks on the tile floor, did not recall when this event occurred.  Denied fever, urinary symptoms, productive cough, abdominal pain or diarrhea.  Workup in ER CT abdomen pelvis asymmetrical swelling of left psoas muscle and stranding in the left retroperitoneum small amount of intramuscular hemorrhage is possible, cholelithiasis without acute cholecystitis.  Patient had received multiple narcotics, muscle relaxant, and steroids and was unable to ambulate or utilize wheelchair she was admitted due to intractable pain and ambulatory dysfunction.  At baseline utilizes a walker without difficulty.  MRI right femur and lumbar  by mouth 2 times daily     sertraline 50 MG tablet  Commonly known as: ZOLOFT  Take 1 tablet by mouth daily            STOP taking these medications      fluticasone 50 MCG/ACT nasal spray  Commonly known as: FLONASE               Where to Get Your Medications        These medications were sent to [id number] - [place], [place] - [address] - P [phone] - F [phone]  [address] [place] [address] Drive [address] [address]      Phone: [phone]   metoprolol tartrate 25 MG tablet  oxyCODONE 5 MG immediate release tablet  tiZANidine 4 MG tablet  vancomycin 1.25 g Solr injection         Discharge Instructions:   Follow up with Nick Martinez MD in 7 days.  Take medications as directed.  Resume activity as tolerated.    Diet: ADULT DIET; Regular; 4 carb choices (60 gm/meal); Low Fat/Low Chol/High Fiber/NIMO     Disposition: Patient is Stableand will be discharged to Transfer to Acute Care Hospital.    Time spent on discharge 42 minutes spent in assessing patient, reviewing medications, discussion with nursing, confirming safe discharge plan and preparation of discharge summary.    Signed:  Carroll Robbins, LALITA - CNP, 8/30/2024 3:59 PM       EMR Dragon/Transcription disclaimer:   Much of this encounter note is an electronic transcription/translation of spoken language to printed text. The electronic translation of spoken language may permit erroneous, or at times, nonsensical words or phrases to be inadvertently transcribed; although attempts have made to review the note for such errors, some may still exist.

## 2024-08-30 NOTE — PROGRESS NOTES
Per transfer center, patient has been accepted at [facility] by Dr. Duane Chan, assigned to room #502. Phone number to call report is [phone]. Per [facility], there are not enough crews to transport the patient by ambulance tonight. Secure message sent to notify both Dr. Urbina and LALITA Rossi.

## 2024-08-31 VITALS
HEART RATE: 65 BPM | RESPIRATION RATE: 16 BRPM | OXYGEN SATURATION: 94 % | TEMPERATURE: 98.2 F | HEIGHT: 62 IN | DIASTOLIC BLOOD PRESSURE: 56 MMHG | WEIGHT: 266 LBS | SYSTOLIC BLOOD PRESSURE: 126 MMHG | BODY MASS INDEX: 48.95 KG/M2

## 2024-08-31 LAB
BACTERIA BLD CULT ORG #2: ABNORMAL
BACTERIA BLD CULT ORG #2: ABNORMAL
BACTERIA BLD CULT: ABNORMAL
ORGANISM: ABNORMAL
ORGANISM: ABNORMAL

## 2024-08-31 PROCEDURE — 6360000002 HC RX W HCPCS: Performed by: HOSPITALIST

## 2024-08-31 PROCEDURE — 6370000000 HC RX 637 (ALT 250 FOR IP): Performed by: NURSE PRACTITIONER

## 2024-08-31 PROCEDURE — 6370000000 HC RX 637 (ALT 250 FOR IP): Performed by: HOSPITALIST

## 2024-08-31 PROCEDURE — 36415 COLL VENOUS BLD VENIPUNCTURE: CPT

## 2024-08-31 PROCEDURE — 2580000003 HC RX 258: Performed by: HOSPITALIST

## 2024-08-31 PROCEDURE — 86403 PARTICLE AGGLUT ANTBDY SCRN: CPT

## 2024-08-31 PROCEDURE — 99232 SBSQ HOSP IP/OBS MODERATE 35: CPT | Performed by: NEUROLOGICAL SURGERY

## 2024-08-31 PROCEDURE — 6370000000 HC RX 637 (ALT 250 FOR IP)

## 2024-08-31 PROCEDURE — 87040 BLOOD CULTURE FOR BACTERIA: CPT

## 2024-08-31 RX ADMIN — METHIMAZOLE 5 MG: 5 TABLET ORAL at 08:05

## 2024-08-31 RX ADMIN — SODIUM CHLORIDE, PRESERVATIVE FREE 10 ML: 5 INJECTION INTRAVENOUS at 08:07

## 2024-08-31 RX ADMIN — LACTULOSE 10 G: 10 SOLUTION ORAL at 08:05

## 2024-08-31 RX ADMIN — ACETAMINOPHEN 1000 MG: 500 TABLET ORAL at 02:13

## 2024-08-31 RX ADMIN — VANCOMYCIN HYDROCHLORIDE 1250 MG: 10 INJECTION, POWDER, LYOPHILIZED, FOR SOLUTION INTRAVENOUS at 02:14

## 2024-08-31 RX ADMIN — OXYCODONE 2.5 MG: 5 TABLET ORAL at 08:06

## 2024-08-31 RX ADMIN — TIZANIDINE 4 MG: 4 TABLET ORAL at 08:45

## 2024-08-31 RX ADMIN — OXYCODONE 2.5 MG: 5 TABLET ORAL at 02:53

## 2024-08-31 RX ADMIN — METOPROLOL TARTRATE 25 MG: 25 TABLET, FILM COATED ORAL at 08:05

## 2024-08-31 RX ADMIN — SERTRALINE HYDROCHLORIDE 50 MG: 50 TABLET ORAL at 08:05

## 2024-08-31 RX ADMIN — AMIODARONE HYDROCHLORIDE 200 MG: 200 TABLET ORAL at 08:05

## 2024-08-31 RX ADMIN — DOCUSATE SODIUM 100 MG: 100 CAPSULE, LIQUID FILLED ORAL at 08:05

## 2024-08-31 ASSESSMENT — PAIN SCALES - GENERAL
PAINLEVEL_OUTOF10: 6
PAINLEVEL_OUTOF10: 7
PAINLEVEL_OUTOF10: 3

## 2024-08-31 ASSESSMENT — PAIN DESCRIPTION - LOCATION
LOCATION: BACK
LOCATION: BACK;GROIN
LOCATION: BACK;GROIN

## 2024-08-31 ASSESSMENT — PAIN DESCRIPTION - ORIENTATION: ORIENTATION: MID

## 2024-08-31 ASSESSMENT — PAIN SCALES - WONG BAKER: WONGBAKER_NUMERICALRESPONSE: HURTS LITTLE MORE

## 2024-08-31 ASSESSMENT — PAIN DESCRIPTION - DESCRIPTORS: DESCRIPTORS: SHARP;ACHING

## 2024-08-31 NOTE — PROGRESS NOTES
Transfer center called to notify that patient's room assignment at [facility] has been changed. Patient will now go to room #209. The phone number to call report is [phone]. Electronically signed by Diana Andrew RN on 8/31/2024 at 7:38 AM

## 2024-08-31 NOTE — PROGRESS NOTES
NEUROSURGERY  Progress Note    INTERVAL HISTORY: No new events overnight.  MRI has revealed a significant collection of purulent material in the psoas muscle.  The patient will likely be transferred to a larger tertiary center for interventional radiology for drainage.  MRI revealed findings consistent with L2-3 osteomyelitis discitis with small epidural collection.    CHIEF COMPLAINT: Bilateral hip and low back pain    HISTORY OF PRESENT ILLNESS:      The patient is a 73 y.o. female with A-fib on Eliquis, previous CVA, hypertension, CHF, anxiety, COPD who presented to the ER on 8/19/2024 with low back/flank pain and bilateral groin pain.  It appears, according to previous medical records that this pain has been present for at least 3 months.  She states that for 5 days [place]  She denies any numbness.  She states her low back pain and groin pain are essentially equal in intensity.  She states walking and standing exacerbates her pain.  Due to her severe pain, she is has been admitted to hospital.        The patient does take anticoagulation medication.  Eliquis      Past Medical History:   Diagnosis Date    Arthritis     Atrial fibrillation (HCC)     Paroxysmal A Fib    Cerebral artery occlusion with cerebral infarction (HCC)     TWICE: once in 2012 or 2013, then had another one 2021    CHF (congestive heart failure) (HCC)     COPD (chronic obstructive pulmonary disease) (HCC)     Hyperlipidemia     Hypertension     On continuous oral anticoagulation     Apixaban    Pneumonia     Thyroid disease        Past Surgical History:   Procedure Laterality Date    CARDIOVERSION  2020    HYSTERECTOMY, TOTAL ABDOMINAL (CERVIX REMOVED) N/A     With removal of tumor, ~2014, was a DANIS and BSO    TONSILLECTOMY Bilateral     at age 17 or 18, withOUT Adneoids as per pt    TUMOR REMOVAL      intraabdominal, ~2014, states she was told it was feeding off her bowel        MEDICATIONS    Current  10/02/2020    PROTIME 16.3 (H) 05/30/2023    PROTIME 15.1 (H) 10/02/2020         IMAGING:  Narrative & Impression  EXAM:  LUMBAR SPINE MRI WITHOUT AND WITH CONTRAST     HISTORY:  Persistent MRSA bacteremia.  Diskitis osteomyelitis suspected on nuclear medicine study.     TECHNIQUE:  Multi-sequential multiplanar MRI lumbar spine before and after the administration of 20 ml of MultiHance contrast intravenously.     COMPARISON:  Lumbar spine MRI dated 08/21/2024.  SPECT-CT dated 08/20/2024.     FINDINGS:     There is L2-L3 disc edema, possibly representing diskitis.  Mild marrow edema and enhancement are noted in the opposing endplates of L2-L3, possibly representing early osteomyelitis.  There is marrow edema and enhancement in the right pedicles of L3-L4   and bilateral facets of L3-L4, possibly suggesting osteomyelitis.  There is anterior epidural enhancing fluid collection and L2-L3 , concerning for epidural abscess.     Multiple peripherally enhancing fluid collections are noted in the psoas muscles bilaterally, largest measuring 11.7 x 3.2 x 2.2 cm in the left psoas.  Findings are most consistent with abscesses.     There is grade 1 anterolisthesis at L4-L5.  There is mild loss of disc height at L2-L3 to L4-L5.     There is an intraosseous hemangioma in the L5 vertebral body.     The vertebral body heights are maintained.     The lumbar lordosis is maintained.     The conus medullaris terminates at L1-L2.  The cauda equina is normal in signal and morphology.     There is fatty atrophy of the paraspinous muscles.     The visualized portions of the abdominopelvic viscera are grossly unremarkable.     T12-L1: There is no significant disc herniation, spinal canal stenosis, or neuroforaminal stenosis.     L1-L2: There is a small central disc protrusion.  There is no significant spinal canal or neural foraminal stenosis.     L2-L3: There is mild bilateral facet hypertrophy.  There is small central disc protrusion.   There is mild spinal canal stenosis.  There is small right foraminal disc protrusion.  There is moderate to severe bilateral neural foraminal stenosis.     L3-L4: There is bilateral facet hypertrophy, with moderate bilateral neural foraminal stenosis, right greater than left.  There is no significant posterior disc bulge or spinal canal stenosis.     L4-L5: There is grade 1 anterolisthesis.  There is a small broad-based posterior disc bulge extending to bilateral neural foramen.  There is bilateral facet hypertrophy.  There is moderate bilateral neural foraminal stenosis.  There is no significant   spinal canal stenosis.     L5-S1: There is a small central disc protrusion.  Bilateral facet hypertrophy is noted.  There is no significant spinal canal or neural foraminal stenosis.     IMPRESSION:  1. Diskitis-osteomyelitis at L2-L3.  2. Marrow edema and enhancement in the right pedicles of L3-L4 and bilateral facets of L3-L4, possibly suggesting osteomyelitis.  3. Anterior epidural enhancing fluid collection and L2-L3 , concerning for epidural abscess.  4. Bilateral psoas abscesses, largest measuring 11.7 x 3.2 x 2.2 cm in the left psoas.  5. Multilevel degenerative changes as above.        ______________________________________   Electronically signed by: ANA VALENTIN M.D.  Date:     08/30/2024  Time:    09:15            Exam Ended: 08/30/24 08:49 CDT Last Resulted: 08/30/24 09:28 CDT              ECHO TTE (8/24/2024)  Interpretation Summary  Show Result Comparison     Left Ventricle: Normal left ventricular systolic function with a visually estimated EF of 55 - 60%. Pt did not have a functioning IV. Unable to give Definity. Grade III diastolic dysfunction with increased LAP.    Right Ventricle: Right ventricle is mildly dilated.    Mitral Valve: Annular calcification. Mildly thickened leaflets. Mild regurgitation.    Tricuspid Valve: Mild regurgitation. Severely elevated RVSP, consistent with severe pulmonary  hypertension. The estimated RVSP is 66 mmHg.    Left Atrium: Left atrium is severely dilated.    Right Atrium: Right atrium is dilated.    Image quality is suboptimal.      NABOR (8/28/2024) Dr. Boothe  Interpretation:    Cardiac procedure transesophageal echocardiography performed tolerated well preliminary report  Left ventricular function normal  Mild leaflet thickening mitral valve with mild to moderate mitral regurgitation  Trileaflet aortic valve minimal sclerosis  Unremarkable tricuspid valve  Pulmonary valve poorly visualized  No obvious valvular vegetations  No intracardiac thrombus  Unremarkable aorta              IMPRESSION  73-year-old with severe back and bilateral groin pain without any significant weakness noted on neurologic exam and L2-3 osteomyelitis discitis with small epidural collection and bilateral psoas abscesses.    RECOMMENDATIONS:     Agree with transfer to larger tertiary center for drainage of bilateral psoas abscesses by interventional radiology    Given the patient's lack of major neurologic deficits, would favor treatment with IV antibiotics prior to surgical drainage of the epidural collection.  Will likely need another MRI at some point to assess treatment.      Will have patient follow-up in our clinic in 2 to 3 weeks.        This dictation was generated by voice recognition computer software.  Although all attempts are made to edit the dictation for accuracy, there may be errors in the transcription that are not intended.        Sarabjit Aguilar,   Cell: [phone]

## 2024-08-31 NOTE — PROGRESS NOTES
This nurse spoke with ANGELO Cannon at transfer center via phone to ask for assistance with patient transport. Kassandra states she will reach out to [facility] [place] regarding a potential overnight bed hold for this patient. Upon return phone call, Kassandra RN states she has spoken with house supervisor at [facility] [place] and they have given her the impression that the bed will be held, and recommends to put the patient on the [facility] transport board for the first available ground transport. Facesheet faxed and [facility] notified via phone, patient has been placed on their board for ground transport. Shift handoff given to ANGELO Brennan at bedside. prasanth Tobar RN also notified of patient transport status.

## 2024-08-31 NOTE — PROGRESS NOTES
Reg from [facility] called. Stated that no certain ETA but may be able to transport between 0700 and 0800.     Electronically signed by Mika Breen RN on 8/31/2024 at 6:27 AM

## 2024-08-31 NOTE — PROGRESS NOTES
[facility] arrived to transport patient at 0840. Report called to ANGELO Lewis at [facility]. Pt's son Daniel was notified that pt transport has been initiated.

## 2024-08-31 NOTE — PROGRESS NOTES
[facility] contacted for ETA in the morning, they are unsure at this time and instructed to call back after 0700.    Electronically signed by Mika Breen RN on 8/31/2024 at 3:09 AM

## 2024-09-01 LAB
BACTERIA BLD CULT ORG #2: ABNORMAL
BACTERIA BLD CULT: ABNORMAL
BACTERIA BLD CULT: ABNORMAL
ORGANISM: ABNORMAL

## 2024-09-02 LAB
BACTERIA BLD CULT ORG #2: ABNORMAL
BACTERIA BLD CULT ORG #2: ABNORMAL
ORGANISM: ABNORMAL

## 2024-09-03 ENCOUNTER — TELEPHONE (OUTPATIENT)
Dept: FAMILY MEDICINE CLINIC | Age: 74
End: 2024-09-03

## 2024-09-03 NOTE — TELEPHONE ENCOUNTER
Care Transitions Initial Follow Up Call    Outreach made within 2 business days of discharge: Yes    Patient: Lashonda Milian Patient : 1950   MRN: [id number]  Reason for Admission: MRSA Bacteremia    Discharge Date: 24       Spoke with: Daniel    Discharge department/facility: [facility]    Patient was transferred to [facility] for MRSA bacteremia and abscess.     Scheduled appointment with PCP within 7-14 days    Follow Up  Future Appointments   Date Time Provider Department Center   10/14/2024  2:15 PM Santos Boothe MD [facility]       Xiao Velazco LPN

## 2024-09-06 DIAGNOSIS — E78.2 MIXED HYPERLIPIDEMIA: ICD-10-CM

## 2024-09-09 DIAGNOSIS — E78.2 MIXED HYPERLIPIDEMIA: ICD-10-CM

## 2024-09-09 RX ORDER — ATORVASTATIN CALCIUM 20 MG/1
20 TABLET, FILM COATED ORAL DAILY
Qty: 30 TABLET | Refills: 1 | Status: SHIPPED | OUTPATIENT
Start: 2024-09-09

## 2024-09-09 NOTE — PROGRESS NOTES
Physician Progress Note      PATIENT:               CAROLINA HUGHES  [facility] #:                  [id number]  :                       1950  ADMIT DATE:       2024 10:39 PM  DISCH DATE:        2024 10:28 AM  RESPONDING  PROVIDER #:        Teresa Urbina MD          QUERY TEXT:    Pt admitted with intractable back pain. Pt noted to have multiple findings on   imaging studies.  If possible, please document in progress notes and discharge   summary the relationship, if any, between tractable back pain and cause:    The medical record reflects the following:  Risk Factors:  Clinical Indicators: Presents for back pain to the ER. CT ABD,: swelling of   the left psoas muscle and stranding in the left retroperitoneum. colonic   diverticulosis. MRI showed inferiorly extruded disc herniation at L2-3 along   with stenosis and MRI: possible diskitis with paraspinal abscess.   bilateral   psoas abscesses largest measuring 11.7 x 3.2 x 2.2 cm in the left psoas.  Treatment: CT, MRI, Tylenol, Oxycodone, Morphine, PT/OT    Melissa Fraser, RN-BSN, CRCR  Clinical   [email or web address]  Options provided:  -- Low back pain due to bilateral psoas abscess  -- Low back pain due to Diskitis-Osteomyelitis at L2-L3  -- Other - I will add my own diagnosis  -- Disagree - Not applicable / Not valid  -- Disagree - Clinically unable to determine / Unknown  -- Refer to Clinical Documentation Reviewer    PROVIDER RESPONSE TEXT:    Low back pain due to bilateral psoas abscess    Query created by: Joceline Fraser on 9/3/2024 9:46 AM      QUERY TEXT:    Patient admitted with low back pain. Documentation reflects Pneumonia was   found on the CT chest.   If possible, please document in the progress notes   and discharge summary if Pneumonia was:    The medical record reflects the following:  Risk Factors: Bacteremia  Clinical Indicators: DC summary states \"CT Chest:

## 2024-09-10 RX ORDER — ATORVASTATIN CALCIUM 20 MG/1
20 TABLET, FILM COATED ORAL DAILY
Qty: 90 TABLET | Refills: 0 | OUTPATIENT
Start: 2024-09-10

## 2024-09-30 ENCOUNTER — APPOINTMENT (OUTPATIENT)
Dept: GENERAL RADIOLOGY | Age: 74
DRG: 028 | End: 2024-09-30
Payer: MEDICARE

## 2024-09-30 ENCOUNTER — HOSPITAL ENCOUNTER (INPATIENT)
Age: 74
LOS: 20 days | Discharge: NURSING, REHAB OR LONG-TERM CARE | DRG: 028 | End: 2024-10-20
Attending: EMERGENCY MEDICINE | Admitting: INTERNAL MEDICINE
Payer: MEDICARE

## 2024-09-30 ENCOUNTER — APPOINTMENT (OUTPATIENT)
Dept: CT IMAGING | Age: 74
DRG: 028 | End: 2024-09-30
Payer: MEDICARE

## 2024-09-30 DIAGNOSIS — Z87.39 HISTORY OF DISCITIS: ICD-10-CM

## 2024-09-30 DIAGNOSIS — G45.9 TIA (TRANSIENT ISCHEMIC ATTACK): Primary | ICD-10-CM

## 2024-09-30 DIAGNOSIS — M46.46 DISCITIS OF LUMBAR REGION: ICD-10-CM

## 2024-09-30 DIAGNOSIS — M54.9 INTRACTABLE BACK PAIN: ICD-10-CM

## 2024-09-30 DIAGNOSIS — G06.1 ABSCESS IN EPIDURAL SPACE OF SPINE: ICD-10-CM

## 2024-09-30 PROBLEM — R29.90 STROKE-LIKE SYMPTOMS: Status: ACTIVE | Noted: 2024-09-30

## 2024-09-30 LAB
ALBUMIN SERPL-MCNC: 2.5 G/DL (ref 3.5–5.2)
ALP SERPL-CCNC: 109 U/L (ref 35–104)
ALT SERPL-CCNC: 26 U/L (ref 5–33)
ANION GAP SERPL CALCULATED.3IONS-SCNC: 8 MMOL/L (ref 7–19)
AST SERPL-CCNC: 50 U/L (ref 5–32)
BASOPHILS # BLD: 0 K/UL (ref 0–0.2)
BASOPHILS NFR BLD: 0.6 % (ref 0–1)
BILIRUB SERPL-MCNC: 0.6 MG/DL (ref 0.2–1.2)
BUN SERPL-MCNC: 21 MG/DL (ref 8–23)
CALCIUM SERPL-MCNC: 8.4 MG/DL (ref 8.8–10.2)
CHLORIDE SERPL-SCNC: 104 MMOL/L (ref 98–111)
CO2 SERPL-SCNC: 27 MMOL/L (ref 22–29)
CREAT SERPL-MCNC: 0.8 MG/DL (ref 0.5–0.9)
CRP SERPL HS-MCNC: 5.6 MG/DL (ref 0–0.5)
EKG P AXIS: 84 DEGREES
EKG P-R INTERVAL: 254 MS
EKG Q-T INTERVAL: 486 MS
EKG QRS DURATION: 106 MS
EKG QTC CALCULATION (BAZETT): 489 MS
EKG T AXIS: 82 DEGREES
EOSINOPHIL # BLD: 0 K/UL (ref 0–0.6)
EOSINOPHIL NFR BLD: 0.6 % (ref 0–5)
ERYTHROCYTE [DISTWIDTH] IN BLOOD BY AUTOMATED COUNT: 17.3 % (ref 11.5–14.5)
GLUCOSE BLD-MCNC: 90 MG/DL (ref 70–99)
GLUCOSE SERPL-MCNC: 97 MG/DL (ref 70–99)
HCT VFR BLD AUTO: 33.6 % (ref 37–47)
HGB BLD-MCNC: 10.4 G/DL (ref 12–16)
IMM GRANULOCYTES # BLD: 0 K/UL
INR PPP: 1.87 (ref 0.88–1.18)
LYMPHOCYTES # BLD: 1 K/UL (ref 1.1–4.5)
LYMPHOCYTES NFR BLD: 19.4 % (ref 20–40)
MCH RBC QN AUTO: 29.1 PG (ref 27–31)
MCHC RBC AUTO-ENTMCNC: 31 G/DL (ref 33–37)
MCV RBC AUTO: 94.1 FL (ref 81–99)
MONOCYTES # BLD: 0.5 K/UL (ref 0–0.9)
MONOCYTES NFR BLD: 10.4 % (ref 0–10)
NEUTROPHILS # BLD: 3.4 K/UL (ref 1.5–7.5)
NEUTS SEG NFR BLD: 68.2 % (ref 50–65)
PERFORMED ON: NORMAL
PLATELET # BLD AUTO: 322 K/UL (ref 130–400)
PMV BLD AUTO: 10.7 FL (ref 9.4–12.3)
POTASSIUM SERPL-SCNC: 4.2 MMOL/L (ref 3.5–5)
PROCALCITONIN: 0.06 NG/ML (ref 0–0.09)
PROT SERPL-MCNC: 6.7 G/DL (ref 6.4–8.3)
PROTHROMBIN TIME: 21 SEC (ref 12–14.6)
RBC # BLD AUTO: 3.57 M/UL (ref 4.2–5.4)
SODIUM SERPL-SCNC: 139 MMOL/L (ref 136–145)
TROPONIN, HIGH SENSITIVITY: <6 NG/L (ref 0–14)
TSH SERPL DL<=0.005 MIU/L-ACNC: 1.04 UIU/ML (ref 0.27–4.2)
VANCOMYCIN TROUGH SERPL-MCNC: 10.7 UG/ML (ref 10–20)
WBC # BLD AUTO: 5 K/UL (ref 4.8–10.8)

## 2024-09-30 PROCEDURE — 80202 ASSAY OF VANCOMYCIN: CPT

## 2024-09-30 PROCEDURE — 86140 C-REACTIVE PROTEIN: CPT

## 2024-09-30 PROCEDURE — 6360000002 HC RX W HCPCS: Performed by: EMERGENCY MEDICINE

## 2024-09-30 PROCEDURE — 85610 PROTHROMBIN TIME: CPT

## 2024-09-30 PROCEDURE — 93010 ELECTROCARDIOGRAM REPORT: CPT | Performed by: INTERNAL MEDICINE

## 2024-09-30 PROCEDURE — 84145 PROCALCITONIN (PCT): CPT

## 2024-09-30 PROCEDURE — 85025 COMPLETE CBC W/AUTO DIFF WBC: CPT

## 2024-09-30 PROCEDURE — 82962 GLUCOSE BLOOD TEST: CPT

## 2024-09-30 PROCEDURE — 0042T CT BRAIN PERFUSION: CPT

## 2024-09-30 PROCEDURE — 6360000004 HC RX CONTRAST MEDICATION: Performed by: EMERGENCY MEDICINE

## 2024-09-30 PROCEDURE — 84443 ASSAY THYROID STIM HORMONE: CPT

## 2024-09-30 PROCEDURE — 96374 THER/PROPH/DIAG INJ IV PUSH: CPT

## 2024-09-30 PROCEDURE — 80053 COMPREHEN METABOLIC PANEL: CPT

## 2024-09-30 PROCEDURE — 6370000000 HC RX 637 (ALT 250 FOR IP)

## 2024-09-30 PROCEDURE — 2580000003 HC RX 258

## 2024-09-30 PROCEDURE — 71045 X-RAY EXAM CHEST 1 VIEW: CPT

## 2024-09-30 PROCEDURE — 84484 ASSAY OF TROPONIN QUANT: CPT

## 2024-09-30 PROCEDURE — 70450 CT HEAD/BRAIN W/O DYE: CPT

## 2024-09-30 PROCEDURE — 1200000000 HC SEMI PRIVATE

## 2024-09-30 PROCEDURE — 36415 COLL VENOUS BLD VENIPUNCTURE: CPT

## 2024-09-30 PROCEDURE — 93005 ELECTROCARDIOGRAM TRACING: CPT | Performed by: EMERGENCY MEDICINE

## 2024-09-30 PROCEDURE — 70496 CT ANGIOGRAPHY HEAD: CPT

## 2024-09-30 PROCEDURE — 99285 EMERGENCY DEPT VISIT HI MDM: CPT

## 2024-09-30 RX ORDER — METHIMAZOLE 5 MG/1
5 TABLET ORAL 2 TIMES DAILY
Status: DISCONTINUED | OUTPATIENT
Start: 2024-09-30 | End: 2024-10-20 | Stop reason: HOSPADM

## 2024-09-30 RX ORDER — SODIUM CHLORIDE 9 MG/ML
INJECTION, SOLUTION INTRAVENOUS PRN
Status: DISCONTINUED | OUTPATIENT
Start: 2024-09-30 | End: 2024-10-20 | Stop reason: HOSPADM

## 2024-09-30 RX ORDER — OXYCODONE HYDROCHLORIDE 5 MG/1
5 TABLET ORAL EVERY 6 HOURS PRN
Status: ON HOLD | COMMUNITY
End: 2024-10-20 | Stop reason: HOSPADM

## 2024-09-30 RX ORDER — ONDANSETRON 4 MG/1
4 TABLET, ORALLY DISINTEGRATING ORAL EVERY 8 HOURS PRN
Status: DISCONTINUED | OUTPATIENT
Start: 2024-09-30 | End: 2024-10-20 | Stop reason: HOSPADM

## 2024-09-30 RX ORDER — LANOLIN ALCOHOL/MO/W.PET/CERES
6 CREAM (GRAM) TOPICAL NIGHTLY
COMMUNITY

## 2024-09-30 RX ORDER — ATORVASTATIN CALCIUM 20 MG/1
20 TABLET, FILM COATED ORAL DAILY
Status: DISCONTINUED | OUTPATIENT
Start: 2024-10-01 | End: 2024-10-20 | Stop reason: HOSPADM

## 2024-09-30 RX ORDER — METHOCARBAMOL 500 MG/1
500 TABLET, FILM COATED ORAL 2 TIMES DAILY
COMMUNITY

## 2024-09-30 RX ORDER — SODIUM CHLORIDE 0.9 % (FLUSH) 0.9 %
5-40 SYRINGE (ML) INJECTION PRN
Status: DISCONTINUED | OUTPATIENT
Start: 2024-09-30 | End: 2024-10-20 | Stop reason: HOSPADM

## 2024-09-30 RX ORDER — ONDANSETRON 2 MG/ML
4 INJECTION INTRAMUSCULAR; INTRAVENOUS EVERY 6 HOURS PRN
Status: DISCONTINUED | OUTPATIENT
Start: 2024-09-30 | End: 2024-10-20 | Stop reason: HOSPADM

## 2024-09-30 RX ORDER — MORPHINE SULFATE 2 MG/ML
2 INJECTION, SOLUTION INTRAMUSCULAR; INTRAVENOUS ONCE
Status: COMPLETED | OUTPATIENT
Start: 2024-09-30 | End: 2024-09-30

## 2024-09-30 RX ORDER — IOPAMIDOL 755 MG/ML
70 INJECTION, SOLUTION INTRAVASCULAR
Status: COMPLETED | OUTPATIENT
Start: 2024-09-30 | End: 2024-09-30

## 2024-09-30 RX ORDER — METOPROLOL TARTRATE 25 MG/1
25 TABLET, FILM COATED ORAL 2 TIMES DAILY
Status: DISCONTINUED | OUTPATIENT
Start: 2024-09-30 | End: 2024-10-20 | Stop reason: HOSPADM

## 2024-09-30 RX ORDER — ASPIRIN 81 MG/1
81 TABLET, CHEWABLE ORAL DAILY
Status: DISCONTINUED | OUTPATIENT
Start: 2024-10-01 | End: 2024-10-20 | Stop reason: HOSPADM

## 2024-09-30 RX ORDER — AMIODARONE HYDROCHLORIDE 200 MG/1
200 TABLET ORAL 2 TIMES DAILY
Status: DISCONTINUED | OUTPATIENT
Start: 2024-09-30 | End: 2024-10-20 | Stop reason: HOSPADM

## 2024-09-30 RX ORDER — ASPIRIN 300 MG/1
300 SUPPOSITORY RECTAL DAILY
Status: DISCONTINUED | OUTPATIENT
Start: 2024-10-01 | End: 2024-10-20 | Stop reason: HOSPADM

## 2024-09-30 RX ORDER — POLYETHYLENE GLYCOL 3350 17 G/17G
17 POWDER, FOR SOLUTION ORAL DAILY PRN
Status: DISCONTINUED | OUTPATIENT
Start: 2024-09-30 | End: 2024-10-20 | Stop reason: HOSPADM

## 2024-09-30 RX ORDER — HYDROCODONE BITARTRATE AND ACETAMINOPHEN 10; 325 MG/1; MG/1
1 TABLET ORAL EVERY 6 HOURS PRN
Status: DISCONTINUED | OUTPATIENT
Start: 2024-09-30 | End: 2024-10-01

## 2024-09-30 RX ORDER — SODIUM CHLORIDE 0.9 % (FLUSH) 0.9 %
5-40 SYRINGE (ML) INJECTION EVERY 12 HOURS SCHEDULED
Status: DISCONTINUED | OUTPATIENT
Start: 2024-09-30 | End: 2024-10-20 | Stop reason: HOSPADM

## 2024-09-30 RX ORDER — PREGABALIN 100 MG/1
100 CAPSULE ORAL 3 TIMES DAILY
COMMUNITY

## 2024-09-30 RX ORDER — ENOXAPARIN SODIUM 100 MG/ML
30 INJECTION SUBCUTANEOUS 2 TIMES DAILY
Status: CANCELLED | OUTPATIENT
Start: 2024-09-30

## 2024-09-30 RX ADMIN — METHIMAZOLE 5 MG: 5 TABLET ORAL at 21:53

## 2024-09-30 RX ADMIN — IOPAMIDOL 70 ML: 755 INJECTION, SOLUTION INTRAVENOUS at 12:36

## 2024-09-30 RX ADMIN — AMIODARONE HYDROCHLORIDE 200 MG: 200 TABLET ORAL at 21:53

## 2024-09-30 RX ADMIN — HYDROCODONE BITARTRATE AND ACETAMINOPHEN 1 TABLET: 10; 325 TABLET ORAL at 21:53

## 2024-09-30 RX ADMIN — MORPHINE SULFATE 2 MG: 2 INJECTION, SOLUTION INTRAMUSCULAR; INTRAVENOUS at 15:19

## 2024-09-30 RX ADMIN — APIXABAN 5 MG: 5 TABLET, FILM COATED ORAL at 21:53

## 2024-09-30 RX ADMIN — METOPROLOL TARTRATE 25 MG: 25 TABLET, FILM COATED ORAL at 21:53

## 2024-09-30 RX ADMIN — SODIUM CHLORIDE, PRESERVATIVE FREE 10 ML: 5 INJECTION INTRAVENOUS at 21:53

## 2024-09-30 ASSESSMENT — PAIN SCALES - GENERAL
PAINLEVEL_OUTOF10: 8
PAINLEVEL_OUTOF10: 10
PAINLEVEL_OUTOF10: 0

## 2024-09-30 ASSESSMENT — PAIN DESCRIPTION - LOCATION
LOCATION: BACK
LOCATION: BACK

## 2024-09-30 ASSESSMENT — PAIN DESCRIPTION - ORIENTATION: ORIENTATION: MID;LOWER

## 2024-09-30 NOTE — ED NOTES
[facility] called with Stroke Alert 1209  Alerted CT 1211  Dr Hopkins called Code Stroke 1219  CT ready 1220  Announced over PA 1221  Alerted Dr Altamirano 1222  Alerted Stroke Coordinator 1224  LKW 1120

## 2024-09-30 NOTE — H&P
hypertension    Hyperlipidemia  -Continue statin    Cerebrovascular accident (CVA) due to occlusion of cerebral artery (HCC)  -Noted    Hyperthyroidism  -Continue methimazole    Diastolic dysfunction  -Takes Lasix prn at home  -Monitor for s/sx volume overload    Antibiotic: Vanc     DVT Prophylaxis: Eliquis     Discharge planning: TBD     Advance Directive: Prior    Diet: Diet NPO     Consults Made:   PHARMACY TO DOSE VANCOMYCIN    Further orders per clinical course/attending.     Signed:  Electronically signed by LALITA Arcos CNP on 9/30/24 at 5:05 PM CDT     EMR Dragon/Transcription disclaimer:   Much of this encounter note is an electronic transcription/translation of spoken language to printed text. The electronic translation of spoken language may permit erroneous words or phrases to be inadvertently transcribed; although attempts have made to review the note for such errors, some may still exist.

## 2024-09-30 NOTE — ED NOTES
ED TO INPATIENT SBAR HANDOFF    Patient Name: Lashonda Milian   : 1950  73 y.o.   Family/Caregiver Present: Yes  Code Status Order: Prior    C-SSRS:    Sitter No  Restraints:         Situation  Chief Complaint:   Chief Complaint   Patient presents with    Altered Mental Status     Patient Diagnosis: Stroke-like symptoms [R29.90]     Brief Description of Patient's Condition: Patient arrived to ED with initial complaints of AMS. NH had reported that about 45min-1 hour prior to her arrival, they the NH staff had noticed a significant change from her baseline. Patient was unable to speak. She has since improved some. Patient has PICC line in place and she is receiving IV antibiotics at the NH for osteomyelitis. She has had a previous CVA. She ambulates with a walker. Patient is calm and cooperative.     Mental Status: alert  Arrived from: nursing home    Imaging:   XR CHEST PORTABLE   Final Result   Severe cardiomegaly and central pulmonary vascular prominence.  Chronic right hilar prominence.  No focal infiltrate, effusion, or pneumothorax is identified.   Right-sided PICC line noted tip terminating in the mid SVC.                               ______________________________________    Electronically signed by: BALWINDER GUPTA M.D.   Date:     2024   Time:    13:27       CTA HEAD NECK W CONTRAST   Final Result   1. No extracranial carotid or vertebral artery stenosis.   2. No large vessel occlusion or high grade stenosis within the [place] of [place].        All CT scans are performed using dose optimization techniques as appropriate to the performed exam and include    at least one of the following: Automated exposure control, adjustment of the mA and/or kV according to size, and the use of iterative reconstruction technique.        ______________________________________    Electronically signed by: ANA VALENTIN M.D.   Date:     2024   Time:    12:56       CT Brain Perfusion   Final Result

## 2024-09-30 NOTE — ED PROVIDER NOTES
[facility] EMERGENCY DEPT  eMERGENCY dEPARTMENT eNCOUnter      Pt Name: Lashonda Milian  MRN: [id number]  Birthdate 1950  Date of evaluation: 9/30/2024  Provider: Shaq Hopkins MD    CHIEF COMPLAINT       Chief Complaint   Patient presents with    Altered Mental Status         HISTORY OF PRESENT ILLNESS   (Location/Symptom, Timing/Onset,Context/Setting, Quality, Duration, Modifying Factors, Severity)  Note limiting factors.   Lashonda Milian is a 73 y.o. female who presents to the emergency department for evaluation regarding episode of altered mental status.  Patient arrives here to the ED from the nursing home staff noting approximately 1 hour ago she was not able to speak or communicate which is a significant change from her baseline.  A prior history of osteomyelitis and discitis involving the L2-3 interspace and required transfer to tertiary center for interventional radiology drainage.  Is currently receiving IV antibiotics with an upper extremity PICC line she does have a prior history of a CVA.  Family states that prior to being hospitalized with this infection she resided at home and ambulated with a walker.  Arrival to the ED patient does not answer any questions.  She does follow some basic commands.  Overall history is very limited at this time.    HPI    NursingNotes were reviewed.    REVIEW OF SYSTEMS    (2-9 systems for level 4, 10 or more for level 5)     Review of Systems   Unable to perform ROS: Mental status change            PAST MEDICALHISTORY     Past Medical History:   Diagnosis Date    Arthritis     Atrial fibrillation (HCC)     Paroxysmal A Fib    Cerebral artery occlusion with cerebral infarction (HCC)     TWICE: once in 2012 or 2013, then had another one 2021    CHF (congestive heart failure) (HCC)     COPD (chronic obstructive pulmonary disease) (HCC)     Hyperlipidemia     Hypertension     On continuous oral anticoagulation     Apixaban    Pneumonia     Thyroid disease          SURGICAL

## 2024-10-01 ENCOUNTER — APPOINTMENT (OUTPATIENT)
Dept: MRI IMAGING | Age: 74
DRG: 028 | End: 2024-10-01
Payer: MEDICARE

## 2024-10-01 LAB
ANION GAP SERPL CALCULATED.3IONS-SCNC: 10 MMOL/L (ref 7–19)
BASOPHILS # BLD: 0 K/UL (ref 0–0.2)
BASOPHILS NFR BLD: 0.8 % (ref 0–1)
BUN SERPL-MCNC: 20 MG/DL (ref 8–23)
CALCIUM SERPL-MCNC: 8.3 MG/DL (ref 8.8–10.2)
CHLORIDE SERPL-SCNC: 104 MMOL/L (ref 98–111)
CHOLEST SERPL-MCNC: 78 MG/DL (ref 0–199)
CO2 SERPL-SCNC: 26 MMOL/L (ref 22–29)
CREAT SERPL-MCNC: 0.8 MG/DL (ref 0.5–0.9)
EOSINOPHIL # BLD: 0 K/UL (ref 0–0.6)
EOSINOPHIL NFR BLD: 0.8 % (ref 0–5)
ERYTHROCYTE [DISTWIDTH] IN BLOOD BY AUTOMATED COUNT: 17.1 % (ref 11.5–14.5)
GLUCOSE SERPL-MCNC: 93 MG/DL (ref 70–99)
HBA1C MFR BLD: 5 % (ref 4–5.6)
HCT VFR BLD AUTO: 30 % (ref 37–47)
HDLC SERPL-MCNC: 23 MG/DL (ref 40–60)
HGB BLD-MCNC: 9.5 G/DL (ref 12–16)
IMM GRANULOCYTES # BLD: 0 K/UL
LDLC SERPL CALC-MCNC: 34 MG/DL
LYMPHOCYTES # BLD: 1 K/UL (ref 1.1–4.5)
LYMPHOCYTES NFR BLD: 20.4 % (ref 20–40)
MCH RBC QN AUTO: 29.1 PG (ref 27–31)
MCHC RBC AUTO-ENTMCNC: 31.7 G/DL (ref 33–37)
MCV RBC AUTO: 91.7 FL (ref 81–99)
MONOCYTES # BLD: 0.6 K/UL (ref 0–0.9)
MONOCYTES NFR BLD: 11.9 % (ref 0–10)
NEUTROPHILS # BLD: 3.1 K/UL (ref 1.5–7.5)
NEUTS SEG NFR BLD: 65.3 % (ref 50–65)
PLATELET # BLD AUTO: 272 K/UL (ref 130–400)
PMV BLD AUTO: 10.6 FL (ref 9.4–12.3)
POTASSIUM SERPL-SCNC: 4.1 MMOL/L (ref 3.5–5)
RBC # BLD AUTO: 3.27 M/UL (ref 4.2–5.4)
SODIUM SERPL-SCNC: 140 MMOL/L (ref 136–145)
TRIGL SERPL-MCNC: 103 MG/DL (ref 0–149)
WBC # BLD AUTO: 4.7 K/UL (ref 4.8–10.8)

## 2024-10-01 PROCEDURE — 6360000004 HC RX CONTRAST MEDICATION

## 2024-10-01 PROCEDURE — 6370000000 HC RX 637 (ALT 250 FOR IP): Performed by: INTERNAL MEDICINE

## 2024-10-01 PROCEDURE — 1200000000 HC SEMI PRIVATE

## 2024-10-01 PROCEDURE — 36415 COLL VENOUS BLD VENIPUNCTURE: CPT

## 2024-10-01 PROCEDURE — 80048 BASIC METABOLIC PNL TOTAL CA: CPT

## 2024-10-01 PROCEDURE — 72158 MRI LUMBAR SPINE W/O & W/DYE: CPT

## 2024-10-01 PROCEDURE — 6360000002 HC RX W HCPCS

## 2024-10-01 PROCEDURE — 70551 MRI BRAIN STEM W/O DYE: CPT

## 2024-10-01 PROCEDURE — 80061 LIPID PANEL: CPT

## 2024-10-01 PROCEDURE — A9577 INJ MULTIHANCE: HCPCS

## 2024-10-01 PROCEDURE — 6370000000 HC RX 637 (ALT 250 FOR IP)

## 2024-10-01 PROCEDURE — 6370000000 HC RX 637 (ALT 250 FOR IP): Performed by: HOSPITALIST

## 2024-10-01 PROCEDURE — 99223 1ST HOSP IP/OBS HIGH 75: CPT | Performed by: PSYCHIATRY & NEUROLOGY

## 2024-10-01 PROCEDURE — 83036 HEMOGLOBIN GLYCOSYLATED A1C: CPT

## 2024-10-01 PROCEDURE — 85025 COMPLETE CBC W/AUTO DIFF WBC: CPT

## 2024-10-01 PROCEDURE — 2580000003 HC RX 258

## 2024-10-01 RX ORDER — METHOCARBAMOL 500 MG/1
500 TABLET, FILM COATED ORAL 2 TIMES DAILY
Status: DISCONTINUED | OUTPATIENT
Start: 2024-10-01 | End: 2024-10-20 | Stop reason: HOSPADM

## 2024-10-01 RX ORDER — OXYCODONE HYDROCHLORIDE 5 MG/1
5 TABLET ORAL EVERY 6 HOURS PRN
Status: DISCONTINUED | OUTPATIENT
Start: 2024-10-01 | End: 2024-10-02

## 2024-10-01 RX ORDER — NALOXONE HYDROCHLORIDE 0.4 MG/ML
0.4 INJECTION, SOLUTION INTRAMUSCULAR; INTRAVENOUS; SUBCUTANEOUS PRN
Status: DISCONTINUED | OUTPATIENT
Start: 2024-10-01 | End: 2024-10-20 | Stop reason: HOSPADM

## 2024-10-01 RX ADMIN — GADOBENATE DIMEGLUMINE 20 ML: 529 INJECTION, SOLUTION INTRAVENOUS at 08:09

## 2024-10-01 RX ADMIN — VANCOMYCIN HYDROCHLORIDE 1250 MG: 10 INJECTION, POWDER, LYOPHILIZED, FOR SOLUTION INTRAVENOUS at 23:00

## 2024-10-01 RX ADMIN — APIXABAN 5 MG: 5 TABLET, FILM COATED ORAL at 08:51

## 2024-10-01 RX ADMIN — ASPIRIN 81 MG: 81 TABLET, CHEWABLE ORAL at 08:51

## 2024-10-01 RX ADMIN — OXYCODONE 5 MG: 5 TABLET ORAL at 17:35

## 2024-10-01 RX ADMIN — OXYCODONE 5 MG: 5 TABLET ORAL at 10:54

## 2024-10-01 RX ADMIN — SODIUM CHLORIDE, PRESERVATIVE FREE 10 ML: 5 INJECTION INTRAVENOUS at 08:51

## 2024-10-01 RX ADMIN — METOPROLOL TARTRATE 25 MG: 25 TABLET, FILM COATED ORAL at 21:02

## 2024-10-01 RX ADMIN — SODIUM CHLORIDE, PRESERVATIVE FREE 10 ML: 5 INJECTION INTRAVENOUS at 21:03

## 2024-10-01 RX ADMIN — METHOCARBAMOL 500 MG: 500 TABLET ORAL at 21:01

## 2024-10-01 RX ADMIN — AMIODARONE HYDROCHLORIDE 200 MG: 200 TABLET ORAL at 21:02

## 2024-10-01 RX ADMIN — METHOCARBAMOL 500 MG: 500 TABLET ORAL at 08:51

## 2024-10-01 RX ADMIN — OXYCODONE 5 MG: 5 TABLET ORAL at 03:05

## 2024-10-01 RX ADMIN — APIXABAN 5 MG: 5 TABLET, FILM COATED ORAL at 21:02

## 2024-10-01 RX ADMIN — AMIODARONE HYDROCHLORIDE 200 MG: 200 TABLET ORAL at 08:51

## 2024-10-01 RX ADMIN — METHIMAZOLE 5 MG: 5 TABLET ORAL at 21:02

## 2024-10-01 RX ADMIN — VANCOMYCIN HYDROCHLORIDE 1250 MG: 10 INJECTION, POWDER, LYOPHILIZED, FOR SOLUTION INTRAVENOUS at 01:03

## 2024-10-01 RX ADMIN — METOPROLOL TARTRATE 25 MG: 25 TABLET, FILM COATED ORAL at 08:51

## 2024-10-01 RX ADMIN — ATORVASTATIN CALCIUM 20 MG: 20 TABLET, FILM COATED ORAL at 08:51

## 2024-10-01 RX ADMIN — SERTRALINE HYDROCHLORIDE 50 MG: 50 TABLET ORAL at 08:51

## 2024-10-01 RX ADMIN — ONDANSETRON 4 MG: 4 TABLET, ORALLY DISINTEGRATING ORAL at 10:58

## 2024-10-01 RX ADMIN — METHIMAZOLE 5 MG: 5 TABLET ORAL at 08:51

## 2024-10-01 ASSESSMENT — PAIN SCALES - GENERAL
PAINLEVEL_OUTOF10: 0
PAINLEVEL_OUTOF10: 7
PAINLEVEL_OUTOF10: 8
PAINLEVEL_OUTOF10: 6
PAINLEVEL_OUTOF10: 10

## 2024-10-01 ASSESSMENT — PAIN DESCRIPTION - LOCATION
LOCATION: BACK
LOCATION: HIP;LEG
LOCATION: BACK
LOCATION: LEG;HIP

## 2024-10-01 ASSESSMENT — PAIN - FUNCTIONAL ASSESSMENT
PAIN_FUNCTIONAL_ASSESSMENT: PREVENTS OR INTERFERES WITH MANY ACTIVE NOT PASSIVE ACTIVITIES
PAIN_FUNCTIONAL_ASSESSMENT: PREVENTS OR INTERFERES SOME ACTIVE ACTIVITIES AND ADLS
PAIN_FUNCTIONAL_ASSESSMENT: PREVENTS OR INTERFERES WITH ALL ACTIVE AND SOME PASSIVE ACTIVITIES

## 2024-10-01 ASSESSMENT — PAIN DESCRIPTION - DESCRIPTORS
DESCRIPTORS: ACHING
DESCRIPTORS: ACHING
DESCRIPTORS: ACHING;DISCOMFORT
DESCRIPTORS: ACHING;SHARP

## 2024-10-01 ASSESSMENT — PAIN DESCRIPTION - ORIENTATION
ORIENTATION: MID;LOWER
ORIENTATION: LEFT
ORIENTATION: LEFT

## 2024-10-01 NOTE — CARE COORDINATION
Case Management Assessment  Initial Evaluation    Date/Time of Evaluation: 10/1/2024 8:52 AM  Assessment Completed by: Addie Sanchez    If patient is discharged prior to next notation, then this note serves as note for discharge by case management.    Patient Name: Lashonda Milian                   YOB: 1950  Diagnosis: TIA (transient ischemic attack) [G45.9]  History of discitis [Z87.39]  Stroke-like symptoms [R29.90]                   Date / Time: 9/30/2024 12:16 PM    Patient Admission Status: Inpatient   Readmission Risk (Low < 19, Mod (19-27), High > 27): Readmission Risk Score: 22.3    Current PCP: Nick Martinez MD  PCP verified by CM? (P) Yes    Chart Reviewed: Yes      History Provided by: (P) Child/Family  Patient Orientation: (P) Other (see comment) (altered mental status)    Patient Cognition: (P) Other (see comment) (altered mental status)    Hospitalization in the last 30 days (Readmission):  Yes    If yes, Readmission Assessment in  Navigator will be completed.    Advance Directives:      Code Status: Full Code   Patient's Primary Decision Maker is: (P) Legal Next of Kin    Primary Decision Maker: Babita Milian - Grandchild - [phone]    Secondary Decision Maker: Daniel Milian - Child - [phone]    Discharge Planning:    Patient lives with: (P) Alone Type of Home: (P) Skilled Nursing Facility  Primary Care Giver: (P) Other (Comment) (snf)  Patient Support Systems include: (P) Children   Current Financial resources: (P) Medicare  Current community resources: (P) None  Current services prior to admission: (P) Skilled Nursing Facility            Current DME:              Type of Home Care services:  (P) None    ADLS  Prior functional level: (P) Assistance with the following:, Mobility  Current functional level: (P) Assistance with the following:, Mobility    PT AM-PAC:   /24  OT AM-PAC:   /24    Family can provide assistance at DC: (P) Yes  Would you like Case Management to

## 2024-10-01 NOTE — CARE COORDINATION
10/01/24 0856   Readmission Assessment   Number of Days since last admission? 8-30 days   Previous Disposition SNF   Who is being Interviewed Patient   What was the patient's/caregiver's perception as to why they think they needed to return back to the hospital? Other (Comment)  (altered mental status)   Did you visit your Primary Care Physician after you left the hospital, before you returned this time? No   Why weren't you able to visit your PCP? Did not have an appointment   Did you see a specialist, such as Cardiac, Pulmonary, Orthopedic Physician, etc. after you left the hospital? No   Who advised the patient to return to the hospital? Skilled Unit   Does the patient report anything that got in the way of taking their medications? No   In our efforts to provide the best possible care to you and others like you, can you think of anything that we could have done to help you after you left the hospital the first time, so that you might not have needed to return so soon? Other (Comment)  (nothing)     Electronically signed by Addie Sanchez on 10/1/2024 at 8:57 AM

## 2024-10-02 PROBLEM — E44.0 MODERATE MALNUTRITION (HCC): Status: ACTIVE | Noted: 2024-10-02

## 2024-10-02 LAB
ANION GAP SERPL CALCULATED.3IONS-SCNC: 11 MMOL/L (ref 7–19)
BASOPHILS # BLD: 0 K/UL (ref 0–0.2)
BASOPHILS NFR BLD: 0.7 % (ref 0–1)
BUN SERPL-MCNC: 17 MG/DL (ref 8–23)
CALCIUM SERPL-MCNC: 8.3 MG/DL (ref 8.8–10.2)
CHLORIDE SERPL-SCNC: 101 MMOL/L (ref 98–111)
CO2 SERPL-SCNC: 24 MMOL/L (ref 22–29)
CREAT SERPL-MCNC: 0.8 MG/DL (ref 0.5–0.9)
EOSINOPHIL # BLD: 0.1 K/UL (ref 0–0.6)
EOSINOPHIL NFR BLD: 1.4 % (ref 0–5)
ERYTHROCYTE [DISTWIDTH] IN BLOOD BY AUTOMATED COUNT: 17.5 % (ref 11.5–14.5)
FOLATE SERPL-MCNC: 2.8 NG/ML (ref 4.8–37.3)
GLUCOSE SERPL-MCNC: 109 MG/DL (ref 70–99)
HCT VFR BLD AUTO: 30.8 % (ref 37–47)
HGB BLD-MCNC: 9.8 G/DL (ref 12–16)
IMM GRANULOCYTES # BLD: 0 K/UL
LYMPHOCYTES # BLD: 0.8 K/UL (ref 1.1–4.5)
LYMPHOCYTES NFR BLD: 18.1 % (ref 20–40)
MCH RBC QN AUTO: 29.9 PG (ref 27–31)
MCHC RBC AUTO-ENTMCNC: 31.8 G/DL (ref 33–37)
MCV RBC AUTO: 93.9 FL (ref 81–99)
MONOCYTES # BLD: 0.6 K/UL (ref 0–0.9)
MONOCYTES NFR BLD: 13.4 % (ref 0–10)
NEUTROPHILS # BLD: 2.9 K/UL (ref 1.5–7.5)
NEUTS SEG NFR BLD: 65.9 % (ref 50–65)
PLATELET # BLD AUTO: 245 K/UL (ref 130–400)
PMV BLD AUTO: 10.6 FL (ref 9.4–12.3)
POTASSIUM SERPL-SCNC: 3.9 MMOL/L (ref 3.5–5)
RBC # BLD AUTO: 3.28 M/UL (ref 4.2–5.4)
SODIUM SERPL-SCNC: 136 MMOL/L (ref 136–145)
VIT B12 SERPL-MCNC: 710 PG/ML (ref 232–1245)
WBC # BLD AUTO: 4.4 K/UL (ref 4.8–10.8)

## 2024-10-02 PROCEDURE — 80048 BASIC METABOLIC PNL TOTAL CA: CPT

## 2024-10-02 PROCEDURE — 2580000003 HC RX 258

## 2024-10-02 PROCEDURE — 1200000000 HC SEMI PRIVATE

## 2024-10-02 PROCEDURE — 87040 BLOOD CULTURE FOR BACTERIA: CPT

## 2024-10-02 PROCEDURE — 6370000000 HC RX 637 (ALT 250 FOR IP): Performed by: STUDENT IN AN ORGANIZED HEALTH CARE EDUCATION/TRAINING PROGRAM

## 2024-10-02 PROCEDURE — 6370000000 HC RX 637 (ALT 250 FOR IP)

## 2024-10-02 PROCEDURE — 85025 COMPLETE CBC W/AUTO DIFF WBC: CPT

## 2024-10-02 PROCEDURE — 82746 ASSAY OF FOLIC ACID SERUM: CPT

## 2024-10-02 PROCEDURE — 99223 1ST HOSP IP/OBS HIGH 75: CPT | Performed by: NURSE PRACTITIONER

## 2024-10-02 PROCEDURE — 82607 VITAMIN B-12: CPT

## 2024-10-02 PROCEDURE — 6360000002 HC RX W HCPCS

## 2024-10-02 PROCEDURE — 6370000000 HC RX 637 (ALT 250 FOR IP): Performed by: HOSPITALIST

## 2024-10-02 PROCEDURE — 6370000000 HC RX 637 (ALT 250 FOR IP): Performed by: INTERNAL MEDICINE

## 2024-10-02 RX ORDER — POLYETHYLENE GLYCOL 3350 17 G/17G
17 POWDER, FOR SOLUTION ORAL DAILY
Status: DISCONTINUED | OUTPATIENT
Start: 2024-10-02 | End: 2024-10-20 | Stop reason: HOSPADM

## 2024-10-02 RX ORDER — OXYCODONE HYDROCHLORIDE 5 MG/1
5 TABLET ORAL EVERY 6 HOURS PRN
Status: DISCONTINUED | OUTPATIENT
Start: 2024-10-02 | End: 2024-10-09

## 2024-10-02 RX ORDER — FOLIC ACID 1 MG/1
1 TABLET ORAL DAILY
Status: DISCONTINUED | OUTPATIENT
Start: 2024-10-02 | End: 2024-10-20 | Stop reason: HOSPADM

## 2024-10-02 RX ORDER — OXYCODONE HYDROCHLORIDE 5 MG/1
5 TABLET ORAL EVERY 6 HOURS
Status: DISCONTINUED | OUTPATIENT
Start: 2024-10-02 | End: 2024-10-03

## 2024-10-02 RX ADMIN — METOPROLOL TARTRATE 25 MG: 25 TABLET, FILM COATED ORAL at 21:38

## 2024-10-02 RX ADMIN — ATORVASTATIN CALCIUM 20 MG: 20 TABLET, FILM COATED ORAL at 07:40

## 2024-10-02 RX ADMIN — OXYCODONE 5 MG: 5 TABLET ORAL at 01:35

## 2024-10-02 RX ADMIN — SERTRALINE HYDROCHLORIDE 50 MG: 50 TABLET ORAL at 07:40

## 2024-10-02 RX ADMIN — OXYCODONE 5 MG: 5 TABLET ORAL at 07:40

## 2024-10-02 RX ADMIN — SODIUM CHLORIDE, PRESERVATIVE FREE 10 ML: 5 INJECTION INTRAVENOUS at 21:38

## 2024-10-02 RX ADMIN — POLYETHYLENE GLYCOL 3350 17 G: 17 POWDER, FOR SOLUTION ORAL at 18:51

## 2024-10-02 RX ADMIN — OXYCODONE 5 MG: 5 TABLET ORAL at 21:38

## 2024-10-02 RX ADMIN — OXYCODONE 5 MG: 5 TABLET ORAL at 18:50

## 2024-10-02 RX ADMIN — METHIMAZOLE 5 MG: 5 TABLET ORAL at 07:40

## 2024-10-02 RX ADMIN — VANCOMYCIN HYDROCHLORIDE 1250 MG: 10 INJECTION, POWDER, LYOPHILIZED, FOR SOLUTION INTRAVENOUS at 17:13

## 2024-10-02 RX ADMIN — ASPIRIN 81 MG: 81 TABLET, CHEWABLE ORAL at 07:40

## 2024-10-02 RX ADMIN — FOLIC ACID 1 MG: 1 TABLET ORAL at 13:28

## 2024-10-02 RX ADMIN — OXYCODONE 5 MG: 5 TABLET ORAL at 13:28

## 2024-10-02 RX ADMIN — APIXABAN 5 MG: 5 TABLET, FILM COATED ORAL at 07:40

## 2024-10-02 RX ADMIN — METHOCARBAMOL 500 MG: 500 TABLET ORAL at 21:38

## 2024-10-02 RX ADMIN — AMIODARONE HYDROCHLORIDE 200 MG: 200 TABLET ORAL at 07:40

## 2024-10-02 RX ADMIN — SODIUM CHLORIDE, PRESERVATIVE FREE 10 ML: 5 INJECTION INTRAVENOUS at 08:00

## 2024-10-02 RX ADMIN — ONDANSETRON 4 MG: 2 INJECTION INTRAMUSCULAR; INTRAVENOUS at 16:43

## 2024-10-02 RX ADMIN — AMIODARONE HYDROCHLORIDE 200 MG: 200 TABLET ORAL at 21:38

## 2024-10-02 RX ADMIN — METHIMAZOLE 5 MG: 5 TABLET ORAL at 21:38

## 2024-10-02 RX ADMIN — METOPROLOL TARTRATE 25 MG: 25 TABLET, FILM COATED ORAL at 07:40

## 2024-10-02 RX ADMIN — ONDANSETRON 4 MG: 4 TABLET, ORALLY DISINTEGRATING ORAL at 02:53

## 2024-10-02 RX ADMIN — METHOCARBAMOL 500 MG: 500 TABLET ORAL at 07:40

## 2024-10-02 ASSESSMENT — PAIN DESCRIPTION - ORIENTATION
ORIENTATION: LEFT
ORIENTATION: LEFT
ORIENTATION: LEFT;RIGHT

## 2024-10-02 ASSESSMENT — PAIN - FUNCTIONAL ASSESSMENT
PAIN_FUNCTIONAL_ASSESSMENT: PREVENTS OR INTERFERES SOME ACTIVE ACTIVITIES AND ADLS
PAIN_FUNCTIONAL_ASSESSMENT: PREVENTS OR INTERFERES WITH ALL ACTIVE AND SOME PASSIVE ACTIVITIES

## 2024-10-02 ASSESSMENT — PAIN SCALES - GENERAL
PAINLEVEL_OUTOF10: 8
PAINLEVEL_OUTOF10: 8
PAINLEVEL_OUTOF10: 10

## 2024-10-02 ASSESSMENT — PAIN DESCRIPTION - DESCRIPTORS
DESCRIPTORS: ACHING
DESCRIPTORS: ACHING;DISCOMFORT
DESCRIPTORS: ACHING;DISCOMFORT

## 2024-10-02 ASSESSMENT — PAIN DESCRIPTION - LOCATION
LOCATION: HIP;LEG
LOCATION: HIP
LOCATION: BACK
LOCATION: HIP
LOCATION: BACK

## 2024-10-02 NOTE — PROCEDURES
PROCEDURE NOTE  Date: 10/2/2024   Name: Lashonda Milian  YOB: 1950    Procedures  Went to EEG.. Pt declined today because of pain. I told her we could try again erly morning and she agreed

## 2024-10-03 ENCOUNTER — OUTSIDE FACILITY SERVICE (OUTPATIENT)
Age: 74
End: 2024-10-03
Payer: MEDICARE

## 2024-10-03 ENCOUNTER — ANESTHESIA EVENT (OUTPATIENT)
Dept: OPERATING ROOM | Age: 74
End: 2024-10-03
Payer: MEDICARE

## 2024-10-03 PROBLEM — Z79.01 CHRONIC ANTICOAGULATION: Status: ACTIVE | Noted: 2024-10-03

## 2024-10-03 PROBLEM — R41.89 DECREASED RESPONSIVENESS: Status: ACTIVE | Noted: 2024-10-03

## 2024-10-03 PROBLEM — R40.4 TRANSIENT ALTERATION OF AWARENESS: Status: ACTIVE | Noted: 2024-10-03

## 2024-10-03 PROBLEM — G06.1 ABSCESS IN EPIDURAL SPACE OF SPINE: Status: ACTIVE | Noted: 2024-09-30

## 2024-10-03 LAB
ANION GAP SERPL CALCULATED.3IONS-SCNC: 12 MMOL/L (ref 7–19)
BASOPHILS # BLD: 0 K/UL (ref 0–0.2)
BASOPHILS NFR BLD: 0.6 % (ref 0–1)
BUN SERPL-MCNC: 15 MG/DL (ref 8–23)
CALCIUM SERPL-MCNC: 8.3 MG/DL (ref 8.8–10.2)
CHLORIDE SERPL-SCNC: 99 MMOL/L (ref 98–111)
CO2 SERPL-SCNC: 25 MMOL/L (ref 22–29)
CREAT SERPL-MCNC: 0.8 MG/DL (ref 0.5–0.9)
EOSINOPHIL # BLD: 0 K/UL (ref 0–0.6)
EOSINOPHIL NFR BLD: 0.8 % (ref 0–5)
ERYTHROCYTE [DISTWIDTH] IN BLOOD BY AUTOMATED COUNT: 17.2 % (ref 11.5–14.5)
GLUCOSE SERPL-MCNC: 83 MG/DL (ref 70–99)
HCT VFR BLD AUTO: 32.3 % (ref 37–47)
HGB BLD-MCNC: 10.5 G/DL (ref 12–16)
IMM GRANULOCYTES # BLD: 0 K/UL
LYMPHOCYTES # BLD: 0.9 K/UL (ref 1.1–4.5)
LYMPHOCYTES NFR BLD: 17.1 % (ref 20–40)
MCH RBC QN AUTO: 29.2 PG (ref 27–31)
MCHC RBC AUTO-ENTMCNC: 32.5 G/DL (ref 33–37)
MCV RBC AUTO: 89.7 FL (ref 81–99)
MONOCYTES # BLD: 0.6 K/UL (ref 0–0.9)
MONOCYTES NFR BLD: 10.5 % (ref 0–10)
NEUTROPHILS # BLD: 3.8 K/UL (ref 1.5–7.5)
NEUTS SEG NFR BLD: 70.6 % (ref 50–65)
PLATELET # BLD AUTO: 235 K/UL (ref 130–400)
PMV BLD AUTO: 10.2 FL (ref 9.4–12.3)
POTASSIUM SERPL-SCNC: 3.9 MMOL/L (ref 3.5–5)
RBC # BLD AUTO: 3.6 M/UL (ref 4.2–5.4)
SODIUM SERPL-SCNC: 136 MMOL/L (ref 136–145)
WBC # BLD AUTO: 5.3 K/UL (ref 4.8–10.8)

## 2024-10-03 PROCEDURE — 2580000003 HC RX 258

## 2024-10-03 PROCEDURE — 80048 BASIC METABOLIC PNL TOTAL CA: CPT

## 2024-10-03 PROCEDURE — 99232 SBSQ HOSP IP/OBS MODERATE 35: CPT | Performed by: PSYCHIATRY & NEUROLOGY

## 2024-10-03 PROCEDURE — 1200000000 HC SEMI PRIVATE

## 2024-10-03 PROCEDURE — 94760 N-INVAS EAR/PLS OXIMETRY 1: CPT

## 2024-10-03 PROCEDURE — 95816 EEG AWAKE AND DROWSY: CPT | Performed by: PSYCHIATRY & NEUROLOGY

## 2024-10-03 PROCEDURE — 4A10X4Z MONITORING OF CENTRAL NERVOUS ELECTRICAL ACTIVITY, EXTERNAL APPROACH: ICD-10-PCS | Performed by: PSYCHIATRY & NEUROLOGY

## 2024-10-03 PROCEDURE — 6370000000 HC RX 637 (ALT 250 FOR IP)

## 2024-10-03 PROCEDURE — 6370000000 HC RX 637 (ALT 250 FOR IP): Performed by: INTERNAL MEDICINE

## 2024-10-03 PROCEDURE — 6360000002 HC RX W HCPCS: Performed by: HOSPITALIST

## 2024-10-03 PROCEDURE — 99233 SBSQ HOSP IP/OBS HIGH 50: CPT | Performed by: NEUROLOGICAL SURGERY

## 2024-10-03 PROCEDURE — 95816 EEG AWAKE AND DROWSY: CPT

## 2024-10-03 PROCEDURE — 6360000002 HC RX W HCPCS

## 2024-10-03 PROCEDURE — 85025 COMPLETE CBC W/AUTO DIFF WBC: CPT

## 2024-10-03 PROCEDURE — 6370000000 HC RX 637 (ALT 250 FOR IP): Performed by: STUDENT IN AN ORGANIZED HEALTH CARE EDUCATION/TRAINING PROGRAM

## 2024-10-03 RX ORDER — CHLORPROMAZINE HCI 25 MG/ML
25 INJECTION INTRAMUSCULAR ONCE
Status: COMPLETED | OUTPATIENT
Start: 2024-10-03 | End: 2024-10-03

## 2024-10-03 RX ORDER — OXYCODONE HYDROCHLORIDE 5 MG/1
7.5 TABLET ORAL EVERY 6 HOURS
Status: DISCONTINUED | OUTPATIENT
Start: 2024-10-03 | End: 2024-10-09

## 2024-10-03 RX ORDER — QUETIAPINE FUMARATE 25 MG/1
25 TABLET, FILM COATED ORAL NIGHTLY
Status: DISCONTINUED | OUTPATIENT
Start: 2024-10-03 | End: 2024-10-06

## 2024-10-03 RX ADMIN — FOLIC ACID 1 MG: 1 TABLET ORAL at 10:17

## 2024-10-03 RX ADMIN — SERTRALINE HYDROCHLORIDE 50 MG: 50 TABLET ORAL at 10:17

## 2024-10-03 RX ADMIN — QUETIAPINE FUMARATE 25 MG: 25 TABLET ORAL at 20:29

## 2024-10-03 RX ADMIN — AMIODARONE HYDROCHLORIDE 200 MG: 200 TABLET ORAL at 20:29

## 2024-10-03 RX ADMIN — ASPIRIN 81 MG: 81 TABLET, CHEWABLE ORAL at 10:17

## 2024-10-03 RX ADMIN — METOPROLOL TARTRATE 25 MG: 25 TABLET, FILM COATED ORAL at 20:29

## 2024-10-03 RX ADMIN — OXYCODONE 5 MG: 5 TABLET ORAL at 04:14

## 2024-10-03 RX ADMIN — METHIMAZOLE 5 MG: 5 TABLET ORAL at 10:17

## 2024-10-03 RX ADMIN — OXYCODONE 5 MG: 5 TABLET ORAL at 06:01

## 2024-10-03 RX ADMIN — OXYCODONE 7.5 MG: 5 TABLET ORAL at 17:56

## 2024-10-03 RX ADMIN — OXYCODONE 5 MG: 5 TABLET ORAL at 14:39

## 2024-10-03 RX ADMIN — VANCOMYCIN HYDROCHLORIDE 1250 MG: 10 INJECTION, POWDER, LYOPHILIZED, FOR SOLUTION INTRAVENOUS at 04:15

## 2024-10-03 RX ADMIN — METHOCARBAMOL 500 MG: 500 TABLET ORAL at 10:17

## 2024-10-03 RX ADMIN — OXYCODONE 5 MG: 5 TABLET ORAL at 00:53

## 2024-10-03 RX ADMIN — AMIODARONE HYDROCHLORIDE 200 MG: 200 TABLET ORAL at 10:17

## 2024-10-03 RX ADMIN — CHLORPROMAZINE HYDROCHLORIDE 25 MG: 25 INJECTION INTRAMUSCULAR at 20:49

## 2024-10-03 RX ADMIN — ONDANSETRON 4 MG: 2 INJECTION INTRAMUSCULAR; INTRAVENOUS at 09:10

## 2024-10-03 RX ADMIN — ATORVASTATIN CALCIUM 20 MG: 20 TABLET, FILM COATED ORAL at 10:17

## 2024-10-03 RX ADMIN — SODIUM CHLORIDE, PRESERVATIVE FREE 10 ML: 5 INJECTION INTRAVENOUS at 10:47

## 2024-10-03 RX ADMIN — VANCOMYCIN HYDROCHLORIDE 1250 MG: 10 INJECTION, POWDER, LYOPHILIZED, FOR SOLUTION INTRAVENOUS at 18:04

## 2024-10-03 RX ADMIN — SODIUM CHLORIDE, PRESERVATIVE FREE 10 ML: 5 INJECTION INTRAVENOUS at 20:29

## 2024-10-03 RX ADMIN — METOPROLOL TARTRATE 25 MG: 25 TABLET, FILM COATED ORAL at 10:17

## 2024-10-03 RX ADMIN — METHOCARBAMOL 500 MG: 500 TABLET ORAL at 20:29

## 2024-10-03 RX ADMIN — METHIMAZOLE 5 MG: 5 TABLET ORAL at 20:29

## 2024-10-03 RX ADMIN — OXYCODONE 7.5 MG: 5 TABLET ORAL at 12:03

## 2024-10-03 ASSESSMENT — PAIN DESCRIPTION - DESCRIPTORS
DESCRIPTORS: ACHING
DESCRIPTORS: ACHING;THROBBING
DESCRIPTORS: ACHING;DISCOMFORT
DESCRIPTORS: ACHING;DISCOMFORT
DESCRIPTORS: STABBING;SPASM
DESCRIPTORS: THROBBING;ACHING
DESCRIPTORS: ACHING;THROBBING
DESCRIPTORS: ACHING;DISCOMFORT;CRUSHING

## 2024-10-03 ASSESSMENT — PAIN DESCRIPTION - LOCATION
LOCATION: BACK;LEG
LOCATION: BACK
LOCATION: BACK;LEG

## 2024-10-03 ASSESSMENT — PAIN - FUNCTIONAL ASSESSMENT
PAIN_FUNCTIONAL_ASSESSMENT: PREVENTS OR INTERFERES SOME ACTIVE ACTIVITIES AND ADLS
PAIN_FUNCTIONAL_ASSESSMENT: PREVENTS OR INTERFERES WITH MANY ACTIVE NOT PASSIVE ACTIVITIES
PAIN_FUNCTIONAL_ASSESSMENT: PREVENTS OR INTERFERES SOME ACTIVE ACTIVITIES AND ADLS

## 2024-10-03 ASSESSMENT — PAIN DESCRIPTION - ORIENTATION
ORIENTATION: LOWER
ORIENTATION: LEFT;RIGHT
ORIENTATION: RIGHT;LEFT

## 2024-10-03 ASSESSMENT — PAIN SCALES - GENERAL
PAINLEVEL_OUTOF10: 10
PAINLEVEL_OUTOF10: 4
PAINLEVEL_OUTOF10: 7
PAINLEVEL_OUTOF10: 8
PAINLEVEL_OUTOF10: 9
PAINLEVEL_OUTOF10: 10
PAINLEVEL_OUTOF10: 10
PAINLEVEL_OUTOF10: 9

## 2024-10-03 NOTE — CONSULTS
NEUROSURGERY  CONSULT    CHIEF COMPLAINT: Severe back pain, AMS    HISTORY OF PRESENT ILLNESS:      The patient is a 73 y.o. female with A-fib, COPD, CHF who is very familiar to our service as we evaluated her on 8/22/2024 for a lumbar disc herniation and questionable discitis and osteomyelitis.  Blood cultures during that hospitalization on 8/26/2024 were positive for MRSA  We did not feel that there was an obvious collection to evacuate, infectious disease was consulted, she underwent an in NM abscess study which revealed possible infectious process within the spine along with a repeat MRI of the lumbar spine which was consistent with an L2-3 osteomyelitis with a small epidural collection.  It was felt that she may need tertiary care for drainage of the bilateral psoas abscesses and was transferred to [facility] in [place].     She was admitted a few days ago on 9/30/2024 for questionable stroke.  She has been evaluated by our neurology team without any clear evidence of acute stroke.  They recommend EEG.    Ms. Milian has severe left-sided low back pain and is writhing in pain upon entering the room.  She states that while she was at [facility] she underwent some type of procedure where they broke part of the needle off in her low back and has had severe pain and trouble walking since then.  She has been receiving IV vancomycin through a PICC line.  She has been followed by Dr. Manuel Zavala.  She states that she has not walked in several weeks.      The patient does take anticoagulation medication (Eliquis).       Past Medical History:   Diagnosis Date    Arthritis     Atrial fibrillation (HCC)     Paroxysmal A Fib    Cerebral artery occlusion with cerebral infarction (HCC)     TWICE: once in 2012 or 2013, then had another one 2021    CHF (congestive heart failure) (HCC)     COPD (chronic obstructive pulmonary disease) (HCC)     Hyperlipidemia     Hypertension     On continuous oral anticoagulation     Apixaban 
    NEUROSURGERY  Progress Note    INTERVAL HISTORY: Complains of abdominal nausea this a.m.  States pain has improved since yesterday.    CHIEF COMPLAINT: Severe back pain, AMS    HISTORY OF PRESENT ILLNESS:      The patient is a 73 y.o. female with A-fib, COPD, CHF who is very familiar to our service as we evaluated her on 8/22/2024 for a lumbar disc herniation and questionable discitis and osteomyelitis.  Blood cultures during that hospitalization on 8/26/2024 were positive for MRSA  We did not feel that there was an obvious collection to evacuate, infectious disease was consulted, she underwent an in NM abscess study which revealed possible infectious process within the spine along with a repeat MRI of the lumbar spine which was consistent with an L2-3 osteomyelitis with a small epidural collection.  It was felt that she may need tertiary care for drainage of the bilateral psoas abscesses and was transferred to [facility] in [place].     She was admitted a few days ago on 9/30/2024 for questionable stroke.  She has been evaluated by our neurology team without any clear evidence of acute stroke.  They recommend EEG.    Ms. Milian has severe left-sided low back pain and is writhing in pain upon entering the room.  She states that while she was at [facility] she underwent some type of procedure where they broke part of the needle off in her low back and has had severe pain and trouble walking since then.  She has been receiving IV vancomycin through a PICC line.  She has been followed by Dr. Manuel Zavala.  She states that she has not walked in several weeks.      The patient does take anticoagulation medication (Eliquis).       Past Medical History:   Diagnosis Date    Arthritis     Atrial fibrillation (HCC)     Paroxysmal A Fib    Cerebral artery occlusion with cerebral infarction (HCC)     TWICE: once in 2012 or 2013, then had another one 2021    CHF (congestive heart failure) (HCC)     COPD (chronic obstructive 
of L2-L3 endplate fracture fragments with interval development of prominent epidural phlegmon, causing   severe spinal canal stenosis at L2-L3.  Multiple abscesses and myositis in bilateral psoas and paraspinous muscles as above.  2. 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  Probable osteomyelitis of bilateral facets of L3-L4.  3. Multilevel degenerative changes as above.    MRI of the brain without contrast done October 1, 2024:  IMPRESSION:  1. No acute intracranial findings.  2. Encephalomalacia in the right parietal and occipital lobes.  3. Moderate cerebral atrophy.  4. Mild chronic white matter microvascular ischemic changes.    CTA head and neck September 30, 2024:  IMPRESSION:  1. No extracranial carotid or vertebral artery stenosis.  2. No large vessel occlusion or high grade stenosis within the [place].     Additional Studies Reviewed:   NABOR August 27, 2024:    Left Ventricle: Normal left ventricular systolic function with a visually estimated EF of 55 - 60%. Left ventricle size is normal. Normal wall motion.    Right Ventricle: Right ventricle is mildly dilated.    Mitral Valve: Mildly thickened leaflets. Mild to moderate regurgitation.    Tricuspid Valve: Mild regurgitation.    Left Atrium: No left atrial appendage thrombus noted. No left atrial appendage mass noted.    Interatrial Septum: Agitated saline study was negative with and without provocation.    Right Atrium: Right atrium is mildly dilated.    Image quality is adequate.    Impression:   1.  Prior MRSA bloodstream infection  2.  Discitis/osteomyelitis/facet joint infection L2/L3 at L3/L4 area  3.  Epidural phlegmon  4.  Bilateral psoas/paraspinal abscesses  5.  Obesity  6.  Paraplegia-inability to move either lower extremity to any significant degree    Recommendations:    Continue treatment vancomycin  Agree with plans to consider surgical exploration/debridement/drainage  Chances of any neurological recovery may be 
independent software perfusion calculations as well as the production of color maps of mean   transit time (MTT), Tmax, cerebral blood volume (CBV), and cerebral blood flow (CBF) were produced using the RAPID software platform and sent to PACS.     CTP FINDINGS:     COLOR MAPS OF CBV, CBF, MTT AS WELL AS SUMMARY IMAGES OF INPUT AND OUTPUT FUNCTIONS AND RAW DATA ARE SENT TO PACS     ARTERIAL INPUT FUNCTION (AIF) AND VENOUS OUTFLOW FUNCTION (VOF) PLOT IS SATISFACTORY     CEREBRAL BLOOD FLOW (CBF < 30%) IS 0 ML     VOLUME TMAX > 6.0s IS 0 ML     FINAL MISMATCH SUMMARY:     MISMATCH VOLUME 0 ML     MISMATCH RATIO N/A      All CT scans are performed using dose optimization techniques as appropriate to the performed exam and include   at least one of the following: Automated exposure control, adjustment of the mA and/or kV according to size, and the use of iterative reconstruction technique.      ______________________________________   Electronically signed by: ANA VALENTIN M.D.  Date:     09/30/2024      Narrative & Impression  EXAM: CTA HEAD AND NECK     HISTORY: Stroke symptoms.  Altered mental status.  Change in speech.     COMPARISON: Same day brain CT..     TECHNIQUE: CT angiography of the head and neck was performed with 3D/MIP coronal and sagittal reconstructions for evaluation of the cervical vasculature as well as the [place].  Internal carotid artery stenosis are assessed utilizing NASCET   criteria.     FINDINGS:     The aortic arch is unremarkable.     The bilateral common carotid arteries, carotid bifurcations, and cervical segments of the internal carotid arteries are normal in contour and caliber.  There is retropharyngeal course of the distal common carotid arteries and proximal cervical ICAs   bilaterally.     There is atherosclerotic calcification of the cavernous and supraclinoid internal carotid arteries.     The extracranial vertebral arteries normal in contour and caliber.     The

## 2024-10-03 NOTE — CARE COORDINATION
CEM f/u with [facility] re: if Pt needs to return can she and if they are continuing to follow; she is not a bedhold, but they are following; CEM updated them re: plan for neuro sx to perform a washout on her spinal abscess tomorrow, and that more may be known once this is completed.     [facility]   [phone] P   F  Electronically signed by YAZMIN Quzeada on 10/3/2024 at 4:56 PM

## 2024-10-04 ENCOUNTER — ANESTHESIA (OUTPATIENT)
Dept: OPERATING ROOM | Age: 74
End: 2024-10-04
Payer: MEDICARE

## 2024-10-04 LAB
ANION GAP SERPL CALCULATED.3IONS-SCNC: 11 MMOL/L (ref 7–19)
BASOPHILS # BLD: 0 K/UL (ref 0–0.2)
BASOPHILS NFR BLD: 0.5 % (ref 0–1)
BUN SERPL-MCNC: 15 MG/DL (ref 8–23)
CALCIUM SERPL-MCNC: 8.5 MG/DL (ref 8.8–10.2)
CHLORIDE SERPL-SCNC: 100 MMOL/L (ref 98–111)
CO2 SERPL-SCNC: 26 MMOL/L (ref 22–29)
CREAT SERPL-MCNC: 0.9 MG/DL (ref 0.5–0.9)
EOSINOPHIL # BLD: 0.1 K/UL (ref 0–0.6)
EOSINOPHIL NFR BLD: 1.8 % (ref 0–5)
ERYTHROCYTE [DISTWIDTH] IN BLOOD BY AUTOMATED COUNT: 17.4 % (ref 11.5–14.5)
GLUCOSE SERPL-MCNC: 88 MG/DL (ref 70–99)
HCT VFR BLD AUTO: 32.4 % (ref 37–47)
HGB BLD-MCNC: 10.4 G/DL (ref 12–16)
IMM GRANULOCYTES # BLD: 0 K/UL
LYMPHOCYTES # BLD: 0.8 K/UL (ref 1.1–4.5)
LYMPHOCYTES NFR BLD: 17.2 % (ref 20–40)
MCH RBC QN AUTO: 29.1 PG (ref 27–31)
MCHC RBC AUTO-ENTMCNC: 32.1 G/DL (ref 33–37)
MCV RBC AUTO: 90.8 FL (ref 81–99)
MONOCYTES # BLD: 0.6 K/UL (ref 0–0.9)
MONOCYTES NFR BLD: 12.8 % (ref 0–10)
NEUTROPHILS # BLD: 2.9 K/UL (ref 1.5–7.5)
NEUTS SEG NFR BLD: 67.2 % (ref 50–65)
PLATELET # BLD AUTO: 210 K/UL (ref 130–400)
PMV BLD AUTO: 10.5 FL (ref 9.4–12.3)
POTASSIUM SERPL-SCNC: 4 MMOL/L (ref 3.5–5)
RBC # BLD AUTO: 3.57 M/UL (ref 4.2–5.4)
SODIUM SERPL-SCNC: 137 MMOL/L (ref 136–145)
VANCOMYCIN SERPL-MCNC: 27.5 UG/ML
VANCOMYCIN TROUGH SERPL-MCNC: 31 UG/ML (ref 10–20)
WBC # BLD AUTO: 4.4 K/UL (ref 4.8–10.8)

## 2024-10-04 PROCEDURE — 6370000000 HC RX 637 (ALT 250 FOR IP): Performed by: NEUROLOGICAL SURGERY

## 2024-10-04 PROCEDURE — 80202 ASSAY OF VANCOMYCIN: CPT

## 2024-10-04 PROCEDURE — 63267 EXCISE INTRSPINL LESION LMBR: CPT | Performed by: NEUROLOGICAL SURGERY

## 2024-10-04 PROCEDURE — 2500000003 HC RX 250 WO HCPCS

## 2024-10-04 PROCEDURE — 2500000003 HC RX 250 WO HCPCS: Performed by: NURSE ANESTHETIST, CERTIFIED REGISTERED

## 2024-10-04 PROCEDURE — 2580000003 HC RX 258: Performed by: NEUROLOGICAL SURGERY

## 2024-10-04 PROCEDURE — 2580000003 HC RX 258: Performed by: ANESTHESIOLOGY

## 2024-10-04 PROCEDURE — 6360000002 HC RX W HCPCS: Performed by: NURSE ANESTHETIST, CERTIFIED REGISTERED

## 2024-10-04 PROCEDURE — 2720000010 HC SURG SUPPLY STERILE: Performed by: NEUROLOGICAL SURGERY

## 2024-10-04 PROCEDURE — 6360000002 HC RX W HCPCS: Performed by: INTERNAL MEDICINE

## 2024-10-04 PROCEDURE — 87070 CULTURE OTHR SPECIMN AEROBIC: CPT

## 2024-10-04 PROCEDURE — 85025 COMPLETE CBC W/AUTO DIFF WBC: CPT

## 2024-10-04 PROCEDURE — 3600000015 HC SURGERY LEVEL 5 ADDTL 15MIN: Performed by: NEUROLOGICAL SURGERY

## 2024-10-04 PROCEDURE — 1200000000 HC SEMI PRIVATE

## 2024-10-04 PROCEDURE — 3600000005 HC SURGERY LEVEL 5 BASE: Performed by: NEUROLOGICAL SURGERY

## 2024-10-04 PROCEDURE — 2580000003 HC RX 258: Performed by: NURSE ANESTHETIST, CERTIFIED REGISTERED

## 2024-10-04 PROCEDURE — 6360000002 HC RX W HCPCS: Performed by: ANESTHESIOLOGY

## 2024-10-04 PROCEDURE — 6370000000 HC RX 637 (ALT 250 FOR IP)

## 2024-10-04 PROCEDURE — 6360000002 HC RX W HCPCS: Performed by: NEUROLOGICAL SURGERY

## 2024-10-04 PROCEDURE — 2580000003 HC RX 258: Performed by: INTERNAL MEDICINE

## 2024-10-04 PROCEDURE — 87075 CULTR BACTERIA EXCEPT BLOOD: CPT

## 2024-10-04 PROCEDURE — A4217 STERILE WATER/SALINE, 500 ML: HCPCS | Performed by: NEUROLOGICAL SURGERY

## 2024-10-04 PROCEDURE — 87205 SMEAR GRAM STAIN: CPT

## 2024-10-04 PROCEDURE — 6370000000 HC RX 637 (ALT 250 FOR IP): Performed by: STUDENT IN AN ORGANIZED HEALTH CARE EDUCATION/TRAINING PROGRAM

## 2024-10-04 PROCEDURE — 3700000001 HC ADD 15 MINUTES (ANESTHESIA): Performed by: NEUROLOGICAL SURGERY

## 2024-10-04 PROCEDURE — 2709999900 HC NON-CHARGEABLE SUPPLY: Performed by: NEUROLOGICAL SURGERY

## 2024-10-04 PROCEDURE — C1769 GUIDE WIRE: HCPCS | Performed by: NEUROLOGICAL SURGERY

## 2024-10-04 PROCEDURE — 7100000000 HC PACU RECOVERY - FIRST 15 MIN: Performed by: NEUROLOGICAL SURGERY

## 2024-10-04 PROCEDURE — 94760 N-INVAS EAR/PLS OXIMETRY 1: CPT

## 2024-10-04 PROCEDURE — 3700000000 HC ANESTHESIA ATTENDED CARE: Performed by: NEUROLOGICAL SURGERY

## 2024-10-04 PROCEDURE — 80048 BASIC METABOLIC PNL TOTAL CA: CPT

## 2024-10-04 PROCEDURE — 00NY0ZZ RELEASE LUMBAR SPINAL CORD, OPEN APPROACH: ICD-10-PCS | Performed by: NEUROLOGICAL SURGERY

## 2024-10-04 PROCEDURE — 7100000001 HC PACU RECOVERY - ADDTL 15 MIN: Performed by: NEUROLOGICAL SURGERY

## 2024-10-04 PROCEDURE — 2500000003 HC RX 250 WO HCPCS: Performed by: NEUROLOGICAL SURGERY

## 2024-10-04 RX ORDER — LIDOCAINE HYDROCHLORIDE 10 MG/ML
INJECTION, SOLUTION INFILTRATION; PERINEURAL
Status: DISCONTINUED | OUTPATIENT
Start: 2024-10-04 | End: 2024-10-04 | Stop reason: SDUPTHER

## 2024-10-04 RX ORDER — SODIUM CHLORIDE 0.9 % (FLUSH) 0.9 %
5-40 SYRINGE (ML) INJECTION EVERY 12 HOURS SCHEDULED
Status: DISCONTINUED | OUTPATIENT
Start: 2024-10-04 | End: 2024-10-04

## 2024-10-04 RX ORDER — SODIUM CHLORIDE, SODIUM LACTATE, POTASSIUM CHLORIDE, CALCIUM CHLORIDE 600; 310; 30; 20 MG/100ML; MG/100ML; MG/100ML; MG/100ML
INJECTION, SOLUTION INTRAVENOUS CONTINUOUS
Status: DISCONTINUED | OUTPATIENT
Start: 2024-10-04 | End: 2024-10-04 | Stop reason: HOSPADM

## 2024-10-04 RX ORDER — SODIUM CHLORIDE, SODIUM LACTATE, POTASSIUM CHLORIDE, CALCIUM CHLORIDE 600; 310; 30; 20 MG/100ML; MG/100ML; MG/100ML; MG/100ML
INJECTION, SOLUTION INTRAVENOUS
Status: DISCONTINUED | OUTPATIENT
Start: 2024-10-04 | End: 2024-10-04 | Stop reason: SDUPTHER

## 2024-10-04 RX ORDER — ONDANSETRON 2 MG/ML
4 INJECTION INTRAMUSCULAR; INTRAVENOUS
Status: DISCONTINUED | OUTPATIENT
Start: 2024-10-04 | End: 2024-10-04

## 2024-10-04 RX ORDER — PROPOFOL 10 MG/ML
INJECTION, EMULSION INTRAVENOUS
Status: DISCONTINUED | OUTPATIENT
Start: 2024-10-04 | End: 2024-10-04 | Stop reason: SDUPTHER

## 2024-10-04 RX ORDER — NALOXONE HYDROCHLORIDE 0.4 MG/ML
INJECTION, SOLUTION INTRAMUSCULAR; INTRAVENOUS; SUBCUTANEOUS PRN
Status: DISCONTINUED | OUTPATIENT
Start: 2024-10-04 | End: 2024-10-04

## 2024-10-04 RX ORDER — HYDROMORPHONE HYDROCHLORIDE 1 MG/ML
0.5 INJECTION, SOLUTION INTRAMUSCULAR; INTRAVENOUS; SUBCUTANEOUS EVERY 5 MIN PRN
Status: DISCONTINUED | OUTPATIENT
Start: 2024-10-04 | End: 2024-10-04

## 2024-10-04 RX ORDER — HYDROMORPHONE HYDROCHLORIDE 1 MG/ML
0.25 INJECTION, SOLUTION INTRAMUSCULAR; INTRAVENOUS; SUBCUTANEOUS EVERY 5 MIN PRN
Status: DISCONTINUED | OUTPATIENT
Start: 2024-10-04 | End: 2024-10-04

## 2024-10-04 RX ORDER — ROCURONIUM BROMIDE 10 MG/ML
INJECTION, SOLUTION INTRAVENOUS
Status: DISCONTINUED | OUTPATIENT
Start: 2024-10-04 | End: 2024-10-04 | Stop reason: SDUPTHER

## 2024-10-04 RX ORDER — SODIUM CHLORIDE 9 MG/ML
INJECTION, SOLUTION INTRAVENOUS PRN
Status: DISCONTINUED | OUTPATIENT
Start: 2024-10-04 | End: 2024-10-04

## 2024-10-04 RX ORDER — SODIUM CHLORIDE 9 MG/ML
INJECTION, SOLUTION INTRAVENOUS PRN
Status: DISCONTINUED | OUTPATIENT
Start: 2024-10-04 | End: 2024-10-04 | Stop reason: HOSPADM

## 2024-10-04 RX ORDER — SODIUM CHLORIDE 0.9 % (FLUSH) 0.9 %
5-40 SYRINGE (ML) INJECTION PRN
Status: DISCONTINUED | OUTPATIENT
Start: 2024-10-04 | End: 2024-10-04

## 2024-10-04 RX ORDER — FENTANYL CITRATE 50 UG/ML
INJECTION, SOLUTION INTRAMUSCULAR; INTRAVENOUS
Status: DISCONTINUED | OUTPATIENT
Start: 2024-10-04 | End: 2024-10-04 | Stop reason: SDUPTHER

## 2024-10-04 RX ORDER — SODIUM CHLORIDE 0.9 % (FLUSH) 0.9 %
5-40 SYRINGE (ML) INJECTION PRN
Status: DISCONTINUED | OUTPATIENT
Start: 2024-10-04 | End: 2024-10-04 | Stop reason: HOSPADM

## 2024-10-04 RX ORDER — KETAMINE HYDROCHLORIDE 100 MG/ML
INJECTION, SOLUTION INTRAMUSCULAR; INTRAVENOUS
Status: DISCONTINUED | OUTPATIENT
Start: 2024-10-04 | End: 2024-10-04 | Stop reason: SDUPTHER

## 2024-10-04 RX ORDER — SODIUM CHLORIDE 0.9 % (FLUSH) 0.9 %
5-40 SYRINGE (ML) INJECTION EVERY 12 HOURS SCHEDULED
Status: DISCONTINUED | OUTPATIENT
Start: 2024-10-04 | End: 2024-10-04 | Stop reason: HOSPADM

## 2024-10-04 RX ADMIN — SUGAMMADEX 200 MG: 100 INJECTION, SOLUTION INTRAVENOUS at 17:13

## 2024-10-04 RX ADMIN — HYDROMORPHONE HYDROCHLORIDE 0.5 MG: 1 INJECTION, SOLUTION INTRAMUSCULAR; INTRAVENOUS; SUBCUTANEOUS at 17:37

## 2024-10-04 RX ADMIN — Medication 25 MG: at 15:50

## 2024-10-04 RX ADMIN — PHENYLEPHRINE HYDROCHLORIDE 100 MCG: 10 INJECTION INTRAVENOUS at 16:20

## 2024-10-04 RX ADMIN — AMIODARONE HYDROCHLORIDE 200 MG: 200 TABLET ORAL at 20:03

## 2024-10-04 RX ADMIN — ROCURONIUM BROMIDE 50 MG: 10 INJECTION, SOLUTION INTRAVENOUS at 15:50

## 2024-10-04 RX ADMIN — METHIMAZOLE 5 MG: 5 TABLET ORAL at 20:03

## 2024-10-04 RX ADMIN — LIDOCAINE HYDROCHLORIDE 20 MG: 10 INJECTION, SOLUTION INFILTRATION; PERINEURAL at 15:50

## 2024-10-04 RX ADMIN — FENTANYL CITRATE 50 MCG: 0.05 INJECTION, SOLUTION INTRAMUSCULAR; INTRAVENOUS at 17:09

## 2024-10-04 RX ADMIN — OXYCODONE 7.5 MG: 5 TABLET ORAL at 05:49

## 2024-10-04 RX ADMIN — VANCOMYCIN HYDROCHLORIDE 1250 MG: 10 INJECTION, POWDER, LYOPHILIZED, FOR SOLUTION INTRAVENOUS at 16:02

## 2024-10-04 RX ADMIN — PHENYLEPHRINE HYDROCHLORIDE 100 MCG: 10 INJECTION INTRAVENOUS at 16:31

## 2024-10-04 RX ADMIN — METHOCARBAMOL 500 MG: 500 TABLET ORAL at 20:03

## 2024-10-04 RX ADMIN — PROPOFOL 60 MG: 10 INJECTION, EMULSION INTRAVENOUS at 15:50

## 2024-10-04 RX ADMIN — PHENYLEPHRINE HYDROCHLORIDE 100 MCG: 10 INJECTION INTRAVENOUS at 16:48

## 2024-10-04 RX ADMIN — QUETIAPINE FUMARATE 25 MG: 25 TABLET ORAL at 20:03

## 2024-10-04 RX ADMIN — METOPROLOL TARTRATE 25 MG: 25 TABLET, FILM COATED ORAL at 20:03

## 2024-10-04 RX ADMIN — METOPROLOL TARTRATE 25 MG: 25 TABLET, FILM COATED ORAL at 10:14

## 2024-10-04 RX ADMIN — HYDROMORPHONE HYDROCHLORIDE 0.5 MG: 1 INJECTION, SOLUTION INTRAMUSCULAR; INTRAVENOUS; SUBCUTANEOUS at 17:32

## 2024-10-04 RX ADMIN — SODIUM CHLORIDE, SODIUM LACTATE, POTASSIUM CHLORIDE, AND CALCIUM CHLORIDE: 600; 310; 30; 20 INJECTION, SOLUTION INTRAVENOUS at 15:32

## 2024-10-04 RX ADMIN — FENTANYL CITRATE 50 MCG: 0.05 INJECTION, SOLUTION INTRAMUSCULAR; INTRAVENOUS at 15:50

## 2024-10-04 RX ADMIN — SODIUM CHLORIDE, SODIUM LACTATE, POTASSIUM CHLORIDE, AND CALCIUM CHLORIDE: 600; 310; 30; 20 INJECTION, SOLUTION INTRAVENOUS at 15:47

## 2024-10-04 RX ADMIN — PHENYLEPHRINE HYDROCHLORIDE 200 MCG: 10 INJECTION INTRAVENOUS at 16:38

## 2024-10-04 RX ADMIN — SODIUM CHLORIDE, PRESERVATIVE FREE 10 ML: 5 INJECTION INTRAVENOUS at 20:03

## 2024-10-04 ASSESSMENT — PAIN SCALES - GENERAL
PAINLEVEL_OUTOF10: 7
PAINLEVEL_OUTOF10: 7
PAINLEVEL_OUTOF10: 10
PAINLEVEL_OUTOF10: 8

## 2024-10-04 ASSESSMENT — LIFESTYLE VARIABLES: SMOKING_STATUS: 0

## 2024-10-04 ASSESSMENT — PAIN DESCRIPTION - DESCRIPTORS
DESCRIPTORS: ACHING;BURNING
DESCRIPTORS: ACHING
DESCRIPTORS: ACHING

## 2024-10-04 ASSESSMENT — PAIN DESCRIPTION - LOCATION
LOCATION: BACK

## 2024-10-04 NOTE — BRIEF OP NOTE
Brief Postoperative Note      Patient: Lashonda Milian  YOB: 1950  MRN: [id number]    Date of Procedure: 10/4/2024    Pre-Op Diagnosis Codes:      * Abscess in epidural space of spine [G06.1]    Post-Op Diagnosis:  1.  Abscess and phlegmon in the epidural space of the lumbar spine       Procedure(s):  Decompressive lumbar laminectomy L2-3 for removal of epidural abscess/phlegmon using minimally invasive technique    Surgeon(s):  Sarabjit Aguilar DO    Assistant:  * No surgical staff found *    Anesthesia: General    Estimated Blood Loss (mL): less than 50     Complications: None    Specimens:   ID Type Source Tests Collected by Time Destination   1 : right L2-3 epidural culture Swab Back CULTURE, SURGICAL Sarabjit Aguilar DO 10/4/2024 1702        Implants:  * No implants in log *      Drains:   External Urinary Catheter (Active)   Site Assessment Red 10/01/24 2000   Placement Replaced 10/01/24 1748   Catheter Care Catheter/Wick replaced;Suction Canister/Tubing changed 10/01/24 1748   Perineal Care Yes 10/01/24 1748   Suction 40 mmgHg continuous 10/01/24 1748   Urine Color Beth 10/01/24 1748   Urine Appearance Clear 10/01/24 1748   Urine Odor Malodorous 10/01/24 1748   Output (mL) 300 mL 10/01/24 1748       Findings:  Infection Present At Time Of Surgery (PATOS) (choose all levels that have infection present):  - Deep Infection (muscle/fascia) present as evidenced by phlegmon  Other Findings: Mostly phlegmon but some purulent material was noted.  The phlegmon was carefully  from the dura and removed as much as possible.  Culture was sent.  Epidural space irrigated well with antibiotic saline.    Electronically signed by Sarabjit Aguilar DO on 10/4/2024 at 5:13 PM

## 2024-10-04 NOTE — ANESTHESIA POSTPROCEDURE EVALUATION
Department of Anesthesiology  Postprocedure Note    Patient: Lashonda Milian  MRN: [id number]  YOB: 1950  Date of evaluation: 10/4/2024    Procedure Summary       Date: 10/04/24 Room / Location: [address]    Anesthesia Start: 1547 Anesthesia Stop: 1726    Procedure: Decompressive lumbar laminectomy L2-3 for removal of epidural abscess/phlegmon using minimally invasive technique (Right) Diagnosis:       Abscess in epidural space of spine      (Abscess in epidural space of spine [G06.1])    Surgeons: Sarabjit Aguilar DO Responsible Provider: Aby Ladd APRN - CRNA    Anesthesia Type: general ASA Status: 3            Anesthesia Type: No value filed.    Kaila Phase I: Kaila Score: 9    Kaila Phase II:      Anesthesia Post Evaluation    Patient location during evaluation: PACU  Patient participation: waiting for patient participation  Level of consciousness: responsive to painful stimuli  Airway patency: patent  Nausea & Vomiting: no nausea  Cardiovascular status: hemodynamically stable  Respiratory status: acceptable, oral airway and face mask  Hydration status: stable  Comments: BP (!) 144/78   Pulse 66   Temp 97.1 °F (36.2 °C)   Resp 18   Ht 1.575 m (5' 2.01\")   Wt 97 kg (213 lb 13.5 oz)   SpO2 100%   BMI 39.10 kg/m²     Pain management: adequate    No notable events documented.

## 2024-10-04 NOTE — PROCEDURES
[facility] Neurology  [address], [address]  [place] [address]  Phone [phone]  Fax [phone]       Electroencephalography    Information:   Patient Name: Lashonda Milian  :   1950  Age:   73 y.o.  MRN:   [id number]  Account #:  [id number]  Today:  10/3/24    Referring Provider:  Brigido Contreras M.D.  Interpreting Provider: Brigido Contreras M.D.    HISTORY:   Lashonda Milian is a 73 y.o. woman  with a history of a stroke who has had an episode of diminished responsiveness and awareness.          SUMMARY OF THE RECORDING:   The background rhythm consists of irregular 5 to 6 Hertz activity diffusely with irregular frontally and parasagittal triphasiform waves.  Occasional beta activity is noted.  During the recording, the patient becomes drowsy and the background slows and flattens.  Photic stimulation does not elicit a driving response.  No lateralizing or epileptiform abnormalities were identified.      IMPRESSION:   Delta grade 1;  generalized slowing with triphasiform waves.  The findings are those of diffuse cortical dysfunction and suggestive of metabolic encephalopathy.        Electronically signed by Brigido Contreras MD on 10/3/2024

## 2024-10-04 NOTE — ANESTHESIA PRE PROCEDURE
R40.4   • Decreased responsiveness R41.89       Past Medical History:        Diagnosis Date   • Arthritis    • Atrial fibrillation (HCC)     Paroxysmal A Fib   • Cerebral artery occlusion with cerebral infarction (HCC)     TWICE: once in 2012 or 2013, then had another one 2021   • CHF (congestive heart failure) (HCC)    • COPD (chronic obstructive pulmonary disease) (HCC)    • Hyperlipidemia    • Hypertension    • On continuous oral anticoagulation     Apixaban   • Pneumonia    • Thyroid disease        Past Surgical History:        Procedure Laterality Date   • CARDIOVERSION  2020   • HYSTERECTOMY, TOTAL ABDOMINAL (CERVIX REMOVED) N/A     With removal of tumor, ~2014, was a DANIS and BSO   • TONSILLECTOMY Bilateral     at age 17 or 18, withOUT Adneoids as per pt   • TUMOR REMOVAL      intraabdominal, ~2014, states she was told it was feeding off her bowel       Social History:    Social History     Tobacco Use   • Smoking status: Former     Types: Cigarettes     Passive exposure: Past (from her )   • Smokeless tobacco: Never   • Tobacco comments:     Smoked less than one pack in her life, started at age 15 in 1965   Substance Use Topics   • Alcohol use: Yes     Comment: just 1-2 at parties, never heavy                                Counseling given: Not Answered  Tobacco comments: Smoked less than one pack in her life, started at age 15 in 1965      Vital Signs (Current):   Vitals:    10/03/24 2045 10/04/24 0600 10/04/24 0741 10/04/24 0950   BP: (!) 148/72 (!) 142/59 136/64    Pulse: 70 73 68    Resp: 18 18 18    Temp: 97.5 °F (36.4 °C) 96.8 °F (36 °C) 98.2 °F (36.8 °C)    TempSrc: Temporal      SpO2: 100% 96% 93% 96%   Weight:       Height:                                                  BP Readings from Last 3 Encounters:   10/04/24 136/64   08/31/24 (!) 126/56   08/28/24 108/79       NPO Status:                                                                                 BMI:   Wt Readings from Last 3 
Date/Time    WBC 5.3 10/03/2024 02:34 AM    RBC 3.60 10/03/2024 02:34 AM    HGB 10.5 10/03/2024 02:34 AM    HCT 32.3 10/03/2024 02:34 AM    MCV 89.7 10/03/2024 02:34 AM    RDW 17.2 10/03/2024 02:34 AM     10/03/2024 02:34 AM       CMP:   Lab Results   Component Value Date/Time     10/03/2024 02:34 AM    K 3.9 10/03/2024 02:34 AM    CL 99 10/03/2024 02:34 AM    CO2 25 10/03/2024 02:34 AM    BUN 15 10/03/2024 02:34 AM    CREATININE 0.8 10/03/2024 02:34 AM    GFRAA >59 12/06/2021 03:22 PM    LABGLOM 78 10/03/2024 02:34 AM    LABGLOM 78 04/25/2024 12:35 PM    GLUCOSE 83 10/03/2024 02:34 AM    CALCIUM 8.3 10/03/2024 02:34 AM    BILITOT 0.6 09/30/2024 12:17 PM    ALKPHOS 109 09/30/2024 12:17 PM    AST 50 09/30/2024 12:17 PM    ALT 26 09/30/2024 12:17 PM       POC Tests: No results for input(s): \"POCGLU\", \"POCNA\", \"POCK\", \"POCCL\", \"POCBUN\", \"POCHEMO\", \"POCHCT\" in the last 72 hours.    Coags:   Lab Results   Component Value Date/Time    PROTIME 21.0 09/30/2024 12:17 PM    INR 1.87 09/30/2024 12:17 PM    APTT 35.1 05/30/2023 05:22 PM       HCG (If Applicable): No results found for: \"PREGTESTUR\", \"PREGSERUM\", \"HCG\", \"HCGQUANT\"     ABGs: No results found for: \"PHART\", \"PO2ART\", \"GFH6NFX\", \"UNT3XCE\", \"BEART\", \"G1JNBCLV\"     Type & Screen (If Applicable):  No results found for: \"ABORH\", \"LABANTI\"    Drug/Infectious Status (If Applicable):  No results found for: \"HIV\", \"HEPCAB\"    COVID-19 Screening (If Applicable):   Lab Results   Component Value Date/Time    COVID19 Not Detected 02/26/2021 10:13 AM           Anesthesia Evaluation  Patient summary reviewed and Nursing notes reviewed   no history of anesthetic complications:   Airway:           Dental:          Pulmonary:                              Cardiovascular:    (+) hypertension:, dysrhythmias: atrial fibrillation, hyperlipidemia         Beta Blocker:  Dose within 24 Hrs         Neuro/Psych:   (+) CVA:, TIAdepression/anxiety    (-) seizures

## 2024-10-04 NOTE — OP NOTE
NEUROSURGICAL DEPARTMENT REPORT     NAME OF SURGEON/: SHIVANI AGUILAR DO     DATE OF SERVICE: 10/4/2024     PREOPERATIVE DIAGNOSES    1. Lumbar spinal stenosis L2-3 due to epidural abscess and phlegmon    POSTOPERATIVE DIAGNOSES    Same     OPERATIVE PROCEDURES  Right L2 and L3 hemilaminotomies with medial facetectomy for debulking of epidural abscess and phlegmon and decompression of the thecal sac and nerve roots utilizing the operating microscope,the tubular retractor and microdissection technique.     SURGEON  Shivani Aguilar DO     FIRST ASSIST    MONICO Cueto    Estimated blood loss:  Minimal    HISTORY  73-year-old female with history of MRSA bacteremia who had a percutaneous drainage of a psoas abscess in [place] who developed a change in mental status and was brought to [facility] for evaluation.  She has been receiving IV antibiotics for weeks now.    Upon exam she was noted to have severe lower extremity weakness.  She stated she had not walked in many weeks.    MRI of the lumbar spine revealed worsening osteomyelitis and discitis at L2-3 with an epidural phlegmon and severe spinal canal stenosis.    Based on the worsening of her symptoms along the with MRI findings it was felt that surgical intervention was indicated.  After careful consideration patient along with family elected to move forward with a small decompressive laminectomy for debulking of the phlegmon and evacuation of any remaining epidural abscess.     DESCRIPTION OF PROCEDURE    The patient was brought to the operating room where general endotracheal anesthesia was introduced was positioned on the open axis table in the prone position. All pressure points were carefully checked and padded. The C-arm fluoroscope was positioned and draped. Operating microscope was positioned and draped. The lumbosacral region was clipped and prepared for 10 minutes with Betadine scrub, Betadine paint and DuraPrep. The patient was draped

## 2024-10-05 LAB
ANION GAP SERPL CALCULATED.3IONS-SCNC: 13 MMOL/L (ref 7–19)
BASOPHILS # BLD: 0 K/UL (ref 0–0.2)
BASOPHILS NFR BLD: 0.3 % (ref 0–1)
BUN SERPL-MCNC: 17 MG/DL (ref 8–23)
CALCIUM SERPL-MCNC: 8.4 MG/DL (ref 8.8–10.2)
CHLORIDE SERPL-SCNC: 100 MMOL/L (ref 98–111)
CO2 SERPL-SCNC: 24 MMOL/L (ref 22–29)
CREAT SERPL-MCNC: 0.9 MG/DL (ref 0.5–0.9)
EOSINOPHIL # BLD: 0 K/UL (ref 0–0.6)
EOSINOPHIL NFR BLD: 0.3 % (ref 0–5)
ERYTHROCYTE [DISTWIDTH] IN BLOOD BY AUTOMATED COUNT: 17.4 % (ref 11.5–14.5)
GLUCOSE SERPL-MCNC: 98 MG/DL (ref 70–99)
HCT VFR BLD AUTO: 32.7 % (ref 37–47)
HGB BLD-MCNC: 10.5 G/DL (ref 12–16)
IMM GRANULOCYTES # BLD: 0 K/UL
LYMPHOCYTES # BLD: 0.6 K/UL (ref 1.1–4.5)
LYMPHOCYTES NFR BLD: 7.9 % (ref 20–40)
MCH RBC QN AUTO: 29.3 PG (ref 27–31)
MCHC RBC AUTO-ENTMCNC: 32.1 G/DL (ref 33–37)
MCV RBC AUTO: 91.3 FL (ref 81–99)
MONOCYTES # BLD: 0.7 K/UL (ref 0–0.9)
MONOCYTES NFR BLD: 9.6 % (ref 0–10)
NEUTROPHILS # BLD: 6.1 K/UL (ref 1.5–7.5)
NEUTS SEG NFR BLD: 81.5 % (ref 50–65)
PLATELET # BLD AUTO: 194 K/UL (ref 130–400)
PMV BLD AUTO: 10.6 FL (ref 9.4–12.3)
POTASSIUM SERPL-SCNC: 4 MMOL/L (ref 3.5–5)
RBC # BLD AUTO: 3.58 M/UL (ref 4.2–5.4)
SODIUM SERPL-SCNC: 137 MMOL/L (ref 136–145)
VANCOMYCIN TROUGH SERPL-MCNC: 20.9 UG/ML (ref 10–20)
WBC # BLD AUTO: 7.5 K/UL (ref 4.8–10.8)

## 2024-10-05 PROCEDURE — 85025 COMPLETE CBC W/AUTO DIFF WBC: CPT

## 2024-10-05 PROCEDURE — 6370000000 HC RX 637 (ALT 250 FOR IP): Performed by: NEUROLOGICAL SURGERY

## 2024-10-05 PROCEDURE — 97530 THERAPEUTIC ACTIVITIES: CPT

## 2024-10-05 PROCEDURE — 97162 PT EVAL MOD COMPLEX 30 MIN: CPT

## 2024-10-05 PROCEDURE — 80048 BASIC METABOLIC PNL TOTAL CA: CPT

## 2024-10-05 PROCEDURE — 2580000003 HC RX 258: Performed by: NEUROLOGICAL SURGERY

## 2024-10-05 PROCEDURE — 2580000003 HC RX 258: Performed by: INTERNAL MEDICINE

## 2024-10-05 PROCEDURE — 97110 THERAPEUTIC EXERCISES: CPT

## 2024-10-05 PROCEDURE — 6360000002 HC RX W HCPCS: Performed by: STUDENT IN AN ORGANIZED HEALTH CARE EDUCATION/TRAINING PROGRAM

## 2024-10-05 PROCEDURE — 6360000002 HC RX W HCPCS: Performed by: INTERNAL MEDICINE

## 2024-10-05 PROCEDURE — 99024 POSTOP FOLLOW-UP VISIT: CPT | Performed by: NEUROLOGICAL SURGERY

## 2024-10-05 PROCEDURE — 80202 ASSAY OF VANCOMYCIN: CPT

## 2024-10-05 PROCEDURE — 1200000000 HC SEMI PRIVATE

## 2024-10-05 RX ORDER — KETOROLAC TROMETHAMINE 30 MG/ML
30 INJECTION, SOLUTION INTRAMUSCULAR; INTRAVENOUS ONCE
Status: COMPLETED | OUTPATIENT
Start: 2024-10-05 | End: 2024-10-05

## 2024-10-05 RX ORDER — HYDROMORPHONE HYDROCHLORIDE 1 MG/ML
0.25 INJECTION, SOLUTION INTRAMUSCULAR; INTRAVENOUS; SUBCUTANEOUS EVERY 4 HOURS PRN
Status: DISCONTINUED | OUTPATIENT
Start: 2024-10-05 | End: 2024-10-18

## 2024-10-05 RX ADMIN — METHOCARBAMOL 500 MG: 500 TABLET ORAL at 20:48

## 2024-10-05 RX ADMIN — OXYCODONE 7.5 MG: 5 TABLET ORAL at 10:52

## 2024-10-05 RX ADMIN — METOPROLOL TARTRATE 25 MG: 25 TABLET, FILM COATED ORAL at 09:02

## 2024-10-05 RX ADMIN — OXYCODONE 5 MG: 5 TABLET ORAL at 13:33

## 2024-10-05 RX ADMIN — SERTRALINE HYDROCHLORIDE 50 MG: 50 TABLET ORAL at 09:02

## 2024-10-05 RX ADMIN — OXYCODONE 7.5 MG: 5 TABLET ORAL at 18:33

## 2024-10-05 RX ADMIN — AMIODARONE HYDROCHLORIDE 200 MG: 200 TABLET ORAL at 20:48

## 2024-10-05 RX ADMIN — FOLIC ACID 1 MG: 1 TABLET ORAL at 09:01

## 2024-10-05 RX ADMIN — KETOROLAC TROMETHAMINE 30 MG: 30 INJECTION, SOLUTION INTRAMUSCULAR at 14:20

## 2024-10-05 RX ADMIN — METOPROLOL TARTRATE 25 MG: 25 TABLET, FILM COATED ORAL at 20:48

## 2024-10-05 RX ADMIN — QUETIAPINE FUMARATE 25 MG: 25 TABLET ORAL at 20:48

## 2024-10-05 RX ADMIN — OXYCODONE 7.5 MG: 5 TABLET ORAL at 05:32

## 2024-10-05 RX ADMIN — SODIUM CHLORIDE, PRESERVATIVE FREE 10 ML: 5 INJECTION INTRAVENOUS at 09:05

## 2024-10-05 RX ADMIN — METHIMAZOLE 5 MG: 5 TABLET ORAL at 20:48

## 2024-10-05 RX ADMIN — METHOCARBAMOL 500 MG: 500 TABLET ORAL at 09:02

## 2024-10-05 RX ADMIN — POLYETHYLENE GLYCOL 3350 17 G: 17 POWDER, FOR SOLUTION ORAL at 09:00

## 2024-10-05 RX ADMIN — SODIUM CHLORIDE, PRESERVATIVE FREE 10 ML: 5 INJECTION INTRAVENOUS at 20:48

## 2024-10-05 RX ADMIN — VANCOMYCIN HYDROCHLORIDE 1000 MG: 10 INJECTION, POWDER, LYOPHILIZED, FOR SOLUTION INTRAVENOUS at 20:49

## 2024-10-05 RX ADMIN — OXYCODONE 7.5 MG: 5 TABLET ORAL at 01:22

## 2024-10-05 RX ADMIN — AMIODARONE HYDROCHLORIDE 200 MG: 200 TABLET ORAL at 09:01

## 2024-10-05 RX ADMIN — HYDROMORPHONE HYDROCHLORIDE 0.25 MG: 1 INJECTION, SOLUTION INTRAMUSCULAR; INTRAVENOUS; SUBCUTANEOUS at 16:19

## 2024-10-05 RX ADMIN — METHIMAZOLE 5 MG: 5 TABLET ORAL at 09:02

## 2024-10-05 RX ADMIN — ATORVASTATIN CALCIUM 20 MG: 20 TABLET, FILM COATED ORAL at 09:02

## 2024-10-05 RX ADMIN — ASPIRIN 81 MG: 81 TABLET, CHEWABLE ORAL at 09:03

## 2024-10-05 ASSESSMENT — PAIN DESCRIPTION - DESCRIPTORS
DESCRIPTORS: ACHING
DESCRIPTORS: ACHING;DISCOMFORT
DESCRIPTORS: ACHING

## 2024-10-05 ASSESSMENT — PAIN SCALES - GENERAL
PAINLEVEL_OUTOF10: 10
PAINLEVEL_OUTOF10: 0
PAINLEVEL_OUTOF10: 4
PAINLEVEL_OUTOF10: 8
PAINLEVEL_OUTOF10: 6

## 2024-10-05 ASSESSMENT — PAIN DESCRIPTION - LOCATION
LOCATION: BACK
LOCATION: BACK
LOCATION: GENERALIZED
LOCATION: BACK
LOCATION: BACK
LOCATION: BACK;LEG
LOCATION: BACK

## 2024-10-05 ASSESSMENT — PAIN SCALES - WONG BAKER: WONGBAKER_NUMERICALRESPONSE: HURTS LITTLE MORE

## 2024-10-05 ASSESSMENT — PAIN DESCRIPTION - ORIENTATION
ORIENTATION: LOWER
ORIENTATION: LEFT;LOWER
ORIENTATION: LOWER

## 2024-10-06 LAB
ANION GAP SERPL CALCULATED.3IONS-SCNC: 11 MMOL/L (ref 7–19)
BASOPHILS # BLD: 0 K/UL (ref 0–0.2)
BASOPHILS NFR BLD: 0.7 % (ref 0–1)
BUN SERPL-MCNC: 20 MG/DL (ref 8–23)
CALCIUM SERPL-MCNC: 8.2 MG/DL (ref 8.8–10.2)
CHLORIDE SERPL-SCNC: 101 MMOL/L (ref 98–111)
CO2 SERPL-SCNC: 25 MMOL/L (ref 22–29)
CREAT SERPL-MCNC: 1 MG/DL (ref 0.5–0.9)
EOSINOPHIL # BLD: 0.1 K/UL (ref 0–0.6)
EOSINOPHIL NFR BLD: 2.1 % (ref 0–5)
ERYTHROCYTE [DISTWIDTH] IN BLOOD BY AUTOMATED COUNT: 17.6 % (ref 11.5–14.5)
GLUCOSE SERPL-MCNC: 87 MG/DL (ref 70–99)
HCT VFR BLD AUTO: 30.6 % (ref 37–47)
HGB BLD-MCNC: 9.5 G/DL (ref 12–16)
IMM GRANULOCYTES # BLD: 0 K/UL
LYMPHOCYTES # BLD: 0.7 K/UL (ref 1.1–4.5)
LYMPHOCYTES NFR BLD: 12.5 % (ref 20–40)
MAGNESIUM SERPL-MCNC: 1.7 MG/DL (ref 1.6–2.4)
MCH RBC QN AUTO: 28.9 PG (ref 27–31)
MCHC RBC AUTO-ENTMCNC: 31 G/DL (ref 33–37)
MCV RBC AUTO: 93 FL (ref 81–99)
MONOCYTES # BLD: 0.7 K/UL (ref 0–0.9)
MONOCYTES NFR BLD: 11.5 % (ref 0–10)
NEUTROPHILS # BLD: 4.2 K/UL (ref 1.5–7.5)
NEUTS SEG NFR BLD: 72.9 % (ref 50–65)
PLATELET # BLD AUTO: 170 K/UL (ref 130–400)
PMV BLD AUTO: 11 FL (ref 9.4–12.3)
POTASSIUM SERPL-SCNC: 3.5 MMOL/L (ref 3.5–5)
RBC # BLD AUTO: 3.29 M/UL (ref 4.2–5.4)
SODIUM SERPL-SCNC: 137 MMOL/L (ref 136–145)
WBC # BLD AUTO: 5.8 K/UL (ref 4.8–10.8)

## 2024-10-06 PROCEDURE — 1200000000 HC SEMI PRIVATE

## 2024-10-06 PROCEDURE — 6370000000 HC RX 637 (ALT 250 FOR IP): Performed by: NEUROLOGICAL SURGERY

## 2024-10-06 PROCEDURE — 6360000002 HC RX W HCPCS: Performed by: STUDENT IN AN ORGANIZED HEALTH CARE EDUCATION/TRAINING PROGRAM

## 2024-10-06 PROCEDURE — 99024 POSTOP FOLLOW-UP VISIT: CPT | Performed by: NEUROLOGICAL SURGERY

## 2024-10-06 PROCEDURE — 2580000003 HC RX 258: Performed by: INTERNAL MEDICINE

## 2024-10-06 PROCEDURE — 85025 COMPLETE CBC W/AUTO DIFF WBC: CPT

## 2024-10-06 PROCEDURE — 6370000000 HC RX 637 (ALT 250 FOR IP): Performed by: STUDENT IN AN ORGANIZED HEALTH CARE EDUCATION/TRAINING PROGRAM

## 2024-10-06 PROCEDURE — 80048 BASIC METABOLIC PNL TOTAL CA: CPT

## 2024-10-06 PROCEDURE — 2580000003 HC RX 258: Performed by: NEUROLOGICAL SURGERY

## 2024-10-06 PROCEDURE — 83735 ASSAY OF MAGNESIUM: CPT

## 2024-10-06 PROCEDURE — 6360000002 HC RX W HCPCS: Performed by: INTERNAL MEDICINE

## 2024-10-06 RX ORDER — QUETIAPINE FUMARATE 25 MG/1
25 TABLET, FILM COATED ORAL 2 TIMES DAILY
Status: DISCONTINUED | OUTPATIENT
Start: 2024-10-06 | End: 2024-10-20 | Stop reason: HOSPADM

## 2024-10-06 RX ADMIN — VANCOMYCIN HYDROCHLORIDE 1000 MG: 10 INJECTION, POWDER, LYOPHILIZED, FOR SOLUTION INTRAVENOUS at 21:26

## 2024-10-06 RX ADMIN — HYDROMORPHONE HYDROCHLORIDE 0.25 MG: 1 INJECTION, SOLUTION INTRAMUSCULAR; INTRAVENOUS; SUBCUTANEOUS at 21:29

## 2024-10-06 RX ADMIN — QUETIAPINE FUMARATE 25 MG: 25 TABLET ORAL at 21:31

## 2024-10-06 RX ADMIN — OXYCODONE 7.5 MG: 5 TABLET ORAL at 13:24

## 2024-10-06 RX ADMIN — ATORVASTATIN CALCIUM 20 MG: 20 TABLET, FILM COATED ORAL at 09:29

## 2024-10-06 RX ADMIN — METHOCARBAMOL 500 MG: 500 TABLET ORAL at 21:31

## 2024-10-06 RX ADMIN — AMIODARONE HYDROCHLORIDE 200 MG: 200 TABLET ORAL at 21:31

## 2024-10-06 RX ADMIN — POLYETHYLENE GLYCOL 3350 17 G: 17 POWDER, FOR SOLUTION ORAL at 09:37

## 2024-10-06 RX ADMIN — METHOCARBAMOL 500 MG: 500 TABLET ORAL at 09:29

## 2024-10-06 RX ADMIN — SERTRALINE HYDROCHLORIDE 50 MG: 50 TABLET ORAL at 09:29

## 2024-10-06 RX ADMIN — SODIUM CHLORIDE, PRESERVATIVE FREE 10 ML: 5 INJECTION INTRAVENOUS at 21:32

## 2024-10-06 RX ADMIN — OXYCODONE 7.5 MG: 5 TABLET ORAL at 04:41

## 2024-10-06 RX ADMIN — METHIMAZOLE 5 MG: 5 TABLET ORAL at 09:30

## 2024-10-06 RX ADMIN — METOPROLOL TARTRATE 25 MG: 25 TABLET, FILM COATED ORAL at 09:29

## 2024-10-06 RX ADMIN — METHIMAZOLE 5 MG: 5 TABLET ORAL at 21:32

## 2024-10-06 RX ADMIN — HYDROMORPHONE HYDROCHLORIDE 0.25 MG: 1 INJECTION, SOLUTION INTRAMUSCULAR; INTRAVENOUS; SUBCUTANEOUS at 09:08

## 2024-10-06 RX ADMIN — FOLIC ACID 1 MG: 1 TABLET ORAL at 09:30

## 2024-10-06 RX ADMIN — SODIUM CHLORIDE, PRESERVATIVE FREE 10 ML: 5 INJECTION INTRAVENOUS at 09:30

## 2024-10-06 RX ADMIN — AMIODARONE HYDROCHLORIDE 200 MG: 200 TABLET ORAL at 09:29

## 2024-10-06 RX ADMIN — QUETIAPINE FUMARATE 25 MG: 25 TABLET ORAL at 11:52

## 2024-10-06 RX ADMIN — METOPROLOL TARTRATE 25 MG: 25 TABLET, FILM COATED ORAL at 21:32

## 2024-10-06 RX ADMIN — ASPIRIN 81 MG: 81 TABLET, CHEWABLE ORAL at 09:29

## 2024-10-06 RX ADMIN — OXYCODONE 7.5 MG: 5 TABLET ORAL at 17:53

## 2024-10-06 ASSESSMENT — PAIN DESCRIPTION - ORIENTATION
ORIENTATION: LOWER

## 2024-10-06 ASSESSMENT — PAIN DESCRIPTION - LOCATION
LOCATION: BACK

## 2024-10-06 ASSESSMENT — PAIN DESCRIPTION - DESCRIPTORS
DESCRIPTORS: ACHING
DESCRIPTORS: ACHING
DESCRIPTORS: ACHING;DISCOMFORT
DESCRIPTORS: ACHING

## 2024-10-06 ASSESSMENT — PAIN SCALES - GENERAL
PAINLEVEL_OUTOF10: 6
PAINLEVEL_OUTOF10: 8
PAINLEVEL_OUTOF10: 7
PAINLEVEL_OUTOF10: 10
PAINLEVEL_OUTOF10: 8
PAINLEVEL_OUTOF10: 10

## 2024-10-07 LAB
ANION GAP SERPL CALCULATED.3IONS-SCNC: 12 MMOL/L (ref 7–19)
BACTERIA BLD CULT ORG #2: NORMAL
BACTERIA BLD CULT: NORMAL
BASOPHILS # BLD: 0 K/UL (ref 0–0.2)
BASOPHILS NFR BLD: 0.6 % (ref 0–1)
BUN SERPL-MCNC: 22 MG/DL (ref 8–23)
CALCIUM SERPL-MCNC: 8.2 MG/DL (ref 8.8–10.2)
CHLORIDE SERPL-SCNC: 101 MMOL/L (ref 98–111)
CO2 SERPL-SCNC: 24 MMOL/L (ref 22–29)
CREAT SERPL-MCNC: 1 MG/DL (ref 0.5–0.9)
EOSINOPHIL # BLD: 0.1 K/UL (ref 0–0.6)
EOSINOPHIL NFR BLD: 1.7 % (ref 0–5)
ERYTHROCYTE [DISTWIDTH] IN BLOOD BY AUTOMATED COUNT: 17.3 % (ref 11.5–14.5)
GLUCOSE SERPL-MCNC: 89 MG/DL (ref 70–99)
HCT VFR BLD AUTO: 30.8 % (ref 37–47)
HGB BLD-MCNC: 9.6 G/DL (ref 12–16)
IMM GRANULOCYTES # BLD: 0 K/UL
LYMPHOCYTES # BLD: 0.7 K/UL (ref 1.1–4.5)
LYMPHOCYTES NFR BLD: 10.4 % (ref 20–40)
MAGNESIUM SERPL-MCNC: 1.8 MG/DL (ref 1.6–2.4)
MCH RBC QN AUTO: 28.7 PG (ref 27–31)
MCHC RBC AUTO-ENTMCNC: 31.2 G/DL (ref 33–37)
MCV RBC AUTO: 92.2 FL (ref 81–99)
MONOCYTES # BLD: 0.6 K/UL (ref 0–0.9)
MONOCYTES NFR BLD: 9.7 % (ref 0–10)
NEUTROPHILS # BLD: 5.1 K/UL (ref 1.5–7.5)
NEUTS SEG NFR BLD: 77.1 % (ref 50–65)
PLATELET # BLD AUTO: 190 K/UL (ref 130–400)
PMV BLD AUTO: 11.1 FL (ref 9.4–12.3)
POTASSIUM SERPL-SCNC: 3.4 MMOL/L (ref 3.5–5)
RBC # BLD AUTO: 3.34 M/UL (ref 4.2–5.4)
SODIUM SERPL-SCNC: 137 MMOL/L (ref 136–145)
VANCOMYCIN TROUGH SERPL-MCNC: 20.7 UG/ML (ref 10–20)
WBC # BLD AUTO: 6.6 K/UL (ref 4.8–10.8)

## 2024-10-07 PROCEDURE — 99232 SBSQ HOSP IP/OBS MODERATE 35: CPT | Performed by: PSYCHIATRY & NEUROLOGY

## 2024-10-07 PROCEDURE — 6360000002 HC RX W HCPCS: Performed by: INTERNAL MEDICINE

## 2024-10-07 PROCEDURE — 6370000000 HC RX 637 (ALT 250 FOR IP): Performed by: NEUROLOGICAL SURGERY

## 2024-10-07 PROCEDURE — 85025 COMPLETE CBC W/AUTO DIFF WBC: CPT

## 2024-10-07 PROCEDURE — 6370000000 HC RX 637 (ALT 250 FOR IP): Performed by: STUDENT IN AN ORGANIZED HEALTH CARE EDUCATION/TRAINING PROGRAM

## 2024-10-07 PROCEDURE — 1200000000 HC SEMI PRIVATE

## 2024-10-07 PROCEDURE — 2580000003 HC RX 258: Performed by: NEUROLOGICAL SURGERY

## 2024-10-07 PROCEDURE — 99024 POSTOP FOLLOW-UP VISIT: CPT | Performed by: NEUROLOGICAL SURGERY

## 2024-10-07 PROCEDURE — 83735 ASSAY OF MAGNESIUM: CPT

## 2024-10-07 PROCEDURE — 80202 ASSAY OF VANCOMYCIN: CPT

## 2024-10-07 PROCEDURE — 2580000003 HC RX 258: Performed by: INTERNAL MEDICINE

## 2024-10-07 PROCEDURE — 94760 N-INVAS EAR/PLS OXIMETRY 1: CPT

## 2024-10-07 PROCEDURE — 80048 BASIC METABOLIC PNL TOTAL CA: CPT

## 2024-10-07 RX ORDER — POTASSIUM CHLORIDE 1500 MG/1
40 TABLET, EXTENDED RELEASE ORAL ONCE
Status: COMPLETED | OUTPATIENT
Start: 2024-10-07 | End: 2024-10-07

## 2024-10-07 RX ADMIN — POLYETHYLENE GLYCOL 3350 17 G: 17 POWDER, FOR SOLUTION ORAL at 10:16

## 2024-10-07 RX ADMIN — APIXABAN 5 MG: 5 TABLET, FILM COATED ORAL at 20:02

## 2024-10-07 RX ADMIN — METHIMAZOLE 5 MG: 5 TABLET ORAL at 20:02

## 2024-10-07 RX ADMIN — METHOCARBAMOL 500 MG: 500 TABLET ORAL at 20:02

## 2024-10-07 RX ADMIN — METHIMAZOLE 5 MG: 5 TABLET ORAL at 10:15

## 2024-10-07 RX ADMIN — VANCOMYCIN HYDROCHLORIDE 1000 MG: 10 INJECTION, POWDER, LYOPHILIZED, FOR SOLUTION INTRAVENOUS at 22:58

## 2024-10-07 RX ADMIN — FOLIC ACID 1 MG: 1 TABLET ORAL at 10:15

## 2024-10-07 RX ADMIN — OXYCODONE 7.5 MG: 5 TABLET ORAL at 04:53

## 2024-10-07 RX ADMIN — ASPIRIN 81 MG: 81 TABLET, CHEWABLE ORAL at 10:15

## 2024-10-07 RX ADMIN — SODIUM CHLORIDE, PRESERVATIVE FREE 10 ML: 5 INJECTION INTRAVENOUS at 20:17

## 2024-10-07 RX ADMIN — AMIODARONE HYDROCHLORIDE 200 MG: 200 TABLET ORAL at 20:02

## 2024-10-07 RX ADMIN — POTASSIUM CHLORIDE 40 MEQ: 1500 TABLET, EXTENDED RELEASE ORAL at 10:15

## 2024-10-07 RX ADMIN — QUETIAPINE FUMARATE 25 MG: 25 TABLET ORAL at 10:15

## 2024-10-07 RX ADMIN — OXYCODONE 7.5 MG: 5 TABLET ORAL at 12:18

## 2024-10-07 RX ADMIN — SODIUM CHLORIDE, PRESERVATIVE FREE 10 ML: 5 INJECTION INTRAVENOUS at 10:16

## 2024-10-07 RX ADMIN — METOPROLOL TARTRATE 25 MG: 25 TABLET, FILM COATED ORAL at 20:02

## 2024-10-07 RX ADMIN — OXYCODONE 7.5 MG: 5 TABLET ORAL at 18:42

## 2024-10-07 RX ADMIN — ATORVASTATIN CALCIUM 20 MG: 20 TABLET, FILM COATED ORAL at 10:15

## 2024-10-07 RX ADMIN — SERTRALINE HYDROCHLORIDE 50 MG: 50 TABLET ORAL at 10:15

## 2024-10-07 RX ADMIN — OXYCODONE 7.5 MG: 5 TABLET ORAL at 23:58

## 2024-10-07 RX ADMIN — QUETIAPINE FUMARATE 25 MG: 25 TABLET ORAL at 20:02

## 2024-10-07 RX ADMIN — AMIODARONE HYDROCHLORIDE 200 MG: 200 TABLET ORAL at 10:15

## 2024-10-07 RX ADMIN — METHOCARBAMOL 500 MG: 500 TABLET ORAL at 10:15

## 2024-10-07 RX ADMIN — OXYCODONE 7.5 MG: 5 TABLET ORAL at 00:01

## 2024-10-07 RX ADMIN — METOPROLOL TARTRATE 25 MG: 25 TABLET, FILM COATED ORAL at 10:15

## 2024-10-07 ASSESSMENT — PAIN DESCRIPTION - LOCATION
LOCATION: BACK
LOCATION: BACK;LEG
LOCATION: BACK
LOCATION: GENERALIZED
LOCATION: BACK

## 2024-10-07 ASSESSMENT — PAIN SCALES - GENERAL
PAINLEVEL_OUTOF10: 8
PAINLEVEL_OUTOF10: 6
PAINLEVEL_OUTOF10: 10
PAINLEVEL_OUTOF10: 7
PAINLEVEL_OUTOF10: 10

## 2024-10-07 ASSESSMENT — PAIN DESCRIPTION - DESCRIPTORS
DESCRIPTORS: ACHING;DISCOMFORT
DESCRIPTORS: ACHING;DISCOMFORT
DESCRIPTORS: ACHING;THROBBING

## 2024-10-07 ASSESSMENT — PAIN DESCRIPTION - ORIENTATION: ORIENTATION: OTHER (COMMENT)

## 2024-10-07 ASSESSMENT — PAIN - FUNCTIONAL ASSESSMENT
PAIN_FUNCTIONAL_ASSESSMENT: PREVENTS OR INTERFERES WITH MANY ACTIVE NOT PASSIVE ACTIVITIES
PAIN_FUNCTIONAL_ASSESSMENT: PREVENTS OR INTERFERES WITH MANY ACTIVE NOT PASSIVE ACTIVITIES
PAIN_FUNCTIONAL_ASSESSMENT: PREVENTS OR INTERFERES SOME ACTIVE ACTIVITIES AND ADLS

## 2024-10-08 ENCOUNTER — OUTSIDE FACILITY SERVICE (OUTPATIENT)
Age: 74
End: 2024-10-08
Payer: MEDICARE

## 2024-10-08 PROBLEM — G83.4 CAUDA EQUINA COMPRESSION (HCC): Status: ACTIVE | Noted: 2024-10-08

## 2024-10-08 PROBLEM — E43 SEVERE MALNUTRITION (HCC): Status: ACTIVE | Noted: 2024-10-08

## 2024-10-08 PROCEDURE — 6370000000 HC RX 637 (ALT 250 FOR IP): Performed by: NEUROLOGICAL SURGERY

## 2024-10-08 PROCEDURE — 99232 SBSQ HOSP IP/OBS MODERATE 35: CPT | Performed by: PSYCHIATRY & NEUROLOGY

## 2024-10-08 PROCEDURE — 6370000000 HC RX 637 (ALT 250 FOR IP): Performed by: STUDENT IN AN ORGANIZED HEALTH CARE EDUCATION/TRAINING PROGRAM

## 2024-10-08 PROCEDURE — 6360000002 HC RX W HCPCS: Performed by: INTERNAL MEDICINE

## 2024-10-08 PROCEDURE — 2580000003 HC RX 258: Performed by: INTERNAL MEDICINE

## 2024-10-08 PROCEDURE — 2580000003 HC RX 258: Performed by: NEUROLOGICAL SURGERY

## 2024-10-08 PROCEDURE — 1200000000 HC SEMI PRIVATE

## 2024-10-08 PROCEDURE — 99024 POSTOP FOLLOW-UP VISIT: CPT | Performed by: NURSE PRACTITIONER

## 2024-10-08 RX ORDER — SENNA AND DOCUSATE SODIUM 50; 8.6 MG/1; MG/1
2 TABLET, FILM COATED ORAL DAILY
Status: DISCONTINUED | OUTPATIENT
Start: 2024-10-08 | End: 2024-10-20 | Stop reason: HOSPADM

## 2024-10-08 RX ORDER — MEGESTROL ACETATE 40 MG/ML
200 SUSPENSION ORAL DAILY
Status: DISCONTINUED | OUTPATIENT
Start: 2024-10-08 | End: 2024-10-20 | Stop reason: HOSPADM

## 2024-10-08 RX ADMIN — QUETIAPINE FUMARATE 25 MG: 25 TABLET ORAL at 21:36

## 2024-10-08 RX ADMIN — METHOCARBAMOL 500 MG: 500 TABLET ORAL at 21:19

## 2024-10-08 RX ADMIN — POLYETHYLENE GLYCOL 3350 17 G: 17 POWDER, FOR SOLUTION ORAL at 09:44

## 2024-10-08 RX ADMIN — METHIMAZOLE 5 MG: 5 TABLET ORAL at 09:43

## 2024-10-08 RX ADMIN — QUETIAPINE FUMARATE 25 MG: 25 TABLET ORAL at 09:44

## 2024-10-08 RX ADMIN — FOLIC ACID 1 MG: 1 TABLET ORAL at 09:43

## 2024-10-08 RX ADMIN — METHOCARBAMOL 500 MG: 500 TABLET ORAL at 09:43

## 2024-10-08 RX ADMIN — APIXABAN 5 MG: 5 TABLET, FILM COATED ORAL at 21:20

## 2024-10-08 RX ADMIN — SERTRALINE HYDROCHLORIDE 50 MG: 50 TABLET ORAL at 09:44

## 2024-10-08 RX ADMIN — ASPIRIN 81 MG: 81 TABLET, CHEWABLE ORAL at 09:43

## 2024-10-08 RX ADMIN — OXYCODONE 7.5 MG: 5 TABLET ORAL at 05:34

## 2024-10-08 RX ADMIN — MEGESTROL ACETATE 200 MG: 40 SUSPENSION ORAL at 12:16

## 2024-10-08 RX ADMIN — METOPROLOL TARTRATE 25 MG: 25 TABLET, FILM COATED ORAL at 21:19

## 2024-10-08 RX ADMIN — SENNOSIDES AND DOCUSATE SODIUM 2 TABLET: 50; 8.6 TABLET ORAL at 12:17

## 2024-10-08 RX ADMIN — APIXABAN 5 MG: 5 TABLET, FILM COATED ORAL at 09:43

## 2024-10-08 RX ADMIN — ATORVASTATIN CALCIUM 20 MG: 20 TABLET, FILM COATED ORAL at 09:44

## 2024-10-08 RX ADMIN — AMIODARONE HYDROCHLORIDE 200 MG: 200 TABLET ORAL at 09:44

## 2024-10-08 RX ADMIN — SODIUM CHLORIDE, PRESERVATIVE FREE 10 ML: 5 INJECTION INTRAVENOUS at 09:45

## 2024-10-08 RX ADMIN — METHIMAZOLE 5 MG: 5 TABLET ORAL at 21:19

## 2024-10-08 RX ADMIN — OXYCODONE 7.5 MG: 5 TABLET ORAL at 12:17

## 2024-10-08 RX ADMIN — NALOXEGOL OXALATE 12.5 MG: 12.5 TABLET, FILM COATED ORAL at 12:17

## 2024-10-08 RX ADMIN — OXYCODONE 7.5 MG: 5 TABLET ORAL at 18:04

## 2024-10-08 RX ADMIN — VANCOMYCIN HYDROCHLORIDE 1000 MG: 10 INJECTION, POWDER, LYOPHILIZED, FOR SOLUTION INTRAVENOUS at 21:24

## 2024-10-08 RX ADMIN — AMIODARONE HYDROCHLORIDE 200 MG: 200 TABLET ORAL at 21:20

## 2024-10-08 RX ADMIN — METOPROLOL TARTRATE 25 MG: 25 TABLET, FILM COATED ORAL at 09:44

## 2024-10-08 ASSESSMENT — PAIN DESCRIPTION - DESCRIPTORS
DESCRIPTORS: THROBBING;ACHING
DESCRIPTORS: ACHING;SHOOTING
DESCRIPTORS: ACHING;THROBBING
DESCRIPTORS: ACHING

## 2024-10-08 ASSESSMENT — PAIN - FUNCTIONAL ASSESSMENT
PAIN_FUNCTIONAL_ASSESSMENT: PREVENTS OR INTERFERES SOME ACTIVE ACTIVITIES AND ADLS
PAIN_FUNCTIONAL_ASSESSMENT: PREVENTS OR INTERFERES WITH MANY ACTIVE NOT PASSIVE ACTIVITIES
PAIN_FUNCTIONAL_ASSESSMENT: PREVENTS OR INTERFERES SOME ACTIVE ACTIVITIES AND ADLS
PAIN_FUNCTIONAL_ASSESSMENT: PREVENTS OR INTERFERES WITH MANY ACTIVE NOT PASSIVE ACTIVITIES

## 2024-10-08 ASSESSMENT — PAIN DESCRIPTION - LOCATION
LOCATION: BACK;LEG
LOCATION: LEG
LOCATION: GENERALIZED
LOCATION: BACK;LEG

## 2024-10-08 ASSESSMENT — PAIN SCALES - GENERAL
PAINLEVEL_OUTOF10: 9
PAINLEVEL_OUTOF10: 7
PAINLEVEL_OUTOF10: 6
PAINLEVEL_OUTOF10: 5

## 2024-10-08 ASSESSMENT — PAIN DESCRIPTION - ORIENTATION
ORIENTATION: RIGHT
ORIENTATION: RIGHT;LEFT
ORIENTATION: OTHER (COMMENT)
ORIENTATION: RIGHT;LEFT

## 2024-10-08 NOTE — CARE COORDINATION
Pt/family would like referral to [facility]; will require precert  Electronically signed by YAZMIN Quezada on 10/8/2024 at 4:06 PM

## 2024-10-09 ENCOUNTER — OUTSIDE FACILITY SERVICE (OUTPATIENT)
Age: 74
End: 2024-10-09
Payer: MEDICARE

## 2024-10-09 LAB
ANION GAP SERPL CALCULATED.3IONS-SCNC: 10 MMOL/L (ref 7–19)
BACTERIA SPEC AEROBE CULT: NORMAL
BACTERIA SPEC ANAEROBE CULT: NORMAL
BASOPHILS # BLD: 0 K/UL (ref 0–0.2)
BASOPHILS NFR BLD: 0.3 % (ref 0–1)
BUN SERPL-MCNC: 28 MG/DL (ref 8–23)
CALCIUM SERPL-MCNC: 8.5 MG/DL (ref 8.8–10.2)
CHLORIDE SERPL-SCNC: 102 MMOL/L (ref 98–111)
CO2 SERPL-SCNC: 25 MMOL/L (ref 22–29)
CREAT SERPL-MCNC: 1.1 MG/DL (ref 0.5–0.9)
EOSINOPHIL # BLD: 0.1 K/UL (ref 0–0.6)
EOSINOPHIL NFR BLD: 1.1 % (ref 0–5)
ERYTHROCYTE [DISTWIDTH] IN BLOOD BY AUTOMATED COUNT: 17.4 % (ref 11.5–14.5)
GLUCOSE SERPL-MCNC: 115 MG/DL (ref 70–99)
GRAM STN SPEC: NORMAL
HCT VFR BLD AUTO: 33.5 % (ref 37–47)
HGB BLD-MCNC: 10.5 G/DL (ref 12–16)
IMM GRANULOCYTES # BLD: 0 K/UL
LYMPHOCYTES # BLD: 0.7 K/UL (ref 1.1–4.5)
LYMPHOCYTES NFR BLD: 11.9 % (ref 20–40)
MCH RBC QN AUTO: 29.2 PG (ref 27–31)
MCHC RBC AUTO-ENTMCNC: 31.3 G/DL (ref 33–37)
MCV RBC AUTO: 93.3 FL (ref 81–99)
MONOCYTES # BLD: 0.7 K/UL (ref 0–0.9)
MONOCYTES NFR BLD: 10.6 % (ref 0–10)
NEUTROPHILS # BLD: 4.6 K/UL (ref 1.5–7.5)
NEUTS SEG NFR BLD: 75.6 % (ref 50–65)
PLATELET # BLD AUTO: 223 K/UL (ref 130–400)
PMV BLD AUTO: 11 FL (ref 9.4–12.3)
POTASSIUM SERPL-SCNC: 4 MMOL/L (ref 3.5–5)
RBC # BLD AUTO: 3.59 M/UL (ref 4.2–5.4)
SODIUM SERPL-SCNC: 137 MMOL/L (ref 136–145)
WBC # BLD AUTO: 6.1 K/UL (ref 4.8–10.8)

## 2024-10-09 PROCEDURE — 6370000000 HC RX 637 (ALT 250 FOR IP): Performed by: STUDENT IN AN ORGANIZED HEALTH CARE EDUCATION/TRAINING PROGRAM

## 2024-10-09 PROCEDURE — 6370000000 HC RX 637 (ALT 250 FOR IP): Performed by: NEUROLOGICAL SURGERY

## 2024-10-09 PROCEDURE — 36415 COLL VENOUS BLD VENIPUNCTURE: CPT

## 2024-10-09 PROCEDURE — 6370000000 HC RX 637 (ALT 250 FOR IP): Performed by: INTERNAL MEDICINE

## 2024-10-09 PROCEDURE — 97110 THERAPEUTIC EXERCISES: CPT

## 2024-10-09 PROCEDURE — 6360000002 HC RX W HCPCS: Performed by: INTERNAL MEDICINE

## 2024-10-09 PROCEDURE — 97530 THERAPEUTIC ACTIVITIES: CPT

## 2024-10-09 PROCEDURE — 2580000003 HC RX 258: Performed by: NEUROLOGICAL SURGERY

## 2024-10-09 PROCEDURE — 6360000002 HC RX W HCPCS: Performed by: STUDENT IN AN ORGANIZED HEALTH CARE EDUCATION/TRAINING PROGRAM

## 2024-10-09 PROCEDURE — 80048 BASIC METABOLIC PNL TOTAL CA: CPT

## 2024-10-09 PROCEDURE — 85025 COMPLETE CBC W/AUTO DIFF WBC: CPT

## 2024-10-09 PROCEDURE — 2580000003 HC RX 258: Performed by: INTERNAL MEDICINE

## 2024-10-09 PROCEDURE — 1200000000 HC SEMI PRIVATE

## 2024-10-09 PROCEDURE — 94760 N-INVAS EAR/PLS OXIMETRY 1: CPT

## 2024-10-09 PROCEDURE — 99024 POSTOP FOLLOW-UP VISIT: CPT | Performed by: NEUROLOGICAL SURGERY

## 2024-10-09 RX ORDER — OXYCODONE HYDROCHLORIDE 5 MG/1
5 TABLET ORAL EVERY 6 HOURS PRN
Status: DISCONTINUED | OUTPATIENT
Start: 2024-10-09 | End: 2024-10-18

## 2024-10-09 RX ADMIN — SERTRALINE HYDROCHLORIDE 50 MG: 50 TABLET ORAL at 09:31

## 2024-10-09 RX ADMIN — ASPIRIN 81 MG: 81 TABLET, CHEWABLE ORAL at 09:30

## 2024-10-09 RX ADMIN — APIXABAN 5 MG: 5 TABLET, FILM COATED ORAL at 09:30

## 2024-10-09 RX ADMIN — METHIMAZOLE 5 MG: 5 TABLET ORAL at 09:30

## 2024-10-09 RX ADMIN — AMIODARONE HYDROCHLORIDE 200 MG: 200 TABLET ORAL at 09:30

## 2024-10-09 RX ADMIN — METHIMAZOLE 5 MG: 5 TABLET ORAL at 20:03

## 2024-10-09 RX ADMIN — APIXABAN 5 MG: 5 TABLET, FILM COATED ORAL at 20:03

## 2024-10-09 RX ADMIN — HYDROMORPHONE HYDROCHLORIDE 0.25 MG: 1 INJECTION, SOLUTION INTRAMUSCULAR; INTRAVENOUS; SUBCUTANEOUS at 20:54

## 2024-10-09 RX ADMIN — ATORVASTATIN CALCIUM 20 MG: 20 TABLET, FILM COATED ORAL at 09:31

## 2024-10-09 RX ADMIN — FOLIC ACID 1 MG: 1 TABLET ORAL at 09:30

## 2024-10-09 RX ADMIN — METHOCARBAMOL 500 MG: 500 TABLET ORAL at 09:31

## 2024-10-09 RX ADMIN — SODIUM CHLORIDE, PRESERVATIVE FREE 10 ML: 5 INJECTION INTRAVENOUS at 09:36

## 2024-10-09 RX ADMIN — METOPROLOL TARTRATE 25 MG: 25 TABLET, FILM COATED ORAL at 09:31

## 2024-10-09 RX ADMIN — METOPROLOL TARTRATE 25 MG: 25 TABLET, FILM COATED ORAL at 20:03

## 2024-10-09 RX ADMIN — OXYCODONE 5 MG: 5 TABLET ORAL at 19:52

## 2024-10-09 RX ADMIN — SENNOSIDES AND DOCUSATE SODIUM 2 TABLET: 50; 8.6 TABLET ORAL at 09:31

## 2024-10-09 RX ADMIN — POLYETHYLENE GLYCOL 3350 17 G: 17 POWDER, FOR SOLUTION ORAL at 09:34

## 2024-10-09 RX ADMIN — AMIODARONE HYDROCHLORIDE 200 MG: 200 TABLET ORAL at 20:03

## 2024-10-09 RX ADMIN — QUETIAPINE FUMARATE 25 MG: 25 TABLET ORAL at 09:31

## 2024-10-09 RX ADMIN — SODIUM CHLORIDE, PRESERVATIVE FREE 10 ML: 5 INJECTION INTRAVENOUS at 20:04

## 2024-10-09 RX ADMIN — NALOXEGOL OXALATE 12.5 MG: 12.5 TABLET, FILM COATED ORAL at 09:35

## 2024-10-09 RX ADMIN — VANCOMYCIN HYDROCHLORIDE 1000 MG: 10 INJECTION, POWDER, LYOPHILIZED, FOR SOLUTION INTRAVENOUS at 20:02

## 2024-10-09 RX ADMIN — QUETIAPINE FUMARATE 25 MG: 25 TABLET ORAL at 20:03

## 2024-10-09 RX ADMIN — METHOCARBAMOL 500 MG: 500 TABLET ORAL at 20:03

## 2024-10-09 ASSESSMENT — PAIN DESCRIPTION - LOCATION
LOCATION: LEG
LOCATION: LEG

## 2024-10-09 ASSESSMENT — PAIN DESCRIPTION - ORIENTATION
ORIENTATION: RIGHT;LEFT
ORIENTATION: RIGHT;LEFT

## 2024-10-09 ASSESSMENT — PAIN SCALES - GENERAL
PAINLEVEL_OUTOF10: 9
PAINLEVEL_OUTOF10: 3
PAINLEVEL_OUTOF10: 9
PAINLEVEL_OUTOF10: 8

## 2024-10-09 ASSESSMENT — PAIN DESCRIPTION - DESCRIPTORS: DESCRIPTORS: ACHING

## 2024-10-10 ENCOUNTER — OUTSIDE FACILITY SERVICE (OUTPATIENT)
Age: 74
End: 2024-10-10
Payer: MEDICARE

## 2024-10-10 LAB
ANION GAP SERPL CALCULATED.3IONS-SCNC: 11 MMOL/L (ref 7–19)
BASOPHILS # BLD: 0 K/UL (ref 0–0.2)
BASOPHILS NFR BLD: 0.5 % (ref 0–1)
BUN SERPL-MCNC: 28 MG/DL (ref 8–23)
CALCIUM SERPL-MCNC: 8.4 MG/DL (ref 8.8–10.2)
CHLORIDE SERPL-SCNC: 102 MMOL/L (ref 98–111)
CO2 SERPL-SCNC: 24 MMOL/L (ref 22–29)
CREAT SERPL-MCNC: 1 MG/DL (ref 0.5–0.9)
EOSINOPHIL # BLD: 0.1 K/UL (ref 0–0.6)
EOSINOPHIL NFR BLD: 1.6 % (ref 0–5)
ERYTHROCYTE [DISTWIDTH] IN BLOOD BY AUTOMATED COUNT: 17.5 % (ref 11.5–14.5)
GLUCOSE SERPL-MCNC: 109 MG/DL (ref 70–99)
HCT VFR BLD AUTO: 32.3 % (ref 37–47)
HGB BLD-MCNC: 10.2 G/DL (ref 12–16)
IMM GRANULOCYTES # BLD: 0 K/UL
LYMPHOCYTES # BLD: 0.9 K/UL (ref 1.1–4.5)
LYMPHOCYTES NFR BLD: 16.4 % (ref 20–40)
MCH RBC QN AUTO: 29.3 PG (ref 27–31)
MCHC RBC AUTO-ENTMCNC: 31.6 G/DL (ref 33–37)
MCV RBC AUTO: 92.8 FL (ref 81–99)
MONOCYTES # BLD: 0.7 K/UL (ref 0–0.9)
MONOCYTES NFR BLD: 11.6 % (ref 0–10)
NEUTROPHILS # BLD: 3.9 K/UL (ref 1.5–7.5)
NEUTS SEG NFR BLD: 69.4 % (ref 50–65)
PLATELET # BLD AUTO: 234 K/UL (ref 130–400)
PMV BLD AUTO: 11.6 FL (ref 9.4–12.3)
POTASSIUM SERPL-SCNC: 3.7 MMOL/L (ref 3.5–5)
RBC # BLD AUTO: 3.48 M/UL (ref 4.2–5.4)
SODIUM SERPL-SCNC: 137 MMOL/L (ref 136–145)
WBC # BLD AUTO: 5.6 K/UL (ref 4.8–10.8)

## 2024-10-10 PROCEDURE — 6370000000 HC RX 637 (ALT 250 FOR IP): Performed by: NEUROLOGICAL SURGERY

## 2024-10-10 PROCEDURE — 2580000003 HC RX 258: Performed by: INTERNAL MEDICINE

## 2024-10-10 PROCEDURE — 36415 COLL VENOUS BLD VENIPUNCTURE: CPT

## 2024-10-10 PROCEDURE — 1200000000 HC SEMI PRIVATE

## 2024-10-10 PROCEDURE — 6370000000 HC RX 637 (ALT 250 FOR IP): Performed by: INTERNAL MEDICINE

## 2024-10-10 PROCEDURE — 6370000000 HC RX 637 (ALT 250 FOR IP): Performed by: STUDENT IN AN ORGANIZED HEALTH CARE EDUCATION/TRAINING PROGRAM

## 2024-10-10 PROCEDURE — 99232 SBSQ HOSP IP/OBS MODERATE 35: CPT | Performed by: PSYCHIATRY & NEUROLOGY

## 2024-10-10 PROCEDURE — 6360000002 HC RX W HCPCS: Performed by: INTERNAL MEDICINE

## 2024-10-10 PROCEDURE — 85025 COMPLETE CBC W/AUTO DIFF WBC: CPT

## 2024-10-10 PROCEDURE — 80048 BASIC METABOLIC PNL TOTAL CA: CPT

## 2024-10-10 PROCEDURE — 6360000002 HC RX W HCPCS: Performed by: STUDENT IN AN ORGANIZED HEALTH CARE EDUCATION/TRAINING PROGRAM

## 2024-10-10 PROCEDURE — 2580000003 HC RX 258: Performed by: NEUROLOGICAL SURGERY

## 2024-10-10 PROCEDURE — 97110 THERAPEUTIC EXERCISES: CPT

## 2024-10-10 PROCEDURE — 99024 POSTOP FOLLOW-UP VISIT: CPT | Performed by: NEUROLOGICAL SURGERY

## 2024-10-10 RX ADMIN — METHIMAZOLE 5 MG: 5 TABLET ORAL at 09:29

## 2024-10-10 RX ADMIN — METOPROLOL TARTRATE 25 MG: 25 TABLET, FILM COATED ORAL at 20:59

## 2024-10-10 RX ADMIN — AMIODARONE HYDROCHLORIDE 200 MG: 200 TABLET ORAL at 20:59

## 2024-10-10 RX ADMIN — METOPROLOL TARTRATE 25 MG: 25 TABLET, FILM COATED ORAL at 09:29

## 2024-10-10 RX ADMIN — ASPIRIN 81 MG: 81 TABLET, CHEWABLE ORAL at 09:29

## 2024-10-10 RX ADMIN — SODIUM CHLORIDE, PRESERVATIVE FREE 10 ML: 5 INJECTION INTRAVENOUS at 20:59

## 2024-10-10 RX ADMIN — FOLIC ACID 1 MG: 1 TABLET ORAL at 09:29

## 2024-10-10 RX ADMIN — OXYCODONE 5 MG: 5 TABLET ORAL at 20:59

## 2024-10-10 RX ADMIN — APIXABAN 5 MG: 5 TABLET, FILM COATED ORAL at 20:59

## 2024-10-10 RX ADMIN — METHIMAZOLE 5 MG: 5 TABLET ORAL at 20:59

## 2024-10-10 RX ADMIN — ATORVASTATIN CALCIUM 20 MG: 20 TABLET, FILM COATED ORAL at 09:29

## 2024-10-10 RX ADMIN — OXYCODONE 5 MG: 5 TABLET ORAL at 14:52

## 2024-10-10 RX ADMIN — QUETIAPINE FUMARATE 25 MG: 25 TABLET ORAL at 09:28

## 2024-10-10 RX ADMIN — SODIUM CHLORIDE, PRESERVATIVE FREE 10 ML: 5 INJECTION INTRAVENOUS at 09:30

## 2024-10-10 RX ADMIN — SERTRALINE HYDROCHLORIDE 50 MG: 50 TABLET ORAL at 09:29

## 2024-10-10 RX ADMIN — AMIODARONE HYDROCHLORIDE 200 MG: 200 TABLET ORAL at 09:29

## 2024-10-10 RX ADMIN — METHOCARBAMOL 500 MG: 500 TABLET ORAL at 20:59

## 2024-10-10 RX ADMIN — QUETIAPINE FUMARATE 25 MG: 25 TABLET ORAL at 20:59

## 2024-10-10 RX ADMIN — METHOCARBAMOL 500 MG: 500 TABLET ORAL at 09:28

## 2024-10-10 RX ADMIN — VANCOMYCIN HYDROCHLORIDE 1000 MG: 10 INJECTION, POWDER, LYOPHILIZED, FOR SOLUTION INTRAVENOUS at 21:03

## 2024-10-10 RX ADMIN — HYDROMORPHONE HYDROCHLORIDE 0.25 MG: 1 INJECTION, SOLUTION INTRAMUSCULAR; INTRAVENOUS; SUBCUTANEOUS at 18:55

## 2024-10-10 RX ADMIN — APIXABAN 5 MG: 5 TABLET, FILM COATED ORAL at 09:28

## 2024-10-10 ASSESSMENT — PAIN DESCRIPTION - LOCATION
LOCATION: LEG;HIP
LOCATION: BACK;HIP
LOCATION: SACRUM
LOCATION: LEG;HIP;NECK
LOCATION: GENERALIZED;LEG

## 2024-10-10 ASSESSMENT — PAIN SCALES - GENERAL
PAINLEVEL_OUTOF10: 8
PAINLEVEL_OUTOF10: 8
PAINLEVEL_OUTOF10: 9
PAINLEVEL_OUTOF10: 7
PAINLEVEL_OUTOF10: 9
PAINLEVEL_OUTOF10: 10

## 2024-10-10 ASSESSMENT — PAIN DESCRIPTION - ORIENTATION
ORIENTATION: RIGHT
ORIENTATION: RIGHT;LEFT
ORIENTATION: RIGHT;LEFT

## 2024-10-10 ASSESSMENT — PAIN DESCRIPTION - DESCRIPTORS
DESCRIPTORS: DISCOMFORT;SORE
DESCRIPTORS: ACHING

## 2024-10-10 NOTE — CARE COORDINATION
SW visited Pt, alongside, dr. Zavala and ANGELO-Janell as they gave encouragement re: cooperating with therapy and making progress with getting stronger and pushing through the pain.    SNF pending  Electronically signed by YAZMIN Quezada on 10/10/2024 at 5:30 PM

## 2024-10-10 NOTE — CARE COORDINATION
Pt's son is agreeable to referrals with [facility] after choice list review. Will require precert.  Electronically signed by YAZMIN Quezada on 10/10/2024 at 12:39 PM

## 2024-10-10 NOTE — CARE COORDINATION
[facility] declined; will get additional choices.  Electronically signed by YAZMIN Quezada on 10/10/2024 at 8:37 AM

## 2024-10-11 ENCOUNTER — OUTSIDE FACILITY SERVICE (OUTPATIENT)
Age: 74
End: 2024-10-11
Payer: MEDICARE

## 2024-10-11 LAB
ANION GAP SERPL CALCULATED.3IONS-SCNC: 11 MMOL/L (ref 7–19)
BASOPHILS # BLD: 0 K/UL (ref 0–0.2)
BASOPHILS NFR BLD: 0.7 % (ref 0–1)
BUN SERPL-MCNC: 30 MG/DL (ref 8–23)
CALCIUM SERPL-MCNC: 8.3 MG/DL (ref 8.8–10.2)
CHLORIDE SERPL-SCNC: 102 MMOL/L (ref 98–111)
CO2 SERPL-SCNC: 23 MMOL/L (ref 22–29)
CREAT SERPL-MCNC: 1.2 MG/DL (ref 0.5–0.9)
EOSINOPHIL # BLD: 0.1 K/UL (ref 0–0.6)
EOSINOPHIL NFR BLD: 1.6 % (ref 0–5)
ERYTHROCYTE [DISTWIDTH] IN BLOOD BY AUTOMATED COUNT: 17.3 % (ref 11.5–14.5)
GLUCOSE SERPL-MCNC: 91 MG/DL (ref 70–99)
HCT VFR BLD AUTO: 32.2 % (ref 37–47)
HGB BLD-MCNC: 10.1 G/DL (ref 12–16)
IMM GRANULOCYTES # BLD: 0 K/UL
LYMPHOCYTES # BLD: 0.8 K/UL (ref 1.1–4.5)
LYMPHOCYTES NFR BLD: 12.7 % (ref 20–40)
MCH RBC QN AUTO: 28.6 PG (ref 27–31)
MCHC RBC AUTO-ENTMCNC: 31.4 G/DL (ref 33–37)
MCV RBC AUTO: 91.2 FL (ref 81–99)
MONOCYTES # BLD: 0.7 K/UL (ref 0–0.9)
MONOCYTES NFR BLD: 11.7 % (ref 0–10)
NEUTROPHILS # BLD: 4.5 K/UL (ref 1.5–7.5)
NEUTS SEG NFR BLD: 73 % (ref 50–65)
PLATELET # BLD AUTO: 229 K/UL (ref 130–400)
PMV BLD AUTO: 10.6 FL (ref 9.4–12.3)
POTASSIUM SERPL-SCNC: 3.9 MMOL/L (ref 3.5–5)
RBC # BLD AUTO: 3.53 M/UL (ref 4.2–5.4)
SODIUM SERPL-SCNC: 136 MMOL/L (ref 136–145)
WBC # BLD AUTO: 6.1 K/UL (ref 4.8–10.8)

## 2024-10-11 PROCEDURE — 97530 THERAPEUTIC ACTIVITIES: CPT

## 2024-10-11 PROCEDURE — 2580000003 HC RX 258: Performed by: INTERNAL MEDICINE

## 2024-10-11 PROCEDURE — 97110 THERAPEUTIC EXERCISES: CPT

## 2024-10-11 PROCEDURE — 6370000000 HC RX 637 (ALT 250 FOR IP): Performed by: NEUROLOGICAL SURGERY

## 2024-10-11 PROCEDURE — 6360000002 HC RX W HCPCS: Performed by: STUDENT IN AN ORGANIZED HEALTH CARE EDUCATION/TRAINING PROGRAM

## 2024-10-11 PROCEDURE — 99024 POSTOP FOLLOW-UP VISIT: CPT | Performed by: NEUROLOGICAL SURGERY

## 2024-10-11 PROCEDURE — 2580000003 HC RX 258: Performed by: NEUROLOGICAL SURGERY

## 2024-10-11 PROCEDURE — 6360000002 HC RX W HCPCS: Performed by: INTERNAL MEDICINE

## 2024-10-11 PROCEDURE — 6370000000 HC RX 637 (ALT 250 FOR IP): Performed by: INTERNAL MEDICINE

## 2024-10-11 PROCEDURE — 6370000000 HC RX 637 (ALT 250 FOR IP): Performed by: STUDENT IN AN ORGANIZED HEALTH CARE EDUCATION/TRAINING PROGRAM

## 2024-10-11 PROCEDURE — 36415 COLL VENOUS BLD VENIPUNCTURE: CPT

## 2024-10-11 PROCEDURE — 85025 COMPLETE CBC W/AUTO DIFF WBC: CPT

## 2024-10-11 PROCEDURE — 94760 N-INVAS EAR/PLS OXIMETRY 1: CPT

## 2024-10-11 PROCEDURE — 1200000000 HC SEMI PRIVATE

## 2024-10-11 PROCEDURE — 92610 EVALUATE SWALLOWING FUNCTION: CPT

## 2024-10-11 PROCEDURE — 80048 BASIC METABOLIC PNL TOTAL CA: CPT

## 2024-10-11 RX ADMIN — ATORVASTATIN CALCIUM 20 MG: 20 TABLET, FILM COATED ORAL at 09:16

## 2024-10-11 RX ADMIN — QUETIAPINE FUMARATE 25 MG: 25 TABLET ORAL at 09:16

## 2024-10-11 RX ADMIN — APIXABAN 5 MG: 5 TABLET, FILM COATED ORAL at 20:53

## 2024-10-11 RX ADMIN — QUETIAPINE FUMARATE 25 MG: 25 TABLET ORAL at 20:53

## 2024-10-11 RX ADMIN — HYDROMORPHONE HYDROCHLORIDE 0.25 MG: 1 INJECTION, SOLUTION INTRAMUSCULAR; INTRAVENOUS; SUBCUTANEOUS at 11:34

## 2024-10-11 RX ADMIN — VANCOMYCIN HYDROCHLORIDE 1000 MG: 10 INJECTION, POWDER, LYOPHILIZED, FOR SOLUTION INTRAVENOUS at 20:49

## 2024-10-11 RX ADMIN — METHOCARBAMOL 500 MG: 500 TABLET ORAL at 09:13

## 2024-10-11 RX ADMIN — METOPROLOL TARTRATE 25 MG: 25 TABLET, FILM COATED ORAL at 09:14

## 2024-10-11 RX ADMIN — MEGESTROL ACETATE 200 MG: 40 SUSPENSION ORAL at 09:17

## 2024-10-11 RX ADMIN — AMIODARONE HYDROCHLORIDE 200 MG: 200 TABLET ORAL at 09:15

## 2024-10-11 RX ADMIN — SODIUM CHLORIDE, PRESERVATIVE FREE 10 ML: 5 INJECTION INTRAVENOUS at 10:59

## 2024-10-11 RX ADMIN — METHIMAZOLE 5 MG: 5 TABLET ORAL at 09:13

## 2024-10-11 RX ADMIN — HYDROMORPHONE HYDROCHLORIDE 0.25 MG: 1 INJECTION, SOLUTION INTRAMUSCULAR; INTRAVENOUS; SUBCUTANEOUS at 01:38

## 2024-10-11 RX ADMIN — SODIUM CHLORIDE, PRESERVATIVE FREE 10 ML: 5 INJECTION INTRAVENOUS at 20:53

## 2024-10-11 RX ADMIN — AMIODARONE HYDROCHLORIDE 200 MG: 200 TABLET ORAL at 20:52

## 2024-10-11 RX ADMIN — METHIMAZOLE 5 MG: 5 TABLET ORAL at 20:52

## 2024-10-11 RX ADMIN — SERTRALINE HYDROCHLORIDE 50 MG: 50 TABLET ORAL at 09:15

## 2024-10-11 RX ADMIN — METHOCARBAMOL 500 MG: 500 TABLET ORAL at 20:53

## 2024-10-11 RX ADMIN — METOPROLOL TARTRATE 25 MG: 25 TABLET, FILM COATED ORAL at 20:52

## 2024-10-11 RX ADMIN — APIXABAN 5 MG: 5 TABLET, FILM COATED ORAL at 09:15

## 2024-10-11 RX ADMIN — OXYCODONE 5 MG: 5 TABLET ORAL at 09:14

## 2024-10-11 RX ADMIN — ASPIRIN 81 MG: 81 TABLET, CHEWABLE ORAL at 09:16

## 2024-10-11 RX ADMIN — FOLIC ACID 1 MG: 1 TABLET ORAL at 09:15

## 2024-10-11 RX ADMIN — NALOXEGOL OXALATE 12.5 MG: 12.5 TABLET, FILM COATED ORAL at 09:15

## 2024-10-11 ASSESSMENT — PAIN DESCRIPTION - ORIENTATION: ORIENTATION: RIGHT

## 2024-10-11 ASSESSMENT — PAIN DESCRIPTION - DESCRIPTORS: DESCRIPTORS: ACHING;DISCOMFORT;SORE

## 2024-10-11 ASSESSMENT — PAIN SCALES - GENERAL
PAINLEVEL_OUTOF10: 7
PAINLEVEL_OUTOF10: 8

## 2024-10-11 ASSESSMENT — PAIN DESCRIPTION - LOCATION
LOCATION: BACK;BUTTOCKS
LOCATION: HIP;NECK;LEG
LOCATION: BACK;HIP
LOCATION: HIP;NECK;LEG

## 2024-10-11 NOTE — DISCHARGE INSTR - DIET

## 2024-10-12 ENCOUNTER — OUTSIDE FACILITY SERVICE (OUTPATIENT)
Age: 74
End: 2024-10-12
Payer: MEDICARE

## 2024-10-12 LAB
ANION GAP SERPL CALCULATED.3IONS-SCNC: 10 MMOL/L (ref 7–19)
BASOPHILS # BLD: 0 K/UL (ref 0–0.2)
BASOPHILS NFR BLD: 0.4 % (ref 0–1)
BUN SERPL-MCNC: 28 MG/DL (ref 8–23)
CALCIUM SERPL-MCNC: 8.2 MG/DL (ref 8.8–10.2)
CHLORIDE SERPL-SCNC: 102 MMOL/L (ref 98–111)
CO2 SERPL-SCNC: 25 MMOL/L (ref 22–29)
CREAT SERPL-MCNC: 1.2 MG/DL (ref 0.5–0.9)
EOSINOPHIL # BLD: 0.1 K/UL (ref 0–0.6)
EOSINOPHIL NFR BLD: 1.9 % (ref 0–5)
ERYTHROCYTE [DISTWIDTH] IN BLOOD BY AUTOMATED COUNT: 17.6 % (ref 11.5–14.5)
GLUCOSE SERPL-MCNC: 100 MG/DL (ref 70–99)
HCT VFR BLD AUTO: 31.9 % (ref 37–47)
HGB BLD-MCNC: 10 G/DL (ref 12–16)
IMM GRANULOCYTES # BLD: 0 K/UL
LYMPHOCYTES # BLD: 0.9 K/UL (ref 1.1–4.5)
LYMPHOCYTES NFR BLD: 16.9 % (ref 20–40)
MCH RBC QN AUTO: 29 PG (ref 27–31)
MCHC RBC AUTO-ENTMCNC: 31.3 G/DL (ref 33–37)
MCV RBC AUTO: 92.5 FL (ref 81–99)
MONOCYTES # BLD: 0.7 K/UL (ref 0–0.9)
MONOCYTES NFR BLD: 12.5 % (ref 0–10)
NEUTROPHILS # BLD: 3.6 K/UL (ref 1.5–7.5)
NEUTS SEG NFR BLD: 67.9 % (ref 50–65)
PLATELET # BLD AUTO: 257 K/UL (ref 130–400)
PMV BLD AUTO: 10.9 FL (ref 9.4–12.3)
POTASSIUM SERPL-SCNC: 3.7 MMOL/L (ref 3.5–5)
RBC # BLD AUTO: 3.45 M/UL (ref 4.2–5.4)
SODIUM SERPL-SCNC: 137 MMOL/L (ref 136–145)
VANCOMYCIN TROUGH SERPL-MCNC: 26.9 UG/ML (ref 10–20)
WBC # BLD AUTO: 5.3 K/UL (ref 4.8–10.8)

## 2024-10-12 PROCEDURE — 80202 ASSAY OF VANCOMYCIN: CPT

## 2024-10-12 PROCEDURE — 94760 N-INVAS EAR/PLS OXIMETRY 1: CPT

## 2024-10-12 PROCEDURE — 6370000000 HC RX 637 (ALT 250 FOR IP): Performed by: NEUROLOGICAL SURGERY

## 2024-10-12 PROCEDURE — 6370000000 HC RX 637 (ALT 250 FOR IP): Performed by: INTERNAL MEDICINE

## 2024-10-12 PROCEDURE — 36415 COLL VENOUS BLD VENIPUNCTURE: CPT

## 2024-10-12 PROCEDURE — 6360000002 HC RX W HCPCS: Performed by: INTERNAL MEDICINE

## 2024-10-12 PROCEDURE — 1200000000 HC SEMI PRIVATE

## 2024-10-12 PROCEDURE — 6370000000 HC RX 637 (ALT 250 FOR IP): Performed by: STUDENT IN AN ORGANIZED HEALTH CARE EDUCATION/TRAINING PROGRAM

## 2024-10-12 PROCEDURE — 85025 COMPLETE CBC W/AUTO DIFF WBC: CPT

## 2024-10-12 PROCEDURE — 99231 SBSQ HOSP IP/OBS SF/LOW 25: CPT | Performed by: NURSE PRACTITIONER

## 2024-10-12 PROCEDURE — 6360000002 HC RX W HCPCS: Performed by: STUDENT IN AN ORGANIZED HEALTH CARE EDUCATION/TRAINING PROGRAM

## 2024-10-12 PROCEDURE — 80048 BASIC METABOLIC PNL TOTAL CA: CPT

## 2024-10-12 PROCEDURE — 2580000003 HC RX 258: Performed by: NEUROLOGICAL SURGERY

## 2024-10-12 RX ORDER — VANCOMYCIN 1 G/200ML
1000 INJECTION, SOLUTION INTRAVENOUS EVERY 24 HOURS
Status: DISCONTINUED | OUTPATIENT
Start: 2024-10-12 | End: 2024-10-12 | Stop reason: DRUGHIGH

## 2024-10-12 RX ORDER — MORPHINE SULFATE 2 MG/ML
1 INJECTION, SOLUTION INTRAMUSCULAR; INTRAVENOUS ONCE
Status: COMPLETED | OUTPATIENT
Start: 2024-10-12 | End: 2024-10-12

## 2024-10-12 RX ADMIN — METOPROLOL TARTRATE 25 MG: 25 TABLET, FILM COATED ORAL at 19:49

## 2024-10-12 RX ADMIN — MEGESTROL ACETATE 200 MG: 40 SUSPENSION ORAL at 08:30

## 2024-10-12 RX ADMIN — METOPROLOL TARTRATE 25 MG: 25 TABLET, FILM COATED ORAL at 08:30

## 2024-10-12 RX ADMIN — APIXABAN 5 MG: 5 TABLET, FILM COATED ORAL at 19:49

## 2024-10-12 RX ADMIN — QUETIAPINE FUMARATE 25 MG: 25 TABLET ORAL at 08:30

## 2024-10-12 RX ADMIN — ATORVASTATIN CALCIUM 20 MG: 20 TABLET, FILM COATED ORAL at 08:29

## 2024-10-12 RX ADMIN — METHOCARBAMOL 500 MG: 500 TABLET ORAL at 19:49

## 2024-10-12 RX ADMIN — OXYCODONE 5 MG: 5 TABLET ORAL at 21:40

## 2024-10-12 RX ADMIN — METHOCARBAMOL 500 MG: 500 TABLET ORAL at 08:29

## 2024-10-12 RX ADMIN — ASPIRIN 81 MG: 81 TABLET, CHEWABLE ORAL at 08:30

## 2024-10-12 RX ADMIN — POLYETHYLENE GLYCOL 3350 17 G: 17 POWDER, FOR SOLUTION ORAL at 08:29

## 2024-10-12 RX ADMIN — METHIMAZOLE 5 MG: 5 TABLET ORAL at 19:49

## 2024-10-12 RX ADMIN — AMIODARONE HYDROCHLORIDE 200 MG: 200 TABLET ORAL at 08:29

## 2024-10-12 RX ADMIN — SENNOSIDES AND DOCUSATE SODIUM 2 TABLET: 50; 8.6 TABLET ORAL at 08:30

## 2024-10-12 RX ADMIN — SODIUM CHLORIDE, PRESERVATIVE FREE 10 ML: 5 INJECTION INTRAVENOUS at 19:49

## 2024-10-12 RX ADMIN — SERTRALINE HYDROCHLORIDE 50 MG: 50 TABLET ORAL at 08:30

## 2024-10-12 RX ADMIN — OXYCODONE 5 MG: 5 TABLET ORAL at 16:22

## 2024-10-12 RX ADMIN — FOLIC ACID 1 MG: 1 TABLET ORAL at 08:30

## 2024-10-12 RX ADMIN — APIXABAN 5 MG: 5 TABLET, FILM COATED ORAL at 08:30

## 2024-10-12 RX ADMIN — SODIUM CHLORIDE, PRESERVATIVE FREE 10 ML: 5 INJECTION INTRAVENOUS at 08:30

## 2024-10-12 RX ADMIN — VANCOMYCIN 1000 MG: 1 INJECTION, SOLUTION INTRAVENOUS at 19:52

## 2024-10-12 RX ADMIN — QUETIAPINE FUMARATE 25 MG: 25 TABLET ORAL at 19:49

## 2024-10-12 RX ADMIN — METHIMAZOLE 5 MG: 5 TABLET ORAL at 08:34

## 2024-10-12 RX ADMIN — MORPHINE SULFATE 1 MG: 2 INJECTION, SOLUTION INTRAMUSCULAR; INTRAVENOUS at 10:35

## 2024-10-12 RX ADMIN — AMIODARONE HYDROCHLORIDE 200 MG: 200 TABLET ORAL at 19:49

## 2024-10-12 RX ADMIN — HYDROMORPHONE HYDROCHLORIDE 0.25 MG: 1 INJECTION, SOLUTION INTRAMUSCULAR; INTRAVENOUS; SUBCUTANEOUS at 19:44

## 2024-10-12 ASSESSMENT — PAIN - FUNCTIONAL ASSESSMENT
PAIN_FUNCTIONAL_ASSESSMENT: PREVENTS OR INTERFERES SOME ACTIVE ACTIVITIES AND ADLS
PAIN_FUNCTIONAL_ASSESSMENT: PREVENTS OR INTERFERES WITH MANY ACTIVE NOT PASSIVE ACTIVITIES

## 2024-10-12 ASSESSMENT — PAIN DESCRIPTION - DESCRIPTORS
DESCRIPTORS: ACHING
DESCRIPTORS: ACHING;THROBBING
DESCRIPTORS: THROBBING;SHOOTING
DESCRIPTORS: ACHING

## 2024-10-12 ASSESSMENT — PAIN SCALES - GENERAL
PAINLEVEL_OUTOF10: 2
PAINLEVEL_OUTOF10: 10
PAINLEVEL_OUTOF10: 8
PAINLEVEL_OUTOF10: 8
PAINLEVEL_OUTOF10: 6
PAINLEVEL_OUTOF10: 8

## 2024-10-12 ASSESSMENT — PAIN DESCRIPTION - LOCATION
LOCATION: LEG
LOCATION: LEG;BACK

## 2024-10-12 ASSESSMENT — PAIN DESCRIPTION - ORIENTATION
ORIENTATION: RIGHT;LEFT
ORIENTATION: RIGHT
ORIENTATION: RIGHT
ORIENTATION: RIGHT;LEFT

## 2024-10-13 LAB
ANION GAP SERPL CALCULATED.3IONS-SCNC: 9 MMOL/L (ref 7–19)
BASOPHILS # BLD: 0 K/UL (ref 0–0.2)
BASOPHILS NFR BLD: 0.4 % (ref 0–1)
BUN SERPL-MCNC: 30 MG/DL (ref 8–23)
CALCIUM SERPL-MCNC: 8 MG/DL (ref 8.8–10.2)
CHLORIDE SERPL-SCNC: 104 MMOL/L (ref 98–111)
CO2 SERPL-SCNC: 25 MMOL/L (ref 22–29)
CREAT SERPL-MCNC: 1.3 MG/DL (ref 0.5–0.9)
EOSINOPHIL # BLD: 0.1 K/UL (ref 0–0.6)
EOSINOPHIL NFR BLD: 2.3 % (ref 0–5)
ERYTHROCYTE [DISTWIDTH] IN BLOOD BY AUTOMATED COUNT: 17.4 % (ref 11.5–14.5)
GLUCOSE SERPL-MCNC: 113 MG/DL (ref 70–99)
HCT VFR BLD AUTO: 31.1 % (ref 37–47)
HGB BLD-MCNC: 9.9 G/DL (ref 12–16)
IMM GRANULOCYTES # BLD: 0 K/UL
LYMPHOCYTES # BLD: 0.8 K/UL (ref 1.1–4.5)
LYMPHOCYTES NFR BLD: 15.7 % (ref 20–40)
MAGNESIUM SERPL-MCNC: 1.7 MG/DL (ref 1.6–2.4)
MCH RBC QN AUTO: 29.4 PG (ref 27–31)
MCHC RBC AUTO-ENTMCNC: 31.8 G/DL (ref 33–37)
MCV RBC AUTO: 92.3 FL (ref 81–99)
MONOCYTES # BLD: 0.7 K/UL (ref 0–0.9)
MONOCYTES NFR BLD: 13 % (ref 0–10)
NEUTROPHILS # BLD: 3.5 K/UL (ref 1.5–7.5)
NEUTS SEG NFR BLD: 67.8 % (ref 50–65)
PLATELET # BLD AUTO: 282 K/UL (ref 130–400)
PMV BLD AUTO: 11.3 FL (ref 9.4–12.3)
POTASSIUM SERPL-SCNC: 3.4 MMOL/L (ref 3.5–5)
RBC # BLD AUTO: 3.37 M/UL (ref 4.2–5.4)
SODIUM SERPL-SCNC: 138 MMOL/L (ref 136–145)
VANCOMYCIN TROUGH SERPL-MCNC: 20.5 UG/ML (ref 10–20)
WBC # BLD AUTO: 5.2 K/UL (ref 4.8–10.8)

## 2024-10-13 PROCEDURE — 80048 BASIC METABOLIC PNL TOTAL CA: CPT

## 2024-10-13 PROCEDURE — 6360000002 HC RX W HCPCS: Performed by: STUDENT IN AN ORGANIZED HEALTH CARE EDUCATION/TRAINING PROGRAM

## 2024-10-13 PROCEDURE — 6360000002 HC RX W HCPCS: Performed by: NEUROLOGICAL SURGERY

## 2024-10-13 PROCEDURE — 83735 ASSAY OF MAGNESIUM: CPT

## 2024-10-13 PROCEDURE — 85025 COMPLETE CBC W/AUTO DIFF WBC: CPT

## 2024-10-13 PROCEDURE — 6370000000 HC RX 637 (ALT 250 FOR IP): Performed by: STUDENT IN AN ORGANIZED HEALTH CARE EDUCATION/TRAINING PROGRAM

## 2024-10-13 PROCEDURE — 80202 ASSAY OF VANCOMYCIN: CPT

## 2024-10-13 PROCEDURE — 2580000003 HC RX 258: Performed by: NEUROLOGICAL SURGERY

## 2024-10-13 PROCEDURE — 6370000000 HC RX 637 (ALT 250 FOR IP): Performed by: INTERNAL MEDICINE

## 2024-10-13 PROCEDURE — 6370000000 HC RX 637 (ALT 250 FOR IP): Performed by: NEUROLOGICAL SURGERY

## 2024-10-13 PROCEDURE — 99232 SBSQ HOSP IP/OBS MODERATE 35: CPT | Performed by: PSYCHIATRY & NEUROLOGY

## 2024-10-13 PROCEDURE — 36415 COLL VENOUS BLD VENIPUNCTURE: CPT

## 2024-10-13 PROCEDURE — 94760 N-INVAS EAR/PLS OXIMETRY 1: CPT

## 2024-10-13 PROCEDURE — 1200000000 HC SEMI PRIVATE

## 2024-10-13 RX ORDER — VANCOMYCIN 1 G/200ML
1000 INJECTION, SOLUTION INTRAVENOUS ONCE
Status: COMPLETED | OUTPATIENT
Start: 2024-10-13 | End: 2024-10-13

## 2024-10-13 RX ORDER — POTASSIUM CHLORIDE 7.45 MG/ML
10 INJECTION INTRAVENOUS PRN
Status: DISCONTINUED | OUTPATIENT
Start: 2024-10-13 | End: 2024-10-20 | Stop reason: HOSPADM

## 2024-10-13 RX ORDER — POTASSIUM CHLORIDE 1500 MG/1
40 TABLET, EXTENDED RELEASE ORAL PRN
Status: DISCONTINUED | OUTPATIENT
Start: 2024-10-13 | End: 2024-10-20 | Stop reason: HOSPADM

## 2024-10-13 RX ADMIN — AMIODARONE HYDROCHLORIDE 200 MG: 200 TABLET ORAL at 09:33

## 2024-10-13 RX ADMIN — VANCOMYCIN 1000 MG: 1 INJECTION, SOLUTION INTRAVENOUS at 12:08

## 2024-10-13 RX ADMIN — HYDROMORPHONE HYDROCHLORIDE 0.25 MG: 1 INJECTION, SOLUTION INTRAMUSCULAR; INTRAVENOUS; SUBCUTANEOUS at 00:43

## 2024-10-13 RX ADMIN — ATORVASTATIN CALCIUM 20 MG: 20 TABLET, FILM COATED ORAL at 09:33

## 2024-10-13 RX ADMIN — ONDANSETRON 4 MG: 4 TABLET, ORALLY DISINTEGRATING ORAL at 16:59

## 2024-10-13 RX ADMIN — METHIMAZOLE 5 MG: 5 TABLET ORAL at 21:26

## 2024-10-13 RX ADMIN — AMIODARONE HYDROCHLORIDE 200 MG: 200 TABLET ORAL at 21:26

## 2024-10-13 RX ADMIN — METHOCARBAMOL 500 MG: 500 TABLET ORAL at 21:26

## 2024-10-13 RX ADMIN — SERTRALINE HYDROCHLORIDE 50 MG: 50 TABLET ORAL at 09:33

## 2024-10-13 RX ADMIN — OXYCODONE 5 MG: 5 TABLET ORAL at 04:59

## 2024-10-13 RX ADMIN — METOPROLOL TARTRATE 25 MG: 25 TABLET, FILM COATED ORAL at 21:26

## 2024-10-13 RX ADMIN — SODIUM CHLORIDE, PRESERVATIVE FREE 10 ML: 5 INJECTION INTRAVENOUS at 09:34

## 2024-10-13 RX ADMIN — QUETIAPINE FUMARATE 25 MG: 25 TABLET ORAL at 09:33

## 2024-10-13 RX ADMIN — ASPIRIN 81 MG: 81 TABLET, CHEWABLE ORAL at 09:33

## 2024-10-13 RX ADMIN — FOLIC ACID 1 MG: 1 TABLET ORAL at 09:33

## 2024-10-13 RX ADMIN — OXYCODONE 5 MG: 5 TABLET ORAL at 10:42

## 2024-10-13 RX ADMIN — ONDANSETRON 4 MG: 2 INJECTION INTRAMUSCULAR; INTRAVENOUS at 09:33

## 2024-10-13 RX ADMIN — HYDROMORPHONE HYDROCHLORIDE 0.25 MG: 1 INJECTION, SOLUTION INTRAMUSCULAR; INTRAVENOUS; SUBCUTANEOUS at 21:33

## 2024-10-13 RX ADMIN — METHOCARBAMOL 500 MG: 500 TABLET ORAL at 09:33

## 2024-10-13 RX ADMIN — APIXABAN 5 MG: 5 TABLET, FILM COATED ORAL at 09:33

## 2024-10-13 RX ADMIN — QUETIAPINE FUMARATE 25 MG: 25 TABLET ORAL at 21:27

## 2024-10-13 RX ADMIN — SODIUM CHLORIDE, PRESERVATIVE FREE 10 ML: 5 INJECTION INTRAVENOUS at 21:34

## 2024-10-13 RX ADMIN — METHIMAZOLE 5 MG: 5 TABLET ORAL at 09:33

## 2024-10-13 RX ADMIN — OXYCODONE 5 MG: 5 TABLET ORAL at 16:59

## 2024-10-13 RX ADMIN — METOPROLOL TARTRATE 25 MG: 25 TABLET, FILM COATED ORAL at 09:33

## 2024-10-13 RX ADMIN — APIXABAN 5 MG: 5 TABLET, FILM COATED ORAL at 21:26

## 2024-10-13 ASSESSMENT — PAIN DESCRIPTION - LOCATION
LOCATION: BACK
LOCATION: BACK;LEG
LOCATION: BACK;LEG
LOCATION: BACK
LOCATION: BACK

## 2024-10-13 ASSESSMENT — PAIN DESCRIPTION - DESCRIPTORS
DESCRIPTORS: ACHING;THROBBING;TENDER
DESCRIPTORS: ACHING;THROBBING
DESCRIPTORS: ACHING
DESCRIPTORS: ACHING

## 2024-10-13 ASSESSMENT — PAIN SCALES - GENERAL
PAINLEVEL_OUTOF10: 10
PAINLEVEL_OUTOF10: 8
PAINLEVEL_OUTOF10: 4
PAINLEVEL_OUTOF10: 6
PAINLEVEL_OUTOF10: 8
PAINLEVEL_OUTOF10: 9
PAINLEVEL_OUTOF10: 2

## 2024-10-13 ASSESSMENT — PAIN DESCRIPTION - ORIENTATION
ORIENTATION: RIGHT;LEFT
ORIENTATION: LOWER
ORIENTATION: RIGHT;LEFT

## 2024-10-14 ENCOUNTER — OUTSIDE FACILITY SERVICE (OUTPATIENT)
Age: 74
End: 2024-10-14
Payer: MEDICARE

## 2024-10-14 LAB
ANION GAP SERPL CALCULATED.3IONS-SCNC: 9 MMOL/L (ref 7–19)
BASOPHILS # BLD: 0.1 K/UL (ref 0–0.2)
BASOPHILS NFR BLD: 1.1 % (ref 0–1)
BUN SERPL-MCNC: 30 MG/DL (ref 8–23)
CALCIUM SERPL-MCNC: 8.2 MG/DL (ref 8.8–10.2)
CHLORIDE SERPL-SCNC: 101 MMOL/L (ref 98–111)
CO2 SERPL-SCNC: 26 MMOL/L (ref 22–29)
CREAT SERPL-MCNC: 1.3 MG/DL (ref 0.5–0.9)
EOSINOPHIL # BLD: 0.1 K/UL (ref 0–0.6)
EOSINOPHIL NFR BLD: 3.1 % (ref 0–5)
ERYTHROCYTE [DISTWIDTH] IN BLOOD BY AUTOMATED COUNT: 17.6 % (ref 11.5–14.5)
GLUCOSE SERPL-MCNC: 103 MG/DL (ref 70–99)
HCT VFR BLD AUTO: 31.3 % (ref 37–47)
HGB BLD-MCNC: 9.9 G/DL (ref 12–16)
IMM GRANULOCYTES # BLD: 0 K/UL
LYMPHOCYTES # BLD: 1 K/UL (ref 1.1–4.5)
LYMPHOCYTES NFR BLD: 20.9 % (ref 20–40)
MCH RBC QN AUTO: 29.3 PG (ref 27–31)
MCHC RBC AUTO-ENTMCNC: 31.6 G/DL (ref 33–37)
MCV RBC AUTO: 92.6 FL (ref 81–99)
MONOCYTES # BLD: 0.6 K/UL (ref 0–0.9)
MONOCYTES NFR BLD: 12.2 % (ref 0–10)
NEUTROPHILS # BLD: 2.8 K/UL (ref 1.5–7.5)
NEUTS SEG NFR BLD: 61.8 % (ref 50–65)
PLATELET # BLD AUTO: 304 K/UL (ref 130–400)
PMV BLD AUTO: 11 FL (ref 9.4–12.3)
POTASSIUM SERPL-SCNC: 3.8 MMOL/L (ref 3.5–5)
RBC # BLD AUTO: 3.38 M/UL (ref 4.2–5.4)
SODIUM SERPL-SCNC: 136 MMOL/L (ref 136–145)
WBC # BLD AUTO: 4.6 K/UL (ref 4.8–10.8)

## 2024-10-14 PROCEDURE — 6370000000 HC RX 637 (ALT 250 FOR IP): Performed by: INTERNAL MEDICINE

## 2024-10-14 PROCEDURE — 97535 SELF CARE MNGMENT TRAINING: CPT

## 2024-10-14 PROCEDURE — 6370000000 HC RX 637 (ALT 250 FOR IP): Performed by: NEUROLOGICAL SURGERY

## 2024-10-14 PROCEDURE — 6370000000 HC RX 637 (ALT 250 FOR IP): Performed by: STUDENT IN AN ORGANIZED HEALTH CARE EDUCATION/TRAINING PROGRAM

## 2024-10-14 PROCEDURE — 97530 THERAPEUTIC ACTIVITIES: CPT

## 2024-10-14 PROCEDURE — 85025 COMPLETE CBC W/AUTO DIFF WBC: CPT

## 2024-10-14 PROCEDURE — 1200000000 HC SEMI PRIVATE

## 2024-10-14 PROCEDURE — 94760 N-INVAS EAR/PLS OXIMETRY 1: CPT

## 2024-10-14 PROCEDURE — 80048 BASIC METABOLIC PNL TOTAL CA: CPT

## 2024-10-14 PROCEDURE — 97165 OT EVAL LOW COMPLEX 30 MIN: CPT

## 2024-10-14 PROCEDURE — 2580000003 HC RX 258: Performed by: NEUROLOGICAL SURGERY

## 2024-10-14 PROCEDURE — 99024 POSTOP FOLLOW-UP VISIT: CPT | Performed by: NEUROLOGICAL SURGERY

## 2024-10-14 PROCEDURE — 36415 COLL VENOUS BLD VENIPUNCTURE: CPT

## 2024-10-14 PROCEDURE — 6360000002 HC RX W HCPCS: Performed by: STUDENT IN AN ORGANIZED HEALTH CARE EDUCATION/TRAINING PROGRAM

## 2024-10-14 PROCEDURE — 97110 THERAPEUTIC EXERCISES: CPT

## 2024-10-14 RX ADMIN — METOPROLOL TARTRATE 25 MG: 25 TABLET, FILM COATED ORAL at 22:02

## 2024-10-14 RX ADMIN — HYDROMORPHONE HYDROCHLORIDE 0.25 MG: 1 INJECTION, SOLUTION INTRAMUSCULAR; INTRAVENOUS; SUBCUTANEOUS at 06:38

## 2024-10-14 RX ADMIN — OXYCODONE 5 MG: 5 TABLET ORAL at 22:04

## 2024-10-14 RX ADMIN — QUETIAPINE FUMARATE 25 MG: 25 TABLET ORAL at 22:02

## 2024-10-14 RX ADMIN — OXYCODONE 5 MG: 5 TABLET ORAL at 04:00

## 2024-10-14 RX ADMIN — SODIUM CHLORIDE, PRESERVATIVE FREE 10 ML: 5 INJECTION INTRAVENOUS at 09:17

## 2024-10-14 RX ADMIN — ATORVASTATIN CALCIUM 20 MG: 20 TABLET, FILM COATED ORAL at 09:14

## 2024-10-14 RX ADMIN — APIXABAN 5 MG: 5 TABLET, FILM COATED ORAL at 22:02

## 2024-10-14 RX ADMIN — NALOXEGOL OXALATE 12.5 MG: 12.5 TABLET, FILM COATED ORAL at 05:59

## 2024-10-14 RX ADMIN — AMIODARONE HYDROCHLORIDE 200 MG: 200 TABLET ORAL at 09:14

## 2024-10-14 RX ADMIN — HYDROMORPHONE HYDROCHLORIDE 0.25 MG: 1 INJECTION, SOLUTION INTRAMUSCULAR; INTRAVENOUS; SUBCUTANEOUS at 14:13

## 2024-10-14 RX ADMIN — METHOCARBAMOL 500 MG: 500 TABLET ORAL at 22:02

## 2024-10-14 RX ADMIN — METHIMAZOLE 5 MG: 5 TABLET ORAL at 09:15

## 2024-10-14 RX ADMIN — QUETIAPINE FUMARATE 25 MG: 25 TABLET ORAL at 09:15

## 2024-10-14 RX ADMIN — AMIODARONE HYDROCHLORIDE 200 MG: 200 TABLET ORAL at 22:02

## 2024-10-14 RX ADMIN — SERTRALINE HYDROCHLORIDE 50 MG: 50 TABLET ORAL at 09:14

## 2024-10-14 RX ADMIN — METHOCARBAMOL 500 MG: 500 TABLET ORAL at 09:15

## 2024-10-14 RX ADMIN — METHIMAZOLE 5 MG: 5 TABLET ORAL at 22:02

## 2024-10-14 RX ADMIN — OXYCODONE 5 MG: 5 TABLET ORAL at 12:09

## 2024-10-14 RX ADMIN — FOLIC ACID 1 MG: 1 TABLET ORAL at 09:15

## 2024-10-14 RX ADMIN — APIXABAN 5 MG: 5 TABLET, FILM COATED ORAL at 09:14

## 2024-10-14 RX ADMIN — ASPIRIN 81 MG: 81 TABLET, CHEWABLE ORAL at 09:14

## 2024-10-14 ASSESSMENT — PAIN SCALES - GENERAL
PAINLEVEL_OUTOF10: 8
PAINLEVEL_OUTOF10: 7
PAINLEVEL_OUTOF10: 0
PAINLEVEL_OUTOF10: 0
PAINLEVEL_OUTOF10: 10
PAINLEVEL_OUTOF10: 7

## 2024-10-14 ASSESSMENT — PAIN DESCRIPTION - ORIENTATION
ORIENTATION: RIGHT;LEFT;MID
ORIENTATION: LOWER
ORIENTATION: RIGHT;LEFT

## 2024-10-14 ASSESSMENT — PAIN DESCRIPTION - LOCATION
LOCATION: BACK
LOCATION: LEG;GROIN
LOCATION: BACK;GROIN
LOCATION: BACK
LOCATION: BACK

## 2024-10-14 ASSESSMENT — PAIN DESCRIPTION - DESCRIPTORS
DESCRIPTORS: PATIENT UNABLE TO DESCRIBE
DESCRIPTORS: THROBBING;SORE;ACHING
DESCRIPTORS: JABBING

## 2024-10-14 ASSESSMENT — PAIN - FUNCTIONAL ASSESSMENT
PAIN_FUNCTIONAL_ASSESSMENT: PREVENTS OR INTERFERES WITH ALL ACTIVE AND SOME PASSIVE ACTIVITIES
PAIN_FUNCTIONAL_ASSESSMENT: PREVENTS OR INTERFERES SOME ACTIVE ACTIVITIES AND ADLS

## 2024-10-14 ASSESSMENT — PAIN DESCRIPTION - ONSET: ONSET: ON-GOING

## 2024-10-14 ASSESSMENT — PAIN DESCRIPTION - FREQUENCY: FREQUENCY: CONTINUOUS

## 2024-10-14 ASSESSMENT — PAIN DESCRIPTION - PAIN TYPE: TYPE: ACUTE PAIN

## 2024-10-14 NOTE — CARE COORDINATION
SW visited with Pt and son, at bedside, this AM re: continued d/c planning; on Friday son was interested in taking Pt home and trying to cover 24/7 care; this morning he indicated that will not be possible at current, but they will work toward that as soon as possible.  Pt also involved in conversation and voiced understanding;     Pt and son would like SNF referrals again to the three facilities in [facility] ([place], [place], and [place]) and agree to additional referrals to [facility] (except [facility]), [facility] SNFs and also [facility] SNFs; will require precert.    Electronically signed by YAZMIN Quezada on 10/14/2024 at 3:09 PM

## 2024-10-14 NOTE — CARE COORDINATION
Referrals being sent to [facility], [facility], [facility], [facility], [facility], [facility], [facility], [facility] and [facility]. Awaiting acceptance/denial.  Electronically signed by Addie Sanchez on 10/14/2024 at 2:11 PM

## 2024-10-15 ENCOUNTER — OUTSIDE FACILITY SERVICE (OUTPATIENT)
Age: 74
End: 2024-10-15
Payer: MEDICARE

## 2024-10-15 LAB
ANION GAP SERPL CALCULATED.3IONS-SCNC: 10 MMOL/L (ref 7–19)
BASOPHILS # BLD: 0 K/UL (ref 0–0.2)
BASOPHILS NFR BLD: 0.6 % (ref 0–1)
BUN SERPL-MCNC: 27 MG/DL (ref 8–23)
CALCIUM SERPL-MCNC: 8 MG/DL (ref 8.8–10.2)
CHLORIDE SERPL-SCNC: 103 MMOL/L (ref 98–111)
CO2 SERPL-SCNC: 24 MMOL/L (ref 22–29)
CREAT SERPL-MCNC: 1.4 MG/DL (ref 0.5–0.9)
EOSINOPHIL # BLD: 0 K/UL (ref 0–0.6)
EOSINOPHIL NFR BLD: 0.6 % (ref 0–5)
ERYTHROCYTE [DISTWIDTH] IN BLOOD BY AUTOMATED COUNT: 17.3 % (ref 11.5–14.5)
GLUCOSE SERPL-MCNC: 127 MG/DL (ref 70–99)
HCT VFR BLD AUTO: 30 % (ref 37–47)
HGB BLD-MCNC: 9.6 G/DL (ref 12–16)
IMM GRANULOCYTES # BLD: 0 K/UL
LYMPHOCYTES # BLD: 0.7 K/UL (ref 1.1–4.5)
LYMPHOCYTES NFR BLD: 12.3 % (ref 20–40)
MAGNESIUM SERPL-MCNC: 1.8 MG/DL (ref 1.6–2.4)
MCH RBC QN AUTO: 29.4 PG (ref 27–31)
MCHC RBC AUTO-ENTMCNC: 32 G/DL (ref 33–37)
MCV RBC AUTO: 92 FL (ref 81–99)
MONOCYTES # BLD: 0.6 K/UL (ref 0–0.9)
MONOCYTES NFR BLD: 10.6 % (ref 0–10)
NEUTROPHILS # BLD: 4 K/UL (ref 1.5–7.5)
NEUTS SEG NFR BLD: 75.3 % (ref 50–65)
PLATELET # BLD AUTO: 328 K/UL (ref 130–400)
PMV BLD AUTO: 11.1 FL (ref 9.4–12.3)
POTASSIUM SERPL-SCNC: 3.3 MMOL/L (ref 3.5–5)
RBC # BLD AUTO: 3.26 M/UL (ref 4.2–5.4)
SODIUM SERPL-SCNC: 137 MMOL/L (ref 136–145)
VANCOMYCIN SERPL-MCNC: 23 UG/ML
VANCOMYCIN TROUGH SERPL-MCNC: 17.9 UG/ML (ref 10–20)
WBC # BLD AUTO: 5.4 K/UL (ref 4.8–10.8)

## 2024-10-15 PROCEDURE — 6370000000 HC RX 637 (ALT 250 FOR IP): Performed by: INTERNAL MEDICINE

## 2024-10-15 PROCEDURE — 6370000000 HC RX 637 (ALT 250 FOR IP): Performed by: NEUROLOGICAL SURGERY

## 2024-10-15 PROCEDURE — 80202 ASSAY OF VANCOMYCIN: CPT

## 2024-10-15 PROCEDURE — 6360000002 HC RX W HCPCS: Performed by: STUDENT IN AN ORGANIZED HEALTH CARE EDUCATION/TRAINING PROGRAM

## 2024-10-15 PROCEDURE — 6360000002 HC RX W HCPCS: Performed by: INTERNAL MEDICINE

## 2024-10-15 PROCEDURE — 85025 COMPLETE CBC W/AUTO DIFF WBC: CPT

## 2024-10-15 PROCEDURE — 2580000003 HC RX 258: Performed by: NEUROLOGICAL SURGERY

## 2024-10-15 PROCEDURE — 6370000000 HC RX 637 (ALT 250 FOR IP): Performed by: STUDENT IN AN ORGANIZED HEALTH CARE EDUCATION/TRAINING PROGRAM

## 2024-10-15 PROCEDURE — 80048 BASIC METABOLIC PNL TOTAL CA: CPT

## 2024-10-15 PROCEDURE — 2580000003 HC RX 258: Performed by: INTERNAL MEDICINE

## 2024-10-15 PROCEDURE — 99232 SBSQ HOSP IP/OBS MODERATE 35: CPT | Performed by: PSYCHIATRY & NEUROLOGY

## 2024-10-15 PROCEDURE — 36415 COLL VENOUS BLD VENIPUNCTURE: CPT

## 2024-10-15 PROCEDURE — 1200000000 HC SEMI PRIVATE

## 2024-10-15 PROCEDURE — 83735 ASSAY OF MAGNESIUM: CPT

## 2024-10-15 RX ORDER — VANCOMYCIN 1.75 G/350ML
1250 INJECTION, SOLUTION INTRAVENOUS ONCE
Status: DISCONTINUED | OUTPATIENT
Start: 2024-10-15 | End: 2024-10-15 | Stop reason: SDUPTHER

## 2024-10-15 RX ADMIN — VANCOMYCIN HYDROCHLORIDE 1250 MG: 10 INJECTION, POWDER, LYOPHILIZED, FOR SOLUTION INTRAVENOUS at 17:11

## 2024-10-15 RX ADMIN — SODIUM CHLORIDE, PRESERVATIVE FREE 10 ML: 5 INJECTION INTRAVENOUS at 21:16

## 2024-10-15 RX ADMIN — OXYCODONE 5 MG: 5 TABLET ORAL at 11:07

## 2024-10-15 RX ADMIN — HYDROMORPHONE HYDROCHLORIDE 0.25 MG: 1 INJECTION, SOLUTION INTRAMUSCULAR; INTRAVENOUS; SUBCUTANEOUS at 01:18

## 2024-10-15 RX ADMIN — HYDROMORPHONE HYDROCHLORIDE 0.25 MG: 1 INJECTION, SOLUTION INTRAMUSCULAR; INTRAVENOUS; SUBCUTANEOUS at 08:10

## 2024-10-15 RX ADMIN — METHIMAZOLE 5 MG: 5 TABLET ORAL at 21:16

## 2024-10-15 RX ADMIN — ATORVASTATIN CALCIUM 20 MG: 20 TABLET, FILM COATED ORAL at 08:11

## 2024-10-15 RX ADMIN — METHOCARBAMOL 500 MG: 500 TABLET ORAL at 08:11

## 2024-10-15 RX ADMIN — APIXABAN 5 MG: 5 TABLET, FILM COATED ORAL at 21:16

## 2024-10-15 RX ADMIN — AMIODARONE HYDROCHLORIDE 200 MG: 200 TABLET ORAL at 08:11

## 2024-10-15 RX ADMIN — METOPROLOL TARTRATE 25 MG: 25 TABLET, FILM COATED ORAL at 21:16

## 2024-10-15 RX ADMIN — APIXABAN 5 MG: 5 TABLET, FILM COATED ORAL at 08:11

## 2024-10-15 RX ADMIN — METHOCARBAMOL 500 MG: 500 TABLET ORAL at 21:16

## 2024-10-15 RX ADMIN — NALOXEGOL OXALATE 12.5 MG: 12.5 TABLET, FILM COATED ORAL at 04:44

## 2024-10-15 RX ADMIN — QUETIAPINE FUMARATE 25 MG: 25 TABLET ORAL at 21:16

## 2024-10-15 RX ADMIN — FOLIC ACID 1 MG: 1 TABLET ORAL at 08:11

## 2024-10-15 RX ADMIN — AMIODARONE HYDROCHLORIDE 200 MG: 200 TABLET ORAL at 21:16

## 2024-10-15 RX ADMIN — SODIUM CHLORIDE, PRESERVATIVE FREE 10 ML: 5 INJECTION INTRAVENOUS at 00:01

## 2024-10-15 RX ADMIN — METHIMAZOLE 5 MG: 5 TABLET ORAL at 08:11

## 2024-10-15 RX ADMIN — QUETIAPINE FUMARATE 25 MG: 25 TABLET ORAL at 08:11

## 2024-10-15 RX ADMIN — SODIUM CHLORIDE, PRESERVATIVE FREE 10 ML: 5 INJECTION INTRAVENOUS at 08:12

## 2024-10-15 RX ADMIN — ASPIRIN 81 MG: 81 TABLET, CHEWABLE ORAL at 08:11

## 2024-10-15 RX ADMIN — METOPROLOL TARTRATE 25 MG: 25 TABLET, FILM COATED ORAL at 08:11

## 2024-10-15 RX ADMIN — SERTRALINE HYDROCHLORIDE 50 MG: 50 TABLET ORAL at 08:11

## 2024-10-15 RX ADMIN — HYDROMORPHONE HYDROCHLORIDE 0.25 MG: 1 INJECTION, SOLUTION INTRAMUSCULAR; INTRAVENOUS; SUBCUTANEOUS at 21:26

## 2024-10-15 ASSESSMENT — PAIN DESCRIPTION - LOCATION
LOCATION: BACK;GROIN;LEG
LOCATION: LEG
LOCATION: BACK;LEG
LOCATION: LEG

## 2024-10-15 ASSESSMENT — PAIN SCALES - GENERAL
PAINLEVEL_OUTOF10: 10
PAINLEVEL_OUTOF10: 8
PAINLEVEL_OUTOF10: 0
PAINLEVEL_OUTOF10: 7
PAINLEVEL_OUTOF10: 6
PAINLEVEL_OUTOF10: 0

## 2024-10-15 ASSESSMENT — PAIN DESCRIPTION - DESCRIPTORS
DESCRIPTORS: GNAWING
DESCRIPTORS: DISCOMFORT
DESCRIPTORS: GNAWING
DESCRIPTORS: PATIENT UNABLE TO DESCRIBE

## 2024-10-15 ASSESSMENT — PAIN DESCRIPTION - PAIN TYPE
TYPE: ACUTE PAIN
TYPE: ACUTE PAIN

## 2024-10-15 ASSESSMENT — PAIN DESCRIPTION - ONSET
ONSET: ON-GOING
ONSET: ON-GOING

## 2024-10-15 ASSESSMENT — PAIN - FUNCTIONAL ASSESSMENT
PAIN_FUNCTIONAL_ASSESSMENT: PREVENTS OR INTERFERES WITH ALL ACTIVE AND SOME PASSIVE ACTIVITIES
PAIN_FUNCTIONAL_ASSESSMENT: PREVENTS OR INTERFERES WITH ALL ACTIVE AND SOME PASSIVE ACTIVITIES
PAIN_FUNCTIONAL_ASSESSMENT: PREVENTS OR INTERFERES WITH MANY ACTIVE NOT PASSIVE ACTIVITIES

## 2024-10-15 ASSESSMENT — PAIN DESCRIPTION - ORIENTATION
ORIENTATION: LEFT
ORIENTATION: LEFT
ORIENTATION: RIGHT
ORIENTATION: RIGHT

## 2024-10-15 ASSESSMENT — PAIN DESCRIPTION - FREQUENCY
FREQUENCY: CONTINUOUS
FREQUENCY: CONTINUOUS

## 2024-10-16 LAB
ANION GAP SERPL CALCULATED.3IONS-SCNC: 11 MMOL/L (ref 7–19)
BASOPHILS # BLD: 0 K/UL (ref 0–0.2)
BASOPHILS NFR BLD: 0.5 % (ref 0–1)
BUN SERPL-MCNC: 26 MG/DL (ref 8–23)
CALCIUM SERPL-MCNC: 8 MG/DL (ref 8.8–10.2)
CHLORIDE SERPL-SCNC: 103 MMOL/L (ref 98–111)
CO2 SERPL-SCNC: 22 MMOL/L (ref 22–29)
CREAT SERPL-MCNC: 1.3 MG/DL (ref 0.5–0.9)
EOSINOPHIL # BLD: 0.1 K/UL (ref 0–0.6)
EOSINOPHIL NFR BLD: 2.4 % (ref 0–5)
ERYTHROCYTE [DISTWIDTH] IN BLOOD BY AUTOMATED COUNT: 17.5 % (ref 11.5–14.5)
GLUCOSE SERPL-MCNC: 91 MG/DL (ref 70–99)
HCT VFR BLD AUTO: 33 % (ref 37–47)
HGB BLD-MCNC: 10.2 G/DL (ref 12–16)
IMM GRANULOCYTES # BLD: 0.1 K/UL
LYMPHOCYTES # BLD: 1 K/UL (ref 1.1–4.5)
LYMPHOCYTES NFR BLD: 18.7 % (ref 20–40)
MAGNESIUM SERPL-MCNC: 1.8 MG/DL (ref 1.6–2.4)
MCH RBC QN AUTO: 29.1 PG (ref 27–31)
MCHC RBC AUTO-ENTMCNC: 30.9 G/DL (ref 33–37)
MCV RBC AUTO: 94.3 FL (ref 81–99)
MONOCYTES # BLD: 0.5 K/UL (ref 0–0.9)
MONOCYTES NFR BLD: 9.2 % (ref 0–10)
NEUTROPHILS # BLD: 3.7 K/UL (ref 1.5–7.5)
NEUTS SEG NFR BLD: 68.3 % (ref 50–65)
PLATELET # BLD AUTO: 311 K/UL (ref 130–400)
PMV BLD AUTO: 10.8 FL (ref 9.4–12.3)
POTASSIUM SERPL-SCNC: 3.1 MMOL/L (ref 3.5–5)
RBC # BLD AUTO: 3.5 M/UL (ref 4.2–5.4)
SODIUM SERPL-SCNC: 136 MMOL/L (ref 136–145)
WBC # BLD AUTO: 5.5 K/UL (ref 4.8–10.8)

## 2024-10-16 PROCEDURE — 2580000003 HC RX 258: Performed by: NEUROLOGICAL SURGERY

## 2024-10-16 PROCEDURE — 6370000000 HC RX 637 (ALT 250 FOR IP): Performed by: NEUROLOGICAL SURGERY

## 2024-10-16 PROCEDURE — 6370000000 HC RX 637 (ALT 250 FOR IP): Performed by: STUDENT IN AN ORGANIZED HEALTH CARE EDUCATION/TRAINING PROGRAM

## 2024-10-16 PROCEDURE — 6360000002 HC RX W HCPCS: Performed by: STUDENT IN AN ORGANIZED HEALTH CARE EDUCATION/TRAINING PROGRAM

## 2024-10-16 PROCEDURE — 6370000000 HC RX 637 (ALT 250 FOR IP): Performed by: INTERNAL MEDICINE

## 2024-10-16 PROCEDURE — 36415 COLL VENOUS BLD VENIPUNCTURE: CPT

## 2024-10-16 PROCEDURE — 85025 COMPLETE CBC W/AUTO DIFF WBC: CPT

## 2024-10-16 PROCEDURE — 83735 ASSAY OF MAGNESIUM: CPT

## 2024-10-16 PROCEDURE — 94760 N-INVAS EAR/PLS OXIMETRY 1: CPT

## 2024-10-16 PROCEDURE — 80048 BASIC METABOLIC PNL TOTAL CA: CPT

## 2024-10-16 PROCEDURE — 1200000000 HC SEMI PRIVATE

## 2024-10-16 RX ADMIN — METHIMAZOLE 5 MG: 5 TABLET ORAL at 20:56

## 2024-10-16 RX ADMIN — APIXABAN 5 MG: 5 TABLET, FILM COATED ORAL at 20:56

## 2024-10-16 RX ADMIN — NALOXEGOL OXALATE 12.5 MG: 12.5 TABLET, FILM COATED ORAL at 05:12

## 2024-10-16 RX ADMIN — AMIODARONE HYDROCHLORIDE 200 MG: 200 TABLET ORAL at 08:36

## 2024-10-16 RX ADMIN — APIXABAN 5 MG: 5 TABLET, FILM COATED ORAL at 08:36

## 2024-10-16 RX ADMIN — QUETIAPINE FUMARATE 25 MG: 25 TABLET ORAL at 08:36

## 2024-10-16 RX ADMIN — METOPROLOL TARTRATE 25 MG: 25 TABLET, FILM COATED ORAL at 08:36

## 2024-10-16 RX ADMIN — ATORVASTATIN CALCIUM 20 MG: 20 TABLET, FILM COATED ORAL at 08:36

## 2024-10-16 RX ADMIN — METHOCARBAMOL 500 MG: 500 TABLET ORAL at 20:56

## 2024-10-16 RX ADMIN — HYDROMORPHONE HYDROCHLORIDE 0.25 MG: 1 INJECTION, SOLUTION INTRAMUSCULAR; INTRAVENOUS; SUBCUTANEOUS at 08:36

## 2024-10-16 RX ADMIN — AMIODARONE HYDROCHLORIDE 200 MG: 200 TABLET ORAL at 20:56

## 2024-10-16 RX ADMIN — QUETIAPINE FUMARATE 25 MG: 25 TABLET ORAL at 20:56

## 2024-10-16 RX ADMIN — SERTRALINE HYDROCHLORIDE 50 MG: 50 TABLET ORAL at 08:36

## 2024-10-16 RX ADMIN — POTASSIUM CHLORIDE 40 MEQ: 1500 TABLET, EXTENDED RELEASE ORAL at 05:12

## 2024-10-16 RX ADMIN — METOPROLOL TARTRATE 25 MG: 25 TABLET, FILM COATED ORAL at 20:56

## 2024-10-16 RX ADMIN — OXYCODONE 5 MG: 5 TABLET ORAL at 05:14

## 2024-10-16 RX ADMIN — SODIUM CHLORIDE, PRESERVATIVE FREE 10 ML: 5 INJECTION INTRAVENOUS at 08:36

## 2024-10-16 RX ADMIN — METHOCARBAMOL 500 MG: 500 TABLET ORAL at 08:36

## 2024-10-16 RX ADMIN — METHIMAZOLE 5 MG: 5 TABLET ORAL at 08:36

## 2024-10-16 RX ADMIN — FOLIC ACID 1 MG: 1 TABLET ORAL at 08:36

## 2024-10-16 RX ADMIN — ASPIRIN 81 MG: 81 TABLET, CHEWABLE ORAL at 08:36

## 2024-10-16 ASSESSMENT — PAIN SCALES - GENERAL
PAINLEVEL_OUTOF10: 8
PAINLEVEL_OUTOF10: 9
PAINLEVEL_OUTOF10: 0

## 2024-10-16 ASSESSMENT — PAIN DESCRIPTION - ORIENTATION: ORIENTATION: LEFT

## 2024-10-16 ASSESSMENT — PAIN DESCRIPTION - ONSET: ONSET: ON-GOING

## 2024-10-16 ASSESSMENT — PAIN DESCRIPTION - PAIN TYPE: TYPE: ACUTE PAIN

## 2024-10-16 ASSESSMENT — PAIN DESCRIPTION - DESCRIPTORS
DESCRIPTORS: ACHING;DISCOMFORT
DESCRIPTORS: ACHING;DISCOMFORT

## 2024-10-16 ASSESSMENT — PAIN - FUNCTIONAL ASSESSMENT: PAIN_FUNCTIONAL_ASSESSMENT: PREVENTS OR INTERFERES WITH ALL ACTIVE AND SOME PASSIVE ACTIVITIES

## 2024-10-16 ASSESSMENT — PAIN DESCRIPTION - FREQUENCY: FREQUENCY: CONTINUOUS

## 2024-10-16 ASSESSMENT — PAIN DESCRIPTION - LOCATION
LOCATION: LEG
LOCATION: BACK

## 2024-10-16 NOTE — CARE COORDINATION
[facility] offered and will initiate precert  [facility]   [phone] p  [phone] f  Electronically signed by YAZMIN Quezada on 10/16/2024 at 11:30 AM

## 2024-10-17 ENCOUNTER — OUTSIDE FACILITY SERVICE (OUTPATIENT)
Age: 74
End: 2024-10-17
Payer: MEDICARE

## 2024-10-17 LAB
ANION GAP SERPL CALCULATED.3IONS-SCNC: 10 MMOL/L (ref 7–19)
BASOPHILS # BLD: 0 K/UL (ref 0–0.2)
BASOPHILS NFR BLD: 0.6 % (ref 0–1)
BUN SERPL-MCNC: 25 MG/DL (ref 8–23)
CALCIUM SERPL-MCNC: 8 MG/DL (ref 8.8–10.2)
CHLORIDE SERPL-SCNC: 105 MMOL/L (ref 98–111)
CO2 SERPL-SCNC: 23 MMOL/L (ref 22–29)
CREAT SERPL-MCNC: 1.3 MG/DL (ref 0.5–0.9)
EOSINOPHIL # BLD: 0.1 K/UL (ref 0–0.6)
EOSINOPHIL NFR BLD: 1.9 % (ref 0–5)
ERYTHROCYTE [DISTWIDTH] IN BLOOD BY AUTOMATED COUNT: 17.6 % (ref 11.5–14.5)
GLUCOSE SERPL-MCNC: 118 MG/DL (ref 70–99)
HCT VFR BLD AUTO: 30 % (ref 37–47)
HGB BLD-MCNC: 9.4 G/DL (ref 12–16)
IMM GRANULOCYTES # BLD: 0 K/UL
LYMPHOCYTES # BLD: 0.9 K/UL (ref 1.1–4.5)
LYMPHOCYTES NFR BLD: 17.1 % (ref 20–40)
MCH RBC QN AUTO: 29 PG (ref 27–31)
MCHC RBC AUTO-ENTMCNC: 31.3 G/DL (ref 33–37)
MCV RBC AUTO: 92.6 FL (ref 81–99)
MONOCYTES # BLD: 0.5 K/UL (ref 0–0.9)
MONOCYTES NFR BLD: 9.1 % (ref 0–10)
NEUTROPHILS # BLD: 3.7 K/UL (ref 1.5–7.5)
NEUTS SEG NFR BLD: 70.7 % (ref 50–65)
PLATELET # BLD AUTO: 320 K/UL (ref 130–400)
PMV BLD AUTO: 11 FL (ref 9.4–12.3)
POTASSIUM SERPL-SCNC: 3.8 MMOL/L (ref 3.5–5)
RBC # BLD AUTO: 3.24 M/UL (ref 4.2–5.4)
SODIUM SERPL-SCNC: 138 MMOL/L (ref 136–145)
VANCOMYCIN SERPL-MCNC: 18.8 UG/ML
WBC # BLD AUTO: 5.3 K/UL (ref 4.8–10.8)

## 2024-10-17 PROCEDURE — 2580000003 HC RX 258: Performed by: INTERNAL MEDICINE

## 2024-10-17 PROCEDURE — 1200000000 HC SEMI PRIVATE

## 2024-10-17 PROCEDURE — 6370000000 HC RX 637 (ALT 250 FOR IP): Performed by: STUDENT IN AN ORGANIZED HEALTH CARE EDUCATION/TRAINING PROGRAM

## 2024-10-17 PROCEDURE — 80202 ASSAY OF VANCOMYCIN: CPT

## 2024-10-17 PROCEDURE — 6370000000 HC RX 637 (ALT 250 FOR IP): Performed by: INTERNAL MEDICINE

## 2024-10-17 PROCEDURE — 94760 N-INVAS EAR/PLS OXIMETRY 1: CPT

## 2024-10-17 PROCEDURE — 2580000003 HC RX 258: Performed by: NEUROLOGICAL SURGERY

## 2024-10-17 PROCEDURE — 6360000002 HC RX W HCPCS: Performed by: STUDENT IN AN ORGANIZED HEALTH CARE EDUCATION/TRAINING PROGRAM

## 2024-10-17 PROCEDURE — 80048 BASIC METABOLIC PNL TOTAL CA: CPT

## 2024-10-17 PROCEDURE — 6370000000 HC RX 637 (ALT 250 FOR IP): Performed by: NEUROLOGICAL SURGERY

## 2024-10-17 PROCEDURE — 36415 COLL VENOUS BLD VENIPUNCTURE: CPT

## 2024-10-17 PROCEDURE — 6360000002 HC RX W HCPCS: Performed by: INTERNAL MEDICINE

## 2024-10-17 PROCEDURE — 85025 COMPLETE CBC W/AUTO DIFF WBC: CPT

## 2024-10-17 PROCEDURE — 6360000002 HC RX W HCPCS: Performed by: NEUROLOGICAL SURGERY

## 2024-10-17 RX ADMIN — ATORVASTATIN CALCIUM 20 MG: 20 TABLET, FILM COATED ORAL at 09:09

## 2024-10-17 RX ADMIN — METHIMAZOLE 5 MG: 5 TABLET ORAL at 20:03

## 2024-10-17 RX ADMIN — FOLIC ACID 1 MG: 1 TABLET ORAL at 09:10

## 2024-10-17 RX ADMIN — QUETIAPINE FUMARATE 25 MG: 25 TABLET ORAL at 20:03

## 2024-10-17 RX ADMIN — METHOCARBAMOL 500 MG: 500 TABLET ORAL at 09:09

## 2024-10-17 RX ADMIN — ASPIRIN 81 MG: 81 TABLET, CHEWABLE ORAL at 09:09

## 2024-10-17 RX ADMIN — METOPROLOL TARTRATE 25 MG: 25 TABLET, FILM COATED ORAL at 09:10

## 2024-10-17 RX ADMIN — OXYCODONE 5 MG: 5 TABLET ORAL at 03:15

## 2024-10-17 RX ADMIN — AMIODARONE HYDROCHLORIDE 200 MG: 200 TABLET ORAL at 20:03

## 2024-10-17 RX ADMIN — METOPROLOL TARTRATE 25 MG: 25 TABLET, FILM COATED ORAL at 20:03

## 2024-10-17 RX ADMIN — APIXABAN 5 MG: 5 TABLET, FILM COATED ORAL at 09:09

## 2024-10-17 RX ADMIN — QUETIAPINE FUMARATE 25 MG: 25 TABLET ORAL at 09:10

## 2024-10-17 RX ADMIN — SODIUM CHLORIDE, PRESERVATIVE FREE 10 ML: 5 INJECTION INTRAVENOUS at 20:04

## 2024-10-17 RX ADMIN — WATER 2 MG: 1 INJECTION INTRAMUSCULAR; INTRAVENOUS; SUBCUTANEOUS at 13:44

## 2024-10-17 RX ADMIN — APIXABAN 5 MG: 5 TABLET, FILM COATED ORAL at 20:03

## 2024-10-17 RX ADMIN — HYDROMORPHONE HYDROCHLORIDE 0.25 MG: 1 INJECTION, SOLUTION INTRAMUSCULAR; INTRAVENOUS; SUBCUTANEOUS at 07:13

## 2024-10-17 RX ADMIN — ONDANSETRON 4 MG: 2 INJECTION INTRAMUSCULAR; INTRAVENOUS at 07:14

## 2024-10-17 RX ADMIN — OXYCODONE 5 MG: 5 TABLET ORAL at 10:02

## 2024-10-17 RX ADMIN — HYDROMORPHONE HYDROCHLORIDE 0.25 MG: 1 INJECTION, SOLUTION INTRAMUSCULAR; INTRAVENOUS; SUBCUTANEOUS at 22:53

## 2024-10-17 RX ADMIN — METHIMAZOLE 5 MG: 5 TABLET ORAL at 09:10

## 2024-10-17 RX ADMIN — POLYETHYLENE GLYCOL 3350 17 G: 17 POWDER, FOR SOLUTION ORAL at 09:12

## 2024-10-17 RX ADMIN — AMIODARONE HYDROCHLORIDE 200 MG: 200 TABLET ORAL at 09:10

## 2024-10-17 RX ADMIN — METHOCARBAMOL 500 MG: 500 TABLET ORAL at 20:03

## 2024-10-17 RX ADMIN — SERTRALINE HYDROCHLORIDE 50 MG: 50 TABLET ORAL at 09:10

## 2024-10-17 RX ADMIN — HYDROMORPHONE HYDROCHLORIDE 0.25 MG: 1 INJECTION, SOLUTION INTRAMUSCULAR; INTRAVENOUS; SUBCUTANEOUS at 11:04

## 2024-10-17 RX ADMIN — OXYCODONE 5 MG: 5 TABLET ORAL at 17:25

## 2024-10-17 ASSESSMENT — PAIN DESCRIPTION - LOCATION
LOCATION: BACK;GENERALIZED
LOCATION: BACK
LOCATION: BACK
LOCATION: BACK;LEG
LOCATION: LEG;KNEE

## 2024-10-17 ASSESSMENT — PAIN SCALES - GENERAL
PAINLEVEL_OUTOF10: 7
PAINLEVEL_OUTOF10: 10
PAINLEVEL_OUTOF10: 8
PAINLEVEL_OUTOF10: 0

## 2024-10-17 ASSESSMENT — PAIN - FUNCTIONAL ASSESSMENT
PAIN_FUNCTIONAL_ASSESSMENT: INTOLERABLE, UNABLE TO DO ANY ACTIVE OR PASSIVE ACTIVITIES
PAIN_FUNCTIONAL_ASSESSMENT: PREVENTS OR INTERFERES SOME ACTIVE ACTIVITIES AND ADLS
PAIN_FUNCTIONAL_ASSESSMENT: PREVENTS OR INTERFERES SOME ACTIVE ACTIVITIES AND ADLS

## 2024-10-17 ASSESSMENT — PAIN DESCRIPTION - DESCRIPTORS
DESCRIPTORS: ACHING
DESCRIPTORS: TIGHTNESS
DESCRIPTORS: ACHING
DESCRIPTORS: PATIENT UNABLE TO DESCRIBE
DESCRIPTORS: ACHING;THROBBING

## 2024-10-17 ASSESSMENT — PAIN DESCRIPTION - ORIENTATION
ORIENTATION: RIGHT;LEFT
ORIENTATION: LEFT;RIGHT

## 2024-10-17 NOTE — CARE COORDINATION
10/17/24 1353   IMM Letter   IMM Letter given to Patient/Family/Significant other/Guardian/POA/by: Yael Cornell   IMM Letter date given: 10/17/24   IMM Letter time given: 1352     Important Message from Medicare letter given to  Lashonda Milian  by Yael Cornell. All questions answered,  Lashonda Milian  voiced understanding. Signed copy of IMM placed in patient's soft chart. Lashonda Milian given a copy of the IMM.

## 2024-10-17 NOTE — CARE COORDINATION
[facility] has precert; Pt was still having issues with pain control today that were addressed by RN and MD; hopeful for d/c tomorrow.   [facility]   [phone] p   [phone] f   Electronically signed by YAZMIN Quezada on 10/17/2024 at 5:17 PM

## 2024-10-18 ENCOUNTER — APPOINTMENT (OUTPATIENT)
Dept: ULTRASOUND IMAGING | Age: 74
DRG: 028 | End: 2024-10-18
Payer: MEDICARE

## 2024-10-18 ENCOUNTER — OUTSIDE FACILITY SERVICE (OUTPATIENT)
Age: 74
End: 2024-10-18
Payer: MEDICARE

## 2024-10-18 LAB
25(OH)D3 SERPL-MCNC: 9.7 NG/ML
ANION GAP SERPL CALCULATED.3IONS-SCNC: 10 MMOL/L (ref 7–19)
ANION GAP SERPL CALCULATED.3IONS-SCNC: 11 MMOL/L (ref 7–19)
BASOPHILS # BLD: 0 K/UL (ref 0–0.2)
BASOPHILS NFR BLD: 0.7 % (ref 0–1)
BUN SERPL-MCNC: 25 MG/DL (ref 8–23)
BUN SERPL-MCNC: 26 MG/DL (ref 8–23)
CALCIUM SERPL-MCNC: 8.2 MG/DL (ref 8.8–10.2)
CALCIUM SERPL-MCNC: 8.4 MG/DL (ref 8.8–10.2)
CHLORIDE SERPL-SCNC: 102 MMOL/L (ref 98–111)
CHLORIDE SERPL-SCNC: 104 MMOL/L (ref 98–111)
CO2 SERPL-SCNC: 23 MMOL/L (ref 22–29)
CO2 SERPL-SCNC: 24 MMOL/L (ref 22–29)
CREAT SERPL-MCNC: 1.6 MG/DL (ref 0.5–0.9)
CREAT SERPL-MCNC: 1.6 MG/DL (ref 0.5–0.9)
EOSINOPHIL # BLD: 0.1 K/UL (ref 0–0.6)
EOSINOPHIL NFR BLD: 1.3 % (ref 0–5)
ERYTHROCYTE [DISTWIDTH] IN BLOOD BY AUTOMATED COUNT: 17.7 % (ref 11.5–14.5)
GLUCOSE SERPL-MCNC: 93 MG/DL (ref 70–99)
GLUCOSE SERPL-MCNC: 96 MG/DL (ref 70–99)
HCT VFR BLD AUTO: 30.7 % (ref 37–47)
HGB BLD-MCNC: 9.4 G/DL (ref 12–16)
IMM GRANULOCYTES # BLD: 0 K/UL
LYMPHOCYTES # BLD: 1 K/UL (ref 1.1–4.5)
LYMPHOCYTES NFR BLD: 16 % (ref 20–40)
MCH RBC QN AUTO: 29.2 PG (ref 27–31)
MCHC RBC AUTO-ENTMCNC: 30.6 G/DL (ref 33–37)
MCV RBC AUTO: 95.3 FL (ref 81–99)
MONOCYTES # BLD: 0.6 K/UL (ref 0–0.9)
MONOCYTES NFR BLD: 9.2 % (ref 0–10)
NEUTROPHILS # BLD: 4.4 K/UL (ref 1.5–7.5)
NEUTS SEG NFR BLD: 72.3 % (ref 50–65)
PLATELET # BLD AUTO: 348 K/UL (ref 130–400)
PMV BLD AUTO: 11.2 FL (ref 9.4–12.3)
POTASSIUM SERPL-SCNC: 3.9 MMOL/L (ref 3.5–5)
POTASSIUM SERPL-SCNC: 4.2 MMOL/L (ref 3.5–5)
PTH-INTACT SERPL-MCNC: 17.2 PG/ML (ref 15–65)
RBC # BLD AUTO: 3.22 M/UL (ref 4.2–5.4)
SODIUM SERPL-SCNC: 137 MMOL/L (ref 136–145)
SODIUM SERPL-SCNC: 137 MMOL/L (ref 136–145)
URATE SERPL-MCNC: 4.6 MG/DL (ref 2.4–5.7)
WBC # BLD AUTO: 6.1 K/UL (ref 4.8–10.8)

## 2024-10-18 PROCEDURE — 2580000003 HC RX 258: Performed by: INTERNAL MEDICINE

## 2024-10-18 PROCEDURE — 6370000000 HC RX 637 (ALT 250 FOR IP): Performed by: NEUROLOGICAL SURGERY

## 2024-10-18 PROCEDURE — 6360000002 HC RX W HCPCS: Performed by: INTERNAL MEDICINE

## 2024-10-18 PROCEDURE — 6370000000 HC RX 637 (ALT 250 FOR IP): Performed by: STUDENT IN AN ORGANIZED HEALTH CARE EDUCATION/TRAINING PROGRAM

## 2024-10-18 PROCEDURE — 84550 ASSAY OF BLOOD/URIC ACID: CPT

## 2024-10-18 PROCEDURE — 1200000000 HC SEMI PRIVATE

## 2024-10-18 PROCEDURE — 76770 US EXAM ABDO BACK WALL COMP: CPT

## 2024-10-18 PROCEDURE — 85025 COMPLETE CBC W/AUTO DIFF WBC: CPT

## 2024-10-18 PROCEDURE — 36415 COLL VENOUS BLD VENIPUNCTURE: CPT

## 2024-10-18 PROCEDURE — 80048 BASIC METABOLIC PNL TOTAL CA: CPT

## 2024-10-18 PROCEDURE — 6370000000 HC RX 637 (ALT 250 FOR IP): Performed by: INTERNAL MEDICINE

## 2024-10-18 PROCEDURE — 83970 ASSAY OF PARATHORMONE: CPT

## 2024-10-18 PROCEDURE — 2580000003 HC RX 258: Performed by: NEUROLOGICAL SURGERY

## 2024-10-18 PROCEDURE — 82306 VITAMIN D 25 HYDROXY: CPT

## 2024-10-18 RX ORDER — HYDROCODONE BITARTRATE AND ACETAMINOPHEN 7.5; 325 MG/1; MG/1
1 TABLET ORAL EVERY 4 HOURS PRN
Status: DISCONTINUED | OUTPATIENT
Start: 2024-10-18 | End: 2024-10-20 | Stop reason: HOSPADM

## 2024-10-18 RX ORDER — ERGOCALCIFEROL 1.25 MG/1
50000 CAPSULE, LIQUID FILLED ORAL WEEKLY
Status: DISCONTINUED | OUTPATIENT
Start: 2024-10-18 | End: 2024-10-20 | Stop reason: HOSPADM

## 2024-10-18 RX ORDER — SODIUM CHLORIDE 9 MG/ML
INJECTION, SOLUTION INTRAVENOUS CONTINUOUS
Status: ACTIVE | OUTPATIENT
Start: 2024-10-18 | End: 2024-10-18

## 2024-10-18 RX ADMIN — ERGOCALCIFEROL 50000 UNITS: 1.25 CAPSULE ORAL at 16:52

## 2024-10-18 RX ADMIN — METHIMAZOLE 5 MG: 5 TABLET ORAL at 20:19

## 2024-10-18 RX ADMIN — APIXABAN 5 MG: 5 TABLET, FILM COATED ORAL at 20:20

## 2024-10-18 RX ADMIN — MEGESTROL ACETATE 200 MG: 40 SUSPENSION ORAL at 08:54

## 2024-10-18 RX ADMIN — METOPROLOL TARTRATE 25 MG: 25 TABLET, FILM COATED ORAL at 20:19

## 2024-10-18 RX ADMIN — FOLIC ACID 1 MG: 1 TABLET ORAL at 08:54

## 2024-10-18 RX ADMIN — METHOCARBAMOL 500 MG: 500 TABLET ORAL at 08:54

## 2024-10-18 RX ADMIN — AMIODARONE HYDROCHLORIDE 200 MG: 200 TABLET ORAL at 08:54

## 2024-10-18 RX ADMIN — METHIMAZOLE 5 MG: 5 TABLET ORAL at 08:53

## 2024-10-18 RX ADMIN — ASPIRIN 81 MG: 81 TABLET, CHEWABLE ORAL at 08:54

## 2024-10-18 RX ADMIN — SODIUM CHLORIDE: 9 INJECTION, SOLUTION INTRAVENOUS at 11:38

## 2024-10-18 RX ADMIN — OXYCODONE 5 MG: 5 TABLET ORAL at 11:32

## 2024-10-18 RX ADMIN — SERTRALINE HYDROCHLORIDE 50 MG: 50 TABLET ORAL at 08:53

## 2024-10-18 RX ADMIN — ATORVASTATIN CALCIUM 20 MG: 20 TABLET, FILM COATED ORAL at 08:54

## 2024-10-18 RX ADMIN — APIXABAN 5 MG: 5 TABLET, FILM COATED ORAL at 08:54

## 2024-10-18 RX ADMIN — HYDROCODONE BITARTRATE AND ACETAMINOPHEN 1 TABLET: 7.5; 325 TABLET ORAL at 20:20

## 2024-10-18 RX ADMIN — VANCOMYCIN HYDROCHLORIDE 1250 MG: 10 INJECTION, POWDER, LYOPHILIZED, FOR SOLUTION INTRAVENOUS at 16:51

## 2024-10-18 RX ADMIN — QUETIAPINE FUMARATE 25 MG: 25 TABLET ORAL at 20:19

## 2024-10-18 RX ADMIN — NALOXEGOL OXALATE 12.5 MG: 12.5 TABLET, FILM COATED ORAL at 08:54

## 2024-10-18 RX ADMIN — POLYETHYLENE GLYCOL 3350 17 G: 17 POWDER, FOR SOLUTION ORAL at 08:53

## 2024-10-18 RX ADMIN — METHOCARBAMOL 500 MG: 500 TABLET ORAL at 20:19

## 2024-10-18 RX ADMIN — SODIUM CHLORIDE, PRESERVATIVE FREE 10 ML: 5 INJECTION INTRAVENOUS at 08:55

## 2024-10-18 RX ADMIN — SODIUM CHLORIDE, PRESERVATIVE FREE 10 ML: 5 INJECTION INTRAVENOUS at 20:20

## 2024-10-18 RX ADMIN — QUETIAPINE FUMARATE 25 MG: 25 TABLET ORAL at 08:54

## 2024-10-18 RX ADMIN — HYDROCODONE BITARTRATE AND ACETAMINOPHEN 1 TABLET: 7.5; 325 TABLET ORAL at 15:26

## 2024-10-18 RX ADMIN — SENNOSIDES AND DOCUSATE SODIUM 2 TABLET: 50; 8.6 TABLET ORAL at 08:54

## 2024-10-18 RX ADMIN — AMIODARONE HYDROCHLORIDE 200 MG: 200 TABLET ORAL at 20:19

## 2024-10-18 RX ADMIN — ONDANSETRON 4 MG: 4 TABLET, ORALLY DISINTEGRATING ORAL at 11:42

## 2024-10-18 ASSESSMENT — PAIN DESCRIPTION - LOCATION
LOCATION: BACK;LEG
LOCATION: BACK
LOCATION: GENERALIZED

## 2024-10-18 ASSESSMENT — PAIN DESCRIPTION - ORIENTATION
ORIENTATION: RIGHT;LEFT
ORIENTATION: RIGHT
ORIENTATION: INNER

## 2024-10-18 ASSESSMENT — PAIN - FUNCTIONAL ASSESSMENT
PAIN_FUNCTIONAL_ASSESSMENT: PREVENTS OR INTERFERES SOME ACTIVE ACTIVITIES AND ADLS

## 2024-10-18 ASSESSMENT — PAIN DESCRIPTION - DESCRIPTORS
DESCRIPTORS: ACHING
DESCRIPTORS: ACHING;TENDER
DESCRIPTORS: ACHING;THROBBING

## 2024-10-18 ASSESSMENT — PAIN SCALES - GENERAL
PAINLEVEL_OUTOF10: 8
PAINLEVEL_OUTOF10: 0
PAINLEVEL_OUTOF10: 0
PAINLEVEL_OUTOF10: 9
PAINLEVEL_OUTOF10: 7

## 2024-10-18 NOTE — CARE COORDINATION
Precert for [facility] is only good through 10/21/24 @11PM  Electronically signed by YAZMIN Quezada on 10/18/2024 at 1:18 PM

## 2024-10-19 LAB
ANION GAP SERPL CALCULATED.3IONS-SCNC: 11 MMOL/L (ref 7–19)
BASOPHILS # BLD: 0 K/UL (ref 0–0.2)
BASOPHILS NFR BLD: 0.5 % (ref 0–1)
BUN SERPL-MCNC: 25 MG/DL (ref 8–23)
CALCIUM SERPL-MCNC: 8 MG/DL (ref 8.8–10.2)
CHLORIDE SERPL-SCNC: 101 MMOL/L (ref 98–111)
CO2 SERPL-SCNC: 22 MMOL/L (ref 22–29)
CREAT SERPL-MCNC: 1.5 MG/DL (ref 0.5–0.9)
EOSINOPHIL # BLD: 0.1 K/UL (ref 0–0.6)
EOSINOPHIL NFR BLD: 1 % (ref 0–5)
ERYTHROCYTE [DISTWIDTH] IN BLOOD BY AUTOMATED COUNT: 17.8 % (ref 11.5–14.5)
GLUCOSE SERPL-MCNC: 83 MG/DL (ref 70–99)
HCT VFR BLD AUTO: 29.4 % (ref 37–47)
HGB BLD-MCNC: 9.2 G/DL (ref 12–16)
IMM GRANULOCYTES # BLD: 0.1 K/UL
LYMPHOCYTES # BLD: 0.9 K/UL (ref 1.1–4.5)
LYMPHOCYTES NFR BLD: 15.9 % (ref 20–40)
MAGNESIUM SERPL-MCNC: 1.6 MG/DL (ref 1.6–2.4)
MCH RBC QN AUTO: 29.8 PG (ref 27–31)
MCHC RBC AUTO-ENTMCNC: 31.3 G/DL (ref 33–37)
MCV RBC AUTO: 95.1 FL (ref 81–99)
MONOCYTES # BLD: 0.5 K/UL (ref 0–0.9)
MONOCYTES NFR BLD: 9 % (ref 0–10)
NEUTROPHILS # BLD: 4.2 K/UL (ref 1.5–7.5)
NEUTS SEG NFR BLD: 72.6 % (ref 50–65)
PLATELET # BLD AUTO: 348 K/UL (ref 130–400)
PMV BLD AUTO: 10.9 FL (ref 9.4–12.3)
POTASSIUM SERPL-SCNC: 3.5 MMOL/L (ref 3.5–5)
RBC # BLD AUTO: 3.09 M/UL (ref 4.2–5.4)
SODIUM SERPL-SCNC: 134 MMOL/L (ref 136–145)
WBC # BLD AUTO: 5.8 K/UL (ref 4.8–10.8)

## 2024-10-19 PROCEDURE — 97530 THERAPEUTIC ACTIVITIES: CPT

## 2024-10-19 PROCEDURE — 83735 ASSAY OF MAGNESIUM: CPT

## 2024-10-19 PROCEDURE — 6370000000 HC RX 637 (ALT 250 FOR IP): Performed by: STUDENT IN AN ORGANIZED HEALTH CARE EDUCATION/TRAINING PROGRAM

## 2024-10-19 PROCEDURE — 6370000000 HC RX 637 (ALT 250 FOR IP): Performed by: NEUROLOGICAL SURGERY

## 2024-10-19 PROCEDURE — 85025 COMPLETE CBC W/AUTO DIFF WBC: CPT

## 2024-10-19 PROCEDURE — 80048 BASIC METABOLIC PNL TOTAL CA: CPT

## 2024-10-19 PROCEDURE — 97110 THERAPEUTIC EXERCISES: CPT

## 2024-10-19 PROCEDURE — 36415 COLL VENOUS BLD VENIPUNCTURE: CPT

## 2024-10-19 PROCEDURE — 6370000000 HC RX 637 (ALT 250 FOR IP): Performed by: INTERNAL MEDICINE

## 2024-10-19 PROCEDURE — 1200000000 HC SEMI PRIVATE

## 2024-10-19 PROCEDURE — 2580000003 HC RX 258: Performed by: NEUROLOGICAL SURGERY

## 2024-10-19 RX ORDER — ACETAMINOPHEN 325 MG/1
650 TABLET ORAL EVERY 6 HOURS PRN
Status: DISCONTINUED | OUTPATIENT
Start: 2024-10-19 | End: 2024-10-20 | Stop reason: HOSPADM

## 2024-10-19 RX ADMIN — METOPROLOL TARTRATE 25 MG: 25 TABLET, FILM COATED ORAL at 21:05

## 2024-10-19 RX ADMIN — QUETIAPINE FUMARATE 25 MG: 25 TABLET ORAL at 09:01

## 2024-10-19 RX ADMIN — ASPIRIN 81 MG: 81 TABLET, CHEWABLE ORAL at 09:01

## 2024-10-19 RX ADMIN — SODIUM CHLORIDE, PRESERVATIVE FREE 10 ML: 5 INJECTION INTRAVENOUS at 09:01

## 2024-10-19 RX ADMIN — APIXABAN 5 MG: 5 TABLET, FILM COATED ORAL at 09:01

## 2024-10-19 RX ADMIN — HYDROCODONE BITARTRATE AND ACETAMINOPHEN 1 TABLET: 7.5; 325 TABLET ORAL at 00:27

## 2024-10-19 RX ADMIN — METHOCARBAMOL 500 MG: 500 TABLET ORAL at 21:04

## 2024-10-19 RX ADMIN — ATORVASTATIN CALCIUM 20 MG: 20 TABLET, FILM COATED ORAL at 09:01

## 2024-10-19 RX ADMIN — POTASSIUM CHLORIDE 40 MEQ: 1500 TABLET, EXTENDED RELEASE ORAL at 06:10

## 2024-10-19 RX ADMIN — FOLIC ACID 1 MG: 1 TABLET ORAL at 09:01

## 2024-10-19 RX ADMIN — ACETAMINOPHEN 650 MG: 325 TABLET ORAL at 02:09

## 2024-10-19 RX ADMIN — SERTRALINE HYDROCHLORIDE 50 MG: 50 TABLET ORAL at 09:01

## 2024-10-19 RX ADMIN — APIXABAN 5 MG: 5 TABLET, FILM COATED ORAL at 21:05

## 2024-10-19 RX ADMIN — SODIUM CHLORIDE, PRESERVATIVE FREE 10 ML: 5 INJECTION INTRAVENOUS at 21:05

## 2024-10-19 RX ADMIN — MEGESTROL ACETATE 200 MG: 40 SUSPENSION ORAL at 09:01

## 2024-10-19 RX ADMIN — HYDROCODONE BITARTRATE AND ACETAMINOPHEN 1 TABLET: 7.5; 325 TABLET ORAL at 21:05

## 2024-10-19 RX ADMIN — HYDROCODONE BITARTRATE AND ACETAMINOPHEN 1 TABLET: 7.5; 325 TABLET ORAL at 11:16

## 2024-10-19 RX ADMIN — HYDROCODONE BITARTRATE AND ACETAMINOPHEN 1 TABLET: 7.5; 325 TABLET ORAL at 06:10

## 2024-10-19 RX ADMIN — HYDROCODONE BITARTRATE AND ACETAMINOPHEN 1 TABLET: 7.5; 325 TABLET ORAL at 16:52

## 2024-10-19 RX ADMIN — METHIMAZOLE 5 MG: 5 TABLET ORAL at 09:01

## 2024-10-19 RX ADMIN — METHIMAZOLE 5 MG: 5 TABLET ORAL at 21:05

## 2024-10-19 RX ADMIN — METOPROLOL TARTRATE 25 MG: 25 TABLET, FILM COATED ORAL at 09:01

## 2024-10-19 RX ADMIN — AMIODARONE HYDROCHLORIDE 200 MG: 200 TABLET ORAL at 09:01

## 2024-10-19 RX ADMIN — METHOCARBAMOL 500 MG: 500 TABLET ORAL at 09:01

## 2024-10-19 RX ADMIN — QUETIAPINE FUMARATE 25 MG: 25 TABLET ORAL at 21:05

## 2024-10-19 RX ADMIN — AMIODARONE HYDROCHLORIDE 200 MG: 200 TABLET ORAL at 21:05

## 2024-10-19 ASSESSMENT — PAIN DESCRIPTION - ONSET: ONSET: ON-GOING

## 2024-10-19 ASSESSMENT — PAIN DESCRIPTION - ORIENTATION
ORIENTATION: RIGHT;LEFT
ORIENTATION: OTHER (COMMENT)
ORIENTATION: INNER
ORIENTATION: RIGHT;LEFT
ORIENTATION: INNER

## 2024-10-19 ASSESSMENT — PAIN SCALES - GENERAL
PAINLEVEL_OUTOF10: 0
PAINLEVEL_OUTOF10: 7
PAINLEVEL_OUTOF10: 0
PAINLEVEL_OUTOF10: 10
PAINLEVEL_OUTOF10: 10
PAINLEVEL_OUTOF10: 8
PAINLEVEL_OUTOF10: 7
PAINLEVEL_OUTOF10: 7
PAINLEVEL_OUTOF10: 0
PAINLEVEL_OUTOF10: 9

## 2024-10-19 ASSESSMENT — PAIN DESCRIPTION - DESCRIPTORS
DESCRIPTORS: ACHING
DESCRIPTORS: SHOOTING
DESCRIPTORS: ACHING;DISCOMFORT

## 2024-10-19 ASSESSMENT — PAIN DESCRIPTION - LOCATION
LOCATION: HIP
LOCATION: GENERALIZED
LOCATION: LEG

## 2024-10-19 ASSESSMENT — PAIN DESCRIPTION - PAIN TYPE: TYPE: ACUTE PAIN

## 2024-10-19 ASSESSMENT — PAIN DESCRIPTION - FREQUENCY: FREQUENCY: CONTINUOUS

## 2024-10-20 VITALS
TEMPERATURE: 97.7 F | DIASTOLIC BLOOD PRESSURE: 82 MMHG | OXYGEN SATURATION: 98 % | HEIGHT: 62 IN | SYSTOLIC BLOOD PRESSURE: 160 MMHG | RESPIRATION RATE: 18 BRPM | WEIGHT: 213.85 LBS | BODY MASS INDEX: 39.35 KG/M2 | HEART RATE: 65 BPM

## 2024-10-20 LAB
ANION GAP SERPL CALCULATED.3IONS-SCNC: 12 MMOL/L (ref 7–19)
BASOPHILS # BLD: 0.1 K/UL (ref 0–0.2)
BASOPHILS NFR BLD: 1 % (ref 0–1)
BUN SERPL-MCNC: 24 MG/DL (ref 8–23)
CALCIUM SERPL-MCNC: 8.3 MG/DL (ref 8.8–10.2)
CHLORIDE SERPL-SCNC: 104 MMOL/L (ref 98–111)
CO2 SERPL-SCNC: 20 MMOL/L (ref 22–29)
CREAT SERPL-MCNC: 1.5 MG/DL (ref 0.5–0.9)
EOSINOPHIL # BLD: 0.1 K/UL (ref 0–0.6)
EOSINOPHIL NFR BLD: 1.6 % (ref 0–5)
ERYTHROCYTE [DISTWIDTH] IN BLOOD BY AUTOMATED COUNT: 17.9 % (ref 11.5–14.5)
GLUCOSE SERPL-MCNC: 79 MG/DL (ref 70–99)
HCT VFR BLD AUTO: 31.7 % (ref 37–47)
HGB BLD-MCNC: 9.7 G/DL (ref 12–16)
IMM GRANULOCYTES # BLD: 0.1 K/UL
LYMPHOCYTES # BLD: 1 K/UL (ref 1.1–4.5)
LYMPHOCYTES NFR BLD: 18.5 % (ref 20–40)
MCH RBC QN AUTO: 29 PG (ref 27–31)
MCHC RBC AUTO-ENTMCNC: 30.6 G/DL (ref 33–37)
MCV RBC AUTO: 94.9 FL (ref 81–99)
MONOCYTES # BLD: 0.6 K/UL (ref 0–0.9)
MONOCYTES NFR BLD: 10.7 % (ref 0–10)
NEUTROPHILS # BLD: 3.5 K/UL (ref 1.5–7.5)
NEUTS SEG NFR BLD: 67.2 % (ref 50–65)
PLATELET # BLD AUTO: 360 K/UL (ref 130–400)
PMV BLD AUTO: 10.8 FL (ref 9.4–12.3)
POTASSIUM SERPL-SCNC: 4.9 MMOL/L (ref 3.5–5)
RBC # BLD AUTO: 3.34 M/UL (ref 4.2–5.4)
SODIUM SERPL-SCNC: 136 MMOL/L (ref 136–145)
WBC # BLD AUTO: 5.1 K/UL (ref 4.8–10.8)

## 2024-10-20 PROCEDURE — 6370000000 HC RX 637 (ALT 250 FOR IP): Performed by: INTERNAL MEDICINE

## 2024-10-20 PROCEDURE — 2580000003 HC RX 258: Performed by: NEUROLOGICAL SURGERY

## 2024-10-20 PROCEDURE — 85025 COMPLETE CBC W/AUTO DIFF WBC: CPT

## 2024-10-20 PROCEDURE — 6370000000 HC RX 637 (ALT 250 FOR IP): Performed by: NEUROLOGICAL SURGERY

## 2024-10-20 PROCEDURE — 6370000000 HC RX 637 (ALT 250 FOR IP): Performed by: STUDENT IN AN ORGANIZED HEALTH CARE EDUCATION/TRAINING PROGRAM

## 2024-10-20 PROCEDURE — 80048 BASIC METABOLIC PNL TOTAL CA: CPT

## 2024-10-20 PROCEDURE — 36415 COLL VENOUS BLD VENIPUNCTURE: CPT

## 2024-10-20 RX ORDER — SENNA AND DOCUSATE SODIUM 50; 8.6 MG/1; MG/1
2 TABLET, FILM COATED ORAL DAILY
Qty: 20 TABLET | Refills: 0 | Status: SHIPPED | OUTPATIENT
Start: 2024-10-21 | End: 2024-10-31

## 2024-10-20 RX ORDER — HYDROCODONE BITARTRATE AND ACETAMINOPHEN 7.5; 325 MG/1; MG/1
1 TABLET ORAL EVERY 4 HOURS PRN
Qty: 18 TABLET | Refills: 0 | Status: SHIPPED | OUTPATIENT
Start: 2024-10-20 | End: 2024-10-23

## 2024-10-20 RX ORDER — ERGOCALCIFEROL 1.25 MG/1
50000 CAPSULE ORAL WEEKLY
Qty: 5 CAPSULE | Refills: 0 | Status: SHIPPED | OUTPATIENT
Start: 2024-10-25

## 2024-10-20 RX ORDER — FOLIC ACID 1 MG/1
1 TABLET ORAL DAILY
Qty: 30 TABLET | Refills: 3 | Status: SHIPPED | OUTPATIENT
Start: 2024-10-21

## 2024-10-20 RX ADMIN — APIXABAN 5 MG: 5 TABLET, FILM COATED ORAL at 08:53

## 2024-10-20 RX ADMIN — ASPIRIN 81 MG: 81 TABLET, CHEWABLE ORAL at 08:53

## 2024-10-20 RX ADMIN — METHIMAZOLE 5 MG: 5 TABLET ORAL at 08:53

## 2024-10-20 RX ADMIN — AMIODARONE HYDROCHLORIDE 200 MG: 200 TABLET ORAL at 08:53

## 2024-10-20 RX ADMIN — SERTRALINE HYDROCHLORIDE 50 MG: 50 TABLET ORAL at 08:53

## 2024-10-20 RX ADMIN — FOLIC ACID 1 MG: 1 TABLET ORAL at 08:53

## 2024-10-20 RX ADMIN — MEGESTROL ACETATE 200 MG: 40 SUSPENSION ORAL at 08:52

## 2024-10-20 RX ADMIN — HYDROCODONE BITARTRATE AND ACETAMINOPHEN 1 TABLET: 7.5; 325 TABLET ORAL at 03:33

## 2024-10-20 RX ADMIN — ATORVASTATIN CALCIUM 20 MG: 20 TABLET, FILM COATED ORAL at 08:53

## 2024-10-20 RX ADMIN — METHOCARBAMOL 500 MG: 500 TABLET ORAL at 08:53

## 2024-10-20 RX ADMIN — METOPROLOL TARTRATE 25 MG: 25 TABLET, FILM COATED ORAL at 08:53

## 2024-10-20 RX ADMIN — QUETIAPINE FUMARATE 25 MG: 25 TABLET ORAL at 08:54

## 2024-10-20 RX ADMIN — SODIUM CHLORIDE, PRESERVATIVE FREE 10 ML: 5 INJECTION INTRAVENOUS at 08:58

## 2024-10-20 RX ADMIN — HYDROCODONE BITARTRATE AND ACETAMINOPHEN 1 TABLET: 7.5; 325 TABLET ORAL at 13:13

## 2024-10-20 ASSESSMENT — PAIN DESCRIPTION - DESCRIPTORS
DESCRIPTORS: SHARP
DESCRIPTORS: ACHING
DESCRIPTORS: ACHING

## 2024-10-20 ASSESSMENT — PAIN DESCRIPTION - ORIENTATION
ORIENTATION: INNER
ORIENTATION: MID
ORIENTATION: RIGHT

## 2024-10-20 ASSESSMENT — PAIN SCALES - GENERAL
PAINLEVEL_OUTOF10: 7
PAINLEVEL_OUTOF10: 5
PAINLEVEL_OUTOF10: 9

## 2024-10-20 ASSESSMENT — PAIN DESCRIPTION - LOCATION
LOCATION: BACK
LOCATION: HIP
LOCATION: GENERALIZED

## 2024-10-20 ASSESSMENT — PAIN - FUNCTIONAL ASSESSMENT: PAIN_FUNCTIONAL_ASSESSMENT: PREVENTS OR INTERFERES SOME ACTIVE ACTIVITIES AND ADLS

## 2024-10-20 NOTE — CARE COORDINATION
CEM left a message w  at [facility] to see if they are able to accept Pt today. They are going to make a couple of calls then call this SW back to see if she can DC today.    Electronically signed by Vineet Kelly on 10/20/2024 at 10:43 AM

## 2024-10-20 NOTE — PLAN OF CARE
Problem: Chronic Conditions and Co-morbidities  Goal: Patient's chronic conditions and co-morbidity symptoms are monitored and maintained or improved  10/10/2024 1539 by Amairani Youngblood RN  Outcome: Progressing  10/10/2024 0312 by Jayy Burris RN  Outcome: Progressing     Problem: Discharge Planning  Goal: Discharge to home or other facility with appropriate resources  10/10/2024 1539 by Amairani Youngblood RN  Outcome: Progressing  10/10/2024 0312 by Jayy Burris RN  Outcome: Progressing     Problem: Skin/Tissue Integrity  Goal: Absence of new skin breakdown  Description: 1.  Monitor for areas of redness and/or skin breakdown  2.  Assess vascular access sites hourly  3.  Every 4-6 hours minimum:  Change oxygen saturation probe site  4.  Every 4-6 hours:  If on nasal continuous positive airway pressure, respiratory therapy assess nares and determine need for appliance change or resting period.  10/10/2024 1539 by Amairani Youngblood RN  Outcome: Progressing  10/10/2024 0312 by Jayy Burris RN  Outcome: Progressing     Problem: Safety - Adult  Goal: Free from fall injury  10/10/2024 1539 by Amairani Youngblood RN  Outcome: Progressing  10/10/2024 0312 by Jayy Burris RN  Outcome: Progressing     Problem: Nutrition Deficit:  Goal: Optimize nutritional status  10/10/2024 1539 by Amairani Youngblood RN  Outcome: Progressing  10/10/2024 0312 by Jayy Burris RN  Outcome: Progressing     Problem: Pain  Goal: Verbalizes/displays adequate comfort level or baseline comfort level  10/10/2024 1539 by Amairani Youngblood RN  Outcome: Progressing  10/10/2024 0312 by Jayy Burris RN  Outcome: Progressing     Problem: ABCDS Injury Assessment  Goal: Absence of physical injury  10/10/2024 1539 by Amairani Youngblood RN  Outcome: Progressing  10/10/2024 0312 by Jayy Burris RN  Outcome: Progressing     
  Problem: Chronic Conditions and Co-morbidities  Goal: Patient's chronic conditions and co-morbidity symptoms are monitored and maintained or improved  10/11/2024 0222 by Jamilah Yan LPN  Outcome: Progressing  Flowsheets (Taken 10/10/2024 2059)  Care Plan - Patient's Chronic Conditions and Co-Morbidity Symptoms are Monitored and Maintained or Improved:   Monitor and assess patient's chronic conditions and comorbid symptoms for stability, deterioration, or improvement   Update acute care plan with appropriate goals if chronic or comorbid symptoms are exacerbated and prevent overall improvement and discharge   Collaborate with multidisciplinary team to address chronic and comorbid conditions and prevent exacerbation or deterioration  10/10/2024 1539 by Amairani Youngblood RN  Outcome: Progressing     Problem: Discharge Planning  Goal: Discharge to home or other facility with appropriate resources  10/11/2024 0222 by Jamilah Yan LPN  Outcome: Progressing  Flowsheets (Taken 10/10/2024 2059)  Discharge to home or other facility with appropriate resources:   Identify barriers to discharge with patient and caregiver   Arrange for needed discharge resources and transportation as appropriate   Arrange for interpreters to assist at discharge as needed   Identify discharge learning needs (meds, wound care, etc)   Refer to discharge planning if patient needs post-hospital services based on physician order or complex needs related to functional status, cognitive ability or social support system  10/10/2024 1539 by Amairani Youngblood, RN  Outcome: Progressing     Problem: Skin/Tissue Integrity  Goal: Absence of new skin breakdown  Description: 1.  Monitor for areas of redness and/or skin breakdown  2.  Assess vascular access sites hourly  3.  Every 4-6 hours minimum:  Change oxygen saturation probe site  4.  Every 4-6 hours:  If on nasal continuous positive airway pressure, respiratory therapy assess nares 
  Problem: Chronic Conditions and Co-morbidities  Goal: Patient's chronic conditions and co-morbidity symptoms are monitored and maintained or improved  10/12/2024 0214 by Jayy Burris RN  Outcome: Progressing  10/11/2024 1703 by Amairani Youngblood RN  Outcome: Progressing     Problem: Discharge Planning  Goal: Discharge to home or other facility with appropriate resources  10/12/2024 0214 by Jayy Burris RN  Outcome: Progressing  10/11/2024 1703 by Amairani Youngblood RN  Outcome: Progressing     Problem: Skin/Tissue Integrity  Goal: Absence of new skin breakdown  Description: 1.  Monitor for areas of redness and/or skin breakdown  2.  Assess vascular access sites hourly  3.  Every 4-6 hours minimum:  Change oxygen saturation probe site  4.  Every 4-6 hours:  If on nasal continuous positive airway pressure, respiratory therapy assess nares and determine need for appliance change or resting period.  10/12/2024 0214 by Jayy Burris RN  Outcome: Progressing  10/11/2024 1703 by Amairani Youngblood RN  Outcome: Progressing     Problem: Safety - Adult  Goal: Free from fall injury  10/12/2024 0214 by Jayy Burris RN  Outcome: Progressing  10/11/2024 1703 by Amairani Youngblood RN  Outcome: Progressing     Problem: Nutrition Deficit:  Goal: Optimize nutritional status  10/12/2024 0214 by Jayy Burris RN  Outcome: Progressing  10/11/2024 1703 by Amairani Youngblood RN  Outcome: Progressing  Flowsheets (Taken 10/11/2024 1238 by Yandy Long, MS, RD, LD)  Nutrient intake appropriate for improving, restoring, or maintaining nutritional needs:   Assess nutritional status and recommend course of action   Monitor oral intake, labs, and treatment plans   Recommend appropriate diets, oral nutritional supplements, and vitamin/mineral supplements     Problem: Pain  Goal: Verbalizes/displays adequate comfort level or baseline comfort level  10/12/2024 0214 by Jayy Burris RN  Outcome: Progressing  10/11/2024 1703 by Rylie 
  Problem: Chronic Conditions and Co-morbidities  Goal: Patient's chronic conditions and co-morbidity symptoms are monitored and maintained or improved  10/15/2024 0127 by Beth Gomez RN  Outcome: Progressing  10/14/2024 1838 by Edwige Quinn RN  Outcome: Not Progressing     Problem: Discharge Planning  Goal: Discharge to home or other facility with appropriate resources  10/15/2024 0127 by Beth Gomez, RN  Outcome: Progressing  10/14/2024 1838 by Edwige Quinn RN  Outcome: Not Progressing     Problem: Skin/Tissue Integrity  Goal: Absence of new skin breakdown  Description: 1.  Monitor for areas of redness and/or skin breakdown  2.  Assess vascular access sites hourly  3.  Every 4-6 hours minimum:  Change oxygen saturation probe site  4.  Every 4-6 hours:  If on nasal continuous positive airway pressure, respiratory therapy assess nares and determine need for appliance change or resting period.  10/15/2024 0127 by Beth Gomez RN  Outcome: Progressing  10/14/2024 1838 by Edwige Quinn RN  Outcome: Not Progressing     Problem: Safety - Adult  Goal: Free from fall injury  10/15/2024 0127 by Beth Gomez, RN  Outcome: Progressing  10/14/2024 1838 by Edwige Quinn RN  Outcome: Not Progressing     Problem: Nutrition Deficit:  Goal: Optimize nutritional status  10/15/2024 0127 by Beth Gomez, RN  Outcome: Progressing  10/14/2024 1838 by Edwige Quinn RN  Outcome: Not Progressing     Problem: Pain  Goal: Verbalizes/displays adequate comfort level or baseline comfort level  10/15/2024 0127 by Beth Gomez, RN  Outcome: Progressing  10/14/2024 1838 by Edwige Quinn RN  Outcome: Not Progressing     Problem: ABCDS Injury Assessment  Goal: Absence of physical injury  10/15/2024 0127 by Beth Gomez RN  Outcome: Progressing  10/14/2024 1838 by Edwige Quinn RN  Outcome: Not Progressing     Problem: Chronic Conditions and Co-morbidities  Goal: Patient's chronic conditions and co-morbidity 
  Problem: Chronic Conditions and Co-morbidities  Goal: Patient's chronic conditions and co-morbidity symptoms are monitored and maintained or improved  10/17/2024 0156 by Elvin Delong RN  Outcome: Progressing  10/16/2024 1733 by Edwige Quinn RN  Outcome: Not Progressing     Problem: Discharge Planning  Goal: Discharge to home or other facility with appropriate resources  10/17/2024 0156 by Elvin Delong RN  Outcome: Progressing  10/16/2024 1733 by Edwige Quinn RN  Outcome: Not Progressing     Problem: Skin/Tissue Integrity  Goal: Absence of new skin breakdown  Description: 1.  Monitor for areas of redness and/or skin breakdown  2.  Assess vascular access sites hourly  3.  Every 4-6 hours minimum:  Change oxygen saturation probe site  4.  Every 4-6 hours:  If on nasal continuous positive airway pressure, respiratory therapy assess nares and determine need for appliance change or resting period.  10/17/2024 0156 by Elvin Delong RN  Outcome: Progressing  10/16/2024 1733 by Edwige Quinn RN  Outcome: Not Progressing     Problem: Safety - Adult  Goal: Free from fall injury  10/17/2024 0156 by Elvin Delong RN  Outcome: Progressing  10/16/2024 1733 by Edwige Quinn RN  Outcome: Not Progressing     Problem: Nutrition Deficit:  Goal: Optimize nutritional status  10/17/2024 0156 by Elvin Delong RN  Outcome: Progressing  10/16/2024 1733 by Edwige Quinn RN  Outcome: Not Progressing     Problem: Pain  Goal: Verbalizes/displays adequate comfort level or baseline comfort level  10/17/2024 0156 by Elvin Delong RN  Outcome: Progressing  10/16/2024 1733 by Edwige Quinn RN  Outcome: Not Progressing     Problem: ABCDS Injury Assessment  Goal: Absence of physical injury  10/17/2024 0156 by Elvin Delong RN  Outcome: Progressing  10/16/2024 1733 by Edwige Quinn RN  Outcome: Not Progressing     Problem: Chronic Conditions and Co-morbidities  Goal: Patient's chronic conditions and 
  Problem: Chronic Conditions and Co-morbidities  Goal: Patient's chronic conditions and co-morbidity symptoms are monitored and maintained or improved  10/19/2024 1128 by Rochelle Junior RN  Outcome: Progressing  Flowsheets (Taken 10/19/2024 0034 by Nat Ambrocio RN)  Care Plan - Patient's Chronic Conditions and Co-Morbidity Symptoms are Monitored and Maintained or Improved:   Monitor and assess patient's chronic conditions and comorbid symptoms for stability, deterioration, or improvement   Collaborate with multidisciplinary team to address chronic and comorbid conditions and prevent exacerbation or deterioration   Update acute care plan with appropriate goals if chronic or comorbid symptoms are exacerbated and prevent overall improvement and discharge  10/19/2024 0023 by Nat Ambrocio RN  Outcome: Progressing  10/19/2024 0014 by Nat Ambrocio RN  Outcome: /[facility] Not Progressing     Problem: Discharge Planning  Goal: Discharge to home or other facility with appropriate resources  10/19/2024 1128 by Rochelle Junior RN  Outcome: Progressing  Flowsheets  Taken 10/19/2024 0901 by Rochelle Junior RN  Discharge to home or other facility with appropriate resources: Identify barriers to discharge with patient and caregiver  Taken 10/19/2024 0034 by Nat Ambrocio RN  Discharge to home or other facility with appropriate resources:   Identify barriers to discharge with patient and caregiver   Arrange for needed discharge resources and transportation as appropriate   Identify discharge learning needs (meds, wound care, etc)   Arrange for interpreters to assist at discharge as needed   Refer to discharge planning if patient needs post-hospital services based on physician order or complex needs related to functional status, cognitive ability or social support system  10/19/2024 0023 by Nat Ambrocio RN  Outcome: Progressing  10/19/2024 0014 by Nat Ambrocio RN  Outcome: /[facility] Not Progressing     Problem: 
  Problem: Chronic Conditions and Co-morbidities  Goal: Patient's chronic conditions and co-morbidity symptoms are monitored and maintained or improved  10/2/2024 0248 by Elvin Delong RN  Outcome: Progressing  10/1/2024 1808 by Tayla Newell RN  Outcome: Progressing     Problem: Discharge Planning  Goal: Discharge to home or other facility with appropriate resources  10/2/2024 0248 by Elvin Delong RN  Outcome: Progressing  10/1/2024 1808 by Tayla Newell RN  Outcome: Progressing     Problem: Skin/Tissue Integrity  Goal: Absence of new skin breakdown  Description: 1.  Monitor for areas of redness and/or skin breakdown  2.  Assess vascular access sites hourly  3.  Every 4-6 hours minimum:  Change oxygen saturation probe site  4.  Every 4-6 hours:  If on nasal continuous positive airway pressure, respiratory therapy assess nares and determine need for appliance change or resting period.  10/2/2024 0248 by Elvin Delong RN  Outcome: Progressing  10/1/2024 1808 by Tayla Newell RN  Outcome: Progressing     Problem: Safety - Adult  Goal: Free from fall injury  10/2/2024 0248 by Elvin Delong RN  Outcome: Progressing  10/1/2024 1808 by Tayla Newell RN  Outcome: Progressing     
  Problem: Chronic Conditions and Co-morbidities  Goal: Patient's chronic conditions and co-morbidity symptoms are monitored and maintained or improved  10/2/2024 1537 by Tayla Newell RN  Outcome: Progressing  10/2/2024 0248 by Elvin Delong RN  Outcome: Progressing     Problem: Discharge Planning  Goal: Discharge to home or other facility with appropriate resources  10/2/2024 1537 by Tayla Newell RN  Outcome: Progressing  10/2/2024 0248 by Elvin Delong RN  Outcome: Progressing     Problem: Skin/Tissue Integrity  Goal: Absence of new skin breakdown  Description: 1.  Monitor for areas of redness and/or skin breakdown  2.  Assess vascular access sites hourly  3.  Every 4-6 hours minimum:  Change oxygen saturation probe site  4.  Every 4-6 hours:  If on nasal continuous positive airway pressure, respiratory therapy assess nares and determine need for appliance change or resting period.  10/2/2024 1537 by Tayla Newell RN  Outcome: Progressing  10/2/2024 0248 by Elvin Delong RN  Outcome: Progressing     Problem: Safety - Adult  Goal: Free from fall injury  10/2/2024 1537 by Tayla Newell RN  Outcome: Progressing  10/2/2024 0248 by Elvin Delong RN  Outcome: Progressing     Problem: Nutrition Deficit:  Goal: Optimize nutritional status  Outcome: Not Progressing     Problem: Pain  Goal: Verbalizes/displays adequate comfort level or baseline comfort level  Outcome: Not Progressing     
  Problem: Chronic Conditions and Co-morbidities  Goal: Patient's chronic conditions and co-morbidity symptoms are monitored and maintained or improved  10/20/2024 0122 by Nat Ambrocio RN  Outcome: Progressing  Flowsheets (Taken 10/20/2024 0045)  Care Plan - Patient's Chronic Conditions and Co-Morbidity Symptoms are Monitored and Maintained or Improved:   Monitor and assess patient's chronic conditions and comorbid symptoms for stability, deterioration, or improvement   Collaborate with multidisciplinary team to address chronic and comorbid conditions and prevent exacerbation or deterioration   Update acute care plan with appropriate goals if chronic or comorbid symptoms are exacerbated and prevent overall improvement and discharge  10/19/2024 1128 by Rochelle Junior RN  Outcome: Progressing  Flowsheets (Taken 10/19/2024 0034 by Nat Ambrocio RN)  Care Plan - Patient's Chronic Conditions and Co-Morbidity Symptoms are Monitored and Maintained or Improved:   Monitor and assess patient's chronic conditions and comorbid symptoms for stability, deterioration, or improvement   Collaborate with multidisciplinary team to address chronic and comorbid conditions and prevent exacerbation or deterioration   Update acute care plan with appropriate goals if chronic or comorbid symptoms are exacerbated and prevent overall improvement and discharge     Problem: Discharge Planning  Goal: Discharge to home or other facility with appropriate resources  10/20/2024 0122 by Nat Ambrocio RN  Outcome: Progressing  Flowsheets (Taken 10/20/2024 0045)  Discharge to home or other facility with appropriate resources:   Identify barriers to discharge with patient and caregiver   Arrange for needed discharge resources and transportation as appropriate   Identify discharge learning needs (meds, wound care, etc)   Arrange for interpreters to assist at discharge as needed   Refer to discharge planning if patient needs post-hospital services 
  Problem: Chronic Conditions and Co-morbidities  Goal: Patient's chronic conditions and co-morbidity symptoms are monitored and maintained or improved  10/4/2024 2336 by Kathi Sandy RN  Outcome: Progressing  10/4/2024 1454 by Babita Aguilar RN  Outcome: Progressing     Problem: Discharge Planning  Goal: Discharge to home or other facility with appropriate resources  10/4/2024 2336 by Kathi Sandy RN  Outcome: Progressing  10/4/2024 1454 by Babita Aguilar RN  Outcome: Progressing     Problem: Skin/Tissue Integrity  Goal: Absence of new skin breakdown  Description: 1.  Monitor for areas of redness and/or skin breakdown  2.  Assess vascular access sites hourly  3.  Every 4-6 hours minimum:  Change oxygen saturation probe site  4.  Every 4-6 hours:  If on nasal continuous positive airway pressure, respiratory therapy assess nares and determine need for appliance change or resting period.  10/4/2024 2336 by Kathi Sandy RN  Outcome: Progressing  10/4/2024 1454 by Babita Aguilar RN  Outcome: Progressing     Problem: Safety - Adult  Goal: Free from fall injury  10/4/2024 2336 by Kathi Sandy RN  Outcome: Progressing  10/4/2024 1454 by Babita Aguilar RN  Outcome: Progressing     Problem: Nutrition Deficit:  Goal: Optimize nutritional status  10/4/2024 2336 by Kathi Sandy RN  Outcome: Progressing  10/4/2024 1454 by Babita Aguilar RN  Outcome: Progressing     
  Problem: Chronic Conditions and Co-morbidities  Goal: Patient's chronic conditions and co-morbidity symptoms are monitored and maintained or improved  10/7/2024 0015 by Kathi Sandy RN  Outcome: Progressing  10/6/2024 1254 by Pete Grover RN  Outcome: Progressing  Flowsheets (Taken 10/6/2024 0942)  Care Plan - Patient's Chronic Conditions and Co-Morbidity Symptoms are Monitored and Maintained or Improved: Monitor and assess patient's chronic conditions and comorbid symptoms for stability, deterioration, or improvement     Problem: Discharge Planning  Goal: Discharge to home or other facility with appropriate resources  10/7/2024 0015 by Kathi Sandy RN  Outcome: Progressing  10/6/2024 1254 by Pete Grover RN  Outcome: Progressing  Flowsheets (Taken 10/6/2024 0942)  Discharge to home or other facility with appropriate resources: Identify barriers to discharge with patient and caregiver     Problem: Skin/Tissue Integrity  Goal: Absence of new skin breakdown  Description: 1.  Monitor for areas of redness and/or skin breakdown  2.  Assess vascular access sites hourly  3.  Every 4-6 hours minimum:  Change oxygen saturation probe site  4.  Every 4-6 hours:  If on nasal continuous positive airway pressure, respiratory therapy assess nares and determine need for appliance change or resting period.  10/7/2024 0015 by Kathi Sandy RN  Outcome: Progressing  10/6/2024 1254 by Pete Grover RN  Outcome: Progressing     Problem: Safety - Adult  Goal: Free from fall injury  10/7/2024 0015 by Kathi Sandy RN  Outcome: Progressing  10/6/2024 1254 by Pete Grover RN  Outcome: Progressing     Problem: Nutrition Deficit:  Goal: Optimize nutritional status  10/7/2024 0015 by Kathi Sandy RN  Outcome: Progressing  10/6/2024 1254 by Pete Grover RN  Outcome: Progressing     
  Problem: Chronic Conditions and Co-morbidities  Goal: Patient's chronic conditions and co-morbidity symptoms are monitored and maintained or improved  10/8/2024 0304 by Evelio Montoya RN  Outcome: Progressing  Flowsheets (Taken 10/7/2024 2246)  Care Plan - Patient's Chronic Conditions and Co-Morbidity Symptoms are Monitored and Maintained or Improved:   Monitor and assess patient's chronic conditions and comorbid symptoms for stability, deterioration, or improvement   Collaborate with multidisciplinary team to address chronic and comorbid conditions and prevent exacerbation or deterioration   Update acute care plan with appropriate goals if chronic or comorbid symptoms are exacerbated and prevent overall improvement and discharge  10/7/2024 1832 by Kierra Kennedy RN  Outcome: Progressing  Flowsheets (Taken 10/7/2024 1015)  Care Plan - Patient's Chronic Conditions and Co-Morbidity Symptoms are Monitored and Maintained or Improved: Monitor and assess patient's chronic conditions and comorbid symptoms for stability, deterioration, or improvement     Problem: Discharge Planning  Goal: Discharge to home or other facility with appropriate resources  10/8/2024 0304 by Evelio Montoya RN  Outcome: Progressing  Flowsheets (Taken 10/7/2024 2246)  Discharge to home or other facility with appropriate resources:   Identify barriers to discharge with patient and caregiver   Arrange for needed discharge resources and transportation as appropriate   Identify discharge learning needs (meds, wound care, etc)   Arrange for interpreters to assist at discharge as needed   Refer to discharge planning if patient needs post-hospital services based on physician order or complex needs related to functional status, cognitive ability or social support system  10/7/2024 1832 by Kierra Kennedy RN  Outcome: Progressing  Flowsheets (Taken 10/7/2024 1015)  Discharge to home or other facility with appropriate resources:   Identify barriers to discharge 
  Problem: Chronic Conditions and Co-morbidities  Goal: Patient's chronic conditions and co-morbidity symptoms are monitored and maintained or improved  10/9/2024 0010 by Elvin Delong RN  Outcome: Progressing  10/8/2024 1743 by Kierra Kennedy RN  Outcome: Not Progressing  Flowsheets (Taken 10/8/2024 0944)  Care Plan - Patient's Chronic Conditions and Co-Morbidity Symptoms are Monitored and Maintained or Improved: Monitor and assess patient's chronic conditions and comorbid symptoms for stability, deterioration, or improvement     Problem: Discharge Planning  Goal: Discharge to home or other facility with appropriate resources  10/9/2024 0010 by Elvin Delong RN  Outcome: Progressing  10/8/2024 1743 by Kierra Kennedy RN  Outcome: Not Progressing  Flowsheets (Taken 10/8/2024 0944)  Discharge to home or other facility with appropriate resources:   Identify barriers to discharge with patient and caregiver   Identify discharge learning needs (meds, wound care, etc)   Arrange for needed discharge resources and transportation as appropriate     Problem: Skin/Tissue Integrity  Goal: Absence of new skin breakdown  Description: 1.  Monitor for areas of redness and/or skin breakdown  2.  Assess vascular access sites hourly  3.  Every 4-6 hours minimum:  Change oxygen saturation probe site  4.  Every 4-6 hours:  If on nasal continuous positive airway pressure, respiratory therapy assess nares and determine need for appliance change or resting period.  10/9/2024 0010 by Elvin Delong RN  Outcome: Progressing  10/8/2024 1743 by Kierra eKnnedy RN  Outcome: Progressing     Problem: Safety - Adult  Goal: Free from fall injury  10/9/2024 0010 by Elvin Delong RN  Outcome: Progressing  10/8/2024 1743 by Kierra Kennedy RN  Outcome: Progressing     Problem: Nutrition Deficit:  Goal: Optimize nutritional status  10/9/2024 0010 by Elvin Delong RN  Outcome: Progressing  10/8/2024 1743 by Kierra Kennedy RN  Outcome: Not Progressing  10/8/2024 
  Problem: Chronic Conditions and Co-morbidities  Goal: Patient's chronic conditions and co-morbidity symptoms are monitored and maintained or improved  Outcome: Progressing     Problem: Discharge Planning  Goal: Discharge to home or other facility with appropriate resources  Outcome: Progressing     Problem: Skin/Tissue Integrity  Goal: Absence of new skin breakdown  Description: 1.  Monitor for areas of redness and/or skin breakdown  2.  Assess vascular access sites hourly  3.  Every 4-6 hours minimum:  Change oxygen saturation probe site  4.  Every 4-6 hours:  If on nasal continuous positive airway pressure, respiratory therapy assess nares and determine need for appliance change or resting period.  Outcome: Progressing     Problem: Safety - Adult  Goal: Free from fall injury  Outcome: Progressing     
  Problem: Chronic Conditions and Co-morbidities  Goal: Patient's chronic conditions and co-morbidity symptoms are monitored and maintained or improved  Outcome: Progressing     Problem: Discharge Planning  Goal: Discharge to home or other facility with appropriate resources  Outcome: Progressing     Problem: Skin/Tissue Integrity  Goal: Absence of new skin breakdown  Description: 1.  Monitor for areas of redness and/or skin breakdown  2.  Assess vascular access sites hourly  3.  Every 4-6 hours minimum:  Change oxygen saturation probe site  4.  Every 4-6 hours:  If on nasal continuous positive airway pressure, respiratory therapy assess nares and determine need for appliance change or resting period.  Outcome: Progressing     Problem: Safety - Adult  Goal: Free from fall injury  Outcome: Progressing     Problem: Nutrition Deficit:  Goal: Optimize nutritional status  Outcome: Progressing     Problem: Pain  Goal: Verbalizes/displays adequate comfort level or baseline comfort level  Outcome: Progressing     Problem: ABCDS Injury Assessment  Goal: Absence of physical injury  Outcome: Progressing     
  Problem: Chronic Conditions and Co-morbidities  Goal: Patient's chronic conditions and co-morbidity symptoms are monitored and maintained or improved  Outcome: Progressing     Problem: Discharge Planning  Goal: Discharge to home or other facility with appropriate resources  Outcome: Progressing     Problem: Skin/Tissue Integrity  Goal: Absence of new skin breakdown  Description: 1.  Monitor for areas of redness and/or skin breakdown  2.  Assess vascular access sites hourly  3.  Every 4-6 hours minimum:  Change oxygen saturation probe site  4.  Every 4-6 hours:  If on nasal continuous positive airway pressure, respiratory therapy assess nares and determine need for appliance change or resting period.  Outcome: Progressing     Problem: Safety - Adult  Goal: Free from fall injury  Outcome: Progressing     Problem: Nutrition Deficit:  Goal: Optimize nutritional status  Outcome: Progressing  Flowsheets (Taken 10/11/2024 1238 by Yandy Long, MS, RD, LD)  Nutrient intake appropriate for improving, restoring, or maintaining nutritional needs:   Assess nutritional status and recommend course of action   Monitor oral intake, labs, and treatment plans   Recommend appropriate diets, oral nutritional supplements, and vitamin/mineral supplements     Problem: Pain  Goal: Verbalizes/displays adequate comfort level or baseline comfort level  Outcome: Progressing     Problem: ABCDS Injury Assessment  Goal: Absence of physical injury  Outcome: Progressing     
  Problem: Chronic Conditions and Co-morbidities  Goal: Patient's chronic conditions and co-morbidity symptoms are monitored and maintained or improved  Outcome: Progressing     Problem: Discharge Planning  Goal: Discharge to home or other facility with appropriate resources  Outcome: Progressing     Problem: Skin/Tissue Integrity  Goal: Absence of new skin breakdown  Description: 1.  Monitor for areas of redness and/or skin breakdown  2.  Assess vascular access sites hourly  3.  Every 4-6 hours minimum:  Change oxygen saturation probe site  4.  Every 4-6 hours:  If on nasal continuous positive airway pressure, respiratory therapy assess nares and determine need for appliance change or resting period.  Outcome: Progressing     Problem: Safety - Adult  Goal: Free from fall injury  Outcome: Progressing     Problem: Nutrition Deficit:  Goal: Optimize nutritional status  Outcome: Progressing  Flowsheets (Taken 10/15/2024 1213 by Yandy Long, MS, RD, LD)  Nutrient intake appropriate for improving, restoring, or maintaining nutritional needs:   Assess nutritional status and recommend course of action   Monitor oral intake, labs, and treatment plans   Recommend appropriate diets, oral nutritional supplements, and vitamin/mineral supplements     
  Problem: Chronic Conditions and Co-morbidities  Goal: Patient's chronic conditions and co-morbidity symptoms are monitored and maintained or improved  Outcome: Progressing     Problem: Discharge Planning  Goal: Discharge to home or other facility with appropriate resources  Outcome: Progressing     Problem: Skin/Tissue Integrity  Goal: Absence of new skin breakdown  Description: 1.  Monitor for areas of redness and/or skin breakdown  2.  Assess vascular access sites hourly  3.  Every 4-6 hours minimum:  Change oxygen saturation probe site  4.  Every 4-6 hours:  If on nasal continuous positive airway pressure, respiratory therapy assess nares and determine need for appliance change or resting period.  Outcome: Progressing     Problem: Safety - Adult  Goal: Free from fall injury  Outcome: Progressing   Electronically signed by Gaurav Kiser RN on 10/1/2024 at 12:50 AM    
  Problem: Chronic Conditions and Co-morbidities  Goal: Patient's chronic conditions and co-morbidity symptoms are monitored and maintained or improved  Outcome: Progressing  Flowsheets (Taken 10/14/2024 0103)  Care Plan - Patient's Chronic Conditions and Co-Morbidity Symptoms are Monitored and Maintained or Improved: Monitor and assess patient's chronic conditions and comorbid symptoms for stability, deterioration, or improvement     Problem: Discharge Planning  Goal: Discharge to home or other facility with appropriate resources  Outcome: Progressing  Flowsheets (Taken 10/14/2024 0103)  Discharge to home or other facility with appropriate resources:   Identify barriers to discharge with patient and caregiver   Arrange for needed discharge resources and transportation as appropriate   Identify discharge learning needs (meds, wound care, etc)     Problem: Skin/Tissue Integrity  Goal: Absence of new skin breakdown  Description: 1.  Monitor for areas of redness and/or skin breakdown  2.  Assess vascular access sites hourly  3.  Every 4-6 hours minimum:  Change oxygen saturation probe site  4.  Every 4-6 hours:  If on nasal continuous positive airway pressure, respiratory therapy assess nares and determine need for appliance change or resting period.  Outcome: Progressing     Problem: Safety - Adult  Goal: Free from fall injury  Outcome: Progressing     Problem: Nutrition Deficit:  Goal: Optimize nutritional status  Outcome: Progressing     Problem: Pain  Goal: Verbalizes/displays adequate comfort level or baseline comfort level  Outcome: Progressing     Problem: ABCDS Injury Assessment  Goal: Absence of physical injury  Outcome: Progressing     
  Problem: Chronic Conditions and Co-morbidities  Goal: Patient's chronic conditions and co-morbidity symptoms are monitored and maintained or improved  Outcome: Progressing  Flowsheets (Taken 10/6/2024 0942)  Care Plan - Patient's Chronic Conditions and Co-Morbidity Symptoms are Monitored and Maintained or Improved: Monitor and assess patient's chronic conditions and comorbid symptoms for stability, deterioration, or improvement     Problem: Discharge Planning  Goal: Discharge to home or other facility with appropriate resources  Outcome: Progressing  Flowsheets (Taken 10/6/2024 0942)  Discharge to home or other facility with appropriate resources: Identify barriers to discharge with patient and caregiver     Problem: Skin/Tissue Integrity  Goal: Absence of new skin breakdown  Description: 1.  Monitor for areas of redness and/or skin breakdown  2.  Assess vascular access sites hourly  3.  Every 4-6 hours minimum:  Change oxygen saturation probe site  4.  Every 4-6 hours:  If on nasal continuous positive airway pressure, respiratory therapy assess nares and determine need for appliance change or resting period.  Outcome: Progressing     Problem: Safety - Adult  Goal: Free from fall injury  Outcome: Progressing     Problem: Nutrition Deficit:  Goal: Optimize nutritional status  Outcome: Progressing     Problem: Pain  Goal: Verbalizes/displays adequate comfort level or baseline comfort level  Outcome: Progressing     Problem: ABCDS Injury Assessment  Goal: Absence of physical injury  Outcome: Progressing     
  Problem: Nutrition Deficit:  Goal: Optimize nutritional status  10/5/2024 1140 by Yandy Long, MS, RD, LD  Outcome: Not Progressing  10/5/2024 1139 by Pete Grover RN  Outcome: Progressing  Flowsheets (Taken 10/5/2024 1116 by Yandy Long, MS, RD, LD)  Nutrient intake appropriate for improving, restoring, or maintaining nutritional needs:   Assess nutritional status and recommend course of action   Monitor oral intake, labs, and treatment plans   Recommend appropriate diets, oral nutritional supplements, and vitamin/mineral supplements  10/4/2024 2336 by Kathi Sandy RN  Outcome: Progressing     
  Problem: Nutrition Deficit:  Goal: Optimize nutritional status  10/8/2024 1417 by Yandy Long, MS, RD, LD  Outcome: Not Progressing  Flowsheets (Taken 10/8/2024 1412)  Nutrient intake appropriate for improving, restoring, or maintaining nutritional needs:   Assess nutritional status and recommend course of action   Monitor oral intake, labs, and treatment plans   Recommend appropriate diets, oral nutritional supplements, and vitamin/mineral supplements  10/8/2024 0304 by Evelio Montoya, RN  Outcome: Progressing     
  Problem: Skin/Tissue Integrity  Goal: Absence of new skin breakdown  Description: 1.  Monitor for areas of redness and/or skin breakdown  2.  Assess vascular access sites hourly  3.  Every 4-6 hours minimum:  Change oxygen saturation probe site  4.  Every 4-6 hours:  If on nasal continuous positive airway pressure, respiratory therapy assess nares and determine need for appliance change or resting period.  10/3/2024 0051 by Kathi Sandy RN  Outcome: Progressing  10/2/2024 1537 by Tayla Newell RN  Outcome: Progressing     Problem: Safety - Adult  Goal: Free from fall injury  10/3/2024 0051 by Kathi Sandy RN  Outcome: Progressing  10/2/2024 1537 by Tayla Newell RN  Outcome: Progressing     Problem: Nutrition Deficit:  Goal: Optimize nutritional status  10/3/2024 0051 by Kathi Sandy RN  Outcome: Progressing  10/2/2024 1537 by Tayla Newell RN  Outcome: Not Progressing     Problem: Pain  Goal: Verbalizes/displays adequate comfort level or baseline comfort level  10/3/2024 0051 by Kathi Sandy RN  Outcome: Progressing  10/2/2024 1537 by Tayla Newell RN  Outcome: Not Progressing     Problem: ABCDS Injury Assessment  Goal: Absence of physical injury  Outcome: Progressing     Problem: Nutrition Deficit:  Goal: Optimize nutritional status  10/3/2024 0051 by Kathi Sandy RN  Outcome: Progressing  10/2/2024 1537 by Tayla Newell RN  Outcome: Not Progressing     Problem: Pain  Goal: Verbalizes/displays adequate comfort level or baseline comfort level  10/3/2024 0051 by Kathi Sandy RN  Outcome: Progressing  10/2/2024 1537 by Tayla Newell RN  Outcome: Not Progressing     
Nutrition Problem #1: Unintended weight loss  Intervention: Food and/or Nutrient Delivery: Continue Current Diet, Start Oral Nutrition Supplement  Nutritional      
10/3/2024 1017)  Care Plan - Patient's Chronic Conditions and Co-Morbidity Symptoms are Monitored and Maintained or Improved: Monitor and assess patient's chronic conditions and comorbid symptoms for stability, deterioration, or improvement     
LD)  Nutrient intake appropriate for improving, restoring, or maintaining nutritional needs:   Assess nutritional status and recommend course of action   Monitor oral intake, labs, and treatment plans   Recommend appropriate diets, oral nutritional supplements, and vitamin/mineral supplements     
RN  Outcome: HH/HSPC Not Progressing  Flowsheets (Taken 10/19/2024 0013)  Free From Fall Injury:   Instruct family/caregiver on patient safety   Based on caregiver fall risk screen, instruct family/caregiver to ask for assistance with transferring infant if caregiver noted to have fall risk factors  10/18/2024 1820 by Kierra Kennedy RN  Outcome: Progressing     Problem: Nutrition Deficit:  Goal: Optimize nutritional status  10/19/2024 0023 by Nat Ambrocio RN  Outcome: Progressing  10/19/2024 0014 by Nat Ambrocio RN  Outcome: HH/HSPC Not Progressing  10/18/2024 1820 by Kierra Kennedy RN  Outcome: Progressing  Flowsheets (Taken 10/18/2024 1046 by Yandy Long, MS, RD, LD)  Nutrient intake appropriate for improving, restoring, or maintaining nutritional needs:   Assess nutritional status and recommend course of action   Monitor oral intake, labs, and treatment plans   Recommend appropriate diets, oral nutritional supplements, and vitamin/mineral supplements     Problem: Pain  Goal: Verbalizes/displays adequate comfort level or baseline comfort level  10/19/2024 0023 by Nat Ambrocio RN  Outcome: Progressing  10/19/2024 0014 by Nat Ambrocio RN  Outcome: HH/HSPC Not Progressing  10/18/2024 1820 by Kierra Kennedy RN  Outcome: Progressing     Problem: ABCDS Injury Assessment  Goal: Absence of physical injury  10/19/2024 0023 by Nat Ambrocio RN  Outcome: Progressing  10/19/2024 0014 by Nat Ambrocio RN  Outcome: HH/HSPC Not Progressing  Flowsheets (Taken 10/19/2024 0013)  Absence of Physical Injury: Implement safety measures based on patient assessment  10/18/2024 1820 by Kierra Kennedy RN  Outcome: Progressing     
RN  Outcome: Progressing     Problem: ABCDS Injury Assessment  Goal: Absence of physical injury  10/5/2024 1139 by Pete Grover RN  Outcome: Progressing  10/4/2024 2336 by Kathi Sandy RN  Outcome: Progressing

## 2024-10-21 ENCOUNTER — TELEPHONE (OUTPATIENT)
Dept: CARDIOLOGY CLINIC | Age: 74
End: 2024-10-21

## 2024-10-21 DIAGNOSIS — G89.29 OTHER CHRONIC PAIN: Primary | ICD-10-CM

## 2024-10-21 RX ORDER — OXYCODONE AND ACETAMINOPHEN 10; 325 MG/1; MG/1
1 TABLET ORAL EVERY 6 HOURS PRN
Qty: 120 TABLET | Refills: 0 | Status: SHIPPED | OUTPATIENT
Start: 2024-10-21 | End: 2024-11-20

## 2024-10-21 NOTE — TELEPHONE ENCOUNTER
Alida from [facility] is requesting a return call in regards to rescheduling patient missed appt. Please return her call.    Thank you!    Antonia  [phone]  Ext. [phone]

## 2024-10-22 NOTE — TELEPHONE ENCOUNTER
Patient has been r/s with [facility] as Dr. Boothe has no openings really until December. 10/22/24 AH

## 2024-10-31 ENCOUNTER — LAB REQUISITION (OUTPATIENT)
Dept: LAB | Facility: HOSPITAL | Age: 74
End: 2024-10-31
Payer: MEDICARE

## 2024-10-31 DIAGNOSIS — M48.062 SPINAL STENOSIS, LUMBAR REGION WITH NEUROGENIC CLAUDICATION: ICD-10-CM

## 2024-10-31 DIAGNOSIS — M19.90 UNSPECIFIED OSTEOARTHRITIS, UNSPECIFIED SITE: ICD-10-CM

## 2024-10-31 DIAGNOSIS — M46.26 OSTEOMYELITIS OF VERTEBRA, LUMBAR REGION: ICD-10-CM

## 2024-10-31 DIAGNOSIS — M00.80 ARTHRITIS DUE TO OTHER BACTERIA, UNSPECIFIED JOINT: ICD-10-CM

## 2024-10-31 LAB — CRP SERPL-MCNC: 11.02 MG/DL (ref 0–0.5)

## 2024-10-31 PROCEDURE — 86140 C-REACTIVE PROTEIN: CPT

## 2024-11-04 ENCOUNTER — TELEPHONE (OUTPATIENT)
Age: 74
End: 2024-11-04
Payer: MEDICARE

## 2024-11-04 NOTE — TELEPHONE ENCOUNTER
Called to speak with Ms. Cyr regarding her appointment with Dr. Banda that she missed on November 4. The call didn't ring and went to voicemail. I left a message with the office's phone number in case she would like to reschedule.

## 2024-11-12 ENCOUNTER — TELEMEDICINE (OUTPATIENT)
Dept: FAMILY MEDICINE CLINIC | Age: 74
End: 2024-11-12

## 2024-11-12 DIAGNOSIS — G89.29 OTHER CHRONIC PAIN: ICD-10-CM

## 2024-11-12 DIAGNOSIS — B95.62 MRSA BACTEREMIA: ICD-10-CM

## 2024-11-12 DIAGNOSIS — Z74.09 POOR MOBILITY: ICD-10-CM

## 2024-11-12 DIAGNOSIS — G06.1 EPIDURAL ABSCESS, L2-L5: Primary | ICD-10-CM

## 2024-11-12 DIAGNOSIS — M54.59 INTRACTABLE LOW BACK PAIN: ICD-10-CM

## 2024-11-12 DIAGNOSIS — M46.26 OSTEOMYELITIS OF LUMBAR SPINE: ICD-10-CM

## 2024-11-12 DIAGNOSIS — R78.81 MRSA BACTEREMIA: ICD-10-CM

## 2024-11-12 DIAGNOSIS — R39.81 FUNCTIONAL URINARY INCONTINENCE: ICD-10-CM

## 2024-11-12 NOTE — TELEPHONE ENCOUNTER
Lashonda CAROL Rukhsana called to request a refill on her medication.    *Per Nelly, Updated OV today, WM SS*      Last office visit : 11/12/2024   Next office visit : Visit date not found     Requested Prescriptions     Pending Prescriptions Disp Refills    oxyCODONE-acetaminophen (PERCOCET)  MG per tablet 120 tablet 0     Sig: Take 1 tablet by mouth every 6 hours as needed for Pain for up to 30 days. Max Daily Amount: 4 tablets            Debbie Knight MA

## 2024-11-12 NOTE — PROGRESS NOTES
Documentation:  I communicated with the patient and/or health care decision maker about    Diagnosis Orders   1. 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 Osteomyelitis of lumbar spine        3. MRSA bacteremia        4. Functional urinary incontinence        5. Poor mobility        6. Intractable low back pain          Details of this discussion including any medical advice provided:     Telephone visit today with patient briefly but also with her son and caregiver, Daniel and his wife Diamond.   Patient has been hospitalized since August, [facility] twice, [place] once, rehab twice. Discharged home from [facility] on Friday after her insurance would no longer pay due to patient's inability to participate in PT.   Patient has had epidural abscess with diskitis and osteomyelitis of lumbar spine with intractable pain. Underlying severe DDD of lumbar spine. MRSA bacteremia.   Has most recently underwent removal of epidural abscess 10/4 by Dr Aguilar. Was on IV vancomycin through last week. Followed by infectious disease, Dr Zavala.   She is mostly bedridden at this time. She has urinary incontinence with some reports of skin breakdown to bottom.   Family reports with any movement she is yelling out in pain. She is taking her percocet every 6 hours and lyrica 100mg TID.   Unable to participate in  PT either due to pain.   Has nursing and PT both coming to home through [facility].   Family is feeding her but appetite is poor.   Known lipoma to thigh, discussed today.    Biggest needs today:  Patient is unable to bear weight due to pain. She is being moved with lift to w/c but cannot stand to sit due to pain. Family is unable to get her out of the house for appointments due to this.  They need PureWick system to ease her pain and prevent skin breakdown.  She needs adult briefs and pads.  Discussed Ensure at least daily as well due to malnutrition.     Discussed her case with Dr Martinez who saw her during rehab.   Recommends continue

## 2024-11-13 ENCOUNTER — TELEPHONE (OUTPATIENT)
Dept: FAMILY MEDICINE CLINIC | Age: 74
End: 2024-11-13

## 2024-11-13 DIAGNOSIS — M46.26 OSTEOMYELITIS OF LUMBAR SPINE: ICD-10-CM

## 2024-11-13 DIAGNOSIS — G06.1 EPIDURAL ABSCESS, L2-L5: Primary | ICD-10-CM

## 2024-11-13 RX ORDER — OXYCODONE AND ACETAMINOPHEN 10; 325 MG/1; MG/1
1 TABLET ORAL EVERY 6 HOURS PRN
Qty: 120 TABLET | Refills: 0 | Status: SHIPPED | OUTPATIENT
Start: 2024-11-13 | End: 2024-12-13

## 2024-11-13 NOTE — TELEPHONE ENCOUNTER
Lets go ahead and fax over an urgent referral with her recent MRI lumbar spine, hospital discharge summary and my office note please.     Let the son know.

## 2024-11-13 NOTE — TELEPHONE ENCOUNTER
Spoke with  at Dr. Shah's office who had me leave a VM on his assistant's line. They said for cases like this they normally can schedule pretty quick but sometimes will have alternative therapy recommendations before requesting referral. They stated she will give me call back to let me know which route.

## 2024-11-14 ENCOUNTER — TELEPHONE (OUTPATIENT)
Dept: NEUROSURGERY | Facility: CLINIC | Age: 74
End: 2024-11-14
Payer: MEDICARE

## 2024-11-14 DIAGNOSIS — G06.1 EPIDURAL ABSCESS, L2-L5: Primary | ICD-10-CM

## 2024-11-14 NOTE — TELEPHONE ENCOUNTER
Received a message from Stephanie yesterday regarding a referral on this patient.  She faxed over records this morning and Dr Pratt has reviewed.  Of note the patient is still in a global period from a surgery by Dr Field.  Dr Pratt recommends the patient return to Dr Field for postop care.    I did call Stephanie but had to leave a VM.  I asked her to call me back if there was a problem with the patient going back to Dr Field.    RYAN SCHWARTZ [facility]  CLINICAL COORDINATOR  DR RAIN PRATT  [facility] NEUROSURGERY      IT IS OKAY FOR THE HUB TO DELIVER THIS INFORMATION TO THE PATIENT IF THEY RECEIVE THIS CALL BACK

## 2024-11-14 NOTE — TELEPHONE ENCOUNTER
Received call/My Chart Message from patient requesting refill on medication(s). Pt was last seen in office on 11/12/2024  and has a follow up scheduled for Visit date not found. Will send request to provider for authorization.     Requested Prescriptions     Pending Prescriptions Disp Refills    pregabalin (LYRICA) 100 MG capsule 90 capsule 3     Sig: Take 1 capsule by mouth 3 times daily for 120 days. Max Daily Amount: 300 mg

## 2024-11-15 ENCOUNTER — TELEPHONE (OUTPATIENT)
Dept: FAMILY MEDICINE CLINIC | Age: 74
End: 2024-11-15

## 2024-11-15 RX ORDER — PREGABALIN 100 MG/1
200 CAPSULE ORAL 2 TIMES DAILY
Qty: 120 CAPSULE | Refills: 0 | Status: SHIPPED | OUTPATIENT
Start: 2024-11-15 | End: 2024-12-15

## 2024-11-15 NOTE — TELEPHONE ENCOUNTER
I believe it was Babita who left a message on behalf of Lashonda, it cut out at name and phone number, but she stated Home Health had noticed a mass on her leg at her visit and they would be reporting it. No incoming records as of yet but will keep checking.

## 2024-11-15 NOTE — TELEPHONE ENCOUNTER
If her pain is still uncontrolled and she has not made any progress I would recommend that.    Otherwise, I would recommend seeing Dr Aguilar at least once more to see if any further ideas since he has not actually seen her since the hospital to know what her current condition in.    If patient is still unable to get transferred to w/c for an appointment, then yes ER would likely be best for her unfortunately.

## 2024-11-15 NOTE — TELEPHONE ENCOUNTER
Let her son know this and see if he is good with me putting a new referral back in for Dr Aguilar.   They can likely call the office also and ask for hospital follow up since he saw her then.

## 2024-11-16 ENCOUNTER — APPOINTMENT (OUTPATIENT)
Dept: ULTRASOUND IMAGING | Facility: HOSPITAL | Age: 74
End: 2024-11-16
Payer: MEDICARE

## 2024-11-16 ENCOUNTER — HOSPITAL ENCOUNTER (EMERGENCY)
Facility: HOSPITAL | Age: 74
Discharge: HOME | End: 2024-11-16
Payer: MEDICARE

## 2024-11-16 ENCOUNTER — APPOINTMENT (OUTPATIENT)
Dept: GENERAL RADIOLOGY | Facility: HOSPITAL | Age: 74
End: 2024-11-16
Payer: MEDICARE

## 2024-11-16 VITALS
BODY MASS INDEX: 32.27 KG/M2 | HEIGHT: 64 IN | DIASTOLIC BLOOD PRESSURE: 78 MMHG | TEMPERATURE: 98 F | HEART RATE: 54 BPM | OXYGEN SATURATION: 98 % | SYSTOLIC BLOOD PRESSURE: 112 MMHG | RESPIRATION RATE: 20 BRPM | WEIGHT: 189 LBS

## 2024-11-16 DIAGNOSIS — D17.23 LIPOMA OF RIGHT LOWER EXTREMITY: Primary | ICD-10-CM

## 2024-11-16 PROCEDURE — 93971 EXTREMITY STUDY: CPT | Performed by: SURGERY

## 2024-11-16 PROCEDURE — 99284 EMERGENCY DEPT VISIT MOD MDM: CPT

## 2024-11-16 PROCEDURE — 93971 EXTREMITY STUDY: CPT

## 2024-11-16 PROCEDURE — 73552 X-RAY EXAM OF FEMUR 2/>: CPT

## 2024-11-16 RX ORDER — AMIODARONE HYDROCHLORIDE 200 MG/1
200 TABLET ORAL
COMMUNITY
Start: 2024-08-31

## 2024-11-16 RX ORDER — LANOLIN ALCOHOL/MO/W.PET/CERES
2 CREAM (GRAM) TOPICAL
COMMUNITY

## 2024-11-16 RX ORDER — METHOCARBAMOL 500 MG/1
500 TABLET, FILM COATED ORAL
COMMUNITY
Start: 2024-09-19

## 2024-11-16 RX ORDER — METHIMAZOLE 5 MG/1
5 TABLET ORAL 2 TIMES DAILY
COMMUNITY

## 2024-11-16 RX ORDER — APIXABAN 5 MG/1
5 TABLET, FILM COATED ORAL 2 TIMES DAILY
COMMUNITY

## 2024-11-16 RX ORDER — FUROSEMIDE 20 MG/1
20 TABLET ORAL
COMMUNITY
Start: 2024-08-31

## 2024-11-16 RX ORDER — BUSPIRONE HYDROCHLORIDE 5 MG/1
5 TABLET ORAL 3 TIMES DAILY
COMMUNITY
Start: 2024-08-31

## 2024-11-16 RX ORDER — FOLIC ACID 1 MG/1
1000 TABLET ORAL DAILY
COMMUNITY

## 2024-11-16 RX ORDER — ERGOCALCIFEROL 1.25 MG/1
CAPSULE, LIQUID FILLED ORAL
COMMUNITY

## 2024-11-16 RX ORDER — ATORVASTATIN CALCIUM 20 MG/1
20 TABLET, FILM COATED ORAL
COMMUNITY
Start: 2024-08-31

## 2024-11-16 RX ORDER — METOPROLOL TARTRATE 25 MG/1
25 TABLET, FILM COATED ORAL 2 TIMES DAILY
COMMUNITY

## 2024-11-17 NOTE — ED PROVIDER NOTES
Subjective   History of Present Illness  Patient is a 74-year-old female that presents to the Emergency Department by EMS for complaints of pain and swelling to the medial aspect of the right upper thigh.  PMH significant for A-fib, on Eliquis, CVA, CHF, hypertension, hyperlipidemia, anxiety, and COPD.  Son present at bedside.  He states that since August they have had a series of admissions at [facility] due to intractable back pain and abdominal pain.  Blood cultures on 8/23/2024 were positive for staph with MRSA and patient was treated with vancomycin.  Infectious disease was also consulted.  With no clear source of bacteremia.  Patient did have bilateral lower lobe pneumonia versus diffuse bronchiolitis versus pulmonary edema.  Patient had an MRI of the lumbar spine that revealed discitis and osteomyelitis at L2/L3 suggesting an epidural abscess, bilateral psoas abscesses largest measuring 11.7 x 3.2 x 2.2 cm to the left psoas.  Infectious disease and neurosurgery were consulted but ultimately patient was transferred to [facility] for IR drainage and was discharged with a PICC line and IV vancomycin twice daily for 12 weeks.  Patient was then placed at [facility] for rehab.  She was brought back to [facility] emergency department on 9/30/2024 for new onset altered mental status and patient was worked up for a code stroke.  Patient ultimately got admitted for questionable stroke. During this visit MRI of the lumbar spine was obtained and it revealed worsening L2-L3 discitis/osteomyelitis which resulted in Dr. Field taking patient to surgery on 10//24 for decompressive lumbar laminectomy of L2-L3 for removal of epidural abscess/phlegmon using minimal invasive technique.  Patient continued on vancomycin per infectious disease and was discharged from [facility] on 10/20/2024 to [facility].  Son reports that patient was discharged from [facility] last Friday, 11/8/2024 with home health.    Son reports  "reason for presentation to ED today for DVT rule out.  He states that on Wednesday home health and family noticed a \"lump/mass \"on the inner aspect of the proximal right thigh that has progressively increased in size and patient has been complaining more of pain.  Son reports that patient has a known \"fatty tumor \"to the right leg but believes it is near the knee.  He states that the area of concern was not noticeable until Wednesday.  He denies any recent fall, trauma, or injury to cause symptoms.  Son reports patient has not had any episodes of fevers, body aches, chills, cough or congestion.  He states that she is confused intermittently at baseline but denies any change or worsening in mental status.  Patient denies any chest pain or shortness of breath.  Denies any new or worsening abdominal pain.  Denies any episodes of nausea, vomiting, constipation, or diarrhea.  Denies any lightheadedness, dizziness, blurred vision, headache or near syncope.        Review of Systems   Unable to perform ROS: Mental status change (family)   Musculoskeletal:  Positive for arthralgias and myalgias.   Skin:  Positive for wound.   Psychiatric/Behavioral:  Positive for confusion.    All other systems reviewed and are negative.      Past Medical History:   Diagnosis Date    Anemia     Back pain     CHF (congestive heart failure)     Depression     Epidural abscess     Hypertension     Injury of back     Intracranial abscess     MRSA bacteremia     Osteomyelitis     Renal disorder     Spasm     Spinal stenosis     Stroke        Allergies   Allergen Reactions    Niacin Rash       Past Surgical History:   Procedure Laterality Date    BRAIN SURGERY      CERVICAL DISC SURGERY         History reviewed. No pertinent family history.    Social History     Socioeconomic History    Marital status:            Objective   Physical Exam  Vitals and nursing note reviewed.   Constitutional:       Appearance: She is ill-appearing.      " Comments: Chronically ill-appearing. In no acute distress.    HENT:      Head: Normocephalic and atraumatic.      Right Ear: External ear normal.      Left Ear: External ear normal.      Nose: Nose normal.      Mouth/Throat:      Mouth: Mucous membranes are moist.      Pharynx: Oropharynx is clear.   Eyes:      Extraocular Movements: Extraocular movements intact.      Conjunctiva/sclera: Conjunctivae normal.   Cardiovascular:      Rate and Rhythm: Regular rhythm. Bradycardia present.      Pulses: Normal pulses.      Heart sounds: Normal heart sounds.   Pulmonary:      Effort: Pulmonary effort is normal. No respiratory distress.      Breath sounds: Normal breath sounds. No wheezing.   Chest:      Chest wall: No tenderness.   Abdominal:      General: Bowel sounds are normal. There is no distension.      Palpations: Abdomen is soft.      Tenderness: There is no abdominal tenderness. There is no guarding or rebound.   Musculoskeletal:         General: Tenderness present.      Cervical back: Normal range of motion and neck supple.      Right lower leg: No edema.      Left lower leg: No edema.      Comments: Patient has visible masslike area noted to the medial aspect of proximal right thigh.  Patient reports tenderness upon palpation.  Area is soft to touch.  No obvious erythema, warm noted to touch, or drainage noted upon exam.  Patient is bedbound.   Skin:     General: Skin is warm and dry.      Capillary Refill: Capillary refill takes less than 2 seconds.   Neurological:      Mental Status: She is alert. Mental status is at baseline.       XR Femur 2 View Right   Final Result   1. No acute osseous findings   2. Severe right hip osteoarthritis.       This report was signed and finalized on 11/16/2024 6:37 PM by Dr. Agustín Bae MD.          US Venous Doppler Lower Extremity Right (duplex)    (Results Pending)        Procedures           ED Course  ED Course as of 11/17/24 1654   Sat Nov 16, 2024   1835 MRI of the  right femur was performed with and without contrast on 8/21/2024 at [facility].  Impression reveals    4.3 cm fat signal intensity lesion in the proximal right thigh anteromedial superficial soft tissues.  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  Continued clinical and imaging surveillance to ensure stability.     Superficial soft tissue edema about the pelvis and thighs, nonspecific.  There is also nonspecific muscle edema in the proximal thighs.     Heterogeneous bone marrow, nonspecific.  Attention at follow-up.     Degenerative changes to the hips, right greater than left.  [KF]      ED Course User Index  [KF] Joaquin Altamirano, APRN                                                       Medical Decision Making  Klarissa Samayoa is a 74 y.o. female who presents to the ED by EMS for complaints of pain and swelling to the medial aspect of the right upper thigh.  PMH significant for A-fib, on Eliquis, CVA, CHF, hypertension, hyperlipidemia, anxiety, and COPD.  Son present at bedside.  He states that since August they have had a series of admissions at [facility] due to intractable back pain and abdominal pain.  Blood cultures on 8/23/2024 were positive for staph with MRSA and patient was treated with vancomycin.  Infectious disease was also consulted.  With no clear source of bacteremia.  Patient did have bilateral lower lobe pneumonia versus diffuse bronchiolitis versus pulmonary edema.  Patient had an MRI of the lumbar spine that revealed discitis and osteomyelitis at L2/L3 suggesting an epidural abscess, bilateral psoas abscesses largest measuring 11.7 x 3.2 x 2.2 cm to the left psoas.  Infectious disease and neurosurgery were consulted but ultimately patient was transferred to [facility] for IR drainage and was discharged with a PICC line and IV vancomycin twice daily for 12 weeks.  Patient was then placed at [facility] for rehab.  She was brought back to [facility] emergency department on  "9/30/2024 for new onset altered mental status and patient was worked up for a code stroke.  Patient ultimately got admitted for questionable stroke. During this visit MRI of the lumbar spine was obtained and it revealed worsening L2-L3 discitis/osteomyelitis which resulted in Dr. Field taking patient to surgery on 10//24 for decompressive lumbar laminectomy of L2-L3 for removal of epidural abscess/phlegmon using minimal invasive technique.  Patient continued on vancomycin per infectious disease and was discharged from [facility] on 10/20/2024 to [facility].  Son reports that patient was discharged from [place] last Friday, 11/8/2024 with home health.    Son reports reason for presentation to ED today for DVT rule out.  He states that on Wednesday home health and family noticed a \"lump/mass \"on the inner aspect of the proximal right thigh that has progressively increased in size and patient has been complaining more of pain.  Son reports that patient has a known \"fatty tumor \"to the right leg but believes it is near the knee.  He states that the area of concern was not noticeable until Wednesday.  He denies any recent fall, trauma, or injury to cause symptoms.  Son reports patient has not had any episodes of fevers, body aches, chills, cough or congestion.  He states that she is confused intermittently at baseline but denies any change or worsening in mental status.  Patient denies any chest pain or shortness of breath.  Denies any new or worsening abdominal pain.  Denies any episodes of nausea, vomiting, constipation, or diarrhea.  Denies any lightheadedness, dizziness, blurred vision, headache or near syncope.    Patient is chronically ill- appearing on arrival. No acute distress was noted.  Vital signs stable.    Patient's presentation raises suspicion for differentials including, but not limited to, DVT, lipoma, abscess.     Past medical history, surgical history, and medication regimen reviewed. "     Previous notes, labs, imaging and more reviewed.    Please refer to above section of note for imaging results that were reviewed and interpreted by radiology as well as attending physician, Dr. Joseph who is in agreement with plans of discharge and follow-up.    Given findings described above, patient's presentation is likely consistent with lipoma to the right thigh. I have a low suspicion for DVT at this point in their ED course.      I had an in-depth and lengthy discussion with the son present at bedside regarding negative vascular study and x-ray imaging that was performed during today's ED encounter.  We also reviewed MRI that was performed on 8/21/2024 that revealed a 4.3 cm fat intensity lesion to the proximal right thigh anterior medially that is consistent with a lipoma which is exactly where area of complaint is today.  Discussed that physical exam findings are consistent with a lipoma to this area.  Discussed that we can provide patient with information to discuss further evaluation and treatment of lipoma.  Son voiced that he wanted patient admitted for evaluation by a neurosurgeon and was recommended to see Dr. Singer.  I discussed that patient has no indications for further workup of her back during this visit as she did not present for anything regarding her back and was here for area of concern to the right leg.  Also discussed that she has no obvious signs of infection as she is not tachycardic or febrile and has not been complaining of any new or worsening symptoms of back pain.  Son reports patient has not had any recent fall, trauma, or injury to back to cause concern but he is not satisfied with care at [facility] which is why they are presenting to this ED today.  I discussed that if he would like to see our neurosurgery department he needs to speak with his primary care provider for a referral but patient has had 2 recent surgeries by 2 different doctors regarding this area so I  am unsure how that would work.  Also discussed that primary care provider can put patient and family in touch with a / to help with facilitating care at home versus SNF placement.  Educated son on concerning signs and symptoms that would warrant a quick return to the ED and he verbalized understanding of this. I answered all the questions regarding the emergency department evaluation, diagnosis, and treatment plan in plain and simple language that was understandable. We discussed that due to always having some diagnostic uncertainty while in the ER, there is always a chance that symptoms may change or new symptoms may reveal themselves after being discharged. Because of this, I stressed the importance of Klarissa following up with their PCP. Patient and son informed that appointment will need to be done by calling their office to set up an appointment within the next few days or as soon as reasonably possible so that the symptoms can be re-evaluated for improvement or for any other questions. I also gave Klarissa's son common sense return precautions and prompted patient to return to the emergency department within 24 - 48hrs if there are any new, worsening, or concerning symptoms. The patient's son verbalized understanding of the discharge instructions and agreed with them. Klarissa was discharged in stable condition.     Dragon disclaimer:  Parts of this note may be an electronic transcription/translation of spoken language to printed text using the Dragon dictation system.       Problems Addressed:  Lipoma of right lower extremity: complicated acute illness or injury    Amount and/or Complexity of Data Reviewed  Radiology: ordered.        Final diagnoses:   Lipoma of right lower extremity       ED Disposition  ED Disposition       ED Disposition   Discharge    Condition   Stable    Comment   --               Roberto Carlos Alexander MD  [address] [place] [address]  [place] [address]  [phone]    Schedule an  appointment as soon as possible for a visit       Amarilis Belle MD  [address] [facility] [address]  [facility] [address]  [phone]    Schedule an appointment as soon as possible for a visit       [facility] EMERGENCY DEPARTMENT  [address] [facility] [address] [phone]    If symptoms worsen         Medication List      No changes were made to your prescriptions during this visit.            Joaquin Altamirano, APRN  11/17/24 165

## 2024-11-22 ENCOUNTER — APPOINTMENT (OUTPATIENT)
Dept: GENERAL RADIOLOGY | Age: 74
DRG: 689 | End: 2024-11-22
Payer: MEDICARE

## 2024-11-22 ENCOUNTER — HOSPITAL ENCOUNTER (INPATIENT)
Age: 74
LOS: 9 days | Discharge: HOME | DRG: 689 | End: 2024-12-01
Attending: STUDENT IN AN ORGANIZED HEALTH CARE EDUCATION/TRAINING PROGRAM | Admitting: INTERNAL MEDICINE
Payer: MEDICARE

## 2024-11-22 ENCOUNTER — APPOINTMENT (OUTPATIENT)
Dept: CT IMAGING | Age: 74
DRG: 689 | End: 2024-11-22
Payer: MEDICARE

## 2024-11-22 DIAGNOSIS — J18.9 PNEUMONIA DUE TO INFECTIOUS ORGANISM, UNSPECIFIED LATERALITY, UNSPECIFIED PART OF LUNG: Primary | ICD-10-CM

## 2024-11-22 DIAGNOSIS — R52 INTRACTABLE PAIN: ICD-10-CM

## 2024-11-22 DIAGNOSIS — N39.0 URINARY TRACT INFECTION WITHOUT HEMATURIA, SITE UNSPECIFIED: ICD-10-CM

## 2024-11-22 DIAGNOSIS — M54.9 INTRACTABLE BACK PAIN: ICD-10-CM

## 2024-11-22 DIAGNOSIS — E43 SEVERE MALNUTRITION (HCC): ICD-10-CM

## 2024-11-22 LAB
ALBUMIN SERPL-MCNC: 2.8 G/DL (ref 3.5–5.2)
ALP SERPL-CCNC: 162 U/L (ref 35–104)
ALT SERPL-CCNC: 30 U/L (ref 5–33)
ANION GAP SERPL CALCULATED.3IONS-SCNC: 16 MMOL/L (ref 7–19)
AST SERPL-CCNC: 81 U/L (ref 5–32)
B PARAP IS1001 DNA NPH QL NAA+NON-PROBE: NOT DETECTED
B PERT.PT PRMT NPH QL NAA+NON-PROBE: NOT DETECTED
BACTERIA URNS QL MICRO: ABNORMAL /HPF
BASOPHILS # BLD: 0 K/UL (ref 0–0.2)
BASOPHILS NFR BLD: 0.6 % (ref 0–1)
BILIRUB SERPL-MCNC: 0.8 MG/DL (ref 0.2–1.2)
BILIRUB UR QL STRIP: NEGATIVE
BNP BLD-MCNC: 1183 PG/ML (ref 0–124)
BUN SERPL-MCNC: 26 MG/DL (ref 8–23)
C PNEUM DNA NPH QL NAA+NON-PROBE: NOT DETECTED
CALCIUM SERPL-MCNC: 8.7 MG/DL (ref 8.8–10.2)
CHLORIDE SERPL-SCNC: 95 MMOL/L (ref 98–111)
CLARITY UR: ABNORMAL
CO2 SERPL-SCNC: 23 MMOL/L (ref 22–29)
COLOR UR: YELLOW
CREAT SERPL-MCNC: 1.3 MG/DL (ref 0.5–0.9)
CRYSTALS URNS MICRO: ABNORMAL /HPF
EOSINOPHIL # BLD: 0.1 K/UL (ref 0–0.6)
EOSINOPHIL NFR BLD: 0.9 % (ref 0–5)
EPI CELLS #/AREA URNS AUTO: 2 /HPF (ref 0–5)
ERYTHROCYTE [DISTWIDTH] IN BLOOD BY AUTOMATED COUNT: 15.6 % (ref 11.5–14.5)
FLUAV RNA NPH QL NAA+NON-PROBE: NOT DETECTED
FLUBV RNA NPH QL NAA+NON-PROBE: NOT DETECTED
GLUCOSE SERPL-MCNC: 93 MG/DL (ref 70–99)
GLUCOSE UR STRIP.AUTO-MCNC: NEGATIVE MG/DL
HADV DNA NPH QL NAA+NON-PROBE: NOT DETECTED
HCOV 229E RNA NPH QL NAA+NON-PROBE: NOT DETECTED
HCOV HKU1 RNA NPH QL NAA+NON-PROBE: NOT DETECTED
HCOV NL63 RNA NPH QL NAA+NON-PROBE: NOT DETECTED
HCOV OC43 RNA NPH QL NAA+NON-PROBE: NOT DETECTED
HCT VFR BLD AUTO: 36.3 % (ref 37–47)
HGB BLD-MCNC: 11.2 G/DL (ref 12–16)
HGB UR STRIP.AUTO-MCNC: ABNORMAL MG/L
HMPV RNA NPH QL NAA+NON-PROBE: NOT DETECTED
HPIV1 RNA NPH QL NAA+NON-PROBE: NOT DETECTED
HPIV2 RNA NPH QL NAA+NON-PROBE: NOT DETECTED
HPIV3 RNA NPH QL NAA+NON-PROBE: NOT DETECTED
HPIV4 RNA NPH QL NAA+NON-PROBE: NOT DETECTED
HYALINE CASTS #/AREA URNS AUTO: 4 /HPF (ref 0–8)
IMM GRANULOCYTES # BLD: 0 K/UL
KETONES UR STRIP.AUTO-MCNC: NEGATIVE MG/DL
LEUKOCYTE ESTERASE UR QL STRIP.AUTO: ABNORMAL
LYMPHOCYTES # BLD: 1.3 K/UL (ref 1.1–4.5)
LYMPHOCYTES NFR BLD: 20 % (ref 20–40)
M PNEUMO DNA NPH QL NAA+NON-PROBE: NOT DETECTED
MAGNESIUM SERPL-MCNC: 1.7 MG/DL (ref 1.6–2.4)
MCH RBC QN AUTO: 29.3 PG (ref 27–31)
MCHC RBC AUTO-ENTMCNC: 30.9 G/DL (ref 33–37)
MCV RBC AUTO: 95 FL (ref 81–99)
MONOCYTES # BLD: 0.5 K/UL (ref 0–0.9)
MONOCYTES NFR BLD: 8.1 % (ref 0–10)
NEUTROPHILS # BLD: 4.6 K/UL (ref 1.5–7.5)
NEUTS SEG NFR BLD: 69.9 % (ref 50–65)
NITRITE UR QL STRIP.AUTO: NEGATIVE
PH UR STRIP.AUTO: 8 [PH] (ref 5–8)
PLATELET # BLD AUTO: 206 K/UL (ref 130–400)
PMV BLD AUTO: 12.7 FL (ref 9.4–12.3)
POTASSIUM SERPL-SCNC: 3.4 MMOL/L (ref 3.5–5)
PROT SERPL-MCNC: 6.9 G/DL (ref 6.4–8.3)
PROT UR STRIP.AUTO-MCNC: 30 MG/DL
RBC # BLD AUTO: 3.82 M/UL (ref 4.2–5.4)
RBC #/AREA URNS AUTO: 29 /HPF (ref 0–4)
RSV RNA NPH QL NAA+NON-PROBE: NOT DETECTED
RV+EV RNA NPH QL NAA+NON-PROBE: NOT DETECTED
SARS-COV-2 RNA NPH QL NAA+NON-PROBE: NOT DETECTED
SODIUM SERPL-SCNC: 134 MMOL/L (ref 136–145)
SP GR UR STRIP.AUTO: 1.02 (ref 1–1.03)
UROBILINOGEN UR STRIP.AUTO-MCNC: 1 E.U./DL
WBC # BLD AUTO: 6.6 K/UL (ref 4.8–10.8)
WBC #/AREA URNS AUTO: 289 /HPF (ref 0–5)

## 2024-11-22 PROCEDURE — 2580000003 HC RX 258: Performed by: NURSE PRACTITIONER

## 2024-11-22 PROCEDURE — 81001 URINALYSIS AUTO W/SCOPE: CPT

## 2024-11-22 PROCEDURE — 6370000000 HC RX 637 (ALT 250 FOR IP): Performed by: NURSE PRACTITIONER

## 2024-11-22 PROCEDURE — 94640 AIRWAY INHALATION TREATMENT: CPT

## 2024-11-22 PROCEDURE — 6360000002 HC RX W HCPCS: Performed by: STUDENT IN AN ORGANIZED HEALTH CARE EDUCATION/TRAINING PROGRAM

## 2024-11-22 PROCEDURE — 85025 COMPLETE CBC W/AUTO DIFF WBC: CPT

## 2024-11-22 PROCEDURE — 87086 URINE CULTURE/COLONY COUNT: CPT

## 2024-11-22 PROCEDURE — 87150 DNA/RNA AMPLIFIED PROBE: CPT

## 2024-11-22 PROCEDURE — 70450 CT HEAD/BRAIN W/O DYE: CPT

## 2024-11-22 PROCEDURE — 96374 THER/PROPH/DIAG INJ IV PUSH: CPT

## 2024-11-22 PROCEDURE — 99285 EMERGENCY DEPT VISIT HI MDM: CPT

## 2024-11-22 PROCEDURE — 1200000000 HC SEMI PRIVATE

## 2024-11-22 PROCEDURE — 80053 COMPREHEN METABOLIC PANEL: CPT

## 2024-11-22 PROCEDURE — 87040 BLOOD CULTURE FOR BACTERIA: CPT

## 2024-11-22 PROCEDURE — 87186 SC STD MICRODIL/AGAR DIL: CPT

## 2024-11-22 PROCEDURE — 96375 TX/PRO/DX INJ NEW DRUG ADDON: CPT

## 2024-11-22 PROCEDURE — 36415 COLL VENOUS BLD VENIPUNCTURE: CPT

## 2024-11-22 PROCEDURE — 71045 X-RAY EXAM CHEST 1 VIEW: CPT

## 2024-11-22 PROCEDURE — 83735 ASSAY OF MAGNESIUM: CPT

## 2024-11-22 PROCEDURE — 0202U NFCT DS 22 TRGT SARS-COV-2: CPT

## 2024-11-22 PROCEDURE — 87077 CULTURE AEROBIC IDENTIFY: CPT

## 2024-11-22 PROCEDURE — 2580000003 HC RX 258: Performed by: STUDENT IN AN ORGANIZED HEALTH CARE EDUCATION/TRAINING PROGRAM

## 2024-11-22 PROCEDURE — 83880 ASSAY OF NATRIURETIC PEPTIDE: CPT

## 2024-11-22 RX ORDER — METOPROLOL TARTRATE 25 MG/1
25 TABLET, FILM COATED ORAL 2 TIMES DAILY
Status: DISCONTINUED | OUTPATIENT
Start: 2024-11-22 | End: 2024-12-01 | Stop reason: HOSPADM

## 2024-11-22 RX ORDER — ACETAMINOPHEN 325 MG/1
650 TABLET ORAL EVERY 6 HOURS PRN
Status: DISCONTINUED | OUTPATIENT
Start: 2024-11-22 | End: 2024-12-01 | Stop reason: HOSPADM

## 2024-11-22 RX ORDER — OXYCODONE AND ACETAMINOPHEN 10; 325 MG/1; MG/1
1 TABLET ORAL EVERY 6 HOURS PRN
Status: DISCONTINUED | OUTPATIENT
Start: 2024-11-22 | End: 2024-11-23

## 2024-11-22 RX ORDER — AMIODARONE HYDROCHLORIDE 200 MG/1
200 TABLET ORAL 2 TIMES DAILY
Status: DISCONTINUED | OUTPATIENT
Start: 2024-11-22 | End: 2024-12-01 | Stop reason: HOSPADM

## 2024-11-22 RX ORDER — ERGOCALCIFEROL 1.25 MG/1
50000 CAPSULE, LIQUID FILLED ORAL WEEKLY
Status: DISCONTINUED | OUTPATIENT
Start: 2024-11-23 | End: 2024-12-01 | Stop reason: HOSPADM

## 2024-11-22 RX ORDER — SERTRALINE HYDROCHLORIDE 100 MG/1
50 TABLET, FILM COATED ORAL DAILY
Status: DISCONTINUED | OUTPATIENT
Start: 2024-11-23 | End: 2024-11-24

## 2024-11-22 RX ORDER — 0.9 % SODIUM CHLORIDE 0.9 %
1000 INTRAVENOUS SOLUTION INTRAVENOUS ONCE
Status: COMPLETED | OUTPATIENT
Start: 2024-11-22 | End: 2024-11-22

## 2024-11-22 RX ORDER — POLYETHYLENE GLYCOL 3350 17 G/17G
17 POWDER, FOR SOLUTION ORAL DAILY PRN
Status: DISCONTINUED | OUTPATIENT
Start: 2024-11-22 | End: 2024-12-01 | Stop reason: HOSPADM

## 2024-11-22 RX ORDER — SODIUM CHLORIDE 0.9 % (FLUSH) 0.9 %
5-40 SYRINGE (ML) INJECTION PRN
Status: DISCONTINUED | OUTPATIENT
Start: 2024-11-22 | End: 2024-12-01 | Stop reason: HOSPADM

## 2024-11-22 RX ORDER — SODIUM CHLORIDE 9 MG/ML
INJECTION, SOLUTION INTRAVENOUS PRN
Status: DISCONTINUED | OUTPATIENT
Start: 2024-11-22 | End: 2024-12-01 | Stop reason: HOSPADM

## 2024-11-22 RX ORDER — FOLIC ACID 1 MG/1
1 TABLET ORAL DAILY
Status: DISCONTINUED | OUTPATIENT
Start: 2024-11-22 | End: 2024-12-01 | Stop reason: HOSPADM

## 2024-11-22 RX ORDER — BUSPIRONE HYDROCHLORIDE 5 MG/1
5 TABLET ORAL 3 TIMES DAILY
Status: DISCONTINUED | OUTPATIENT
Start: 2024-11-22 | End: 2024-12-01 | Stop reason: HOSPADM

## 2024-11-22 RX ORDER — ONDANSETRON 4 MG/1
4 TABLET, ORALLY DISINTEGRATING ORAL EVERY 8 HOURS PRN
Status: DISCONTINUED | OUTPATIENT
Start: 2024-11-22 | End: 2024-12-01 | Stop reason: HOSPADM

## 2024-11-22 RX ORDER — GUAIFENESIN 600 MG/1
600 TABLET, EXTENDED RELEASE ORAL 2 TIMES DAILY
Status: DISCONTINUED | OUTPATIENT
Start: 2024-11-22 | End: 2024-12-01 | Stop reason: HOSPADM

## 2024-11-22 RX ORDER — METHOCARBAMOL 500 MG/1
500 TABLET, FILM COATED ORAL 2 TIMES DAILY
Status: DISCONTINUED | OUTPATIENT
Start: 2024-11-22 | End: 2024-12-01 | Stop reason: HOSPADM

## 2024-11-22 RX ORDER — METHIMAZOLE 5 MG/1
5 TABLET ORAL 2 TIMES DAILY
Status: DISCONTINUED | OUTPATIENT
Start: 2024-11-22 | End: 2024-12-01 | Stop reason: HOSPADM

## 2024-11-22 RX ORDER — ONDANSETRON 2 MG/ML
4 INJECTION INTRAMUSCULAR; INTRAVENOUS EVERY 6 HOURS PRN
Status: DISCONTINUED | OUTPATIENT
Start: 2024-11-22 | End: 2024-12-01 | Stop reason: HOSPADM

## 2024-11-22 RX ORDER — IPRATROPIUM BROMIDE AND ALBUTEROL SULFATE 2.5; .5 MG/3ML; MG/3ML
1 SOLUTION RESPIRATORY (INHALATION)
Status: DISCONTINUED | OUTPATIENT
Start: 2024-11-22 | End: 2024-12-01 | Stop reason: HOSPADM

## 2024-11-22 RX ORDER — ATORVASTATIN CALCIUM 20 MG/1
20 TABLET, FILM COATED ORAL DAILY
Status: DISCONTINUED | OUTPATIENT
Start: 2024-11-23 | End: 2024-12-01 | Stop reason: HOSPADM

## 2024-11-22 RX ORDER — AZITHROMYCIN 250 MG/1
500 TABLET, FILM COATED ORAL EVERY 24 HOURS
Status: COMPLETED | OUTPATIENT
Start: 2024-11-23 | End: 2024-11-24

## 2024-11-22 RX ORDER — SODIUM CHLORIDE 0.9 % (FLUSH) 0.9 %
5-40 SYRINGE (ML) INJECTION EVERY 12 HOURS SCHEDULED
Status: DISCONTINUED | OUTPATIENT
Start: 2024-11-22 | End: 2024-12-01 | Stop reason: HOSPADM

## 2024-11-22 RX ORDER — FUROSEMIDE 20 MG/1
20 TABLET ORAL DAILY PRN
Status: DISCONTINUED | OUTPATIENT
Start: 2024-11-22 | End: 2024-12-01 | Stop reason: HOSPADM

## 2024-11-22 RX ADMIN — PREGABALIN 200 MG: 75 CAPSULE ORAL at 22:09

## 2024-11-22 RX ADMIN — SODIUM CHLORIDE, PRESERVATIVE FREE 10 ML: 5 INJECTION INTRAVENOUS at 22:10

## 2024-11-22 RX ADMIN — AZITHROMYCIN MONOHYDRATE 500 MG: 500 INJECTION, POWDER, LYOPHILIZED, FOR SOLUTION INTRAVENOUS at 16:31

## 2024-11-22 RX ADMIN — METHOCARBAMOL TABLETS 500 MG: 500 TABLET, COATED ORAL at 22:10

## 2024-11-22 RX ADMIN — METHIMAZOLE 5 MG: 5 TABLET ORAL at 22:10

## 2024-11-22 RX ADMIN — METOPROLOL TARTRATE 25 MG: 25 TABLET, FILM COATED ORAL at 23:59

## 2024-11-22 RX ADMIN — IPRATROPIUM BROMIDE AND ALBUTEROL SULFATE 1 DOSE: 2.5; .5 SOLUTION RESPIRATORY (INHALATION) at 20:52

## 2024-11-22 RX ADMIN — APIXABAN 5 MG: 5 TABLET, FILM COATED ORAL at 22:10

## 2024-11-22 RX ADMIN — AMIODARONE HYDROCHLORIDE 200 MG: 200 TABLET ORAL at 22:10

## 2024-11-22 RX ADMIN — BUSPIRONE HYDROCHLORIDE 5 MG: 5 TABLET ORAL at 22:10

## 2024-11-22 RX ADMIN — SODIUM CHLORIDE 1000 ML: 9 INJECTION, SOLUTION INTRAVENOUS at 15:37

## 2024-11-22 RX ADMIN — GUAIFENESIN 600 MG: 600 TABLET ORAL at 22:09

## 2024-11-22 RX ADMIN — Medication 6 MG: at 22:09

## 2024-11-22 RX ADMIN — WATER 1000 MG: 1 INJECTION INTRAMUSCULAR; INTRAVENOUS; SUBCUTANEOUS at 16:31

## 2024-11-22 ASSESSMENT — PAIN SCALES - GENERAL: PAINLEVEL_OUTOF10: 0

## 2024-11-22 NOTE — ED PROVIDER NOTES
[facility] EMERGENCY DEPT  eMERGENCY dEPARTMENT eNCOUnter      Pt Name: Lashonda Milian  MRN: [id number]  Birthdate 1950  Date of evaluation: 11/22/2024  Provider: Rohith Cisneros MD    Chief Complaint:  Chief Complaint   Patient presents with    Shortness of Breath     Pts family report pts O2 sat was low, EMS O2 sat was 98% on room air     HPI    Lashonda Milian is a 74 y.o. female who presents to the emergency department due to not doing well. Historyobtained from the son. He says she has never fuly recovered from her previous hospitalizations. This morning had low oxygen around lunchtime. Last night they heard gurgling with her breathing like fluid in her lungs. Her general mental status is poor where she is \"off\" and has \"moments where she's with it.\" No falls or injuries. Bed bound since August. No sick contacts known. Cough, nonproductive because she is too weak. No mental status changes. Poor PO intake. The son's wife said her oxygen was low today so they brought her in. Sats were normal with EMS they are normal in the room. No vomiting.     NursingNotes were reviewed.    Review of Systems   Constitutional:  Negative for chills and fever.   Respiratory:  Negative for shortness of breath.    Cardiovascular:  Negative for chest pain and leg swelling.   Gastrointestinal:  Positive for nausea. Negative for vomiting.   Genitourinary:  Negative for difficulty urinating and dysuria.   Neurological:  Negative for syncope and light-headedness.       Past Medical History:   Diagnosis Date    Arthritis     Atrial fibrillation (HCC)     Paroxysmal A Fib    Cerebral artery occlusion with cerebral infarction (HCC)     TWICE: once in 2012 or 2013, then had another one 2021    CHF (congestive heart failure) (HCC)     COPD (chronic obstructive pulmonary disease) (HCC)     Hyperlipidemia     Hypertension     On continuous oral anticoagulation     Apixaban    Pneumonia     Thyroid disease        Past Surgical History:

## 2024-11-22 NOTE — H&P
[address] [place] [address]    DEPARTMENT OF HOSPITALIST MEDICINE        HISTORY & PHYSICAL:          REASON FOR ADMISSION:  Chief Complaint   Patient presents with    Shortness of Breath     Pts family report pts O2 sat was low, EMS O2 sat was 98% on room air        HISTORY OF PRESENT ILLNESS:  Lashonda Milian is an 74 y.o. female with diagnoses listed below.  Patient presented to the emergency room family reporting her oxygen was low at home, having trouble swallowing, she has a oxygen saturation of 98% on room air when I evaluated her in the ED. sodium 134 potassium 3.4, BUN 26 creatinine 1.3 BNP 1183 hemoglobin 11.2 hematocrit 36.3 urine turbid moderate blood large leukocyte esterase, 4+ bacteria.  CT of the head encephalomalacia of the right parietal lobe posteriorly and adjacent occipital lobe consistent with old infarct microangiopathic ischemic change and atrophy chest x-ray cardiomegaly noted again no noticeable vascular congestion questionable medial left base atelectasis or infiltrate/pneumonia no visible pleural fluid or pneumothorax.  Patient admitted to hospital service with UTI and questionable pneumonia    PAST MEDICAL HISTORY:  Past Medical History:   Diagnosis Date    Arthritis     Atrial fibrillation (HCC)     Paroxysmal A Fib    Cerebral artery occlusion with cerebral infarction (HCC)     TWICE: once in 2012 or 2013, then had another one 2021    CHF (congestive heart failure) (HCC)     COPD (chronic obstructive pulmonary disease) (HCC)     Hyperlipidemia     Hypertension     On continuous oral anticoagulation     Apixaban    Pneumonia     Thyroid disease          PAST SURGICAL HISTORY:  Past Surgical History:   Procedure Laterality Date    CARDIOVERSION  2020    HYSTERECTOMY, TOTAL ABDOMINAL (CERVIX REMOVED) N/A     With removal of tumor, ~2014, was a DANIS and BSO    LUMBAR SPINE SURGERY Right 10/4/2024    Decompressive lumbar laminectomy L2-3 for removal of epidural

## 2024-11-22 NOTE — ED NOTES
ED TO INPATIENT SBAR HANDOFF    Patient Name: Lashonda Milian   : 1950  74 y.o.   Family/Caregiver Present: Yes  Code Status Order: Prior    C-SSRS: Risk of Suicide: No Risk  Sitter No  Restraints:         Situation  Chief Complaint:   Chief Complaint   Patient presents with    Shortness of Breath     Pts family report pts O2 sat was low, EMS O2 sat was 98% on room air     Patient Diagnosis: UTI (urinary tract infection) [N39.0]     Brief Description of Patient's Condition: pt came from home via EMS with complaints of SOB that started last night, per pts family her oxygen sat was low at home but it was 98% with EMS, pt was discharged from [facility] 2 weeks ago, she is taken care of by her family, pt is bed ridden and incontinent  Mental Status: disoriented  Arrived from: home    Imaging:   CT HEAD WO CONTRAST   Final Result   1.  Encephalomalacia in the right parietal lobe posteriorly and adjacent occipital lobe consistent with old infarct.   2.  Microangiopathic ischemic change.  Atrophy.   3.  Atherosclerosis.   4.  No intracranial hemorrhage.   5.  Otherwise unremarkable noncontrast CT scan of the brain.        All CT scans are performed using dose optimization techniques as appropriate to the performed exam and include    at least one of the following: Automated exposure control, adjustment of the mA and/or kV according to size, and the use of iterative reconstruction technique.        ______________________________________    Electronically signed by: PERCY KNUTSON M.D.   Date:     2024   Time:    15:13       XR CHEST PORTABLE   Final Result   Cardiomegaly is again noted.  No noticeable vascular congestion.  Questionable medial left base atelectasis or infiltrate/pneumonia.  No visible pleural fluid or pneumothorax.   - - - - -           ______________________________________    Electronically signed by: TONIE LOPES M.D.   Date:     2024   Time:    14:29         COVID-19 Results:   Internal

## 2024-11-23 LAB
A BAUMANNII DNA BLD POS QL NAA+NON-PROBE: NOT DETECTED
ALBUMIN SERPL-MCNC: 2.5 G/DL (ref 3.5–5.2)
ALP SERPL-CCNC: 138 U/L (ref 35–104)
ALT SERPL-CCNC: 31 U/L (ref 5–33)
ANION GAP SERPL CALCULATED.3IONS-SCNC: 17 MMOL/L (ref 7–19)
AST SERPL-CCNC: 89 U/L (ref 5–32)
BASOPHILS # BLD: 0 K/UL (ref 0–0.2)
BASOPHILS NFR BLD: 0.7 % (ref 0–1)
BILIRUB SERPL-MCNC: 0.6 MG/DL (ref 0.2–1.2)
BUN SERPL-MCNC: 24 MG/DL (ref 8–23)
C ALBICANS DNA BLD POS QL NAA+NON-PROBE: NOT DETECTED
C AURIS DNA BLD POS QL NAA+PROBE: NOT DETECTED
C GLABRATA DNA BLD POS QL NAA+NON-PROBE: NOT DETECTED
C KRUSEI DNA BLD POS QL NAA+NON-PROBE: NOT DETECTED
C PARAP DNA BLD POS QL NAA+NON-PROBE: NOT DETECTED
C TROPICLS DNA BLD POS QL NAA+NON-PROBE: NOT DETECTED
CALCIUM SERPL-MCNC: 8.1 MG/DL (ref 8.8–10.2)
CHLORIDE SERPL-SCNC: 98 MMOL/L (ref 98–111)
CO2 SERPL-SCNC: 20 MMOL/L (ref 22–29)
CREAT SERPL-MCNC: 1.3 MG/DL (ref 0.5–0.9)
CRYPTOCOCCUS NEOFORMANS/GATTII BY PCR: NOT DETECTED
E CLOAC COMP DNA BLD POS NAA+NON-PROBE: NOT DETECTED
E COLI DNA BLD POS QL NAA+NON-PROBE: NOT DETECTED
E FAECALIS DNA BLD POS QL NAA+PROBE: NOT DETECTED
E FAECIUM DNA BLD POS QL NAA+PROBE: NOT DETECTED
ENTEROBACT DNA BLD POS QL NAA+NON-PROBE: NOT DETECTED
ENTEROCOC DNA BLD POS QL NAA+NON-PROBE: NOT DETECTED
EOSINOPHIL # BLD: 0 K/UL (ref 0–0.6)
EOSINOPHIL NFR BLD: 0.7 % (ref 0–5)
ERYTHROCYTE [DISTWIDTH] IN BLOOD BY AUTOMATED COUNT: 15.6 % (ref 11.5–14.5)
GLUCOSE SERPL-MCNC: 79 MG/DL (ref 70–99)
GN BLD CULTURE PNL BLD POS NAA+PROBE: NOT DETECTED
GP B STREP DNA BLD POS QL NAA+NON-PROBE: NOT DETECTED
HCT VFR BLD AUTO: 35.3 % (ref 37–47)
HGB BLD-MCNC: 10.8 G/DL (ref 12–16)
IMM GRANULOCYTES # BLD: 0 K/UL
K OXYTOCA DNA BLD POS QL NAA+NON-PROBE: NOT DETECTED
K PNEUMON DNA SPEC QL NAA+PROBE: NOT DETECTED
K. AEROGENES DNA SPEC QL NAA+PROBE: NOT DETECTED
L MONOCYTOG DNA BLD POS QL NAA+NON-PROBE: NOT DETECTED
LYMPHOCYTES # BLD: 1 K/UL (ref 1.1–4.5)
LYMPHOCYTES NFR BLD: 23.3 % (ref 20–40)
MAGNESIUM SERPL-MCNC: 1.7 MG/DL (ref 1.6–2.4)
MCH RBC QN AUTO: 29.4 PG (ref 27–31)
MCHC RBC AUTO-ENTMCNC: 30.6 G/DL (ref 33–37)
MCV RBC AUTO: 96.2 FL (ref 81–99)
MECA BLD POS QL NAA+NON-PROBE: DETECTED
MONOCYTES # BLD: 0.4 K/UL (ref 0–0.9)
MONOCYTES NFR BLD: 9.4 % (ref 0–10)
N MEN DNA BLD POS QL NAA+NON-PROBE: NOT DETECTED
NEUTROPHILS # BLD: 2.9 K/UL (ref 1.5–7.5)
NEUTS SEG NFR BLD: 65.2 % (ref 50–65)
P AERUGINOSA DNA BLD POS NAA+NON-PROBE: NOT DETECTED
PLATELET # BLD AUTO: 169 K/UL (ref 130–400)
PMV BLD AUTO: 12.8 FL (ref 9.4–12.3)
POTASSIUM SERPL-SCNC: 3 MMOL/L (ref 3.5–5)
PROT SERPL-MCNC: 6.1 G/DL (ref 6.4–8.3)
PROTEUS SP DNA BLD POS QL NAA+NON-PROBE: NOT DETECTED
RBC # BLD AUTO: 3.67 M/UL (ref 4.2–5.4)
S AUREUS DNA BLD POS QL NAA+NON-PROBE: NOT DETECTED
S AUREUS+CONS DNA BLD POS NAA+NON-PROBE: DETECTED
S EPIDERMIDIS DNA BLD POS QL NAA+PROBE: DETECTED
S LUGDUNENSIS DNA BLD POS QL NAA+PROBE: NOT DETECTED
S MALTOPH DNA BLD POS QL NAA+PROBE: NOT DETECTED
S MARCESCENS DNA BLD POS NAA+NON-PROBE: NOT DETECTED
S PNEUM DNA BLD POS QL NAA+NON-PROBE: NOT DETECTED
S PYO DNA BLD POS QL NAA+NON-PROBE: NOT DETECTED
SALMONELLA DNA BLD POS QL NAA+PROBE: NOT DETECTED
SODIUM SERPL-SCNC: 135 MMOL/L (ref 136–145)
STREPTOCOCCUS DNA BLD POS NAA+NON-PROBE: NOT DETECTED
WBC # BLD AUTO: 4.4 K/UL (ref 4.8–10.8)

## 2024-11-23 PROCEDURE — 2580000003 HC RX 258: Performed by: INTERNAL MEDICINE

## 2024-11-23 PROCEDURE — 6370000000 HC RX 637 (ALT 250 FOR IP): Performed by: INTERNAL MEDICINE

## 2024-11-23 PROCEDURE — 1200000000 HC SEMI PRIVATE

## 2024-11-23 PROCEDURE — 85025 COMPLETE CBC W/AUTO DIFF WBC: CPT

## 2024-11-23 PROCEDURE — 94760 N-INVAS EAR/PLS OXIMETRY 1: CPT

## 2024-11-23 PROCEDURE — 36415 COLL VENOUS BLD VENIPUNCTURE: CPT

## 2024-11-23 PROCEDURE — 6370000000 HC RX 637 (ALT 250 FOR IP): Performed by: NURSE PRACTITIONER

## 2024-11-23 PROCEDURE — 83735 ASSAY OF MAGNESIUM: CPT

## 2024-11-23 PROCEDURE — 94640 AIRWAY INHALATION TREATMENT: CPT

## 2024-11-23 PROCEDURE — 6360000002 HC RX W HCPCS: Performed by: HOSPITALIST

## 2024-11-23 PROCEDURE — 80053 COMPREHEN METABOLIC PANEL: CPT

## 2024-11-23 PROCEDURE — 6360000002 HC RX W HCPCS: Performed by: NURSE PRACTITIONER

## 2024-11-23 PROCEDURE — 6360000002 HC RX W HCPCS: Performed by: INTERNAL MEDICINE

## 2024-11-23 PROCEDURE — 2580000003 HC RX 258: Performed by: NURSE PRACTITIONER

## 2024-11-23 RX ORDER — MAGNESIUM SULFATE 1 G/100ML
1000 INJECTION INTRAVENOUS PRN
Status: DISCONTINUED | OUTPATIENT
Start: 2024-11-23 | End: 2024-12-01 | Stop reason: HOSPADM

## 2024-11-23 RX ORDER — PREGABALIN 50 MG/1
100 CAPSULE ORAL NIGHTLY
Status: DISCONTINUED | OUTPATIENT
Start: 2024-11-23 | End: 2024-11-24

## 2024-11-23 RX ORDER — VANCOMYCIN 1 G/200ML
1000 INJECTION, SOLUTION INTRAVENOUS EVERY 24 HOURS
Status: DISCONTINUED | OUTPATIENT
Start: 2024-11-24 | End: 2024-11-25

## 2024-11-23 RX ORDER — POTASSIUM CHLORIDE 1500 MG/1
40 TABLET, EXTENDED RELEASE ORAL PRN
Status: DISCONTINUED | OUTPATIENT
Start: 2024-11-23 | End: 2024-12-01 | Stop reason: HOSPADM

## 2024-11-23 RX ORDER — POTASSIUM CHLORIDE 7.45 MG/ML
10 INJECTION INTRAVENOUS PRN
Status: DISCONTINUED | OUTPATIENT
Start: 2024-11-23 | End: 2024-12-01 | Stop reason: HOSPADM

## 2024-11-23 RX ORDER — OXYCODONE AND ACETAMINOPHEN 5; 325 MG/1; MG/1
1 TABLET ORAL EVERY 4 HOURS PRN
Status: DISCONTINUED | OUTPATIENT
Start: 2024-11-23 | End: 2024-11-24

## 2024-11-23 RX ADMIN — METOPROLOL TARTRATE 25 MG: 25 TABLET, FILM COATED ORAL at 21:10

## 2024-11-23 RX ADMIN — BUSPIRONE HYDROCHLORIDE 5 MG: 5 TABLET ORAL at 14:37

## 2024-11-23 RX ADMIN — AMIODARONE HYDROCHLORIDE 200 MG: 200 TABLET ORAL at 21:10

## 2024-11-23 RX ADMIN — IPRATROPIUM BROMIDE AND ALBUTEROL SULFATE 1 DOSE: 2.5; .5 SOLUTION RESPIRATORY (INHALATION) at 19:56

## 2024-11-23 RX ADMIN — POTASSIUM CHLORIDE 10 MEQ: 10 INJECTION, SOLUTION INTRAVENOUS at 13:20

## 2024-11-23 RX ADMIN — SODIUM CHLORIDE: 9 INJECTION, SOLUTION INTRAVENOUS at 19:46

## 2024-11-23 RX ADMIN — SODIUM CHLORIDE, PRESERVATIVE FREE 10 ML: 5 INJECTION INTRAVENOUS at 19:45

## 2024-11-23 RX ADMIN — SODIUM CHLORIDE, PRESERVATIVE FREE 10 ML: 5 INJECTION INTRAVENOUS at 10:22

## 2024-11-23 RX ADMIN — METHOCARBAMOL TABLETS 500 MG: 500 TABLET, COATED ORAL at 21:10

## 2024-11-23 RX ADMIN — VANCOMYCIN HYDROCHLORIDE 2250 MG: 5 INJECTION, POWDER, LYOPHILIZED, FOR SOLUTION INTRAVENOUS at 19:46

## 2024-11-23 RX ADMIN — POTASSIUM CHLORIDE 10 MEQ: 10 INJECTION, SOLUTION INTRAVENOUS at 14:35

## 2024-11-23 RX ADMIN — AZITHROMYCIN DIHYDRATE 500 MG: 250 TABLET ORAL at 14:37

## 2024-11-23 RX ADMIN — POTASSIUM CHLORIDE 10 MEQ: 10 INJECTION, SOLUTION INTRAVENOUS at 12:13

## 2024-11-23 RX ADMIN — WATER 1000 MG: 1 INJECTION INTRAMUSCULAR; INTRAVENOUS; SUBCUTANEOUS at 17:25

## 2024-11-23 RX ADMIN — IPRATROPIUM BROMIDE AND ALBUTEROL SULFATE 1 DOSE: 2.5; .5 SOLUTION RESPIRATORY (INHALATION) at 06:45

## 2024-11-23 RX ADMIN — POTASSIUM CHLORIDE 10 MEQ: 10 INJECTION, SOLUTION INTRAVENOUS at 15:45

## 2024-11-23 RX ADMIN — GUAIFENESIN 600 MG: 600 TABLET ORAL at 21:10

## 2024-11-23 RX ADMIN — PREGABALIN 100 MG: 50 CAPSULE ORAL at 21:10

## 2024-11-23 RX ADMIN — IPRATROPIUM BROMIDE AND ALBUTEROL SULFATE 1 DOSE: 2.5; .5 SOLUTION RESPIRATORY (INHALATION) at 10:37

## 2024-11-23 RX ADMIN — IPRATROPIUM BROMIDE AND ALBUTEROL SULFATE 1 DOSE: 2.5; .5 SOLUTION RESPIRATORY (INHALATION) at 14:04

## 2024-11-23 RX ADMIN — BUSPIRONE HYDROCHLORIDE 5 MG: 5 TABLET ORAL at 21:10

## 2024-11-23 RX ADMIN — POTASSIUM CHLORIDE 10 MEQ: 10 INJECTION, SOLUTION INTRAVENOUS at 10:22

## 2024-11-23 RX ADMIN — APIXABAN 5 MG: 5 TABLET, FILM COATED ORAL at 21:10

## 2024-11-23 RX ADMIN — POTASSIUM CHLORIDE 10 MEQ: 10 INJECTION, SOLUTION INTRAVENOUS at 11:13

## 2024-11-23 ASSESSMENT — PAIN SCALES - GENERAL: PAINLEVEL_OUTOF10: 0

## 2024-11-23 NOTE — CARE COORDINATION
Case Management Assessment  Initial Evaluation    Date/Time of Evaluation: 11/23/2024 7:54 AM  Assessment Completed by: FABBY Sanchez    If patient is discharged prior to next notation, then this note serves as note for discharge by case management.    Patient Name: Lashonda Milian                   YOB: 1950  Diagnosis: UTI (urinary tract infection) [N39.0]  Urinary tract infection without hematuria, site unspecified [N39.0]  Pneumonia due to infectious organism, unspecified laterality, unspecified part of lung [J18.9]                   Date / Time: 11/22/2024  1:49 PM    Patient Admission Status: Inpatient   Readmission Risk (Low < 19, Mod (19-27), High > 27): Readmission Risk Score: 25.1    Current PCP: Nick Martinez MD  PCP verified by CM? Yes    Chart Reviewed: Yes      History Provided by: Patient  Patient Orientation: Alert and Oriented    Patient Cognition: Alert    Hospitalization in the last 30 days (Readmission):  No    If yes, Readmission Assessment in  Navigator will be completed.    Advance Directives:      Code Status: Full Code   Patient's Primary Decision Maker is: Legal Next of Kin    Primary Decision Maker: Babita Milian - Grandchild - [phone]    Secondary Decision Maker: Daniel Milian - Child - [phone]    Discharge Planning:    Patient lives with: Family Members Type of Home: House  Primary Care Giver: Family  Patient Support Systems include: Family Members   Current Financial resources: Medicare  Current community resources: None  Current services prior to admission: Durable Medical Equipment            Current DME: Bedside Commode, Shower Chair, Walker            Type of Home Care services:  None    ADLS  Prior functional level: Assistance with the following:, Mobility, Shopping, Bathing  Current functional level: Assistance with the following:, Mobility, Shopping, Bathing    PT AM-PAC:   /24  OT AM-PAC:   /24    Family can provide assistance at DC:

## 2024-11-24 LAB
ALBUMIN SERPL-MCNC: 2.7 G/DL (ref 3.5–5.2)
ALP SERPL-CCNC: 137 U/L (ref 35–104)
ALT SERPL-CCNC: 38 U/L (ref 5–33)
ANION GAP SERPL CALCULATED.3IONS-SCNC: 14 MMOL/L (ref 7–19)
AST SERPL-CCNC: 112 U/L (ref 5–32)
BACTERIA BLD CULT ORG #2: ABNORMAL
BACTERIA BLD CULT ORG #2: ABNORMAL
BASOPHILS # BLD: 0.1 K/UL (ref 0–0.2)
BASOPHILS NFR BLD: 1.1 % (ref 0–1)
BILIRUB SERPL-MCNC: 0.5 MG/DL (ref 0.2–1.2)
BUN SERPL-MCNC: 21 MG/DL (ref 8–23)
CALCIUM SERPL-MCNC: 8 MG/DL (ref 8.8–10.2)
CHLORIDE SERPL-SCNC: 104 MMOL/L (ref 98–111)
CO2 SERPL-SCNC: 19 MMOL/L (ref 22–29)
CREAT SERPL-MCNC: 1.1 MG/DL (ref 0.5–0.9)
EOSINOPHIL # BLD: 0 K/UL (ref 0–0.6)
EOSINOPHIL NFR BLD: 0.2 % (ref 0–5)
ERYTHROCYTE [DISTWIDTH] IN BLOOD BY AUTOMATED COUNT: 16 % (ref 11.5–14.5)
GLUCOSE SERPL-MCNC: 90 MG/DL (ref 70–99)
HCT VFR BLD AUTO: 33.7 % (ref 37–47)
HGB BLD-MCNC: 10.9 G/DL (ref 12–16)
IMM GRANULOCYTES # BLD: 0.5 K/UL
LYMPHOCYTES # BLD: 1.2 K/UL (ref 1.1–4.5)
LYMPHOCYTES NFR BLD: 19.6 % (ref 20–40)
MCH RBC QN AUTO: 30.1 PG (ref 27–31)
MCHC RBC AUTO-ENTMCNC: 32.3 G/DL (ref 33–37)
MCV RBC AUTO: 93.1 FL (ref 81–99)
MONOCYTES # BLD: 0.6 K/UL (ref 0–0.9)
MONOCYTES NFR BLD: 8.9 % (ref 0–10)
NEUTROPHILS # BLD: 3.9 K/UL (ref 1.5–7.5)
NEUTS SEG NFR BLD: 61.8 % (ref 50–65)
ORGANISM: ABNORMAL
PLATELET # BLD AUTO: 141 K/UL (ref 130–400)
PMV BLD AUTO: 12.8 FL (ref 9.4–12.3)
POTASSIUM SERPL-SCNC: 4.3 MMOL/L (ref 3.5–5)
PROT SERPL-MCNC: 5.6 G/DL (ref 6.4–8.3)
RBC # BLD AUTO: 3.62 M/UL (ref 4.2–5.4)
SODIUM SERPL-SCNC: 137 MMOL/L (ref 136–145)
WBC # BLD AUTO: 6.3 K/UL (ref 4.8–10.8)

## 2024-11-24 PROCEDURE — 87040 BLOOD CULTURE FOR BACTERIA: CPT

## 2024-11-24 PROCEDURE — 94640 AIRWAY INHALATION TREATMENT: CPT

## 2024-11-24 PROCEDURE — 6370000000 HC RX 637 (ALT 250 FOR IP): Performed by: NURSE PRACTITIONER

## 2024-11-24 PROCEDURE — 1200000000 HC SEMI PRIVATE

## 2024-11-24 PROCEDURE — 6360000002 HC RX W HCPCS: Performed by: INTERNAL MEDICINE

## 2024-11-24 PROCEDURE — 94760 N-INVAS EAR/PLS OXIMETRY 1: CPT

## 2024-11-24 PROCEDURE — 2580000003 HC RX 258: Performed by: INTERNAL MEDICINE

## 2024-11-24 PROCEDURE — 6360000002 HC RX W HCPCS: Performed by: NURSE PRACTITIONER

## 2024-11-24 PROCEDURE — 80053 COMPREHEN METABOLIC PANEL: CPT

## 2024-11-24 PROCEDURE — 36415 COLL VENOUS BLD VENIPUNCTURE: CPT

## 2024-11-24 PROCEDURE — 6370000000 HC RX 637 (ALT 250 FOR IP): Performed by: INTERNAL MEDICINE

## 2024-11-24 PROCEDURE — 85025 COMPLETE CBC W/AUTO DIFF WBC: CPT

## 2024-11-24 PROCEDURE — 2580000003 HC RX 258: Performed by: NURSE PRACTITIONER

## 2024-11-24 RX ORDER — SODIUM CHLORIDE 9 MG/ML
INJECTION, SOLUTION INTRAVENOUS CONTINUOUS
Status: DISCONTINUED | OUTPATIENT
Start: 2024-11-24 | End: 2024-12-01 | Stop reason: HOSPADM

## 2024-11-24 RX ORDER — SERTRALINE HYDROCHLORIDE 25 MG/1
25 TABLET, FILM COATED ORAL DAILY
Status: DISCONTINUED | OUTPATIENT
Start: 2024-11-25 | End: 2024-12-01 | Stop reason: HOSPADM

## 2024-11-24 RX ORDER — OXYCODONE AND ACETAMINOPHEN 5; 325 MG/1; MG/1
1 TABLET ORAL EVERY 6 HOURS PRN
Status: DISCONTINUED | OUTPATIENT
Start: 2024-11-24 | End: 2024-11-28

## 2024-11-24 RX ADMIN — SODIUM CHLORIDE: 9 INJECTION, SOLUTION INTRAVENOUS at 23:55

## 2024-11-24 RX ADMIN — GUAIFENESIN 600 MG: 600 TABLET ORAL at 09:36

## 2024-11-24 RX ADMIN — METOPROLOL TARTRATE 25 MG: 25 TABLET, FILM COATED ORAL at 21:23

## 2024-11-24 RX ADMIN — VANCOMYCIN 1000 MG: 1 INJECTION, SOLUTION INTRAVENOUS at 20:04

## 2024-11-24 RX ADMIN — AMIODARONE HYDROCHLORIDE 200 MG: 200 TABLET ORAL at 21:23

## 2024-11-24 RX ADMIN — BUSPIRONE HYDROCHLORIDE 5 MG: 5 TABLET ORAL at 09:36

## 2024-11-24 RX ADMIN — AMIODARONE HYDROCHLORIDE 200 MG: 200 TABLET ORAL at 09:36

## 2024-11-24 RX ADMIN — APIXABAN 5 MG: 5 TABLET, FILM COATED ORAL at 09:36

## 2024-11-24 RX ADMIN — FOLIC ACID 1 MG: 1 TABLET ORAL at 09:36

## 2024-11-24 RX ADMIN — GUAIFENESIN 600 MG: 600 TABLET ORAL at 21:23

## 2024-11-24 RX ADMIN — WATER 1000 MG: 1 INJECTION INTRAMUSCULAR; INTRAVENOUS; SUBCUTANEOUS at 14:32

## 2024-11-24 RX ADMIN — BUSPIRONE HYDROCHLORIDE 5 MG: 5 TABLET ORAL at 21:24

## 2024-11-24 RX ADMIN — IPRATROPIUM BROMIDE AND ALBUTEROL SULFATE 1 DOSE: 2.5; .5 SOLUTION RESPIRATORY (INHALATION) at 18:18

## 2024-11-24 RX ADMIN — SERTRALINE HYDROCHLORIDE 50 MG: 100 TABLET ORAL at 09:36

## 2024-11-24 RX ADMIN — METHOCARBAMOL TABLETS 500 MG: 500 TABLET, COATED ORAL at 09:36

## 2024-11-24 RX ADMIN — OXYCODONE HYDROCHLORIDE AND ACETAMINOPHEN 1 TABLET: 5; 325 TABLET ORAL at 16:54

## 2024-11-24 RX ADMIN — IPRATROPIUM BROMIDE AND ALBUTEROL SULFATE 1 DOSE: 2.5; .5 SOLUTION RESPIRATORY (INHALATION) at 06:04

## 2024-11-24 RX ADMIN — METOPROLOL TARTRATE 25 MG: 25 TABLET, FILM COATED ORAL at 09:36

## 2024-11-24 RX ADMIN — BUSPIRONE HYDROCHLORIDE 5 MG: 5 TABLET ORAL at 14:34

## 2024-11-24 RX ADMIN — METHOCARBAMOL TABLETS 500 MG: 500 TABLET, COATED ORAL at 21:23

## 2024-11-24 RX ADMIN — SODIUM CHLORIDE: 9 INJECTION, SOLUTION INTRAVENOUS at 14:32

## 2024-11-24 RX ADMIN — AZITHROMYCIN DIHYDRATE 500 MG: 250 TABLET ORAL at 14:34

## 2024-11-24 RX ADMIN — SODIUM CHLORIDE, PRESERVATIVE FREE 10 ML: 5 INJECTION INTRAVENOUS at 21:24

## 2024-11-24 RX ADMIN — ATORVASTATIN CALCIUM 20 MG: 20 TABLET, FILM COATED ORAL at 09:36

## 2024-11-24 RX ADMIN — APIXABAN 5 MG: 5 TABLET, FILM COATED ORAL at 21:24

## 2024-11-24 ASSESSMENT — PAIN SCALES - GENERAL
PAINLEVEL_OUTOF10: 0
PAINLEVEL_OUTOF10: 6
PAINLEVEL_OUTOF10: 4

## 2024-11-24 ASSESSMENT — PAIN DESCRIPTION - DESCRIPTORS: DESCRIPTORS: ACHING;DULL

## 2024-11-24 ASSESSMENT — PAIN DESCRIPTION - ORIENTATION: ORIENTATION: RIGHT;LEFT

## 2024-11-24 ASSESSMENT — PAIN DESCRIPTION - LOCATION: LOCATION: KNEE

## 2024-11-25 LAB
ALBUMIN SERPL-MCNC: 2.4 G/DL (ref 3.5–5.2)
ALP SERPL-CCNC: 126 U/L (ref 35–104)
ALT SERPL-CCNC: 32 U/L (ref 5–33)
ANION GAP SERPL CALCULATED.3IONS-SCNC: 15 MMOL/L (ref 7–19)
AST SERPL-CCNC: 81 U/L (ref 5–32)
BACTERIA UR CULT: ABNORMAL
BACTERIA UR CULT: ABNORMAL
BASOPHILS # BLD: 0 K/UL (ref 0–0.2)
BASOPHILS NFR BLD: 0.7 % (ref 0–1)
BILIRUB SERPL-MCNC: 0.5 MG/DL (ref 0.2–1.2)
BUN SERPL-MCNC: 17 MG/DL (ref 8–23)
CALCIUM SERPL-MCNC: 7.9 MG/DL (ref 8.8–10.2)
CHLORIDE SERPL-SCNC: 104 MMOL/L (ref 98–111)
CO2 SERPL-SCNC: 19 MMOL/L (ref 22–29)
CREAT SERPL-MCNC: 1 MG/DL (ref 0.5–0.9)
EOSINOPHIL # BLD: 0.1 K/UL (ref 0–0.6)
EOSINOPHIL NFR BLD: 1.3 % (ref 0–5)
ERYTHROCYTE [DISTWIDTH] IN BLOOD BY AUTOMATED COUNT: 15.9 % (ref 11.5–14.5)
GLUCOSE SERPL-MCNC: 92 MG/DL (ref 70–99)
HCT VFR BLD AUTO: 32.7 % (ref 37–47)
HGB BLD-MCNC: 10.1 G/DL (ref 12–16)
IMM GRANULOCYTES # BLD: 0 K/UL
LYMPHOCYTES # BLD: 1 K/UL (ref 1.1–4.5)
LYMPHOCYTES NFR BLD: 21.8 % (ref 20–40)
MAGNESIUM SERPL-MCNC: 1.5 MG/DL (ref 1.6–2.4)
MCH RBC QN AUTO: 29.5 PG (ref 27–31)
MCHC RBC AUTO-ENTMCNC: 30.9 G/DL (ref 33–37)
MCV RBC AUTO: 95.6 FL (ref 81–99)
MONOCYTES # BLD: 0.4 K/UL (ref 0–0.9)
MONOCYTES NFR BLD: 9.6 % (ref 0–10)
NEUTROPHILS # BLD: 3 K/UL (ref 1.5–7.5)
NEUTS SEG NFR BLD: 65.9 % (ref 50–65)
ORGANISM: ABNORMAL
PLATELET # BLD AUTO: 159 K/UL (ref 130–400)
PMV BLD AUTO: 12.3 FL (ref 9.4–12.3)
POTASSIUM SERPL-SCNC: 3 MMOL/L (ref 3.5–5)
PROT SERPL-MCNC: 5.9 G/DL (ref 6.4–8.3)
RBC # BLD AUTO: 3.42 M/UL (ref 4.2–5.4)
SODIUM SERPL-SCNC: 138 MMOL/L (ref 136–145)
VANCOMYCIN TROUGH SERPL-MCNC: 22.2 UG/ML (ref 10–20)
WBC # BLD AUTO: 4.5 K/UL (ref 4.8–10.8)

## 2024-11-25 PROCEDURE — 85025 COMPLETE CBC W/AUTO DIFF WBC: CPT

## 2024-11-25 PROCEDURE — 94760 N-INVAS EAR/PLS OXIMETRY 1: CPT

## 2024-11-25 PROCEDURE — 6360000002 HC RX W HCPCS: Performed by: NURSE PRACTITIONER

## 2024-11-25 PROCEDURE — 36415 COLL VENOUS BLD VENIPUNCTURE: CPT

## 2024-11-25 PROCEDURE — 6370000000 HC RX 637 (ALT 250 FOR IP): Performed by: NURSE PRACTITIONER

## 2024-11-25 PROCEDURE — 2580000003 HC RX 258: Performed by: NURSE PRACTITIONER

## 2024-11-25 PROCEDURE — 94640 AIRWAY INHALATION TREATMENT: CPT

## 2024-11-25 PROCEDURE — 6370000000 HC RX 637 (ALT 250 FOR IP): Performed by: INTERNAL MEDICINE

## 2024-11-25 PROCEDURE — 6360000002 HC RX W HCPCS: Performed by: HOSPITALIST

## 2024-11-25 PROCEDURE — 6360000002 HC RX W HCPCS: Performed by: INTERNAL MEDICINE

## 2024-11-25 PROCEDURE — 1200000000 HC SEMI PRIVATE

## 2024-11-25 PROCEDURE — 80053 COMPREHEN METABOLIC PANEL: CPT

## 2024-11-25 PROCEDURE — 80202 ASSAY OF VANCOMYCIN: CPT

## 2024-11-25 PROCEDURE — 83735 ASSAY OF MAGNESIUM: CPT

## 2024-11-25 PROCEDURE — 2580000003 HC RX 258: Performed by: INTERNAL MEDICINE

## 2024-11-25 RX ADMIN — BUSPIRONE HYDROCHLORIDE 5 MG: 5 TABLET ORAL at 13:58

## 2024-11-25 RX ADMIN — POTASSIUM CHLORIDE 10 MEQ: 10 INJECTION, SOLUTION INTRAVENOUS at 13:04

## 2024-11-25 RX ADMIN — POTASSIUM CHLORIDE 10 MEQ: 10 INJECTION, SOLUTION INTRAVENOUS at 14:02

## 2024-11-25 RX ADMIN — POTASSIUM CHLORIDE 10 MEQ: 10 INJECTION, SOLUTION INTRAVENOUS at 12:03

## 2024-11-25 RX ADMIN — POTASSIUM CHLORIDE 10 MEQ: 10 INJECTION, SOLUTION INTRAVENOUS at 09:57

## 2024-11-25 RX ADMIN — IPRATROPIUM BROMIDE AND ALBUTEROL SULFATE 1 DOSE: 2.5; .5 SOLUTION RESPIRATORY (INHALATION) at 11:00

## 2024-11-25 RX ADMIN — VANCOMYCIN 1000 MG: 1 INJECTION, SOLUTION INTRAVENOUS at 19:14

## 2024-11-25 RX ADMIN — SODIUM CHLORIDE: 9 INJECTION, SOLUTION INTRAVENOUS at 13:21

## 2024-11-25 RX ADMIN — AMIODARONE HYDROCHLORIDE 200 MG: 200 TABLET ORAL at 08:32

## 2024-11-25 RX ADMIN — IPRATROPIUM BROMIDE AND ALBUTEROL SULFATE 1 DOSE: 2.5; .5 SOLUTION RESPIRATORY (INHALATION) at 06:46

## 2024-11-25 RX ADMIN — OXYCODONE HYDROCHLORIDE AND ACETAMINOPHEN 1 TABLET: 5; 325 TABLET ORAL at 20:41

## 2024-11-25 RX ADMIN — OXYCODONE HYDROCHLORIDE AND ACETAMINOPHEN 1 TABLET: 5; 325 TABLET ORAL at 11:47

## 2024-11-25 RX ADMIN — ATORVASTATIN CALCIUM 20 MG: 20 TABLET, FILM COATED ORAL at 08:32

## 2024-11-25 RX ADMIN — MAGNESIUM SULFATE HEPTAHYDRATE 1000 MG: 1 INJECTION, SOLUTION INTRAVENOUS at 11:46

## 2024-11-25 RX ADMIN — APIXABAN 5 MG: 5 TABLET, FILM COATED ORAL at 20:41

## 2024-11-25 RX ADMIN — METHOCARBAMOL TABLETS 500 MG: 500 TABLET, COATED ORAL at 08:32

## 2024-11-25 RX ADMIN — SERTRALINE HYDROCHLORIDE 25 MG: 25 TABLET ORAL at 08:32

## 2024-11-25 RX ADMIN — BUSPIRONE HYDROCHLORIDE 5 MG: 5 TABLET ORAL at 08:32

## 2024-11-25 RX ADMIN — GUAIFENESIN 600 MG: 600 TABLET ORAL at 08:32

## 2024-11-25 RX ADMIN — POTASSIUM CHLORIDE 10 MEQ: 10 INJECTION, SOLUTION INTRAVENOUS at 15:08

## 2024-11-25 RX ADMIN — METHOCARBAMOL TABLETS 500 MG: 500 TABLET, COATED ORAL at 20:41

## 2024-11-25 RX ADMIN — MAGNESIUM SULFATE HEPTAHYDRATE 1000 MG: 1 INJECTION, SOLUTION INTRAVENOUS at 10:38

## 2024-11-25 RX ADMIN — BUSPIRONE HYDROCHLORIDE 5 MG: 5 TABLET ORAL at 20:40

## 2024-11-25 RX ADMIN — GUAIFENESIN 600 MG: 600 TABLET ORAL at 20:41

## 2024-11-25 RX ADMIN — APIXABAN 5 MG: 5 TABLET, FILM COATED ORAL at 08:32

## 2024-11-25 RX ADMIN — POTASSIUM CHLORIDE 10 MEQ: 10 INJECTION, SOLUTION INTRAVENOUS at 10:56

## 2024-11-25 RX ADMIN — AMIODARONE HYDROCHLORIDE 200 MG: 200 TABLET ORAL at 20:41

## 2024-11-25 RX ADMIN — WATER 1000 MG: 1 INJECTION INTRAMUSCULAR; INTRAVENOUS; SUBCUTANEOUS at 13:58

## 2024-11-25 RX ADMIN — IPRATROPIUM BROMIDE AND ALBUTEROL SULFATE 1 DOSE: 2.5; .5 SOLUTION RESPIRATORY (INHALATION) at 14:35

## 2024-11-25 RX ADMIN — OXYCODONE HYDROCHLORIDE AND ACETAMINOPHEN 1 TABLET: 5; 325 TABLET ORAL at 05:40

## 2024-11-25 RX ADMIN — FOLIC ACID 1 MG: 1 TABLET ORAL at 08:32

## 2024-11-25 ASSESSMENT — PAIN DESCRIPTION - ORIENTATION
ORIENTATION: RIGHT;LEFT
ORIENTATION: RIGHT;LEFT

## 2024-11-25 ASSESSMENT — PAIN DESCRIPTION - LOCATION
LOCATION: KNEE
LOCATION: BACK;LEG

## 2024-11-25 ASSESSMENT — PAIN DESCRIPTION - DESCRIPTORS
DESCRIPTORS: ACHING;DULL
DESCRIPTORS: ACHING

## 2024-11-25 ASSESSMENT — PAIN DESCRIPTION - FREQUENCY: FREQUENCY: CONTINUOUS

## 2024-11-25 ASSESSMENT — PAIN SCALES - GENERAL
PAINLEVEL_OUTOF10: 8
PAINLEVEL_OUTOF10: 7
PAINLEVEL_OUTOF10: 0
PAINLEVEL_OUTOF10: 3
PAINLEVEL_OUTOF10: 8

## 2024-11-25 ASSESSMENT — PAIN DESCRIPTION - PAIN TYPE: TYPE: CHRONIC PAIN

## 2024-11-25 ASSESSMENT — PAIN DESCRIPTION - ONSET: ONSET: ON-GOING

## 2024-11-25 ASSESSMENT — PAIN DESCRIPTION - DIRECTION: RADIATING_TOWARDS: NO

## 2024-11-25 NOTE — CARE COORDINATION
Pt is current with [facility].  Please fax NITHYA orders, d/c summary and AVS to them at discharge.  [facility]  P:  [phone]  F:  [phone]  Electronically signed by Tayla Lockett RN on 11/25/2024 at 10:08 AM

## 2024-11-26 LAB
ALBUMIN SERPL-MCNC: 2.5 G/DL (ref 3.5–5.2)
ALP SERPL-CCNC: 125 U/L (ref 35–104)
ALT SERPL-CCNC: 26 U/L (ref 5–33)
ANION GAP SERPL CALCULATED.3IONS-SCNC: 13 MMOL/L (ref 7–19)
AST SERPL-CCNC: 56 U/L (ref 5–32)
BASOPHILS # BLD: 0 K/UL (ref 0–0.2)
BASOPHILS NFR BLD: 0.9 % (ref 0–1)
BILIRUB SERPL-MCNC: 0.4 MG/DL (ref 0.2–1.2)
BUN SERPL-MCNC: 13 MG/DL (ref 8–23)
CALCIUM SERPL-MCNC: 8.2 MG/DL (ref 8.8–10.2)
CHLORIDE SERPL-SCNC: 107 MMOL/L (ref 98–111)
CO2 SERPL-SCNC: 19 MMOL/L (ref 22–29)
CREAT SERPL-MCNC: 0.9 MG/DL (ref 0.5–0.9)
EOSINOPHIL # BLD: 0 K/UL (ref 0–0.6)
EOSINOPHIL NFR BLD: 0.7 % (ref 0–5)
ERYTHROCYTE [DISTWIDTH] IN BLOOD BY AUTOMATED COUNT: 16 % (ref 11.5–14.5)
GLUCOSE SERPL-MCNC: 107 MG/DL (ref 70–99)
HCT VFR BLD AUTO: 37 % (ref 37–47)
HGB BLD-MCNC: 11.3 G/DL (ref 12–16)
IMM GRANULOCYTES # BLD: 0 K/UL
LYMPHOCYTES # BLD: 0.6 K/UL (ref 1.1–4.5)
LYMPHOCYTES NFR BLD: 14.9 % (ref 20–40)
MCH RBC QN AUTO: 29.8 PG (ref 27–31)
MCHC RBC AUTO-ENTMCNC: 30.5 G/DL (ref 33–37)
MCV RBC AUTO: 97.6 FL (ref 81–99)
MONOCYTES # BLD: 0.4 K/UL (ref 0–0.9)
MONOCYTES NFR BLD: 9.5 % (ref 0–10)
NEUTROPHILS # BLD: 3.2 K/UL (ref 1.5–7.5)
NEUTS SEG NFR BLD: 73.3 % (ref 50–65)
PLATELET # BLD AUTO: 161 K/UL (ref 130–400)
PMV BLD AUTO: 12.6 FL (ref 9.4–12.3)
POTASSIUM SERPL-SCNC: 3.6 MMOL/L (ref 3.5–5)
PROT SERPL-MCNC: 5.9 G/DL (ref 6.4–8.3)
RBC # BLD AUTO: 3.79 M/UL (ref 4.2–5.4)
SODIUM SERPL-SCNC: 139 MMOL/L (ref 136–145)
WBC # BLD AUTO: 4.3 K/UL (ref 4.8–10.8)

## 2024-11-26 PROCEDURE — 85025 COMPLETE CBC W/AUTO DIFF WBC: CPT

## 2024-11-26 PROCEDURE — 97530 THERAPEUTIC ACTIVITIES: CPT

## 2024-11-26 PROCEDURE — 36415 COLL VENOUS BLD VENIPUNCTURE: CPT

## 2024-11-26 PROCEDURE — 6370000000 HC RX 637 (ALT 250 FOR IP): Performed by: INTERNAL MEDICINE

## 2024-11-26 PROCEDURE — 1200000000 HC SEMI PRIVATE

## 2024-11-26 PROCEDURE — 94760 N-INVAS EAR/PLS OXIMETRY 1: CPT

## 2024-11-26 PROCEDURE — 6360000002 HC RX W HCPCS: Performed by: NURSE PRACTITIONER

## 2024-11-26 PROCEDURE — 92522 EVALUATE SPEECH PRODUCTION: CPT

## 2024-11-26 PROCEDURE — 94640 AIRWAY INHALATION TREATMENT: CPT

## 2024-11-26 PROCEDURE — 6370000000 HC RX 637 (ALT 250 FOR IP): Performed by: NURSE PRACTITIONER

## 2024-11-26 PROCEDURE — 2580000003 HC RX 258: Performed by: NURSE PRACTITIONER

## 2024-11-26 PROCEDURE — 99222 1ST HOSP IP/OBS MODERATE 55: CPT | Performed by: NURSE PRACTITIONER

## 2024-11-26 PROCEDURE — 80053 COMPREHEN METABOLIC PANEL: CPT

## 2024-11-26 PROCEDURE — 97165 OT EVAL LOW COMPLEX 30 MIN: CPT

## 2024-11-26 PROCEDURE — 92610 EVALUATE SWALLOWING FUNCTION: CPT

## 2024-11-26 PROCEDURE — 97162 PT EVAL MOD COMPLEX 30 MIN: CPT

## 2024-11-26 RX ADMIN — METHOCARBAMOL TABLETS 500 MG: 500 TABLET, COATED ORAL at 09:52

## 2024-11-26 RX ADMIN — IPRATROPIUM BROMIDE AND ALBUTEROL SULFATE 1 DOSE: 2.5; .5 SOLUTION RESPIRATORY (INHALATION) at 18:51

## 2024-11-26 RX ADMIN — SODIUM CHLORIDE, PRESERVATIVE FREE 10 ML: 5 INJECTION INTRAVENOUS at 09:51

## 2024-11-26 RX ADMIN — APIXABAN 5 MG: 5 TABLET, FILM COATED ORAL at 20:36

## 2024-11-26 RX ADMIN — AMIODARONE HYDROCHLORIDE 200 MG: 200 TABLET ORAL at 21:01

## 2024-11-26 RX ADMIN — IPRATROPIUM BROMIDE AND ALBUTEROL SULFATE 1 DOSE: 2.5; .5 SOLUTION RESPIRATORY (INHALATION) at 14:03

## 2024-11-26 RX ADMIN — IPRATROPIUM BROMIDE AND ALBUTEROL SULFATE 1 DOSE: 2.5; .5 SOLUTION RESPIRATORY (INHALATION) at 06:55

## 2024-11-26 RX ADMIN — GUAIFENESIN 600 MG: 600 TABLET ORAL at 09:52

## 2024-11-26 RX ADMIN — SERTRALINE HYDROCHLORIDE 25 MG: 25 TABLET ORAL at 09:52

## 2024-11-26 RX ADMIN — BUSPIRONE HYDROCHLORIDE 5 MG: 5 TABLET ORAL at 20:36

## 2024-11-26 RX ADMIN — AMIODARONE HYDROCHLORIDE 200 MG: 200 TABLET ORAL at 09:52

## 2024-11-26 RX ADMIN — FOLIC ACID 1 MG: 1 TABLET ORAL at 09:52

## 2024-11-26 RX ADMIN — METHOCARBAMOL TABLETS 500 MG: 500 TABLET, COATED ORAL at 20:36

## 2024-11-26 RX ADMIN — IPRATROPIUM BROMIDE AND ALBUTEROL SULFATE 1 DOSE: 2.5; .5 SOLUTION RESPIRATORY (INHALATION) at 11:00

## 2024-11-26 RX ADMIN — ATORVASTATIN CALCIUM 20 MG: 20 TABLET, FILM COATED ORAL at 09:52

## 2024-11-26 RX ADMIN — METOPROLOL TARTRATE 25 MG: 25 TABLET, FILM COATED ORAL at 09:52

## 2024-11-26 RX ADMIN — WATER 1000 MG: 1 INJECTION INTRAMUSCULAR; INTRAVENOUS; SUBCUTANEOUS at 15:30

## 2024-11-26 RX ADMIN — APIXABAN 5 MG: 5 TABLET, FILM COATED ORAL at 09:52

## 2024-11-26 RX ADMIN — GUAIFENESIN 600 MG: 600 TABLET ORAL at 20:36

## 2024-11-26 RX ADMIN — OXYCODONE HYDROCHLORIDE AND ACETAMINOPHEN 1 TABLET: 5; 325 TABLET ORAL at 09:52

## 2024-11-26 RX ADMIN — BUSPIRONE HYDROCHLORIDE 5 MG: 5 TABLET ORAL at 09:52

## 2024-11-26 ASSESSMENT — PAIN SCALES - GENERAL
PAINLEVEL_OUTOF10: 3
PAINLEVEL_OUTOF10: 6

## 2024-11-26 ASSESSMENT — PAIN DESCRIPTION - LOCATION: LOCATION: KNEE

## 2024-11-26 ASSESSMENT — PAIN DESCRIPTION - ORIENTATION: ORIENTATION: RIGHT;LEFT

## 2024-11-26 ASSESSMENT — PAIN DESCRIPTION - DESCRIPTORS: DESCRIPTORS: ACHING;DISCOMFORT

## 2024-11-26 NOTE — CONSULTS
NEUROSURGERY  CONSULT    CHIEF COMPLAINT:  Shortness of breath    HISTORY OF PRESENT ILLNESS:      The patient is a 74 y.o. female with afib on chronic anticoagulation, COPD, CHF, hx of CVA and lumbar discitis/osteomyelitis familiar to our team as she underwent a decompressive lumbar laminectomy of L2-3 for removal of an epidural abscess/phlegmon using minimally invasive technique on 10/4/2024 who presented to the ED on 11/22/2024 with shortness of breath.  She was noted to have a UTI and placed on Rocephin.  Patient has been more confused this visit.  CT of the head showed encephalomalacia in the right parietal lobe consistent with old infarct.  First set of blood cultures obtained on 11/22/2024 were positive for Staphylococcus epidermidis which is most likely a contaminant.  Second set of blood cultures obtained on 11/24/2024 have been no growth to date.  Patient had failed to follow-up in our clinic for a postop visit and we were asked to evaluate during this admission.    Upon entering the room Ms. Milian is asking if she can speak with her son Daniel.  She is alert to the year.  She thinks that she is supposed to be in [place].  She denies any low back pain or radicular pains.      The patient does take anticoagulation medication (Eliquis).       Past Medical History:   Diagnosis Date    Arthritis     Atrial fibrillation (HCC)     Paroxysmal A Fib    Cerebral artery occlusion with cerebral infarction (HCC)     TWICE: once in 2012 or 2013, then had another one 2021    CHF (congestive heart failure) (HCC)     COPD (chronic obstructive pulmonary disease) (HCC)     Hyperlipidemia     Hypertension     On continuous oral anticoagulation     Apixaban    Pneumonia     Thyroid disease        Past Surgical History:   Procedure Laterality Date    CARDIOVERSION  2020    HYSTERECTOMY, TOTAL ABDOMINAL (CERVIX REMOVED) N/A     With removal of tumor, ~2014, was a DANIS and BSO    LUMBAR SPINE SURGERY Right 10/4/2024

## 2024-11-27 LAB
ALBUMIN SERPL-MCNC: 2.4 G/DL (ref 3.5–5.2)
ALP SERPL-CCNC: 116 U/L (ref 35–104)
ALT SERPL-CCNC: 22 U/L (ref 5–33)
ANION GAP SERPL CALCULATED.3IONS-SCNC: 14 MMOL/L (ref 7–19)
AST SERPL-CCNC: 47 U/L (ref 5–32)
BACTERIA BLD CULT: NORMAL
BASOPHILS # BLD: 0 K/UL (ref 0–0.2)
BASOPHILS NFR BLD: 0.5 % (ref 0–1)
BILIRUB SERPL-MCNC: 0.4 MG/DL (ref 0.2–1.2)
BUN SERPL-MCNC: 12 MG/DL (ref 8–23)
CALCIUM SERPL-MCNC: 8 MG/DL (ref 8.8–10.2)
CHLORIDE SERPL-SCNC: 107 MMOL/L (ref 98–111)
CO2 SERPL-SCNC: 17 MMOL/L (ref 22–29)
CREAT SERPL-MCNC: 0.8 MG/DL (ref 0.5–0.9)
EOSINOPHIL # BLD: 0 K/UL (ref 0–0.6)
EOSINOPHIL NFR BLD: 1.1 % (ref 0–5)
ERYTHROCYTE [DISTWIDTH] IN BLOOD BY AUTOMATED COUNT: 16 % (ref 11.5–14.5)
GLUCOSE SERPL-MCNC: 103 MG/DL (ref 70–99)
HCT VFR BLD AUTO: 32.6 % (ref 37–47)
HGB BLD-MCNC: 10.2 G/DL (ref 12–16)
IMM GRANULOCYTES # BLD: 0 K/UL
LYMPHOCYTES # BLD: 0.7 K/UL (ref 1.1–4.5)
LYMPHOCYTES NFR BLD: 17.2 % (ref 20–40)
MAGNESIUM SERPL-MCNC: 1.8 MG/DL (ref 1.6–2.4)
MCH RBC QN AUTO: 29.7 PG (ref 27–31)
MCHC RBC AUTO-ENTMCNC: 31.3 G/DL (ref 33–37)
MCV RBC AUTO: 94.8 FL (ref 81–99)
MONOCYTES # BLD: 0.3 K/UL (ref 0–0.9)
MONOCYTES NFR BLD: 8.5 % (ref 0–10)
NEUTROPHILS # BLD: 2.7 K/UL (ref 1.5–7.5)
NEUTS SEG NFR BLD: 72.4 % (ref 50–65)
PLATELET # BLD AUTO: 151 K/UL (ref 130–400)
PMV BLD AUTO: 12.3 FL (ref 9.4–12.3)
POTASSIUM SERPL-SCNC: 3.4 MMOL/L (ref 3.5–5)
PROT SERPL-MCNC: 5.6 G/DL (ref 6.4–8.3)
RBC # BLD AUTO: 3.44 M/UL (ref 4.2–5.4)
SODIUM SERPL-SCNC: 138 MMOL/L (ref 136–145)
WBC # BLD AUTO: 3.8 K/UL (ref 4.8–10.8)

## 2024-11-27 PROCEDURE — 97530 THERAPEUTIC ACTIVITIES: CPT

## 2024-11-27 PROCEDURE — 94640 AIRWAY INHALATION TREATMENT: CPT

## 2024-11-27 PROCEDURE — 97110 THERAPEUTIC EXERCISES: CPT

## 2024-11-27 PROCEDURE — 80053 COMPREHEN METABOLIC PANEL: CPT

## 2024-11-27 PROCEDURE — 6370000000 HC RX 637 (ALT 250 FOR IP): Performed by: NURSE PRACTITIONER

## 2024-11-27 PROCEDURE — 94760 N-INVAS EAR/PLS OXIMETRY 1: CPT

## 2024-11-27 PROCEDURE — 2580000003 HC RX 258: Performed by: NURSE PRACTITIONER

## 2024-11-27 PROCEDURE — 36415 COLL VENOUS BLD VENIPUNCTURE: CPT

## 2024-11-27 PROCEDURE — 1200000000 HC SEMI PRIVATE

## 2024-11-27 PROCEDURE — 83735 ASSAY OF MAGNESIUM: CPT

## 2024-11-27 PROCEDURE — 6370000000 HC RX 637 (ALT 250 FOR IP): Performed by: HOSPITALIST

## 2024-11-27 PROCEDURE — 85025 COMPLETE CBC W/AUTO DIFF WBC: CPT

## 2024-11-27 PROCEDURE — 6370000000 HC RX 637 (ALT 250 FOR IP): Performed by: INTERNAL MEDICINE

## 2024-11-27 RX ADMIN — SODIUM CHLORIDE, PRESERVATIVE FREE 10 ML: 5 INJECTION INTRAVENOUS at 09:35

## 2024-11-27 RX ADMIN — IPRATROPIUM BROMIDE AND ALBUTEROL SULFATE 1 DOSE: 2.5; .5 SOLUTION RESPIRATORY (INHALATION) at 07:26

## 2024-11-27 RX ADMIN — APIXABAN 5 MG: 5 TABLET, FILM COATED ORAL at 21:00

## 2024-11-27 RX ADMIN — BUSPIRONE HYDROCHLORIDE 5 MG: 5 TABLET ORAL at 09:35

## 2024-11-27 RX ADMIN — APIXABAN 5 MG: 5 TABLET, FILM COATED ORAL at 09:35

## 2024-11-27 RX ADMIN — METHOCARBAMOL TABLETS 500 MG: 500 TABLET, COATED ORAL at 21:00

## 2024-11-27 RX ADMIN — METOPROLOL TARTRATE 25 MG: 25 TABLET, FILM COATED ORAL at 21:00

## 2024-11-27 RX ADMIN — ATORVASTATIN CALCIUM 20 MG: 20 TABLET, FILM COATED ORAL at 09:35

## 2024-11-27 RX ADMIN — GUAIFENESIN 600 MG: 600 TABLET ORAL at 09:35

## 2024-11-27 RX ADMIN — METOPROLOL TARTRATE 25 MG: 25 TABLET, FILM COATED ORAL at 09:35

## 2024-11-27 RX ADMIN — POTASSIUM CHLORIDE 40 MEQ: 1500 TABLET, EXTENDED RELEASE ORAL at 09:34

## 2024-11-27 RX ADMIN — METHOCARBAMOL TABLETS 500 MG: 500 TABLET, COATED ORAL at 09:35

## 2024-11-27 RX ADMIN — FOLIC ACID 1 MG: 1 TABLET ORAL at 09:35

## 2024-11-27 RX ADMIN — AMIODARONE HYDROCHLORIDE 200 MG: 200 TABLET ORAL at 21:00

## 2024-11-27 RX ADMIN — AMIODARONE HYDROCHLORIDE 200 MG: 200 TABLET ORAL at 09:35

## 2024-11-27 RX ADMIN — IPRATROPIUM BROMIDE AND ALBUTEROL SULFATE 1 DOSE: 2.5; .5 SOLUTION RESPIRATORY (INHALATION) at 15:15

## 2024-11-27 RX ADMIN — GUAIFENESIN 600 MG: 600 TABLET ORAL at 21:00

## 2024-11-27 RX ADMIN — OXYCODONE HYDROCHLORIDE AND ACETAMINOPHEN 1 TABLET: 5; 325 TABLET ORAL at 13:07

## 2024-11-27 RX ADMIN — IPRATROPIUM BROMIDE AND ALBUTEROL SULFATE 1 DOSE: 2.5; .5 SOLUTION RESPIRATORY (INHALATION) at 11:25

## 2024-11-27 RX ADMIN — BUSPIRONE HYDROCHLORIDE 5 MG: 5 TABLET ORAL at 13:07

## 2024-11-27 RX ADMIN — SERTRALINE HYDROCHLORIDE 25 MG: 25 TABLET ORAL at 09:35

## 2024-11-27 RX ADMIN — BUSPIRONE HYDROCHLORIDE 5 MG: 5 TABLET ORAL at 21:00

## 2024-11-27 RX ADMIN — IPRATROPIUM BROMIDE AND ALBUTEROL SULFATE 1 DOSE: 2.5; .5 SOLUTION RESPIRATORY (INHALATION) at 18:46

## 2024-11-27 ASSESSMENT — PAIN DESCRIPTION - ORIENTATION: ORIENTATION: POSTERIOR;ANTERIOR

## 2024-11-27 ASSESSMENT — PAIN DESCRIPTION - DESCRIPTORS: DESCRIPTORS: ACHING

## 2024-11-27 ASSESSMENT — PAIN DESCRIPTION - LOCATION: LOCATION: GENERALIZED

## 2024-11-27 ASSESSMENT — PAIN SCALES - GENERAL: PAINLEVEL_OUTOF10: 10

## 2024-11-27 NOTE — CARE COORDINATION
Possible SNF, son is looking over choice list, wants some time to speak with his mother, SW will continue to follow  Electronically signed by FABBY Sanchez on 11/27/2024 at 11:24 AM

## 2024-11-28 LAB
ALBUMIN SERPL-MCNC: 2.2 G/DL (ref 3.5–5.2)
ALP SERPL-CCNC: 107 U/L (ref 35–104)
ALT SERPL-CCNC: 19 U/L (ref 5–33)
ANION GAP SERPL CALCULATED.3IONS-SCNC: 13 MMOL/L (ref 7–19)
AST SERPL-CCNC: 39 U/L (ref 5–32)
BASOPHILS # BLD: 0 K/UL (ref 0–0.2)
BASOPHILS NFR BLD: 0.8 % (ref 0–1)
BILIRUB SERPL-MCNC: 0.4 MG/DL (ref 0.2–1.2)
BUN SERPL-MCNC: 11 MG/DL (ref 8–23)
CALCIUM SERPL-MCNC: 7.9 MG/DL (ref 8.8–10.2)
CHLORIDE SERPL-SCNC: 107 MMOL/L (ref 98–111)
CO2 SERPL-SCNC: 16 MMOL/L (ref 22–29)
CREAT SERPL-MCNC: 0.8 MG/DL (ref 0.5–0.9)
EOSINOPHIL # BLD: 0.1 K/UL (ref 0–0.6)
EOSINOPHIL NFR BLD: 1.3 % (ref 0–5)
ERYTHROCYTE [DISTWIDTH] IN BLOOD BY AUTOMATED COUNT: 15.9 % (ref 11.5–14.5)
GLUCOSE SERPL-MCNC: 94 MG/DL (ref 70–99)
HCT VFR BLD AUTO: 31.3 % (ref 37–47)
HGB BLD-MCNC: 9.7 G/DL (ref 12–16)
IMM GRANULOCYTES # BLD: 0 K/UL
LYMPHOCYTES # BLD: 0.7 K/UL (ref 1.1–4.5)
LYMPHOCYTES NFR BLD: 17.7 % (ref 20–40)
MCH RBC QN AUTO: 29.6 PG (ref 27–31)
MCHC RBC AUTO-ENTMCNC: 31 G/DL (ref 33–37)
MCV RBC AUTO: 95.4 FL (ref 81–99)
MONOCYTES # BLD: 0.4 K/UL (ref 0–0.9)
MONOCYTES NFR BLD: 10.1 % (ref 0–10)
NEUTROPHILS # BLD: 2.6 K/UL (ref 1.5–7.5)
NEUTS SEG NFR BLD: 69.6 % (ref 50–65)
PLATELET # BLD AUTO: 133 K/UL (ref 130–400)
PMV BLD AUTO: 12.1 FL (ref 9.4–12.3)
POTASSIUM SERPL-SCNC: 3.7 MMOL/L (ref 3.5–5)
PROT SERPL-MCNC: 5.4 G/DL (ref 6.4–8.3)
RBC # BLD AUTO: 3.28 M/UL (ref 4.2–5.4)
SODIUM SERPL-SCNC: 136 MMOL/L (ref 136–145)
T3FREE SERPL-MCNC: 1.2 PG/ML (ref 2–4.4)
TSH SERPL DL<=0.005 MIU/L-ACNC: 3.08 UIU/ML (ref 0.27–4.2)
WBC # BLD AUTO: 3.8 K/UL (ref 4.8–10.8)

## 2024-11-28 PROCEDURE — 6370000000 HC RX 637 (ALT 250 FOR IP): Performed by: NURSE PRACTITIONER

## 2024-11-28 PROCEDURE — 85025 COMPLETE CBC W/AUTO DIFF WBC: CPT

## 2024-11-28 PROCEDURE — 94760 N-INVAS EAR/PLS OXIMETRY 1: CPT

## 2024-11-28 PROCEDURE — 36415 COLL VENOUS BLD VENIPUNCTURE: CPT

## 2024-11-28 PROCEDURE — 80053 COMPREHEN METABOLIC PANEL: CPT

## 2024-11-28 PROCEDURE — 84481 FREE ASSAY (FT-3): CPT

## 2024-11-28 PROCEDURE — 6370000000 HC RX 637 (ALT 250 FOR IP): Performed by: INTERNAL MEDICINE

## 2024-11-28 PROCEDURE — 84443 ASSAY THYROID STIM HORMONE: CPT

## 2024-11-28 PROCEDURE — 2580000003 HC RX 258: Performed by: NURSE PRACTITIONER

## 2024-11-28 PROCEDURE — 1200000000 HC SEMI PRIVATE

## 2024-11-28 PROCEDURE — 2580000003 HC RX 258: Performed by: INTERNAL MEDICINE

## 2024-11-28 PROCEDURE — 6370000000 HC RX 637 (ALT 250 FOR IP): Performed by: STUDENT IN AN ORGANIZED HEALTH CARE EDUCATION/TRAINING PROGRAM

## 2024-11-28 PROCEDURE — 94640 AIRWAY INHALATION TREATMENT: CPT

## 2024-11-28 RX ORDER — PREGABALIN 50 MG/1
100 CAPSULE ORAL 2 TIMES DAILY
Status: DISCONTINUED | OUTPATIENT
Start: 2024-11-28 | End: 2024-12-01 | Stop reason: HOSPADM

## 2024-11-28 RX ORDER — OXYCODONE AND ACETAMINOPHEN 10; 325 MG/1; MG/1
1 TABLET ORAL EVERY 6 HOURS PRN
Status: DISCONTINUED | OUTPATIENT
Start: 2024-11-28 | End: 2024-12-01 | Stop reason: HOSPADM

## 2024-11-28 RX ADMIN — APIXABAN 5 MG: 5 TABLET, FILM COATED ORAL at 19:38

## 2024-11-28 RX ADMIN — APIXABAN 5 MG: 5 TABLET, FILM COATED ORAL at 07:57

## 2024-11-28 RX ADMIN — IPRATROPIUM BROMIDE AND ALBUTEROL SULFATE 1 DOSE: 2.5; .5 SOLUTION RESPIRATORY (INHALATION) at 14:34

## 2024-11-28 RX ADMIN — GUAIFENESIN 600 MG: 600 TABLET ORAL at 19:37

## 2024-11-28 RX ADMIN — PREGABALIN 100 MG: 50 CAPSULE ORAL at 19:58

## 2024-11-28 RX ADMIN — GUAIFENESIN 600 MG: 600 TABLET ORAL at 07:56

## 2024-11-28 RX ADMIN — BUSPIRONE HYDROCHLORIDE 5 MG: 5 TABLET ORAL at 19:37

## 2024-11-28 RX ADMIN — METHOCARBAMOL TABLETS 500 MG: 500 TABLET, COATED ORAL at 19:38

## 2024-11-28 RX ADMIN — SODIUM CHLORIDE: 9 INJECTION, SOLUTION INTRAVENOUS at 00:12

## 2024-11-28 RX ADMIN — IPRATROPIUM BROMIDE AND ALBUTEROL SULFATE 1 DOSE: 2.5; .5 SOLUTION RESPIRATORY (INHALATION) at 10:09

## 2024-11-28 RX ADMIN — IPRATROPIUM BROMIDE AND ALBUTEROL SULFATE 1 DOSE: 2.5; .5 SOLUTION RESPIRATORY (INHALATION) at 06:14

## 2024-11-28 RX ADMIN — ATORVASTATIN CALCIUM 20 MG: 20 TABLET, FILM COATED ORAL at 07:57

## 2024-11-28 RX ADMIN — SERTRALINE HYDROCHLORIDE 25 MG: 25 TABLET ORAL at 07:57

## 2024-11-28 RX ADMIN — FOLIC ACID 1 MG: 1 TABLET ORAL at 07:57

## 2024-11-28 RX ADMIN — OXYCODONE HYDROCHLORIDE AND ACETAMINOPHEN 1 TABLET: 5; 325 TABLET ORAL at 07:57

## 2024-11-28 RX ADMIN — METOPROLOL TARTRATE 25 MG: 25 TABLET, FILM COATED ORAL at 19:37

## 2024-11-28 RX ADMIN — METOPROLOL TARTRATE 25 MG: 25 TABLET, FILM COATED ORAL at 07:56

## 2024-11-28 RX ADMIN — BUSPIRONE HYDROCHLORIDE 5 MG: 5 TABLET ORAL at 13:13

## 2024-11-28 RX ADMIN — METHOCARBAMOL TABLETS 500 MG: 500 TABLET, COATED ORAL at 07:56

## 2024-11-28 RX ADMIN — SODIUM CHLORIDE, PRESERVATIVE FREE 10 ML: 5 INJECTION INTRAVENOUS at 19:38

## 2024-11-28 RX ADMIN — IPRATROPIUM BROMIDE AND ALBUTEROL SULFATE 1 DOSE: 2.5; .5 SOLUTION RESPIRATORY (INHALATION) at 18:39

## 2024-11-28 RX ADMIN — BUSPIRONE HYDROCHLORIDE 5 MG: 5 TABLET ORAL at 07:57

## 2024-11-28 RX ADMIN — AMIODARONE HYDROCHLORIDE 200 MG: 200 TABLET ORAL at 07:56

## 2024-11-28 RX ADMIN — OXYCODONE HYDROCHLORIDE AND ACETAMINOPHEN 1 TABLET: 10; 325 TABLET ORAL at 19:58

## 2024-11-28 RX ADMIN — OXYCODONE HYDROCHLORIDE AND ACETAMINOPHEN 1 TABLET: 5; 325 TABLET ORAL at 17:06

## 2024-11-28 RX ADMIN — AMIODARONE HYDROCHLORIDE 200 MG: 200 TABLET ORAL at 19:38

## 2024-11-28 RX ADMIN — ACETAMINOPHEN 650 MG: 325 TABLET ORAL at 19:37

## 2024-11-28 ASSESSMENT — PAIN SCALES - GENERAL
PAINLEVEL_OUTOF10: 10
PAINLEVEL_OUTOF10: 9
PAINLEVEL_OUTOF10: 8
PAINLEVEL_OUTOF10: 7

## 2024-11-28 ASSESSMENT — PAIN DESCRIPTION - DESCRIPTORS
DESCRIPTORS: ACHING
DESCRIPTORS: ACHING;DISCOMFORT

## 2024-11-28 ASSESSMENT — PAIN DESCRIPTION - LOCATION
LOCATION: GENERALIZED
LOCATION: BACK
LOCATION: GENERALIZED

## 2024-11-28 NOTE — CARE COORDINATION
[facility]  Facility ph: [phone]    ph: [phone]  C: [phone]  Referral fax:  [phone]  Mitch reached out to pt son Daniel who states he cannot take care of his mother at his time. Pt son would like a referral to [facility] at this time. Pt will be a pre-cert once accepted.   Electronically signed by SULTANA HANSEN on 11/28/2024 at 10:48 AM

## 2024-11-29 LAB
ALBUMIN SERPL-MCNC: 2.2 G/DL (ref 3.5–5.2)
ALP SERPL-CCNC: 107 U/L (ref 35–104)
ALT SERPL-CCNC: 16 U/L (ref 5–33)
ANION GAP SERPL CALCULATED.3IONS-SCNC: 11 MMOL/L (ref 7–19)
AST SERPL-CCNC: 40 U/L (ref 5–32)
BACTERIA BLD CULT ORG #2: NORMAL
BACTERIA BLD CULT: NORMAL
BASOPHILS # BLD: 0 K/UL (ref 0–0.2)
BASOPHILS NFR BLD: 1.2 % (ref 0–1)
BILIRUB SERPL-MCNC: 0.4 MG/DL (ref 0.2–1.2)
BUN SERPL-MCNC: 9 MG/DL (ref 8–23)
CALCIUM SERPL-MCNC: 8 MG/DL (ref 8.8–10.2)
CHLORIDE SERPL-SCNC: 110 MMOL/L (ref 98–111)
CO2 SERPL-SCNC: 18 MMOL/L (ref 22–29)
CREAT SERPL-MCNC: 0.8 MG/DL (ref 0.5–0.9)
EOSINOPHIL # BLD: 0.1 K/UL (ref 0–0.6)
EOSINOPHIL NFR BLD: 1.5 % (ref 0–5)
ERYTHROCYTE [DISTWIDTH] IN BLOOD BY AUTOMATED COUNT: 16.1 % (ref 11.5–14.5)
GLUCOSE SERPL-MCNC: 82 MG/DL (ref 70–99)
HCT VFR BLD AUTO: 33.2 % (ref 37–47)
HGB BLD-MCNC: 10.1 G/DL (ref 12–16)
IMM GRANULOCYTES # BLD: 0 K/UL
LYMPHOCYTES # BLD: 0.7 K/UL (ref 1.1–4.5)
LYMPHOCYTES NFR BLD: 21.3 % (ref 20–40)
MAGNESIUM SERPL-MCNC: 1.7 MG/DL (ref 1.6–2.4)
MCH RBC QN AUTO: 29.8 PG (ref 27–31)
MCHC RBC AUTO-ENTMCNC: 30.4 G/DL (ref 33–37)
MCV RBC AUTO: 97.9 FL (ref 81–99)
MONOCYTES # BLD: 0.4 K/UL (ref 0–0.9)
MONOCYTES NFR BLD: 11.4 % (ref 0–10)
NEUTROPHILS # BLD: 2.1 K/UL (ref 1.5–7.5)
NEUTS SEG NFR BLD: 64.3 % (ref 50–65)
PLATELET # BLD AUTO: 136 K/UL (ref 130–400)
PMV BLD AUTO: 12.4 FL (ref 9.4–12.3)
POTASSIUM SERPL-SCNC: 3.4 MMOL/L (ref 3.5–5)
PROT SERPL-MCNC: 5.4 G/DL (ref 6.4–8.3)
RBC # BLD AUTO: 3.39 M/UL (ref 4.2–5.4)
SODIUM SERPL-SCNC: 139 MMOL/L (ref 136–145)
WBC # BLD AUTO: 3.2 K/UL (ref 4.8–10.8)

## 2024-11-29 PROCEDURE — 36415 COLL VENOUS BLD VENIPUNCTURE: CPT

## 2024-11-29 PROCEDURE — 80053 COMPREHEN METABOLIC PANEL: CPT

## 2024-11-29 PROCEDURE — 6370000000 HC RX 637 (ALT 250 FOR IP): Performed by: INTERNAL MEDICINE

## 2024-11-29 PROCEDURE — 6370000000 HC RX 637 (ALT 250 FOR IP): Performed by: HOSPITALIST

## 2024-11-29 PROCEDURE — 6370000000 HC RX 637 (ALT 250 FOR IP): Performed by: STUDENT IN AN ORGANIZED HEALTH CARE EDUCATION/TRAINING PROGRAM

## 2024-11-29 PROCEDURE — 1200000000 HC SEMI PRIVATE

## 2024-11-29 PROCEDURE — 2580000003 HC RX 258: Performed by: NURSE PRACTITIONER

## 2024-11-29 PROCEDURE — 94760 N-INVAS EAR/PLS OXIMETRY 1: CPT

## 2024-11-29 PROCEDURE — 83735 ASSAY OF MAGNESIUM: CPT

## 2024-11-29 PROCEDURE — 6370000000 HC RX 637 (ALT 250 FOR IP): Performed by: NURSE PRACTITIONER

## 2024-11-29 PROCEDURE — 2580000003 HC RX 258: Performed by: INTERNAL MEDICINE

## 2024-11-29 PROCEDURE — 85025 COMPLETE CBC W/AUTO DIFF WBC: CPT

## 2024-11-29 PROCEDURE — 94640 AIRWAY INHALATION TREATMENT: CPT

## 2024-11-29 RX ADMIN — IPRATROPIUM BROMIDE AND ALBUTEROL SULFATE 1 DOSE: 2.5; .5 SOLUTION RESPIRATORY (INHALATION) at 10:46

## 2024-11-29 RX ADMIN — APIXABAN 5 MG: 5 TABLET, FILM COATED ORAL at 10:09

## 2024-11-29 RX ADMIN — PREGABALIN 100 MG: 50 CAPSULE ORAL at 10:09

## 2024-11-29 RX ADMIN — IPRATROPIUM BROMIDE AND ALBUTEROL SULFATE 1 DOSE: 2.5; .5 SOLUTION RESPIRATORY (INHALATION) at 19:04

## 2024-11-29 RX ADMIN — IPRATROPIUM BROMIDE AND ALBUTEROL SULFATE 1 DOSE: 2.5; .5 SOLUTION RESPIRATORY (INHALATION) at 07:04

## 2024-11-29 RX ADMIN — GUAIFENESIN 600 MG: 600 TABLET ORAL at 10:10

## 2024-11-29 RX ADMIN — PREGABALIN 100 MG: 50 CAPSULE ORAL at 21:04

## 2024-11-29 RX ADMIN — SODIUM CHLORIDE, PRESERVATIVE FREE 10 ML: 5 INJECTION INTRAVENOUS at 10:10

## 2024-11-29 RX ADMIN — BUSPIRONE HYDROCHLORIDE 5 MG: 5 TABLET ORAL at 10:09

## 2024-11-29 RX ADMIN — BUSPIRONE HYDROCHLORIDE 5 MG: 5 TABLET ORAL at 21:04

## 2024-11-29 RX ADMIN — METHOCARBAMOL TABLETS 500 MG: 500 TABLET, COATED ORAL at 10:10

## 2024-11-29 RX ADMIN — APIXABAN 5 MG: 5 TABLET, FILM COATED ORAL at 21:04

## 2024-11-29 RX ADMIN — AMIODARONE HYDROCHLORIDE 200 MG: 200 TABLET ORAL at 10:09

## 2024-11-29 RX ADMIN — SODIUM CHLORIDE: 9 INJECTION, SOLUTION INTRAVENOUS at 14:19

## 2024-11-29 RX ADMIN — METOPROLOL TARTRATE 25 MG: 25 TABLET, FILM COATED ORAL at 21:04

## 2024-11-29 RX ADMIN — BUSPIRONE HYDROCHLORIDE 5 MG: 5 TABLET ORAL at 14:18

## 2024-11-29 RX ADMIN — ATORVASTATIN CALCIUM 20 MG: 20 TABLET, FILM COATED ORAL at 10:09

## 2024-11-29 RX ADMIN — SODIUM CHLORIDE, PRESERVATIVE FREE 10 ML: 5 INJECTION INTRAVENOUS at 21:04

## 2024-11-29 RX ADMIN — METOPROLOL TARTRATE 25 MG: 25 TABLET, FILM COATED ORAL at 10:10

## 2024-11-29 RX ADMIN — SERTRALINE HYDROCHLORIDE 25 MG: 25 TABLET ORAL at 10:10

## 2024-11-29 RX ADMIN — METHOCARBAMOL TABLETS 500 MG: 500 TABLET, COATED ORAL at 21:04

## 2024-11-29 RX ADMIN — AMIODARONE HYDROCHLORIDE 200 MG: 200 TABLET ORAL at 21:04

## 2024-11-29 RX ADMIN — OXYCODONE HYDROCHLORIDE AND ACETAMINOPHEN 1 TABLET: 10; 325 TABLET ORAL at 23:59

## 2024-11-29 RX ADMIN — GUAIFENESIN 600 MG: 600 TABLET ORAL at 21:04

## 2024-11-29 RX ADMIN — FOLIC ACID 1 MG: 1 TABLET ORAL at 10:10

## 2024-11-29 RX ADMIN — IPRATROPIUM BROMIDE AND ALBUTEROL SULFATE 1 DOSE: 2.5; .5 SOLUTION RESPIRATORY (INHALATION) at 14:35

## 2024-11-29 RX ADMIN — POTASSIUM CHLORIDE 40 MEQ: 1500 TABLET, EXTENDED RELEASE ORAL at 06:02

## 2024-11-29 RX ADMIN — OXYCODONE HYDROCHLORIDE AND ACETAMINOPHEN 1 TABLET: 10; 325 TABLET ORAL at 06:02

## 2024-11-29 ASSESSMENT — PAIN DESCRIPTION - LOCATION
LOCATION: ABDOMEN
LOCATION: BACK

## 2024-11-29 ASSESSMENT — PAIN SCALES - GENERAL
PAINLEVEL_OUTOF10: 10
PAINLEVEL_OUTOF10: 10

## 2024-11-29 NOTE — CARE COORDINATION
11/29/24 1050   IMM Letter   IMM Letter given to Patient/Family/Significant other/Guardian/POA/by: FABBY Sanchez   IMM Letter date given: 11/29/24   IMM Letter time given: 1045     Second IMM given to patient's POA, Daniel Milian,  verbalizing understanding over phone All questions and concerns addressed   Patient declined waiting 4 hr period prior to discharge.   Signed letter placed in pt soft chart   Electronically signed by FABBY Sanchez on 11/29/2024 at 10:51 AM

## 2024-11-30 LAB
ALBUMIN SERPL-MCNC: 1.8 G/DL (ref 3.5–5.2)
ALP SERPL-CCNC: 110 U/L (ref 35–104)
ALT SERPL-CCNC: 14 U/L (ref 5–33)
ANION GAP SERPL CALCULATED.3IONS-SCNC: 12 MMOL/L (ref 7–19)
AST SERPL-CCNC: 40 U/L (ref 5–32)
BASOPHILS # BLD: 0 K/UL (ref 0–0.2)
BASOPHILS NFR BLD: 0.8 % (ref 0–1)
BILIRUB SERPL-MCNC: 0.4 MG/DL (ref 0.2–1.2)
BUN SERPL-MCNC: 9 MG/DL (ref 8–23)
CALCIUM SERPL-MCNC: 7.9 MG/DL (ref 8.8–10.2)
CHLORIDE SERPL-SCNC: 108 MMOL/L (ref 98–111)
CO2 SERPL-SCNC: 14 MMOL/L (ref 22–29)
CREAT SERPL-MCNC: 0.7 MG/DL (ref 0.5–0.9)
EOSINOPHIL # BLD: 0.1 K/UL (ref 0–0.6)
EOSINOPHIL NFR BLD: 1.5 % (ref 0–5)
ERYTHROCYTE [DISTWIDTH] IN BLOOD BY AUTOMATED COUNT: 16.3 % (ref 11.5–14.5)
GLUCOSE SERPL-MCNC: 83 MG/DL (ref 70–99)
HCT VFR BLD AUTO: 37.8 % (ref 37–47)
HGB BLD-MCNC: 11 G/DL (ref 12–16)
IMM GRANULOCYTES # BLD: 0 K/UL
LYMPHOCYTES # BLD: 0.8 K/UL (ref 1.1–4.5)
LYMPHOCYTES NFR BLD: 19.2 % (ref 20–40)
MCH RBC QN AUTO: 29.8 PG (ref 27–31)
MCHC RBC AUTO-ENTMCNC: 29.1 G/DL (ref 33–37)
MCV RBC AUTO: 102.4 FL (ref 81–99)
MONOCYTES # BLD: 0.4 K/UL (ref 0–0.9)
MONOCYTES NFR BLD: 11.1 % (ref 0–10)
NEUTROPHILS # BLD: 2.6 K/UL (ref 1.5–7.5)
NEUTS SEG NFR BLD: 66.6 % (ref 50–65)
PLATELET # BLD AUTO: 117 K/UL (ref 130–400)
PMV BLD AUTO: 12.1 FL (ref 9.4–12.3)
POTASSIUM SERPL-SCNC: 4.4 MMOL/L (ref 3.5–5)
PROT SERPL-MCNC: 5.6 G/DL (ref 6.4–8.3)
RBC # BLD AUTO: 3.69 M/UL (ref 4.2–5.4)
SODIUM SERPL-SCNC: 134 MMOL/L (ref 136–145)
WBC # BLD AUTO: 4 K/UL (ref 4.8–10.8)

## 2024-11-30 PROCEDURE — 6370000000 HC RX 637 (ALT 250 FOR IP): Performed by: STUDENT IN AN ORGANIZED HEALTH CARE EDUCATION/TRAINING PROGRAM

## 2024-11-30 PROCEDURE — 94760 N-INVAS EAR/PLS OXIMETRY 1: CPT

## 2024-11-30 PROCEDURE — 85025 COMPLETE CBC W/AUTO DIFF WBC: CPT

## 2024-11-30 PROCEDURE — 94640 AIRWAY INHALATION TREATMENT: CPT

## 2024-11-30 PROCEDURE — 6370000000 HC RX 637 (ALT 250 FOR IP): Performed by: INTERNAL MEDICINE

## 2024-11-30 PROCEDURE — 1200000000 HC SEMI PRIVATE

## 2024-11-30 PROCEDURE — 6370000000 HC RX 637 (ALT 250 FOR IP): Performed by: NURSE PRACTITIONER

## 2024-11-30 PROCEDURE — 80053 COMPREHEN METABOLIC PANEL: CPT

## 2024-11-30 PROCEDURE — 36415 COLL VENOUS BLD VENIPUNCTURE: CPT

## 2024-11-30 PROCEDURE — 6360000002 HC RX W HCPCS: Performed by: HOSPITALIST

## 2024-11-30 PROCEDURE — 2580000003 HC RX 258: Performed by: INTERNAL MEDICINE

## 2024-11-30 RX ORDER — HYDROMORPHONE HYDROCHLORIDE 1 MG/ML
1 INJECTION, SOLUTION INTRAMUSCULAR; INTRAVENOUS; SUBCUTANEOUS ONCE
Status: COMPLETED | OUTPATIENT
Start: 2024-11-30 | End: 2024-11-30

## 2024-11-30 RX ADMIN — AMIODARONE HYDROCHLORIDE 200 MG: 200 TABLET ORAL at 21:05

## 2024-11-30 RX ADMIN — APIXABAN 5 MG: 5 TABLET, FILM COATED ORAL at 09:20

## 2024-11-30 RX ADMIN — BUSPIRONE HYDROCHLORIDE 5 MG: 5 TABLET ORAL at 09:20

## 2024-11-30 RX ADMIN — APIXABAN 5 MG: 5 TABLET, FILM COATED ORAL at 21:05

## 2024-11-30 RX ADMIN — IPRATROPIUM BROMIDE AND ALBUTEROL SULFATE 1 DOSE: 2.5; .5 SOLUTION RESPIRATORY (INHALATION) at 14:43

## 2024-11-30 RX ADMIN — SERTRALINE HYDROCHLORIDE 25 MG: 25 TABLET ORAL at 09:20

## 2024-11-30 RX ADMIN — METHOCARBAMOL TABLETS 500 MG: 500 TABLET, COATED ORAL at 21:05

## 2024-11-30 RX ADMIN — SODIUM CHLORIDE: 9 INJECTION, SOLUTION INTRAVENOUS at 18:28

## 2024-11-30 RX ADMIN — ERGOCALCIFEROL 50000 UNITS: 1.25 CAPSULE ORAL at 09:20

## 2024-11-30 RX ADMIN — AMIODARONE HYDROCHLORIDE 200 MG: 200 TABLET ORAL at 09:20

## 2024-11-30 RX ADMIN — IPRATROPIUM BROMIDE AND ALBUTEROL SULFATE 1 DOSE: 2.5; .5 SOLUTION RESPIRATORY (INHALATION) at 09:53

## 2024-11-30 RX ADMIN — BUSPIRONE HYDROCHLORIDE 5 MG: 5 TABLET ORAL at 21:05

## 2024-11-30 RX ADMIN — HYDROMORPHONE HYDROCHLORIDE 1 MG: 1 INJECTION, SOLUTION INTRAMUSCULAR; INTRAVENOUS; SUBCUTANEOUS at 11:12

## 2024-11-30 RX ADMIN — METOPROLOL TARTRATE 25 MG: 25 TABLET, FILM COATED ORAL at 09:20

## 2024-11-30 RX ADMIN — OXYCODONE HYDROCHLORIDE AND ACETAMINOPHEN 1 TABLET: 10; 325 TABLET ORAL at 05:53

## 2024-11-30 RX ADMIN — GUAIFENESIN 600 MG: 600 TABLET ORAL at 09:20

## 2024-11-30 RX ADMIN — PREGABALIN 100 MG: 50 CAPSULE ORAL at 09:20

## 2024-11-30 RX ADMIN — FOLIC ACID 1 MG: 1 TABLET ORAL at 09:20

## 2024-11-30 RX ADMIN — PREGABALIN 100 MG: 50 CAPSULE ORAL at 21:05

## 2024-11-30 RX ADMIN — OXYCODONE HYDROCHLORIDE AND ACETAMINOPHEN 1 TABLET: 10; 325 TABLET ORAL at 21:05

## 2024-11-30 RX ADMIN — IPRATROPIUM BROMIDE AND ALBUTEROL SULFATE 1 DOSE: 2.5; .5 SOLUTION RESPIRATORY (INHALATION) at 18:37

## 2024-11-30 RX ADMIN — GUAIFENESIN 600 MG: 600 TABLET ORAL at 21:05

## 2024-11-30 RX ADMIN — IPRATROPIUM BROMIDE AND ALBUTEROL SULFATE 1 DOSE: 2.5; .5 SOLUTION RESPIRATORY (INHALATION) at 06:27

## 2024-11-30 RX ADMIN — ATORVASTATIN CALCIUM 20 MG: 20 TABLET, FILM COATED ORAL at 09:20

## 2024-11-30 RX ADMIN — SODIUM CHLORIDE: 9 INJECTION, SOLUTION INTRAVENOUS at 05:54

## 2024-11-30 RX ADMIN — METOPROLOL TARTRATE 25 MG: 25 TABLET, FILM COATED ORAL at 21:05

## 2024-11-30 RX ADMIN — METHOCARBAMOL TABLETS 500 MG: 500 TABLET, COATED ORAL at 09:20

## 2024-11-30 ASSESSMENT — PAIN DESCRIPTION - ORIENTATION
ORIENTATION: RIGHT
ORIENTATION: RIGHT

## 2024-11-30 ASSESSMENT — PAIN SCALES - GENERAL
PAINLEVEL_OUTOF10: 8
PAINLEVEL_OUTOF10: 8
PAINLEVEL_OUTOF10: 10
PAINLEVEL_OUTOF10: 8

## 2024-11-30 ASSESSMENT — PAIN DESCRIPTION - LOCATION
LOCATION: LEG
LOCATION: BACK
LOCATION: BACK;LEG

## 2024-11-30 ASSESSMENT — PAIN DESCRIPTION - DESCRIPTORS
DESCRIPTORS: ACHING
DESCRIPTORS: ACHING

## 2024-12-01 ENCOUNTER — READMISSION MANAGEMENT (OUTPATIENT)
Dept: CALL CENTER | Facility: HOSPITAL | Age: 74
End: 2024-12-01
Payer: MEDICARE

## 2024-12-01 VITALS
BODY MASS INDEX: 41.33 KG/M2 | OXYGEN SATURATION: 96 % | HEIGHT: 59 IN | DIASTOLIC BLOOD PRESSURE: 54 MMHG | SYSTOLIC BLOOD PRESSURE: 97 MMHG | TEMPERATURE: 97.6 F | RESPIRATION RATE: 16 BRPM | WEIGHT: 205 LBS | HEART RATE: 81 BPM

## 2024-12-01 LAB
ALBUMIN SERPL-MCNC: 2 G/DL (ref 3.5–5.2)
ALP SERPL-CCNC: 104 U/L (ref 35–104)
ALT SERPL-CCNC: 11 U/L (ref 5–33)
ANION GAP SERPL CALCULATED.3IONS-SCNC: 10 MMOL/L (ref 7–19)
AST SERPL-CCNC: 28 U/L (ref 5–32)
BASOPHILS # BLD: 0 K/UL (ref 0–0.2)
BASOPHILS NFR BLD: 1 % (ref 0–1)
BILIRUB SERPL-MCNC: 0.3 MG/DL (ref 0.2–1.2)
BUN SERPL-MCNC: 11 MG/DL (ref 8–23)
CALCIUM SERPL-MCNC: 7.7 MG/DL (ref 8.8–10.2)
CHLORIDE SERPL-SCNC: 109 MMOL/L (ref 98–111)
CO2 SERPL-SCNC: 19 MMOL/L (ref 22–29)
CREAT SERPL-MCNC: 0.8 MG/DL (ref 0.5–0.9)
EOSINOPHIL # BLD: 0.1 K/UL (ref 0–0.6)
EOSINOPHIL NFR BLD: 2 % (ref 0–5)
ERYTHROCYTE [DISTWIDTH] IN BLOOD BY AUTOMATED COUNT: 16.2 % (ref 11.5–14.5)
GLUCOSE SERPL-MCNC: 92 MG/DL (ref 70–99)
HCT VFR BLD AUTO: 31.2 % (ref 37–47)
HGB BLD-MCNC: 9.5 G/DL (ref 12–16)
IMM GRANULOCYTES # BLD: 0 K/UL
LYMPHOCYTES # BLD: 0.7 K/UL (ref 1.1–4.5)
LYMPHOCYTES NFR BLD: 24.6 % (ref 20–40)
MCH RBC QN AUTO: 29.7 PG (ref 27–31)
MCHC RBC AUTO-ENTMCNC: 30.4 G/DL (ref 33–37)
MCV RBC AUTO: 97.5 FL (ref 81–99)
MONOCYTES # BLD: 0.3 K/UL (ref 0–0.9)
MONOCYTES NFR BLD: 11.3 % (ref 0–10)
NEUTROPHILS # BLD: 1.8 K/UL (ref 1.5–7.5)
NEUTS SEG NFR BLD: 60.1 % (ref 50–65)
PLATELET # BLD AUTO: 145 K/UL (ref 130–400)
PMV BLD AUTO: 11.9 FL (ref 9.4–12.3)
POTASSIUM SERPL-SCNC: 3.8 MMOL/L (ref 3.5–5)
PROT SERPL-MCNC: 5.1 G/DL (ref 6.4–8.3)
RBC # BLD AUTO: 3.2 M/UL (ref 4.2–5.4)
SODIUM SERPL-SCNC: 138 MMOL/L (ref 136–145)
WBC # BLD AUTO: 3 K/UL (ref 4.8–10.8)

## 2024-12-01 PROCEDURE — 6370000000 HC RX 637 (ALT 250 FOR IP): Performed by: NURSE PRACTITIONER

## 2024-12-01 PROCEDURE — 6370000000 HC RX 637 (ALT 250 FOR IP): Performed by: STUDENT IN AN ORGANIZED HEALTH CARE EDUCATION/TRAINING PROGRAM

## 2024-12-01 PROCEDURE — 94640 AIRWAY INHALATION TREATMENT: CPT

## 2024-12-01 PROCEDURE — 94760 N-INVAS EAR/PLS OXIMETRY 1: CPT

## 2024-12-01 PROCEDURE — 85025 COMPLETE CBC W/AUTO DIFF WBC: CPT

## 2024-12-01 PROCEDURE — 2580000003 HC RX 258: Performed by: NURSE PRACTITIONER

## 2024-12-01 PROCEDURE — 80053 COMPREHEN METABOLIC PANEL: CPT

## 2024-12-01 PROCEDURE — 6370000000 HC RX 637 (ALT 250 FOR IP): Performed by: INTERNAL MEDICINE

## 2024-12-01 PROCEDURE — 36415 COLL VENOUS BLD VENIPUNCTURE: CPT

## 2024-12-01 RX ADMIN — FOLIC ACID 1 MG: 1 TABLET ORAL at 09:14

## 2024-12-01 RX ADMIN — SODIUM CHLORIDE, PRESERVATIVE FREE 10 ML: 5 INJECTION INTRAVENOUS at 09:20

## 2024-12-01 RX ADMIN — IPRATROPIUM BROMIDE AND ALBUTEROL SULFATE 1 DOSE: 2.5; .5 SOLUTION RESPIRATORY (INHALATION) at 06:29

## 2024-12-01 RX ADMIN — APIXABAN 5 MG: 5 TABLET, FILM COATED ORAL at 09:14

## 2024-12-01 RX ADMIN — OXYCODONE HYDROCHLORIDE AND ACETAMINOPHEN 1 TABLET: 10; 325 TABLET ORAL at 06:07

## 2024-12-01 RX ADMIN — IPRATROPIUM BROMIDE AND ALBUTEROL SULFATE 1 DOSE: 2.5; .5 SOLUTION RESPIRATORY (INHALATION) at 10:07

## 2024-12-01 RX ADMIN — BUSPIRONE HYDROCHLORIDE 5 MG: 5 TABLET ORAL at 09:13

## 2024-12-01 RX ADMIN — METOPROLOL TARTRATE 25 MG: 25 TABLET, FILM COATED ORAL at 09:16

## 2024-12-01 RX ADMIN — GUAIFENESIN 600 MG: 600 TABLET ORAL at 09:13

## 2024-12-01 RX ADMIN — AMIODARONE HYDROCHLORIDE 200 MG: 200 TABLET ORAL at 09:16

## 2024-12-01 RX ADMIN — ATORVASTATIN CALCIUM 20 MG: 20 TABLET, FILM COATED ORAL at 09:14

## 2024-12-01 RX ADMIN — SERTRALINE HYDROCHLORIDE 25 MG: 25 TABLET ORAL at 09:14

## 2024-12-01 ASSESSMENT — PAIN SCALES - GENERAL: PAINLEVEL_OUTOF10: 10

## 2024-12-01 NOTE — DISCHARGE SUMMARY
Discharge Summary    Date:12/1/2024        Patient Name:Lashonda Milian     YOB: 1950     Age:74 y.o.    Admit Date:11/22/2024   Admission Condition:fair   Discharged Condition:fair  Discharge Date: 12/01/24       Discharge Diagnoses   Principal Problem:    UTI (urinary tract infection)  Active Problems:    Morbidly obese    Hypertension    Hyperlipidemia    Diastolic dysfunction    Severe malnutrition (HCC)  Resolved Problems:    * No resolved hospital problems. *      Hospital Stay   Narrative of Hospital Course:     72-year-old female with a history of paroxysmal atrial fibrillation, COPD not on home O2, hypertension, hyperlipidemia, thyroid disorder, arthritis, currently bedbound, presents to the hospital with concerns of shortness of breath. In the emergency room chest x-ray with left base atelectasis/possible pneumonia. Urinalysis was obtained with concerns of infection. Was admitted in-house for questionable pneumonia as well as UTI.  Patient completed course of antibiotics for pneumonia and UTI, patient with noted decreased oral intake, was seen by speech in-house and put on a soft/thin liquid diet. Seen in house by NS for follow up on recent discitis/OM status post decompression laminectomy and debulking of phlegmon 10/4 with Dr. Aguilar. Patient completed course of antibiotics for full 6 weeks and per neurosurgery no further intervention workup needed at this time.  Continue follow-up as needed outpatient.     On further discussion with son, he is noted that patient's decreased oral intake has been ongoing for a very long time. Stated patient does not eat much at home and just nibbles on food and states she is full. Patient with FTT, bedbound, intractable chronic pain syndrome. Due to overall medical morbidity and concerns of further decline in health, palliative care consulted to follow outpt and son in agreeable with plan.     Son was interested in patient getting some rehabilitation at

## 2024-12-01 NOTE — PLAN OF CARE
Problem: Chronic Conditions and Co-morbidities  Goal: Patient's chronic conditions and co-morbidity symptoms are monitored and maintained or improved  11/23/2024 0351 by Leatha Dacosta LPN  Outcome: Progressing  Flowsheets (Taken 11/22/2024 2325)  Care Plan - Patient's Chronic Conditions and Co-Morbidity Symptoms are Monitored and Maintained or Improved:   Monitor and assess patient's chronic conditions and comorbid symptoms for stability, deterioration, or improvement   Collaborate with multidisciplinary team to address chronic and comorbid conditions and prevent exacerbation or deterioration   Update acute care plan with appropriate goals if chronic or comorbid symptoms are exacerbated and prevent overall improvement and discharge  11/22/2024 2234 by Jaida Goodwin, RN  Outcome: Progressing     Problem: Safety - Adult  Goal: Free from fall injury  11/23/2024 0351 by Leatha Dacosta LPN  Outcome: Progressing  Flowsheets (Taken 11/22/2024 2326)  Free From Fall Injury: Instruct family/caregiver on patient safety  11/22/2024 2234 by Jaida Goodwin, RN  Outcome: Progressing     Problem: Skin/Tissue Integrity  Goal: Absence of new skin breakdown  Description: 1.  Monitor for areas of redness and/or skin breakdown  11/23/2024 0351 by Leatha Dacosta LPN  Outcome: Progressing  11/22/2024 2234 by Jaida Goodwin, RN  Outcome: Progressing     
  Problem: Chronic Conditions and Co-morbidities  Goal: Patient's chronic conditions and co-morbidity symptoms are monitored and maintained or improved  11/23/2024 1533 by Arianna Pedroza RN  Outcome: Progressing  11/23/2024 0351 by Leatha Dacosta LPN  Outcome: Progressing  Flowsheets (Taken 11/22/2024 2325)  Care Plan - Patient's Chronic Conditions and Co-Morbidity Symptoms are Monitored and Maintained or Improved:   Monitor and assess patient's chronic conditions and comorbid symptoms for stability, deterioration, or improvement   Collaborate with multidisciplinary team to address chronic and comorbid conditions and prevent exacerbation or deterioration   Update acute care plan with appropriate goals if chronic or comorbid symptoms are exacerbated and prevent overall improvement and discharge     Problem: Safety - Adult  Goal: Free from fall injury  11/23/2024 1533 by Arianna Pedroza RN  Outcome: Progressing  11/23/2024 0351 by Leatha Dacosta LPN  Outcome: Progressing  Flowsheets (Taken 11/22/2024 2326)  Free From Fall Injury: Instruct family/caregiver on patient safety     Problem: Skin/Tissue Integrity  Goal: Absence of new skin breakdown  Description: 1.  Monitor for areas of redness and/or skin breakdown  11/23/2024 1533 by Arianna Pedroza RN  Outcome: Progressing  11/23/2024 0351 by Leatha Dacosta LPN  Outcome: Progressing     Problem: Pain  Goal: Verbalizes/displays adequate comfort level or baseline comfort level  Outcome: Progressing     
  Problem: Chronic Conditions and Co-morbidities  Goal: Patient's chronic conditions and co-morbidity symptoms are monitored and maintained or improved  11/24/2024 1351 by Arianna Pedroza RN  Outcome: Progressing  11/24/2024 0406 by Leatha Dacosta LPN  Outcome: Progressing  Flowsheets (Taken 11/23/2024 2110)  Care Plan - Patient's Chronic Conditions and Co-Morbidity Symptoms are Monitored and Maintained or Improved:   Monitor and assess patient's chronic conditions and comorbid symptoms for stability, deterioration, or improvement   Collaborate with multidisciplinary team to address chronic and comorbid conditions and prevent exacerbation or deterioration   Update acute care plan with appropriate goals if chronic or comorbid symptoms are exacerbated and prevent overall improvement and discharge     Problem: Safety - Adult  Goal: Free from fall injury  11/24/2024 1351 by Arianna Pedroza RN  Outcome: Progressing  11/24/2024 0406 by Leatha Dacosta LPN  Outcome: Progressing  Flowsheets (Taken 11/23/2024 2311)  Free From Fall Injury: Instruct family/caregiver on patient safety     Problem: Skin/Tissue Integrity  Goal: Absence of new skin breakdown  Description: 1.  Monitor for areas of redness and/or skin breakdown  11/24/2024 1351 by Arianna Pedroza RN  Outcome: Progressing  11/24/2024 0406 by Leatha Dacosta LPN  Outcome: Progressing     Problem: Pain  Goal: Verbalizes/displays adequate comfort level or baseline comfort level  11/24/2024 1351 by Arianna Pedroza RN  Outcome: Progressing  11/24/2024 0406 by Leatha Dacosta LPN  Outcome: Progressing  Flowsheets (Taken 11/23/2024 2301)  Verbalizes/displays adequate comfort level or baseline comfort level:   Encourage patient to monitor pain and request assistance   Assess pain using appropriate pain scale   Administer analgesics based on type and severity of pain and evaluate response   Implement non-pharmacological 
  Problem: Chronic Conditions and Co-morbidities  Goal: Patient's chronic conditions and co-morbidity symptoms are monitored and maintained or improved  11/25/2024 0330 by Leatha Dacosta LPN  Outcome: Progressing  Flowsheets (Taken 11/24/2024 1944)  Care Plan - Patient's Chronic Conditions and Co-Morbidity Symptoms are Monitored and Maintained or Improved:   Monitor and assess patient's chronic conditions and comorbid symptoms for stability, deterioration, or improvement   Collaborate with multidisciplinary team to address chronic and comorbid conditions and prevent exacerbation or deterioration   Update acute care plan with appropriate goals if chronic or comorbid symptoms are exacerbated and prevent overall improvement and discharge  11/24/2024 1351 by Arianna Pedroza, RN  Outcome: Progressing     Problem: Safety - Adult  Goal: Free from fall injury  11/25/2024 0330 by Leatha Dacosta LPN  Outcome: Progressing  Flowsheets (Taken 11/24/2024 2313)  Free From Fall Injury: Instruct family/caregiver on patient safety  11/24/2024 1351 by Arianna Pedroza, RN  Outcome: Progressing     Problem: Skin/Tissue Integrity  Goal: Absence of new skin breakdown  Description: 1.  Monitor for areas of redness and/or skin breakdown  11/25/2024 0330 by Leatha Dacsota LPN  Outcome: Progressing  11/24/2024 1351 by Arianna Pedroza, RN  Outcome: Progressing     Problem: Pain  Goal: Verbalizes/displays adequate comfort level or baseline comfort level  11/25/2024 0330 by Leatha Dacosta LPN  Outcome: Progressing  Flowsheets (Taken 11/24/2024 1944)  Verbalizes/displays adequate comfort level or baseline comfort level:   Encourage patient to monitor pain and request assistance   Assess pain using appropriate pain scale   Administer analgesics based on type and severity of pain and evaluate response   Implement non-pharmacological measures as appropriate and evaluate response   Consider cultural and social 
  Problem: Chronic Conditions and Co-morbidities  Goal: Patient's chronic conditions and co-morbidity symptoms are monitored and maintained or improved  11/25/2024 0930 by Carolyn Johnson RN  Outcome: Progressing  11/25/2024 0330 by Leatha Dacosta LPN  Outcome: Progressing  Flowsheets (Taken 11/24/2024 1944)  Care Plan - Patient's Chronic Conditions and Co-Morbidity Symptoms are Monitored and Maintained or Improved:   Monitor and assess patient's chronic conditions and comorbid symptoms for stability, deterioration, or improvement   Collaborate with multidisciplinary team to address chronic and comorbid conditions and prevent exacerbation or deterioration   Update acute care plan with appropriate goals if chronic or comorbid symptoms are exacerbated and prevent overall improvement and discharge     Problem: Safety - Adult  Goal: Free from fall injury  11/25/2024 0930 by Carolyn Johnson RN  Outcome: Progressing  Flowsheets (Taken 11/25/2024 0929)  Free From Fall Injury: Instruct family/caregiver on patient safety  11/25/2024 0330 by Leatha Dacosta LPN  Outcome: Progressing  Flowsheets (Taken 11/24/2024 2318)  Free From Fall Injury: Instruct family/caregiver on patient safety     Problem: Skin/Tissue Integrity  Goal: Absence of new skin breakdown  Description: 1.  Monitor for areas of redness and/or skin breakdown  11/25/2024 0930 by Carolyn Johnson RN  Outcome: Progressing  11/25/2024 0330 by Leatha Dacosta LPN  Outcome: Progressing     Problem: Pain  Goal: Verbalizes/displays adequate comfort level or baseline comfort level  11/25/2024 0930 by Carolyn Johnson RN  Outcome: Progressing  11/25/2024 0330 by Leatha Dacosta LPN  Outcome: Progressing  Flowsheets (Taken 11/24/2024 1944)  Verbalizes/displays adequate comfort level or baseline comfort level:   Encourage patient to monitor pain and request assistance   Assess pain using appropriate pain scale   Administer analgesics based on 
  Problem: Chronic Conditions and Co-morbidities  Goal: Patient's chronic conditions and co-morbidity symptoms are monitored and maintained or improved  12/1/2024 0956 by Chanel Grande RN  Outcome: Adequate for Discharge  11/30/2024 2244 by Jaida Goodwin RN  Outcome: Progressing     Problem: Safety - Adult  Goal: Free from fall injury  12/1/2024 0956 by Chanel Grande RN  Outcome: Adequate for Discharge  11/30/2024 2244 by Jaida Goodwin RN  Outcome: Progressing     Problem: Skin/Tissue Integrity  Goal: Absence of new skin breakdown  Description: 1.  Monitor for areas of redness and/or skin breakdown  12/1/2024 0956 by Chanel Grande RN  Outcome: Adequate for Discharge  11/30/2024 2244 by Jaida Goodwin RN  Outcome: Progressing     Problem: Pain  Goal: Verbalizes/displays adequate comfort level or baseline comfort level  12/1/2024 0956 by Chanel Grande RN  Outcome: Adequate for Discharge  11/30/2024 2244 by Jaida Goodwin RN  Outcome: Progressing     Problem: ABCDS Injury Assessment  Goal: Absence of physical injury  12/1/2024 0956 by Chanel Grande RN  Outcome: Adequate for Discharge  11/30/2024 2244 by Jaida Goodwin RN  Outcome: Progressing     Problem: Nutrition Deficit:  Goal: Optimize nutritional status  12/1/2024 0956 by Chanel Grande RN  Outcome: Adequate for Discharge  11/30/2024 2244 by Jaida Goodwin RN  Outcome: Progressing     
  Problem: Chronic Conditions and Co-morbidities  Goal: Patient's chronic conditions and co-morbidity symptoms are monitored and maintained or improved  Outcome: Progressing     Problem: Safety - Adult  Goal: Free from fall injury  Outcome: Progressing     Problem: Skin/Tissue Integrity  Goal: Absence of new skin breakdown  Description: 1.  Monitor for areas of redness and/or skin breakdown  Outcome: Progressing     Problem: Pain  Goal: Verbalizes/displays adequate comfort level or baseline comfort level  Outcome: Progressing     Problem: ABCDS Injury Assessment  Goal: Absence of physical injury  Outcome: Progressing     
  Problem: Chronic Conditions and Co-morbidities  Goal: Patient's chronic conditions and co-morbidity symptoms are monitored and maintained or improved  Outcome: Progressing     Problem: Safety - Adult  Goal: Free from fall injury  Outcome: Progressing     Problem: Skin/Tissue Integrity  Goal: Absence of new skin breakdown  Description: 1.  Monitor for areas of redness and/or skin breakdown  Outcome: Progressing     Problem: Pain  Goal: Verbalizes/displays adequate comfort level or baseline comfort level  Outcome: Progressing     Problem: ABCDS Injury Assessment  Goal: Absence of physical injury  Outcome: Progressing     
  Problem: Chronic Conditions and Co-morbidities  Goal: Patient's chronic conditions and co-morbidity symptoms are monitored and maintained or improved  Outcome: Progressing     Problem: Safety - Adult  Goal: Free from fall injury  Outcome: Progressing     Problem: Skin/Tissue Integrity  Goal: Absence of new skin breakdown  Description: 1.  Monitor for areas of redness and/or skin breakdown  Outcome: Progressing     Problem: Pain  Goal: Verbalizes/displays adequate comfort level or baseline comfort level  Outcome: Progressing     Problem: ABCDS Injury Assessment  Goal: Absence of physical injury  Outcome: Progressing     Problem: Nutrition Deficit:  Goal: Optimize nutritional status  Outcome: Progressing     
  Problem: Chronic Conditions and Co-morbidities  Goal: Patient's chronic conditions and co-morbidity symptoms are monitored and maintained or improved  Outcome: Progressing     Problem: Safety - Adult  Goal: Free from fall injury  Outcome: Progressing     Problem: Skin/Tissue Integrity  Goal: Absence of new skin breakdown  Description: 1.  Monitor for areas of redness and/or skin breakdown  Outcome: Progressing     Problem: Pain  Goal: Verbalizes/displays adequate comfort level or baseline comfort level  Outcome: Progressing     Problem: ABCDS Injury Assessment  Goal: Absence of physical injury  Outcome: Progressing     Problem: Nutrition Deficit:  Goal: Optimize nutritional status  Outcome: Progressing     
influences on pain and pain management   Notify Licensed Independent Practitioner if interventions unsuccessful or patient reports new pain  11/23/2024 1533 by Arianna Pedroza, RN  Outcome: Progressing     Problem: ABCDS Injury Assessment  Goal: Absence of physical injury  Outcome: Progressing  Flowsheets (Taken 11/24/2024 0406)  Absence of Physical Injury: Implement safety measures based on patient assessment

## 2024-12-01 NOTE — OUTREACH NOTE
Prep Survey      Flowsheet Row Responses   [facility] facility patient discharged from? Non-BH   Is LACE score < 7 ? Non-BH Discharge   Eligibility Not Eligible   What are the reasons patient is not eligible? Other  [not a [facility] PCP]   Does the patient have one of the following disease processes/diagnoses(primary or secondary)? Other   Prep survey completed? Yes            Mariana Jain Registered Nurse

## 2024-12-02 ENCOUNTER — TELEPHONE (OUTPATIENT)
Dept: FAMILY MEDICINE CLINIC | Age: 74
End: 2024-12-02

## 2024-12-02 NOTE — TELEPHONE ENCOUNTER
Care Transitions Initial Follow Up Call    Outreach made within 2 business days of discharge: Yes    Patient: Lashonda Milian Patient : 1950   MRN: [id number]  Reason for Admission: UTI/pneumonia    Discharge Date: 24       Spoke with: No one answered the phone    Discharge department/facility: [facility]        Scheduled appointment with PCP within 7-14 days    Follow Up  No future appointments.    Xiao Velazco LPN

## 2024-12-03 ENCOUNTER — TELEPHONE (OUTPATIENT)
Dept: FAMILY MEDICINE CLINIC | Age: 74
End: 2024-12-03

## 2024-12-03 NOTE — PROGRESS NOTES
Hospitalist Progress Note        PCP: Nick Martinez MD    Date of Admission: 11/22/2024    Length of Stay: 1    Chief Complaint: from home with report of low oxygen at home, trouble swallowing.   Admitted with concern for UTI and possible CAP.       Subjective: very drowsy this am.       Medications:  Reviewed    Infusion Medications    sodium chloride       Scheduled Medications    pregabalin  100 mg Oral Nightly    amiodarone  200 mg Oral BID    apixaban  5 mg Oral BID    atorvastatin  20 mg Oral Daily    busPIRone  5 mg Oral TID    vitamin D  50,000 Units Oral Weekly    folic acid  1 mg Oral Daily    methIMAzole  5 mg Oral BID    methocarbamol  500 mg Oral BID    metoprolol tartrate  25 mg Oral BID    sertraline  50 mg Oral Daily    sodium chloride flush  5-40 mL IntraVENous 2 times per day    ipratropium 0.5 mg-albuterol 2.5 mg  1 Dose Inhalation Q4H WA RT    guaiFENesin  600 mg Oral BID    cefTRIAXone (ROCEPHIN) IV  1,000 mg IntraVENous Q24H    And    azithromycin  500 mg Oral Q24H     PRN Meds: magnesium sulfate, sodium phosphate 14.07 mmol in sodium chloride 0.9 % 250 mL IVPB **OR** sodium phosphate 28.17 mmol in sodium chloride 0.9 % 250 mL IVPB, potassium chloride **OR** potassium alternative oral replacement **OR** potassium chloride, furosemide, sodium chloride flush, sodium chloride, ondansetron **OR** ondansetron, polyethylene glycol, acetaminophen **OR** acetaminophen      Intake/Output Summary (Last 24 hours) at 11/23/2024 1233  Last data filed at 11/23/2024 0540  Gross per 24 hour   Intake 100 ml   Output 250 ml   Net -150 ml       Physical Exam Performed:    BP (!) 101/56   Pulse 59   Temp 97.6 °F (36.4 °C) (Temporal)   Resp 18   Ht 1.499 m (4' 11\")   Wt 88 kg (194 lb)   SpO2 97%   Breastfeeding No   BMI 39.18 kg/m²     General appearance: No apparent distress, appears stated age and cooperative.  HEENT: Pupils equal, round, and reactive to light. Conjunctivae/corneas clear.  Neck: 
    Hospitalist Progress Note        PCP: Nick Martinez MD    Date of Admission: 11/22/2024    Length of Stay: 3    Chief Complaint: from home with report of low oxygen at home, trouble swallowing.   Admitted with concern for UTI and possible CAP.       Subjective:     More awake. Pleasantly confused. Son at bedside this am.     Pt has not ambulated since August.   Pt had ongoing back pain concerns and did not have much relief since her MRSA discitis and osteomyelitis treatment.             Medications:  Reviewed    Infusion Medications    sodium chloride 75 mL/hr at 11/25/24 1321    sodium chloride 25 mL/hr at 11/23/24 1946     Scheduled Medications    sertraline  25 mg Oral Daily    vancomycin  1,000 mg IntraVENous Q24H    vancomycin (VANCOCIN) intermittent dosing (placeholder)   Other RX Placeholder    amiodarone  200 mg Oral BID    apixaban  5 mg Oral BID    atorvastatin  20 mg Oral Daily    busPIRone  5 mg Oral TID    vitamin D  50,000 Units Oral Weekly    folic acid  1 mg Oral Daily    [Held by provider] methIMAzole  5 mg Oral BID    methocarbamol  500 mg Oral BID    metoprolol tartrate  25 mg Oral BID    sodium chloride flush  5-40 mL IntraVENous 2 times per day    ipratropium 0.5 mg-albuterol 2.5 mg  1 Dose Inhalation Q4H WA RT    guaiFENesin  600 mg Oral BID    cefTRIAXone (ROCEPHIN) IV  1,000 mg IntraVENous Q24H     PRN Meds: oxyCODONE-acetaminophen, magnesium sulfate, sodium phosphate 14.07 mmol in sodium chloride 0.9 % 250 mL IVPB **OR** sodium phosphate 28.17 mmol in sodium chloride 0.9 % 250 mL IVPB, potassium chloride **OR** potassium alternative oral replacement **OR** potassium chloride, furosemide, sodium chloride flush, sodium chloride, ondansetron **OR** ondansetron, polyethylene glycol, acetaminophen **OR** acetaminophen      Intake/Output Summary (Last 24 hours) at 11/25/2024 1409  Last data filed at 11/25/2024 0400  Gross per 24 hour   Intake 1213.65 ml   Output 800 ml   Net 413.65 ml 
    Hospitalist Progress Note        PCP: Nick Martinez MD    Date of Admission: 2024    Length of Stay: 4    Chief Complaint: from home with report of low oxygen at home, trouble swallowing.   Admitted with concern for UTI and possible CAP.       Subjective:     More awake. Pleasantly confused, but is actually better oriented today. She knows she is at [facility], able to tell me her full name and .   She is able to carry a simple conversation.     She has not touched any of her meals and refusing to try yet again this morning, although she denies any nausea or pain today.              Medications:  Reviewed    Infusion Medications    sodium chloride 75 mL/hr at 24 1321    sodium chloride 25 mL/hr at 24 1946     Scheduled Medications    sertraline  25 mg Oral Daily    vancomycin (VANCOCIN) intermittent dosing (placeholder)   Other RX Placeholder    amiodarone  200 mg Oral BID    apixaban  5 mg Oral BID    atorvastatin  20 mg Oral Daily    busPIRone  5 mg Oral TID    vitamin D  50,000 Units Oral Weekly    folic acid  1 mg Oral Daily    [Held by provider] methIMAzole  5 mg Oral BID    methocarbamol  500 mg Oral BID    metoprolol tartrate  25 mg Oral BID    sodium chloride flush  5-40 mL IntraVENous 2 times per day    ipratropium 0.5 mg-albuterol 2.5 mg  1 Dose Inhalation Q4H WA RT    guaiFENesin  600 mg Oral BID    cefTRIAXone (ROCEPHIN) IV  1,000 mg IntraVENous Q24H     PRN Meds: oxyCODONE-acetaminophen, magnesium sulfate, sodium phosphate 14.07 mmol in sodium chloride 0.9 % 250 mL IVPB **OR** sodium phosphate 28.17 mmol in sodium chloride 0.9 % 250 mL IVPB, potassium chloride **OR** potassium alternative oral replacement **OR** potassium chloride, furosemide, sodium chloride flush, sodium chloride, ondansetron **OR** ondansetron, polyethylene glycol, acetaminophen **OR** acetaminophen      Intake/Output Summary (Last 24 hours) at 2024 1250  Last data filed at 2024 0944  Gross 
   11/27/24 1611   Subjective   Subjective Ok   Pain Assessment   Pain Assessment None - Denies Pain   Cognition   Overall Cognitive Status WFL   Orientation   Overall Orientation Status WFL   Vitals   O2 Device None (Room air)   PT Exercises   PROM Exercises BL LE EX X 10 reps.   Patient Education   Education Given To Patient   Education Provided Role of Therapy;Transfer Training;Plan of Care;Fall Prevention Strategies   Education Provided Comments use of call light, staff A, importance of mobility   Education Method Demonstration;Verbal   Barriers to Learning Cognition   Education Outcome Verbalized understanding;Demonstrated understanding;Continued education needed   Other Specialty Interventions   Other Treatments/Modalities In bed call light all needs in reach  alarm set.   Assessment   Activity Tolerance Patient limited by endurance   PT Plan of Care   Wednesday X       Electronically signed by Abdiel Wisdom PTA on 11/27/2024 at 4:14 PM   
  Physician Progress Note      PATIENT:               CAROLINA HUGHES  CSN #:                  [id number]  :                       1950  ADMIT DATE:       2024 1:49 PM  DISCH DATE:        2024 11:51 AM  RESPONDING  PROVIDER #:        Ekaterina Menjivar MD          QUERY TEXT:    Pt admitted with UTI. Pt noted to have Cr 1.3 on 24 and 0.8 on 28.   If possible, please document in the progress notes and discharge summary if   you are evaluating and/or treating any of the following:    The medical record reflects the following:  Risk Factors: 74 y.o female with UTI, HTN, heart failure, malnutrition  Clinical Indicators:  24 Cr 1.3 mg/dL  24 Cr 0.8 mg/dL  Treatment: Creatinine monitoring    Defined by Kidney Disease Improving Global Outcomes (KDIGO) clinical practice   guideline for acute kidney injury:  -Increase in SCr by greater than or equal to 0.3 mg/dl within 48 hours; or  -Increase or decrease in SCr to greater than or equal to 1.5 times baseline,   which is known or presumed to have occurred within the prior 7 days; or  -Urine volume < 0.5ml/kg/h for 6 hours  Options provided:  -- Acute kidney injury  -- Acute kidney failure  -- Other - I will add my own diagnosis  -- Disagree - Not applicable / Not valid  -- Disagree - Clinically unable to determine / Unknown  -- Refer to Clinical Documentation Reviewer    PROVIDER RESPONSE TEXT:    Did not treat for SHELLEY during my assessment    Query created by: Nieves Sotelo on 2024 9:44 AM      Electronically signed by:  Ekaterina Menjivar MD 2024 10:47 AM          
  Physician Progress Note      PATIENT:               CAROLINA HUGHES  CSN #:                  [id number]  :                       1950  ADMIT DATE:       2024 1:49 PM  DISCH DATE:        2024 11:51 AM  RESPONDING  PROVIDER #:        Ekaterina Menjivar MD          QUERY TEXT:    Pt admitted with Proteus UTI and pneumonia. Pt noted to have Temp 96.5, HR   , and RR 16-28 on 24. If possible, please document in the progress   notes and discharge summary if you are evaluating and /or treating any of the   following:  The medical record reflects the following:  Risk Factors: 74-year-old female with UTI and pneumonia, CHF, COPD, HTN  Clinical Indicators:  24 H&P: 24: HPI: Patient presented to the emergency room. Family   reporting her oxygen was low at home, having trouble swallowing. Urine turbid,   moderate blood, large leukocyte esterase, 4+ bacteria. Assessment and plan:   UTI-Rocephin; Pneumonia-Azithromycin and Rocephin  24 IM Progress note: Assessment/Plan: UTI; Monica Epi bacteremia suspect   contaminant; possible CAP; dysphagia    24 urine culture: Proteus mirabilis >100,000  24 blood culture: staphylococcus epidermidis 1 bottle  24 blood cultures: negative  24 Respiratory panel: Negative  24 XR Chest: Cardiomegaly is again noted.  No noticeable vascular   congestion.  Questionable medial left base atelectasis or   infiltrate/pneumonia.  No visible pleural fluid or pneumothorax  24: Per VS flowsheets: BP 85/65x1  24: Per VS flowsheets Temp 96.5; HR ; RR 16-28  24: Per VS flowsheets HR 86-97; RR 18-20; WBC 3.8  24: Per VS flowsheets RR 16-26; WBC 3.8  24: WBC 3.2  24:  Per VS flowsheets HR 68-95; WBC 4.0  24: WBC 3.0    Treatment: labs, urine culture, blood cultures, Respiratory panel swab, XR   Chest, IV Rocephin, IV and PO Zithromax, IV Vancomycin  Options provided:  -- Sepsis due to Proteus 
  Physician Progress Note      PATIENT:               CAROLINA HUGHES  CSN #:                  [id number]  :                       1950  ADMIT DATE:       2024 1:49 PM  DISCH DATE:  RESPONDING  PROVIDER #:        Teresa Urbina MD          QUERY TEXT:    Patient admitted with UTI, noted to have paroxysmal atrial fibrillation and is   maintained on Eliquis. If possible, please document in progress notes and   discharge summary if you are evaluating and/or treating any of the following:?  ?  The medical record reflects the following:  Risk Factors: 74 year old female, pmhx A. fib, CHF, HTN  Clinical Indicators:  24 H&P: Past medical history: Paroxysmal A fib, CHF, Hypertension.   Assessment/Plan: Hypertension, Diastolic Dysfunction  24 Internal Medicine progress note: Assessment/Plan: Hx of paroxysmal   Afib. On eliquis. Metoprolol BID, Amio, Statin.  Treatment: Eliquis  Options provided:  -- Secondary hypercoagulable state in a patient with atrial fibrillation  -- Other - I will add my own diagnosis  -- Disagree - Not applicable / Not valid  -- Disagree - Clinically unable to determine / Unknown  -- Refer to Clinical Documentation Reviewer    PROVIDER RESPONSE TEXT:    This patient has secondary hypercoagulable state in a patient with atrial   fibrillation.    Query created by: Nieves Sotelo on 2024 3:32 PM      Electronically signed by:  Teresa Urbina MD 2024 3:44 PM          
  Physician Progress Note      PATIENT:               CAROLINA HUGHES  CSN #:                  [id number]  :                       1950  ADMIT DATE:       2024 1:49 PM  DISCH DATE:  RESPONDING  PROVIDER #:        Teresa Urbina MD          QUERY TEXT:    Pt admitted with UTI. Pt noted to have CHF in past medical history per H&P   24. If possible, please document in progress notes and discharge summary   if you are evaluating and/or treating any of the following:    The medical record reflects the following:  Risk Factors: pmhx CHF, HTN, morbid obesity, COPD, A. Fib  Clinical Indicators:  24: H&P HPI: Patient presents with shortness of breath. Past medical   history CHF.  General exam: Respiratory: bilateral fair air entry in both lung   fields, diminished bilateral bases poor inspiratory effort. No edema.   Assessment/Plan: diastolic dysfunction: BNP low and no evidence of fluid   overload  24 Internal Medicine general exam: Respiratory: Normal respiratory   effort. Clear to auscultation, bilaterally without rales/wheezes/rhonchi.  24 XR Chest: Impression: Cardiomegaly is again noted.  24 BNP 1,183  Treatment: PO Lasix PRN, PO Lopressor, BNP  Options provided:  -- Chronic Diastolic CHF/HFpEF  -- Other - I will add my own diagnosis  -- Disagree - Not applicable / Not valid  -- Disagree - Clinically unable to determine / Unknown  -- Refer to Clinical Documentation Reviewer    PROVIDER RESPONSE TEXT:    This patient has chronic diastolic CHF/HFpEF.    Query created by: Nieves Sotelo on 2024 3:18 PM      Electronically signed by:  Teresa Urbina MD 2024 3:25 PM          
 Occupational Therapy Initial Assessment  Date: 2024   Patient Name: Lashonda Milian  MRN: [id number]     : 1950    Date of Service: 2024    Discharge Recommendations:  Continue to assess pending progress         General  Chart Reviewed: Yes  Referring Practitioner: Dr. Urbina  Diagnosis: UTI    Assessment   Assessment: Pt very poor motivation for all transfers and balance this date.  If pt was to d/c home pt would require dependency for all parts of sponge bath, ADL transfers- recommend with marlyn lift,  and toileting task/transfers.  Recommend at this time continuation of OT goals to improve pt overall ADL function, improve quality of life, and decrease burden of care.  Prognosis: Good  Decision Making: Low Complexity  REQUIRES OT FOLLOW-UP: Yes  Activity Tolerance  Activity Tolerance: Patient limited by pain;Treatment limited secondary to agitation;Treatment limited secondary to decreased cognition              Patient Diagnosis(es): The primary encounter diagnosis was Pneumonia due to infectious organism, unspecified laterality, unspecified part of lung. A diagnosis of Urinary tract infection without hematuria, site unspecified was also pertinent to this visit.    Past Medical History:   Past Medical History:   Diagnosis Date    Arthritis     Atrial fibrillation (HCC)     Paroxysmal A Fib    Cerebral artery occlusion with cerebral infarction (HCC)     TWICE: once in  or , then had another one     CHF (congestive heart failure) (HCC)     COPD (chronic obstructive pulmonary disease) (HCC)     Hyperlipidemia     Hypertension     On continuous oral anticoagulation     Apixaban    Pneumonia     Thyroid disease         Past Surgical History:   Past Surgical History:   Procedure Laterality Date    CARDIOVERSION      HYSTERECTOMY, TOTAL ABDOMINAL (CERVIX REMOVED) N/A     With removal of tumor, ~, was a DANIS and BSO    LUMBAR SPINE SURGERY Right 10/4/2024    Decompressive lumbar 
4 Eyes Skin Assessment     NAME:  Lashonda Milian  YOB: 1950  MEDICAL RECORD NUMBER:  [id number]    The patient is being assessed for  Admission    I agree that at least one RN has performed a thorough Head to Toe Skin Assessment on the patient. ALL assessment sites listed below have been assessed.      Areas assessed by both nurses:    Head, Face, Ears, Shoulders, Back, Chest, Arms, Elbows, Hands, Sacrum. Buttock, Coccyx, Ischium, and Legs. Feet and Heels        Does the Patient have a Wound? Yes wound(s) were present on assessment. LDA wound assessment was Initiated and completed by RN       Clark Prevention initiated by RN: Yes  Wound Care Orders initiated by RN: No    Pressure Injury (Stage 3,4, Unstageable, DTI, NWPT, and Complex wounds) if present, place Wound referral order by RN under : No    New Ostomies, if present place, Ostomy referral order under : No     Nurse 1 eSignature: Electronically signed by Jaida Goodwin RN on 11/22/24 at 9:52 PM CST    **SHARE this note so that the co-signing nurse can place an eSignature**    Nurse 2 eSignature: Electronically signed by Leatha Dacosta LPN on 11/22/24 at 10:46 PM CST   
Comprehensive Nutrition Assessment    Type and Reason for Visit:  LOS    Nutrition Recommendations/Plan:   Ensure High Protein with meals  Add severe malnutrition to problem list     Malnutrition Assessment:  Malnutrition Status:  Severe malnutrition (11/29/24 1353)    Context:  Acute Illness     Findings of the 6 clinical characteristics of malnutrition:  Energy Intake:  50% or less of estimated energy requirements for 5 or more days  Weight Loss:  Greater than 7.5% over 3 months     Body Fat Loss:  No body fat loss     Muscle Mass Loss:  No muscle mass loss    Fluid Accumulation:  Mild Extremities   Strength:  Not Performed    Nutrition Assessment:    Pt screened for LOS day 7. Pt noted to have prolonged poor intake and noted to have weight loss of 22.9% in past 90 days. Pt states her appetite is \"better\" however, lunch tray observed with poor intake. Recommend ONS w/meals.    Nutrition Related Findings:    Generalized edema, BM 11-29 Wound Type: None       Current Nutrition Intake & Therapies:    Average Meal Intake: Unable to assess  Average Supplements Intake: None Ordered  ADULT DIET; Dysphagia - Soft and Bite Sized    Anthropometric Measures:  Height: 149.9 cm (4' 11.02\")  Ideal Body Weight (IBW): 95 lbs (43 kg)    Admission Body Weight: 88 kg (194 lb 0.1 oz)  Current Body Weight: 93 kg (205 lb 0.4 oz), 215.8 % IBW. Weight Source: Bed scale  Current BMI (kg/m2): 41.4  Usual Body Weight: 120.7 kg (266 lb 1.5 oz) (per hospital visit 8-19-24)   % Weight Change (Calculated): -22.9   BMI Categories: Obese Class 3 (BMI 40.0 or greater)    Estimated Daily Nutrient Needs:  Energy Requirements Based On: Kcal/kg  Weight Used for Energy Requirements: Current  Energy (kcal/day): 744-1395 (8-15/kg)  Weight Used for Protein Requirements: Ideal  Protein (g/day): 86 (2/kg)  Method Used for Fluid Requirements: 1 ml/kcal  Fluid (ml/day): 744-1395 (8-15/kg)    Nutrition Diagnosis:   Severe malnutrition related to inadequate 
Facility/Department: [facility] ONCOLOGY UNIT   CLINICAL BEDSIDE SWALLOW EVALUATION  SPEECH PRODUCTION EVALUATION     NAME: Lashonda Milian  : 1950  MRN: [id number]    ADMISSION DATE: 2024  ADMITTING DIAGNOSIS: has Occipital infarction (HCC); Hypertension; Hyperlipidemia; Abnormal stress test; Morbidly obese; Atrial fibrillation (HCC); Overactive bladder; Moderate episode of recurrent major depressive disorder (HCC); Hyperthyroidism; Diastolic dysfunction; Lung nodule seen on imaging study; JAMES (generalized anxiety disorder); Intractable back pain; Intractable abdominal pain; Ambulatory dysfunction; Leg mass, right; Abnormal abdominal CT scan; Intractable pain; MRSA bacteremia; Strain of back; TIA (transient ischemic attack); Moderate malnutrition (HCC); Epidural abscess, L2-L5; Chronic anticoagulation; Transient alteration of awareness; Decreased responsiveness; Cauda equina compression (HCC); Severe malnutrition (HCC); Osteomyelitis of lumbar spine; and UTI (urinary tract infection) on their problem list.    Date of Eval: 2024  Evaluating Therapist: KOREY Manzanares    Pain:  Pain Assessment  Pain Assessment: 0-10  Pain Level: 6  Patient's Stated Pain Goal: 2  Pain Location: Knee  Pain Orientation: Right, Left  Pain Descriptors: Aching, Discomfort  Functional Pain Assessment: Prevents or interferes some active activities and ADLs  Pain Type: Chronic pain  Pain Radiating Towards: no  Pain Frequency: Continuous  Pain Onset: On-going  Non-Pharmaceutical Pain Intervention(s): Rest, Repositioned  Response to Pain Intervention: Other (comment) (eyes closed)  Side Effects: No reported side effects  Multiple Pain Sites: No    Reason for Referral  Lashonda Milian was referred for a bedside swallow evaluation to assess the efficiency of her swallow function, identify signs and symptoms of aspiration and make recommendations regarding safe dietary consistencies, effective compensatory strategies, and safe 
Lashonda Milian arrived to room # 411.   Presented with: UTI.  Mental Status: Patient is oriented, alert, coherent, logical, thought processes intact, and able to concentrate and follow conversation.   Vitals:    11/22/24 2200   BP: (!) 110/56   Pulse: 52   Resp: 16   Temp: 98.4 °F (36.9 °C)   SpO2: 97%     Patient safety contract and falls prevention contract reviewed with patient Yes.  Oriented Patient to room.  Call light within reach. Yes.  Needs, issues or concerns expressed at this time: no.      Electronically signed by Leatha Dacosta LPN on 11/22/2024 at 10:46 PM  
Occupational Therapy     11/27/24 1600   Subjective   Subjective Pt in bed upon arrival for therapy. Pt presents very lethargic but able to particiapte at bed level.   Cognition   Overall Cognitive Status Exceptions   Orientation   Overall Orientation Status WFL   ADL   Feeding Minimal assistance;Moderate assistance   Feeding Skilled Clinical Factors Set up and positioning important due to BUE hand tremors.   Grooming Maximum assistance   UE Bathing Maximum assistance   LE Bathing Maximum assistance;Dependent/Total   UE Dressing Maximum assistance   LE Dressing Maximum assistance;Dependent/Total   Putting On/Taking Off Footwear Dependent/Total   Toileting Dependent/Total   Functional Mobility Maximum assistance;Dependent/Total   Assessment   Assessment Tx focused on self feeding task of a snack and drinks with cup/ straw. Pt instructed on BUE hand and arm AROM/AAROM and BLE ankle pumps at bed level to promote mobility.   Activity Tolerance Patient limited by fatigue   Discharge Recommendations Patient would benefit from continued therapy after discharge;24 hour supervision or assist   Occupational Therapy Plan   Times Per Week 3-5x/wk   Times Per Day Once a day   OT Plan of Care   Wednesday X     Electronically signed by KATERIN Garcia on 11/27/2024 at 4:04 PM    
Progress Note    Date:2024       Room:041/411-02  Patient Name:Lashonda Milian     YOB: 1950     Age:74 y.o.      Subjective    Subjective: 72-year-old female with a history of paroxysmal atrial fibrillation, COPD not on home O2, hypertension, hyperlipidemia, thyroid disorder, arthritis, currently bedbound, presents to the hospital with concerns of shortness of breath.  In the emergency room chest x-ray with left base atelectasis/possible pneumonia.  Urinalysis was obtained with concerns of infection.  Was admitted in-house for questionable pneumonia as well as UTI.  Patient completed course of antibiotics for pneumonia and UTI, patient with noted decreased oral intake, was seen by speech in-house and put on a soft/thin liquid diet.    On further discussion with son, he is noted that patient's oral intake has been ongoing for a very long time.  Today patient does not eat much at home and just labels on food and states she is full.    Discussed with son about discharge planning, he is interested in patient getting some rehabilitation at his SNF, plans for [facility], will be precert and CM following.        Review of Systems: 6 point system reviewed and negative except as stated above.    Objective         Vitals Last 24 Hours:  TEMPERATURE:  Temp  Av °F (36.7 °C)  Min: 97.5 °F (36.4 °C)  Max: 98.4 °F (36.9 °C)  RESPIRATIONS RANGE: Resp  Av  Min: 16  Max: 18  PULSE OXIMETRY RANGE: SpO2  Av.4 %  Min: 95 %  Max: 98 %  PULSE RANGE: Pulse  Av.3  Min: 81  Max: 97  BLOOD PRESSURE RANGE: Systolic (24hrs), Av , Min:106 , Max:135   ; Diastolic (24hrs), Av, Min:65, Max:81    I/O (24Hr):    Intake/Output Summary (Last 24 hours) at 2024 1200  Last data filed at 2024 0504  Gross per 24 hour   Intake 120 ml   Output 1100 ml   Net -980 ml       Physical Examination:  General: no acute distress lying comfortably in bed.  HEENT: Atraumatic normocephalic, range of motion 
Progress Note    Date:2024       Room:0411/411-02  Patient Name:Lashonda Milian     YOB: 1950     Age:74 y.o.      Subjective    Subjective: 72-year-old female with a history of paroxysmal atrial fibrillation, COPD not on home O2, hypertension, hyperlipidemia, thyroid disorder, arthritis, currently bedbound, presents to the hospital with concerns of shortness of breath.  In the emergency room chest x-ray with left base atelectasis/possible pneumonia.  Urinalysis was obtained with concerns of infection.  Was admitted in-house for questionable pneumonia as well as UTI.  Patient completed course of antibiotics for pneumonia and UTI, patient with noted decreased oral intake, was seen by speech in-house and put on a soft/thin liquid diet.    On further discussion with son, he is noted that patient's oral intake has been ongoing for a very long time.  Today patient does not eat much at home and just labels on food and states she is full.    Discussed with son today about discharge planning, he is interested in patient getting some rehabilitation at his SNF, hoping to discuss more with his mother at this time.     Review of Systems: 6 point system reviewed and negative except as stated above.    Objective         Vitals Last 24 Hours:  TEMPERATURE:  Temp  Av °F (36.7 °C)  Min: 97.5 °F (36.4 °C)  Max: 98.7 °F (37.1 °C)  RESPIRATIONS RANGE: Resp  Av.3  Min: 18  Max: 20  PULSE OXIMETRY RANGE: SpO2  Av.6 %  Min: 95 %  Max: 97 %  PULSE RANGE: Pulse  Av  Min: 85  Max: 103  BLOOD PRESSURE RANGE: Systolic (24hrs), Av , Min:98 , Max:113   ; Diastolic (24hrs), Av, Min:66, Max:81    I/O (24Hr):    Intake/Output Summary (Last 24 hours) at 2024 1344  Last data filed at 2024 1304  Gross per 24 hour   Intake 200 ml   Output 1000 ml   Net -800 ml       Physical Examination:  General: no acute distress lying comfortably in bed.  HEENT: Atraumatic normocephalic, range of motion 
Progress Note    Date:2024       Room:0411/411-02  Patient Name:Lashonda Milian     YOB: 1950     Age:74 y.o.      Subjective    Subjective: 72-year-old female with a history of paroxysmal atrial fibrillation, COPD not on home O2, hypertension, hyperlipidemia, thyroid disorder, arthritis, currently bedbound, presents to the hospital with concerns of shortness of breath. In the emergency room chest x-ray with left base atelectasis/possible pneumonia. Urinalysis was obtained with concerns of infection. Was admitted in-house for questionable pneumonia as well as UTI.  Patient completed course of antibiotics for pneumonia and UTI, patient with noted decreased oral intake, was seen by speech in-house and put on a soft/thin liquid diet.    On further discussion with son, he is noted that patient's oral intake has been ongoing for a very long time.  Today patient does not eat much at home and just labels on food and states she is full.    Discussed with son about discharge planning, he is interested in patient getting some rehabilitation at his SNF, plans for [place], however not qualified as used up all her available days. Son willing to take patient home on discharge.    This morning, patient with pain in right thigh as well as with movement. Need to have better pain control prior to discharge home.       Review of Systems: 6 point system reviewed and negative except as stated above.    Objective         Vitals Last 24 Hours:  TEMPERATURE:  Temp  Av.3 °F (36.3 °C)  Min: 97 °F (36.1 °C)  Max: 97.7 °F (36.5 °C)  RESPIRATIONS RANGE: Resp  Av  Min: 16  Max: 18  PULSE OXIMETRY RANGE: SpO2  Av.5 %  Min: 92 %  Max: 100 %  PULSE RANGE: Pulse  Av.3  Min: 68  Max: 88  BLOOD PRESSURE RANGE: Systolic (24hrs), Av , Min:115 , Max:132   ; Diastolic (24hrs), Av, Min:78, Max:95    I/O (24Hr):    Intake/Output Summary (Last 24 hours) at 2024 1103  Last data filed at 2024 
[facility]   Pharmacy Pharmacokinetic Monitoring Service - Vancomycin     Lashonda Milian is a 74 y.o. female starting on vancomycin therapy for bloodstream infection. Pharmacy consulted by Dr Urbina for monitoring and adjustment.    Target Concentration: Goal AUC/LAKHWINDER 400-600 mg*hr/L    Additional Antimicrobials: zithromax rocephin    Pertinent Laboratory Values:   Wt Readings from Last 1 Encounters:   11/22/24 88 kg (194 lb)     Temp Readings from Last 1 Encounters:   11/23/24 98.8 °F (37.1 °C) (Temporal)     Estimated Creatinine Clearance: 40 mL/min (A) (based on SCr of 1.3 mg/dL (H)).  Recent Labs     11/22/24  1407 11/23/24  0345   CREATININE 1.3* 1.3*   BUN 26* 24*   WBC 6.6 4.4*     Procalcitonin:     Pertinent Cultures:  Culture Date Source Results   11/22/24 11/22/24 Urine    blood Proteus mirabilis    Gm+cocci in clusters   MRSA Nasal Swab: N/A. Non-respiratory infection.    Plan:  Dosing recommendations based on Bayesian software  Start vancomycin 2250 mg x 1 then 1000 mg iv every 24 hours  Anticipated AUC of 567 and trough concentration of 17.9 at steady state  Renal labs as indicated   Vancomycin concentration ordered for 11/25/24 @ 1900   Pharmacy will continue to monitor patient and adjust therapy as indicated    Thank you for the consult,  Glen Salcido RPH  11/23/2024 7:23 PM   
[facility]   Pharmacy Pharmacokinetic Monitoring Service - Vancomycin    Consulting Provider: Dr Urbina   Indication: bloodstream infection  Target Concentration: Goal AUC/LAKHWINDER 400-600 mg*hr/L  Day of Therapy: 3  Additional Antimicrobials: ceftriaxone    Pertinent Laboratory Values:   Wt Readings from Last 1 Encounters:   11/22/24 88 kg (194 lb)     Temp Readings from Last 1 Encounters:   11/25/24 98.9 °F (37.2 °C) (Temporal)     Estimated Creatinine Clearance: 52 mL/min (A) (based on SCr of 1 mg/dL (H)).  Recent Labs     11/24/24  0820 11/25/24  0736   CREATININE 1.1* 1.0*   BUN 21 17   WBC 6.3 4.5*     Procalcitonin:     Pertinent Cultures:  Culture Date Source Results   11/22/24 11/22/24 Urine    blood Proteus mirabilis    Staph epideermidis   MRSA Nasal Swab: N/A. Non-respiratory infection.    Recent vancomycin administrations                     vancomycin (VANCOCIN) 1000 mg in 200 mL IVPB (mg) 1,000 mg New Bag 11/25/24 1914     1,000 mg New Bag 11/24/24 2004    vancomycin (VANCOCIN) 2,250 mg in sodium chloride 0.9 % 500 mL IVPB (mg) 2,250 mg New Bag 11/23/24 1946                    Assessment:  Date/Time Current Dose Concentration Timing of Concentration (h) AUC   11/25/24 1000 mg 22.2 22 hr 58 min 637   Note: Serum concentrations collected for AUC dosing may appear elevated if collected in close proximity to the dose administered, this is not necessarily an indication of toxicity    Plan:  Current dosing regimen is supra-therapeutic  Hold draw level 11/26/24 1500 due to partial dose given after level drawn  Repeat vancomycin concentration ordered for 11/26/24 @ 1500   Pharmacy will continue to monitor patient and adjust therapy as indicated    Thank you for the consult,  Glen Salcido RPH  11/25/2024 8:26 PM   
Accessibility: Accessible  Home Equipment: Rollator, Walker - Rolling, Wheelchair - Manual  Receives Help From: Family  ADL Assistance: Needs assistance  Homemaking Assistance: Needs assistance  Homemaking Responsibilities: No  Ambulation Assistance: Needs assistance  Transfer Assistance: Needs assistance  Active : No  Patient's  Info: family  Mode of Transportation: Car  Occupation: Retired  Additional Comments: sleeps in a regular bed, poor historian;  if pt at home pt would require dependent care for all ADL tasks  Vision/Hearing  Vision  Vision: Impaired  Vision Exceptions: Wears glasses at all times  Hearing  Hearing: Within functional limits    Cognition   Orientation  Overall Orientation Status: Within Functional Limits  Cognition  Overall Cognitive Status: Exceptions  Arousal/Alertness: Impaired  Following Commands: Follows one step commands with repetition;Follows one step commands with increased time  Attention Span: Impaired;Difficulty attending to directions  Memory: Impaired;Decreased recall of recent events  Safety Judgement: Impaired;Decreased awareness of need for assistance;Decreased awareness of need for safety  Problem Solving: Impaired;Assistance required to implement solutions;Assistance required to generate solutions  Insights: Impaired  Initiation: Impaired  Sequencing: Impaired    Objective  Temp: 97 °F (36.1 °C)  Pulse: 90  Heart Rate Source: Monitor  Respirations: 18  SpO2: 97 %  O2 Device: None (Room air)  BP: 128/72  MAP (Calculated): 91     Observation/Palpation  Posture: Poor  Observation: pt large body habitus, gross atrophy, poor sitting balance, pt not wanting to use UEs for all tasks, L lateral lean       AROM RLE (degrees)  RLE AROM: Exceptions  RLE General AROM: BLEs in ER at hips at position of comfort, ankle in PF, able to get to neutral DF, knee and hip 0-90 AAROM  AROM LLE (degrees)  LLE AROM : Exceptions  LLE General AROM: BLEs in ER at hips at position of 
PM       Radiology:  CT HEAD WO CONTRAST   Final Result   1.  Encephalomalacia in the right parietal lobe posteriorly and adjacent occipital lobe consistent with old infarct.   2.  Microangiopathic ischemic change.  Atrophy.   3.  Atherosclerosis.   4.  No intracranial hemorrhage.   5.  Otherwise unremarkable noncontrast CT scan of the brain.        All CT scans are performed using dose optimization techniques as appropriate to the performed exam and include    at least one of the following: Automated exposure control, adjustment of the mA and/or kV according to size, and the use of iterative reconstruction technique.        ______________________________________    Electronically signed by: PERCY KNUTSON M.D.   Date:     11/22/2024   Time:    15:13       XR CHEST PORTABLE   Final Result   Cardiomegaly is again noted.  No noticeable vascular congestion.  Questionable medial left base atelectasis or infiltrate/pneumonia.  No visible pleural fluid or pneumothorax.   - - - - -           ______________________________________    Electronically signed by: TONIE LOPES M.D.   Date:     11/22/2024   Time:    14:29           IP CONSULT TO CASE MANAGEMENT  PHARMACY TO DOSE VANCOMYCIN    Assessment/Plan:    Active Hospital Problems    Diagnosis     Morbidly obese [E66.01]      Priority: High    UTI (urinary tract infection) [N39.0]     Diastolic dysfunction [I51.89]     Hypertension [I10]     Hyperlipidemia [E78.5]        UTI  Rocephin IV  Follow cultures - proteus preliminary.        Staph Epi bacteremia.   Suspect contaminant.   Cont Vanco IV and follow repeat cultures.       Possible CAP.   Empiric on zithro and rocephin.       Dysphagia.   Poor PO.   Pt more awake today. Not doing very well with nursing bedside swallow.   Will have SLP eval         Hx of paroxysmal Afib.   Hx of CVA x 2012 2013 and then 2021  On eliquis.   Metoprolol BID  Amio  Statin        Hx of Hyperthyroidism on Methimazole.   I could find single abnormal 
fibrillation  Continue beta-blocker, amiodarone and Eliquis.    History of CVA: Statin  On anticoagulation    History of hyperthyroidism on methimazole  Currently on hold per previous provider given significant weight loss as well as normal free T4 noted.  TSH normal     Recent discitis/OM status post decompression laminectomy and debulking of phlegmon 10/4 with Dr. Aguilar.  Completed course of antibiotics for full 6 weeks.  Seen in house by neurosurgery no further intervention workup needed at this time.  Continue follow-up as needed outpatient.    Hypokalemia: Repleted.        Disposition: SNF will need precert; may not qualify      Electronically signed by   Ekaterina Menjivar MD   Internal Medicine Hospitalist  On 11/29/2024  At 10:45 AM    EMR Dragon/Transcription disclaimer:   Much of this encounter note is an electronic transcription/translation of spoken language to printed text. The electronic translation of spoken language may permit erroneous, or at times, nonsensical words or phrases to be inadvertently transcribed; although attempts have made to review the note for such errors, some may still exist.

## 2024-12-03 NOTE — TELEPHONE ENCOUNTER
Care Transitions Initial Follow Up Call    Outreach made within 2 business days of discharge: Yes    Patient: Lashonda Milian Patient : 1950   MRN: [id number]  Reason for Admission: UTI    Discharge Date: 24       Spoke with: No one answered phone. Message left.     Discharge department/facility: [facility]        Scheduled appointment with PCP within 7-14 days    Follow Up  No future appointments.    Xiao Velazco LPN

## 2024-12-04 ENCOUNTER — HOSPITAL ENCOUNTER (EMERGENCY)
Age: 74
Discharge: HOME | End: 2024-12-04
Payer: MEDICARE

## 2024-12-04 ENCOUNTER — TELEPHONE (OUTPATIENT)
Dept: FAMILY MEDICINE CLINIC | Age: 74
End: 2024-12-04

## 2024-12-04 ENCOUNTER — APPOINTMENT (OUTPATIENT)
Dept: VASCULAR LAB | Age: 74
End: 2024-12-04
Payer: MEDICARE

## 2024-12-04 VITALS
TEMPERATURE: 98.8 F | HEART RATE: 71 BPM | RESPIRATION RATE: 16 BRPM | SYSTOLIC BLOOD PRESSURE: 113 MMHG | OXYGEN SATURATION: 96 % | DIASTOLIC BLOOD PRESSURE: 84 MMHG

## 2024-12-04 DIAGNOSIS — M79.89 SWELLING OF RIGHT HAND: Primary | ICD-10-CM

## 2024-12-04 LAB
ALBUMIN SERPL-MCNC: 2.4 G/DL (ref 3.5–5.2)
ALP SERPL-CCNC: 115 U/L (ref 35–104)
ALT SERPL-CCNC: 15 U/L (ref 5–33)
ANION GAP SERPL CALCULATED.3IONS-SCNC: 13 MMOL/L (ref 7–19)
AST SERPL-CCNC: 43 U/L (ref 5–32)
BASOPHILS # BLD: 0.1 K/UL (ref 0–0.2)
BASOPHILS NFR BLD: 1.5 % (ref 0–1)
BILIRUB SERPL-MCNC: 0.4 MG/DL (ref 0.2–1.2)
BNP BLD-MCNC: 5288 PG/ML (ref 0–124)
BUN SERPL-MCNC: 11 MG/DL (ref 8–23)
CALCIUM SERPL-MCNC: 8.2 MG/DL (ref 8.8–10.2)
CHLORIDE SERPL-SCNC: 103 MMOL/L (ref 98–111)
CO2 SERPL-SCNC: 22 MMOL/L (ref 22–29)
CREAT SERPL-MCNC: 0.9 MG/DL (ref 0.5–0.9)
EOSINOPHIL # BLD: 0.1 K/UL (ref 0–0.6)
EOSINOPHIL NFR BLD: 2.4 % (ref 0–5)
ERYTHROCYTE [DISTWIDTH] IN BLOOD BY AUTOMATED COUNT: 15.7 % (ref 11.5–14.5)
GLUCOSE SERPL-MCNC: 92 MG/DL (ref 70–99)
HCT VFR BLD AUTO: 34.1 % (ref 37–47)
HGB BLD-MCNC: 10.9 G/DL (ref 12–16)
IMM GRANULOCYTES # BLD: 0 K/UL
LYMPHOCYTES # BLD: 1.2 K/UL (ref 1.1–4.5)
LYMPHOCYTES NFR BLD: 35.4 % (ref 20–40)
MCH RBC QN AUTO: 30.5 PG (ref 27–31)
MCHC RBC AUTO-ENTMCNC: 32 G/DL (ref 33–37)
MCV RBC AUTO: 95.5 FL (ref 81–99)
MONOCYTES # BLD: 0.4 K/UL (ref 0–0.9)
MONOCYTES NFR BLD: 12.8 % (ref 0–10)
NEUTROPHILS # BLD: 1.6 K/UL (ref 1.5–7.5)
NEUTS SEG NFR BLD: 47 % (ref 50–65)
PLATELET # BLD AUTO: 197 K/UL (ref 130–400)
PMV BLD AUTO: 11.2 FL (ref 9.4–12.3)
POTASSIUM SERPL-SCNC: 3.6 MMOL/L (ref 3.5–5)
PROT SERPL-MCNC: 5.8 G/DL (ref 6.4–8.3)
RBC # BLD AUTO: 3.57 M/UL (ref 4.2–5.4)
SODIUM SERPL-SCNC: 138 MMOL/L (ref 136–145)
WBC # BLD AUTO: 3.4 K/UL (ref 4.8–10.8)

## 2024-12-04 PROCEDURE — 83880 ASSAY OF NATRIURETIC PEPTIDE: CPT

## 2024-12-04 PROCEDURE — 99284 EMERGENCY DEPT VISIT MOD MDM: CPT

## 2024-12-04 PROCEDURE — 80053 COMPREHEN METABOLIC PANEL: CPT

## 2024-12-04 PROCEDURE — 36415 COLL VENOUS BLD VENIPUNCTURE: CPT

## 2024-12-04 PROCEDURE — 93971 EXTREMITY STUDY: CPT

## 2024-12-04 PROCEDURE — 85025 COMPLETE CBC W/AUTO DIFF WBC: CPT

## 2024-12-04 ASSESSMENT — ENCOUNTER SYMPTOMS: RESPIRATORY NEGATIVE: 1

## 2024-12-04 NOTE — ED PROVIDER NOTES
hand, hand, and forearm.  Patient has good range of motion and no pain with palpation.   Skin:     Coloration: Skin is pale.      Findings: Bruising present.   Neurological:      Mental Status: She is alert and oriented to person, place, and time.   Psychiatric:         Mood and Affect: Mood normal.         DIAGNOSTIC RESULTS     EKG: All EKG's are interpreted by the Emergency Department Physician who either signs or Co-signs this chart in the absence of a cardiologist.        RADIOLOGY:   Non-plain film images such as CT, Ultrasound and MRI are read by the radiologist. Plainradiographic images are visualized and preliminarily interpreted by the emergency physician with the below findings:        Interpretation per the Radiologist below, if available at the time of this note:    Vascular duplex upper extremity venous right    (Results Pending)         ED BEDSIDE ULTRASOUND:   Performed by ED Physician - none    LABS:  Labs Reviewed   CBC WITH AUTO DIFFERENTIAL - Abnormal; Notable for the following components:       Result Value    WBC 3.4 (*)     RBC 3.57 (*)     Hemoglobin 10.9 (*)     Hematocrit 34.1 (*)     MCHC 32.0 (*)     RDW 15.7 (*)     Neutrophils % 47.0 (*)     Monocytes % 12.8 (*)     Basophils % 1.5 (*)     All other components within normal limits   COMPREHENSIVE METABOLIC PANEL - Abnormal; Notable for the following components:    Calcium 8.2 (*)     Total Protein 5.8 (*)     Albumin 2.4 (*)     Alkaline Phosphatase 115 (*)     AST 43 (*)     All other components within normal limits   BRAIN NATRIURETIC PEPTIDE - Abnormal; Notable for the following components:    NT Pro-BNP 5,288 (*)     All other components within normal limits       All other labs were within normal range or not returned as of this dictation.    Medications - No data to display    EMERGENCY DEPARTMENT COURSE and DIFFERENTIALDIAGNOSIS/MDM:   Vitals:    Vitals:    12/04/24 0802   BP: 113/84   Pulse: 71   Resp: 16   Temp: 98.8 °F (37.1

## 2024-12-04 NOTE — PROGRESS NOTES
Vascular lab preliminary notes.  Right upper extremity venous duplex scan performed.   No evidence of DVT or SVT in areas visualized at this time.   Final report pending.

## 2024-12-04 NOTE — TELEPHONE ENCOUNTER
Called patient's son and Mrs. Milian is back in the hospital again at this time with possible blood clot. He will call if he needs anything.

## 2024-12-04 NOTE — DISCHARGE INSTRUCTIONS
Follow-up with your provider if symptoms persist.  Return to ER for any new, worsening, or change in condition.

## 2024-12-05 PROCEDURE — 93971 EXTREMITY STUDY: CPT | Performed by: SURGERY

## 2024-12-13 ENCOUNTER — APPOINTMENT (OUTPATIENT)
Dept: GENERAL RADIOLOGY | Facility: HOSPITAL | Age: 74
End: 2024-12-13
Payer: MEDICARE

## 2024-12-13 ENCOUNTER — HOSPITAL ENCOUNTER (EMERGENCY)
Facility: HOSPITAL | Age: 74
Discharge: HOME | End: 2024-12-14
Payer: MEDICARE

## 2024-12-13 DIAGNOSIS — K59.00 CONSTIPATION, UNSPECIFIED CONSTIPATION TYPE: ICD-10-CM

## 2024-12-13 DIAGNOSIS — Z91.89 AT RISK FOR DEHYDRATION DUE TO POOR FLUID INTAKE: Primary | ICD-10-CM

## 2024-12-13 LAB
ALBUMIN SERPL-MCNC: 2.5 G/DL (ref 3.5–5.2)
ALBUMIN/GLOB SERPL: 0.7 G/DL
ALP SERPL-CCNC: 148 U/L (ref 39–117)
ALT SERPL W P-5'-P-CCNC: 18 U/L (ref 1–33)
ANION GAP SERPL CALCULATED.3IONS-SCNC: 12 MMOL/L (ref 5–15)
AST SERPL-CCNC: 50 U/L (ref 1–32)
B PARAPERT DNA SPEC QL NAA+PROBE: NOT DETECTED
B PERT DNA SPEC QL NAA+PROBE: NOT DETECTED
BACTERIA UR QL AUTO: NORMAL /HPF
BASOPHILS # BLD AUTO: 0.04 10*3/MM3 (ref 0–0.2)
BASOPHILS NFR BLD AUTO: 1 % (ref 0–1.5)
BILIRUB SERPL-MCNC: 0.6 MG/DL (ref 0–1.2)
BILIRUB UR QL STRIP: NEGATIVE
BUN SERPL-MCNC: 17 MG/DL (ref 8–23)
BUN/CREAT SERPL: 17.3 (ref 7–25)
C PNEUM DNA NPH QL NAA+NON-PROBE: NOT DETECTED
CALCIUM SPEC-SCNC: 8.2 MG/DL (ref 8.6–10.5)
CHLORIDE SERPL-SCNC: 98 MMOL/L (ref 98–107)
CLARITY UR: CLEAR
CO2 SERPL-SCNC: 29 MMOL/L (ref 22–29)
COLOR UR: YELLOW
CREAT SERPL-MCNC: 0.98 MG/DL (ref 0.57–1)
CRP SERPL-MCNC: 1.63 MG/DL (ref 0–0.5)
D-LACTATE SERPL-SCNC: 1.5 MMOL/L (ref 0.5–2)
DEPRECATED RDW RBC AUTO: 55.9 FL (ref 37–54)
EGFRCR SERPLBLD CKD-EPI 2021: 61.5 ML/MIN/1.73
EOSINOPHIL # BLD AUTO: 0.07 10*3/MM3 (ref 0–0.4)
EOSINOPHIL NFR BLD AUTO: 1.7 % (ref 0.3–6.2)
ERYTHROCYTE [DISTWIDTH] IN BLOOD BY AUTOMATED COUNT: 16.5 % (ref 12.3–15.4)
ERYTHROCYTE [SEDIMENTATION RATE] IN BLOOD: 40 MM/HR (ref 0–30)
FLUAV SUBTYP SPEC NAA+PROBE: NOT DETECTED
FLUBV RNA ISLT QL NAA+PROBE: NOT DETECTED
GLOBULIN UR ELPH-MCNC: 3.4 GM/DL
GLUCOSE SERPL-MCNC: 92 MG/DL (ref 65–99)
GLUCOSE UR STRIP-MCNC: NEGATIVE MG/DL
HADV DNA SPEC NAA+PROBE: NOT DETECTED
HCOV 229E RNA SPEC QL NAA+PROBE: NOT DETECTED
HCOV HKU1 RNA SPEC QL NAA+PROBE: NOT DETECTED
HCOV NL63 RNA SPEC QL NAA+PROBE: NOT DETECTED
HCOV OC43 RNA SPEC QL NAA+PROBE: NOT DETECTED
HCT VFR BLD AUTO: 36.1 % (ref 34–46.6)
HGB BLD-MCNC: 11.8 G/DL (ref 12–15.9)
HGB UR QL STRIP.AUTO: ABNORMAL
HMPV RNA NPH QL NAA+NON-PROBE: NOT DETECTED
HPIV1 RNA ISLT QL NAA+PROBE: NOT DETECTED
HPIV2 RNA SPEC QL NAA+PROBE: NOT DETECTED
HPIV3 RNA NPH QL NAA+PROBE: NOT DETECTED
HPIV4 P GENE NPH QL NAA+PROBE: NOT DETECTED
HYALINE CASTS UR QL AUTO: NORMAL /LPF
IMM GRANULOCYTES # BLD AUTO: 0.01 10*3/MM3 (ref 0–0.05)
IMM GRANULOCYTES NFR BLD AUTO: 0.2 % (ref 0–0.5)
KETONES UR QL STRIP: NEGATIVE
LEUKOCYTE ESTERASE UR QL STRIP.AUTO: NEGATIVE
LIPASE SERPL-CCNC: 23 U/L (ref 13–60)
LYMPHOCYTES # BLD AUTO: 1.43 10*3/MM3 (ref 0.7–3.1)
LYMPHOCYTES NFR BLD AUTO: 35.4 % (ref 19.6–45.3)
M PNEUMO IGG SER IA-ACNC: NOT DETECTED
MAGNESIUM SERPL-MCNC: 1.8 MG/DL (ref 1.6–2.4)
MCH RBC QN AUTO: 29.9 PG (ref 26.6–33)
MCHC RBC AUTO-ENTMCNC: 32.7 G/DL (ref 31.5–35.7)
MCV RBC AUTO: 91.4 FL (ref 79–97)
MONOCYTES # BLD AUTO: 0.33 10*3/MM3 (ref 0.1–0.9)
MONOCYTES NFR BLD AUTO: 8.2 % (ref 5–12)
NEUTROPHILS NFR BLD AUTO: 2.16 10*3/MM3 (ref 1.7–7)
NEUTROPHILS NFR BLD AUTO: 53.5 % (ref 42.7–76)
NITRITE UR QL STRIP: NEGATIVE
NT-PROBNP SERPL-MCNC: 2469 PG/ML (ref 0–900)
PH UR STRIP.AUTO: <=5 [PH] (ref 5–8)
PLATELET # BLD AUTO: 135 10*3/MM3 (ref 140–450)
PMV BLD AUTO: 12.8 FL (ref 6–12)
POTASSIUM SERPL-SCNC: 3.3 MMOL/L (ref 3.5–5.2)
PROT SERPL-MCNC: 5.9 G/DL (ref 6–8.5)
PROT UR QL STRIP: NEGATIVE
QT INTERVAL: 486 MS
QT INTERVAL: 508 MS
QTC INTERVAL: 549 MS
QTC INTERVAL: 582 MS
RBC # BLD AUTO: 3.95 10*6/MM3 (ref 3.77–5.28)
RBC # UR STRIP: NORMAL /HPF
REF LAB TEST METHOD: NORMAL
RHINOVIRUS RNA SPEC NAA+PROBE: NOT DETECTED
RSV RNA NPH QL NAA+NON-PROBE: NOT DETECTED
SARS-COV-2 RNA RESP QL NAA+PROBE: NOT DETECTED
SODIUM SERPL-SCNC: 139 MMOL/L (ref 136–145)
SP GR UR STRIP: 1.01 (ref 1–1.03)
SQUAMOUS #/AREA URNS HPF: NORMAL /HPF
T4 FREE SERPL-MCNC: 1.38 NG/DL (ref 0.93–1.7)
TSH SERPL DL<=0.05 MIU/L-ACNC: 5.63 UIU/ML (ref 0.27–4.2)
UROBILINOGEN UR QL STRIP: ABNORMAL
WBC # UR STRIP: NORMAL /HPF
WBC NRBC COR # BLD AUTO: 4.04 10*3/MM3 (ref 3.4–10.8)

## 2024-12-13 PROCEDURE — 84481 FREE ASSAY (FT-3): CPT | Performed by: NURSE PRACTITIONER

## 2024-12-13 PROCEDURE — 99284 EMERGENCY DEPT VISIT MOD MDM: CPT

## 2024-12-13 PROCEDURE — 83735 ASSAY OF MAGNESIUM: CPT | Performed by: NURSE PRACTITIONER

## 2024-12-13 PROCEDURE — 74019 RADEX ABDOMEN 2 VIEWS: CPT

## 2024-12-13 PROCEDURE — 83880 ASSAY OF NATRIURETIC PEPTIDE: CPT | Performed by: NURSE PRACTITIONER

## 2024-12-13 PROCEDURE — 93005 ELECTROCARDIOGRAM TRACING: CPT | Performed by: INTERNAL MEDICINE

## 2024-12-13 PROCEDURE — 85025 COMPLETE CBC W/AUTO DIFF WBC: CPT | Performed by: NURSE PRACTITIONER

## 2024-12-13 PROCEDURE — 83605 ASSAY OF LACTIC ACID: CPT | Performed by: NURSE PRACTITIONER

## 2024-12-13 PROCEDURE — 71045 X-RAY EXAM CHEST 1 VIEW: CPT

## 2024-12-13 PROCEDURE — 81001 URINALYSIS AUTO W/SCOPE: CPT | Performed by: NURSE PRACTITIONER

## 2024-12-13 PROCEDURE — 87154 CUL TYP ID BLD PTHGN 6+ TRGT: CPT | Performed by: NURSE PRACTITIONER

## 2024-12-13 PROCEDURE — 80053 COMPREHEN METABOLIC PANEL: CPT | Performed by: NURSE PRACTITIONER

## 2024-12-13 PROCEDURE — 85652 RBC SED RATE AUTOMATED: CPT | Performed by: NURSE PRACTITIONER

## 2024-12-13 PROCEDURE — 87147 CULTURE TYPE IMMUNOLOGIC: CPT | Performed by: NURSE PRACTITIONER

## 2024-12-13 PROCEDURE — 84439 ASSAY OF FREE THYROXINE: CPT | Performed by: NURSE PRACTITIONER

## 2024-12-13 PROCEDURE — 0202U NFCT DS 22 TRGT SARS-COV-2: CPT | Performed by: NURSE PRACTITIONER

## 2024-12-13 PROCEDURE — 87040 BLOOD CULTURE FOR BACTERIA: CPT | Performed by: NURSE PRACTITIONER

## 2024-12-13 PROCEDURE — 83690 ASSAY OF LIPASE: CPT | Performed by: NURSE PRACTITIONER

## 2024-12-13 PROCEDURE — 84443 ASSAY THYROID STIM HORMONE: CPT | Performed by: NURSE PRACTITIONER

## 2024-12-13 PROCEDURE — 87186 SC STD MICRODIL/AGAR DIL: CPT | Performed by: NURSE PRACTITIONER

## 2024-12-13 PROCEDURE — 36415 COLL VENOUS BLD VENIPUNCTURE: CPT

## 2024-12-13 PROCEDURE — 86140 C-REACTIVE PROTEIN: CPT | Performed by: NURSE PRACTITIONER

## 2024-12-13 PROCEDURE — 93005 ELECTROCARDIOGRAM TRACING: CPT | Performed by: NURSE PRACTITIONER

## 2024-12-13 PROCEDURE — 25810000003 SODIUM CHLORIDE 0.9 % SOLUTION: Performed by: NURSE PRACTITIONER

## 2024-12-13 RX ORDER — SODIUM CHLORIDE 0.9 % (FLUSH) 0.9 %
10 SYRINGE (ML) INJECTION AS NEEDED
Status: DISCONTINUED | OUTPATIENT
Start: 2024-12-13 | End: 2024-12-14 | Stop reason: HOSPADM

## 2024-12-13 RX ADMIN — SODIUM CHLORIDE 500 ML: 0.9 INJECTION, SOLUTION INTRAVENOUS at 19:17

## 2024-12-14 VITALS
RESPIRATION RATE: 16 BRPM | WEIGHT: 200 LBS | OXYGEN SATURATION: 92 % | BODY MASS INDEX: 34.15 KG/M2 | HEART RATE: 77 BPM | SYSTOLIC BLOOD PRESSURE: 98 MMHG | HEIGHT: 64 IN | TEMPERATURE: 98.3 F | DIASTOLIC BLOOD PRESSURE: 63 MMHG

## 2024-12-14 LAB — T3FREE SERPL-MCNC: 1.33 PG/ML (ref 2–4.4)

## 2024-12-14 NOTE — ED PROVIDER NOTES
Subjective   History of Present Illness  Patient is a 74-year-old female who presents to the ER with chief complaints of failure to thrive.  Patient's son is at bedside.  Patient is currently living with patient's son.  She has had a lengthy hospitalization recently.  Per review patient fell in August.  She was admitted at [facility] August 19 to August 30 due to a fall and back pain.  She was unable to ambulate.  At baseline patient was using a walker.  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  At that time patient declined surgery.  She was experiencing hallucinations while on pain medication.  Blood cultures came back positive for staph with MRSA.  Patient was started on vancomycin.  There were no clear sources for bacteremia.  ID was consulted.  Patient had further CT scans and CT scan of the chest showed a bilateral pneumonia with diffuse bronchiolitis versus pulmonary edema.  Echo showed an EF of 55 to 60%.  On August 28, 2024 she had a MEMO which showed no obvious valvular vegetation or thrombus.  Patient had a repeat scan which showed L2-L3 disc space concerning for discitis/osteomyelitis suggestive of an epidural abscess and bilateral psoas abscesses.  Blood cultures on August 29 was positive for gram-positive cocci in clusters.  It was felt patient needed to be transferred to a higher level of care for IR to drain the psoas abscesses.  Patient was then transferred to [facility] [place] in [place].  She had a drain placed for drainage of the abscess.  Patient was discharged to a nursing home and while at the nursing home she had an event concerning for stroke.  Patient was readmitted at [facility] on September 30, 2024 to October 20, 2024 for strokelike symptoms.  She became altered and had inability to speak which improved while in the emergency  department.  Discharge diagnosis was TIA.  Per review of chart, MRI of the brain was negative for acute findings.  She has encephalomalacia to the right parietal and occipital lobes.  She had worsening back pain therefore repeat MRI was performed which showed worsening discitis/osteomyelitis of L2-L3.  Patient went to surgery on October 4, 2024 for decompression laminectomy for debulking of epidural abscess and phlegmon.  Patient was discharged on October 20, 2024 to [facility].  Please see note for complete details.  Son states she underwent physical therapy and was discharged to his home.  Patient has essentially been bedbound since she developed the epidural abscess.  She has a hospital bed in his home and receives home health weekly.  Today home health came out to the home and felt patient needed to be further evaluated.  She has not been eating or drinking for the past several days.  She states that she is just full and does not feel like eating or drinking.  She denies any abdominal pain or back pain.  She is lethargic on exam however answers all questions appropriately.  She states that she just does not want to eat or drink.  She has had minimal cough without any recorded fevers.  Due to failure to thrive she was brought to the ER for further evaluation.    Past medical history significant for anemia, CHF, depression, epidural abscess, hypertension, intracranial abscess, MRSA bacteremia, osteomyelitis, renal disorder, spinal stenosis, stroke        Review of Systems   Constitutional:  Positive for appetite change.   HENT:  Positive for congestion.    Respiratory:  Positive for cough. Negative for shortness of breath.    Cardiovascular: Negative.  Negative for chest pain.   Gastrointestinal:  Positive for constipation and nausea. Negative for abdominal pain and vomiting.   Genitourinary: Negative.  Negative for dysuria.   Musculoskeletal:  Negative for back pain.   Skin:         Chronic diabetic  wound to the right ankle   All other systems reviewed and are negative.      Past Medical History:   Diagnosis Date    Anemia     Back pain     CHF (congestive heart failure)     Depression     Epidural abscess     Hypertension     Injury of back     Intracranial abscess     MRSA bacteremia     Osteomyelitis     Renal disorder     Spasm     Spinal stenosis     Stroke        Allergies   Allergen Reactions    Niacin Rash       Past Surgical History:   Procedure Laterality Date    BRAIN SURGERY      CERVICAL DISC SURGERY         No family history on file.    Social History     Socioeconomic History    Marital status:            Objective   Physical Exam  Vitals and nursing note reviewed.   Constitutional:       Appearance: She is well-developed. She is ill-appearing.      Comments: Chronically ill-appearing, sleeping on exam   HENT:      Head: Normocephalic and atraumatic.      Nose: Nose normal.   Eyes:      Extraocular Movements: Extraocular movements intact.      Conjunctiva/sclera: Conjunctivae normal.   Cardiovascular:      Rate and Rhythm: Normal rate and regular rhythm.      Heart sounds: Normal heart sounds.   Pulmonary:      Effort: Pulmonary effort is normal.      Breath sounds: Rales present.   Abdominal:      General: Bowel sounds are normal. There is no distension.      Palpations: Abdomen is soft.      Tenderness: There is no abdominal tenderness.   Musculoskeletal:      Cervical back: Normal range of motion and neck supple.      Comments: Patient denies any back pain to palpation on exam    No skin break down noted to coccyx   Skin:     General: Skin is warm and dry.      Coloration: Skin is pale.   Neurological:      Mental Status: She is alert.   Psychiatric:      Comments: Sleeping on exam, awakens to verbal, appears lethargic, speech is clear, answers all questions appropriately including name, place, and birthdate as well as current year, no new focal deficits identified          Procedures           ED Course                                                       Medical Decision Making  Patient is a 74-year-old female who presents to the ER with chief complaints of failure to thrive.  Patient's son is at bedside.  Patient is currently living with patient's son.  She has had a lengthy hospitalization recently.  Per review patient fell in August.  She was admitted at [facility] August 19 to August 30 due to a fall and back pain.  She was unable to ambulate.  At baseline patient was using a walker.  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  At that time patient declined surgery.  She was experiencing hallucinations while on pain medication.  Blood cultures came back positive for staph with MRSA.  Patient was started on vancomycin.  There were no clear sources for bacteremia.  ID was consulted.  Patient had further CT scans and CT scan of the chest showed a bilateral pneumonia with diffuse bronchiolitis versus pulmonary edema.  Echo showed an EF of 55 to 60%.  On August 28, 2024 she had a MEMO which showed no obvious valvular vegetation or thrombus.  Patient had a repeat scan which showed L2-L3 disc space concerning for discitis/osteomyelitis suggestive of an epidural abscess and bilateral psoas abscesses.  Blood cultures on August 29 was positive for gram-positive cocci in clusters.  It was felt patient needed to be transferred to a higher level of care for IR to drain the psoas abscesses.  Patient was then transferred to [facility] in [place].  She had a drain placed for drainage of the abscess.  Patient was discharged to a nursing home and while at the nursing home she had an event concerning for stroke.  Patient was readmitted at [facility] on September 30, 2024 to October 20, 2024 for strokelike symptoms.  She became  altered and had inability to speak which improved while in the emergency department.  Discharge diagnosis was TIA.  Per review of chart, MRI of the brain was negative for acute findings.  She has encephalomalacia to the right parietal and occipital lobes.  She had worsening back pain therefore repeat MRI was performed which showed worsening discitis/osteomyelitis of L2-L3.  Patient went to surgery on October 4, 2024 for decompression laminectomy for debulking of epidural abscess and phlegmon.  Patient was discharged on October 20, 2024 to [facility].  Please see note for complete details.  Son states she underwent physical therapy and was discharged to his home.  Patient has essentially been bedbound since she developed the epidural abscess.  She has a hospital bed in his home and receives home health weekly.  Today home health came out to the home and felt patient needed to be further evaluated.  She has not been eating or drinking for the past several days.  She states that she is just full and does not feel like eating or drinking.  She denies any abdominal pain or back pain.  She is lethargic on exam however answers all questions appropriately.  She states that she just does not want to eat or drink.  She has had minimal cough without any recorded fevers.  Due to failure to thrive she was brought to the ER for further evaluation.    Past medical history significant for anemia, CHF, depression, epidural abscess, hypertension, intracranial abscess, MRSA bacteremia, osteomyelitis, renal disorder, spinal stenosis, stroke    Differential diagnosis includes but not limited to dehydration, UTI, pneumonia, viral illness, bacteremia, and other etiologies    Labs Reviewed  COMPREHENSIVE METABOLIC PANEL - Abnormal; Notable for the following components:     Potassium                     3.3 (*)                Calcium                       8.2 (*)                Total Protein                 5.9 (*)                 Albumin                       2.5 (*)                AST (SGOT)                    50 (*)                 Alkaline Phosphatase          148 (*)             All other components within normal limits         Narrative: GFR Categories in Chronic Kidney Disease (CKD)                                      GFR Category          GFR (mL/min/1.73)    Interpretation                  G1                     90 or greater         Normal or high (1)                  G2                      60-89                Mild decrease (1)                  G3a                   45-59                Mild to moderate decrease                  G3b                   30-44                Moderate to severe decrease                  G4                    15-29                Severe decrease                  G5                    14 or less           Kidney failure                                          (1)In the absence of evidence of kidney disease, neither GFR category G1 or G2 fulfill the criteria for CKD.                                    eGFR calculation 2021 CKD-EPI creatinine equation, which does not include race as a factor  URINALYSIS W/ MICROSCOPIC IF INDICATED (NO CULTURE) - Abnormal; Notable for the following components:     Blood, UA                     Small (1+) (*)            All other components within normal limits  CBC WITH AUTO DIFFERENTIAL - Abnormal; Notable for the following components:     Hemoglobin                    11.8 (*)               RDW                           16.5 (*)               RDW-SD                        55.9 (*)               MPV                           12.8 (*)               Platelets                     135 (*)             All other components within normal limits  BNP (IN-HOUSE) - Abnormal; Notable for the following components:     proBNP                        2,469.0 (*)            All other components within normal limits         Narrative: This assay is used as an aid in the diagnosis of  individuals suspected of having heart failure. It can be used as an aid in the diagnosis of acute decompensated heart failure (ADHF) in patients presenting with signs and symptoms of ADHF to the emergency department (ED). In addition, NT-proBNP of <300 pg/mL indicates ADHF is not likely.                                    Age Range Result Interpretation  NT-proBNP Concentration (pg/mL:                                                      <50             Positive            >450                                   Gray                 300-450                                    Negative             <300                                    50-75           Positive            >900                                  Vasquez                300-900                                  Negative            <300                                                      >75             Positive            >1800                                  Vasquez                300-1800                                  Negative            <300  TSH - Abnormal; Notable for the following components:     TSH                           5.630 (*)            All other components within normal limits  SEDIMENTATION RATE - Abnormal; Notable for the following components:     Sed Rate                      40 (*)              All other components within normal limits  C-REACTIVE PROTEIN - Abnormal; Notable for the following components:     C-Reactive Protein            1.63 (*)            All other components within normal limits  RESPIRATORY PANEL PCR W/ COVID-19 (SARS-COV-2), NP SWAB IN UTM/VTP, 2 HR TAT - Normal         Narrative: In the setting of a positive respiratory panel with a viral infection PLUS a negative procalcitonin without other underlying concern for bacterial infection, consider observing off antibiotics or discontinuation of antibiotics and continue supportive care. If the respiratory panel is positive for atypical bacterial infection (Bordetella  "pertussis, Chlamydophila pneumoniae, or Mycoplasma pneumoniae), consider antibiotic de-escalation to target atypical bacterial infection.  LIPASE - Normal  LACTIC ACID, PLASMA - Normal  MAGNESIUM - Normal  T4, FREE - Normal  BLOOD CULTURE  BLOOD CULTURE  URINALYSIS, MICROSCOPIC ONLY  T3, FREE  CBC AND DIFFERENTIAL     XR Abdomen Flat & Upright   Final Result         1.  Notable stool impaction in the rectum. Upstream colon is    nondistended. Small bowel loops are decompressed.              This report was signed and finalized on 12/13/2024 7:34 PM by Dr Tk Vasquez.          XR Chest 1 View   Final Result    1.  No acute process in the chest. No visualized infiltrate.         This report was signed and finalized on 12/13/2024 7:24 PM by Dr Tk Vasquez.         Patient was brought to the ER because home health recommended since patient has had decreased po intake. She is able to swallow her pills however just hasn't had an appetite and states she just feels full when she eats and drinks. Patient is oriented. She has no back pain on each exam. Labs for the most part are unremarkable. She has a mildly elevated bnp however denies any worsening shortness of breath or chest pain. She has constipation on xray and we attempted disimpaction in the er. Also soap suds enema was given. Patient is requesting to be \"left alone. I wish mague would just let me go home, I've been here all day.\" After lengthy discussion with the son he feels he can take care of the patient at home. We talked about her possibly needing a feeding tube if she continues to refuse to eat. He then inquired about palliative care and states this was already discussed during a previous admission. He feels patient and family are ready for palliative care. We recommend to call pcp on Monday to set up palliative care and/or feeding tube if they decide against palliative care. Dr. Morrison reviewed labs and case and agrees with treatment plan. Today no " obvious signs of new infection. Vitals are stable. Son is comfortable with patient being discharged home and will speak with pcp on Monday regarding palliative care. Patient may return with worsening symptoms if they decide against palliative care.    Problems Addressed:  At risk for dehydration due to poor fluid intake: complicated acute illness or injury  Constipation, unspecified constipation type: complicated acute illness or injury    Amount and/or Complexity of Data Reviewed  Independent Historian:      Details: son  Labs: ordered. Decision-making details documented in ED Course.  Radiology: ordered. Decision-making details documented in ED Course.  ECG/medicine tests: ordered. Decision-making details documented in ED Course.    Risk  Prescription drug management.        Final diagnoses:   At risk for dehydration due to poor fluid intake   Constipation, unspecified constipation type       ED Disposition  ED Disposition       ED Disposition   Discharge    Condition   Stable    Comment   --               No follow-up provider specified.       Medication List      No changes were made to your prescriptions during this visit.            Toshia Perez, APRN  12/13/24 1020

## 2024-12-14 NOTE — ED NOTES
Faxed medical necessity to [facility] and called dispatch, ETA of EMS arrival appx 60-90 mins

## 2024-12-14 NOTE — DISCHARGE INSTRUCTIONS
I would have discussions with patient and family regarding treatment going forward. If patient wants to be on palliative care and have only comfort measures, call pcp on Monday to get this started. Otherwise, if she continues to have decreased PO intake you would need to consider a feeding tube.   Also, if you decide against comfort measures I would recommend a scheduled bowel regimen to help with the constipation.   Please return with any new or worsening symptoms if you decide against palliative care

## 2024-12-15 LAB
BACTERIA BLD CULT: ABNORMAL
BOTTLE TYPE: ABNORMAL

## 2024-12-15 NOTE — DISCHARGE INSTRUCTIONS
It was very nice to meet you, Klarissa. Thank you for allowing us to take care of you today at [facility].    Today you were seen in the emergency department for your symptoms. Please understand that an ER evaluation is just the start of your evaluation. We do the best we can, but we are often unable to fully find what is causing your symptoms from one evaluation.  Because of this, the goal is to determine whether you need to be evaluated in the hospital or if it is safe for you to go home and see other doctors provided such as primary care physicians or specialist on an outpatient basis.     Like we discussed, I strongly urge that you follow up with your primary care doctor. Please call their office to set up an appointment as soon as possible so that you can be re-evaluated for improvement in your symptoms or for any other questions.  I have provided the information needed, including phone number, to call to set up an appointment below in these discharge papers.     Educational material has also been provided in the following pages regarding what we have discussed today.     Please return to the emergency room within 12-48 hours if you experience symptoms such as the following:   Fever, chills, chest pain or shortness of breath, pain with inspiration/expiration, pain that travels to your arms, neck or back, nausea, vomiting, severe headache, tearing pain in your chest, dizziness, feel as though you are about to pass out, OR if you have any worsening symptoms, or any other concerns.     102.9

## 2024-12-17 ENCOUNTER — TELEPHONE (OUTPATIENT)
Dept: EMERGENCY DEPT | Facility: HOSPITAL | Age: 74
End: 2024-12-17
Payer: MEDICARE

## 2024-12-17 DIAGNOSIS — F41.1 GAD (GENERALIZED ANXIETY DISORDER): ICD-10-CM

## 2024-12-17 DIAGNOSIS — I48.91 ATRIAL FIBRILLATION, UNSPECIFIED TYPE (HCC): ICD-10-CM

## 2024-12-17 DIAGNOSIS — E78.2 MIXED HYPERLIPIDEMIA: ICD-10-CM

## 2024-12-17 DIAGNOSIS — F33.1 MODERATE EPISODE OF RECURRENT MAJOR DEPRESSIVE DISORDER (HCC): ICD-10-CM

## 2024-12-17 DIAGNOSIS — M46.26 OSTEOMYELITIS OF LUMBAR SPINE: Primary | ICD-10-CM

## 2024-12-17 DIAGNOSIS — I10 ESSENTIAL HYPERTENSION: ICD-10-CM

## 2024-12-17 DIAGNOSIS — Z74.09 POOR MOBILITY: ICD-10-CM

## 2024-12-17 DIAGNOSIS — G06.1 EPIDURAL ABSCESS, L2-L5: ICD-10-CM

## 2024-12-17 DIAGNOSIS — I51.89 DIASTOLIC DYSFUNCTION: ICD-10-CM

## 2024-12-17 LAB
BACTERIA SPEC AEROBE CULT: ABNORMAL
GRAM STN SPEC: ABNORMAL
ISOLATED FROM: ABNORMAL

## 2024-12-17 NOTE — TELEPHONE ENCOUNTER
Lashonda Milian called to request a refill on her medication.    *Pt son requested refill on Percocet's but per Beck was informed that if Maria G was okay to fill temp short term supply that was fine but he would not approve regular script as she has not been seen by him in office since 2023. Daniel voiced awareness and understanding, wanted to let us know they were also interested in Pall Care and wanted to know how to get that started.*    Last office visit : 11/12/2024   Next office visit : Visit date not found     Requested Prescriptions     Pending Prescriptions Disp Refills    sertraline (ZOLOFT) 50 MG tablet [Pharmacy Med Name: Sertraline HCl 50 MG Oral Tablet] 30 tablet 0     Sig: Take 1 tablet by mouth once daily    furosemide (LASIX) 20 MG tablet [Pharmacy Med Name: Furosemide 20 MG Oral Tablet] 30 tablet 0     Sig: TAKE 1 TABLET BY MOUTH ONCE DAILY AS NEEDED    atorvastatin (LIPITOR) 20 MG tablet [Pharmacy Med Name: Atorvastatin Calcium 20 MG Oral Tablet] 30 tablet 0     Sig: Take 1 tablet by mouth once daily    folic acid (FOLVITE) 1 MG tablet [Pharmacy Med Name: Folic Acid 1 MG Oral Tablet] 30 tablet 0     Sig: Take 1 tablet by mouth once daily    busPIRone (BUSPAR) 5 MG tablet [Pharmacy Med Name: busPIRone HCl 5 MG Oral Tablet] 90 tablet 0     Sig: Take 1 tablet by mouth three times daily as needed    apixaban (ELIQUIS) 5 MG TABS tablet 60 tablet 0     Sig: Take 1 tablet by mouth twice daily    metoprolol tartrate (LOPRESSOR) 25 MG tablet [Pharmacy Med Name: Metoprolol Tartrate 25 MG Oral Tablet] 60 tablet 0     Sig: Take 1 tablet by mouth twice daily    amiodarone (CORDARONE) 200 MG tablet [Pharmacy Med Name: Amiodarone HCl 200 MG Oral Tablet] 60 tablet 0     Sig: Take 1 tablet by mouth twice daily    methocarbamol (ROBAXIN) 500 MG tablet [Pharmacy Med Name: Methocarbamol 500 MG Oral Tablet] 60 tablet 0     Sig: Take 1 tablet by mouth twice daily    pregabalin (LYRICA) 100 MG capsule 120 capsule 0

## 2024-12-18 LAB
QT INTERVAL: 486 MS
QT INTERVAL: 508 MS
QTC INTERVAL: 549 MS
QTC INTERVAL: 582 MS

## 2024-12-18 RX ORDER — FOLIC ACID 1 MG/1
1000 TABLET ORAL DAILY
Qty: 30 TABLET | Refills: 0 | Status: SHIPPED | OUTPATIENT
Start: 2024-12-18

## 2024-12-18 RX ORDER — BUSPIRONE HYDROCHLORIDE 5 MG/1
5 TABLET ORAL 3 TIMES DAILY PRN
Qty: 90 TABLET | Refills: 0 | Status: SHIPPED | OUTPATIENT
Start: 2024-12-18

## 2024-12-18 RX ORDER — PREGABALIN 100 MG/1
200 CAPSULE ORAL 2 TIMES DAILY
Qty: 120 CAPSULE | Refills: 0 | Status: SHIPPED | OUTPATIENT
Start: 2024-12-18 | End: 2025-01-17

## 2024-12-18 RX ORDER — AMIODARONE HYDROCHLORIDE 200 MG/1
200 TABLET ORAL 2 TIMES DAILY
Qty: 60 TABLET | Refills: 0 | Status: SHIPPED | OUTPATIENT
Start: 2024-12-18

## 2024-12-18 RX ORDER — FUROSEMIDE 20 MG/1
TABLET ORAL
Qty: 30 TABLET | Refills: 0 | Status: SHIPPED | OUTPATIENT
Start: 2024-12-18

## 2024-12-18 RX ORDER — ATORVASTATIN CALCIUM 20 MG/1
20 TABLET, FILM COATED ORAL DAILY
Qty: 30 TABLET | Refills: 0 | Status: SHIPPED | OUTPATIENT
Start: 2024-12-18

## 2024-12-18 RX ORDER — METOPROLOL TARTRATE 25 MG/1
25 TABLET, FILM COATED ORAL 2 TIMES DAILY
Qty: 60 TABLET | Refills: 0 | Status: SHIPPED | OUTPATIENT
Start: 2024-12-18

## 2024-12-18 RX ORDER — METHOCARBAMOL 500 MG/1
500 TABLET, FILM COATED ORAL 2 TIMES DAILY
Qty: 60 TABLET | Refills: 0 | Status: SHIPPED | OUTPATIENT
Start: 2024-12-18

## 2024-12-18 NOTE — TELEPHONE ENCOUNTER
Medications pended were refilled for 30 day supply.    Referral made to [facility].  If they do not hear from them within a week, let us know.     Hopefully palliative care can assess her in the home and that will help us with further refills, etc also

## 2024-12-22 PROBLEM — N39.0 UTI (URINARY TRACT INFECTION): Status: RESOLVED | Noted: 2024-11-22 | Resolved: 2024-12-22

## 2025-01-08 ENCOUNTER — TELEPHONE (OUTPATIENT)
Age: 75
End: 2025-01-08

## 2025-01-08 NOTE — TELEPHONE ENCOUNTER
Received call from Bridgett with [employer] reporting that pt is currently admitted to [facility] in [facility] for Cholecystitis.

## 2025-01-15 ENCOUNTER — APPOINTMENT (OUTPATIENT)
Dept: GENERAL RADIOLOGY | Age: 75
End: 2025-01-15
Payer: MEDICARE

## 2025-01-15 ENCOUNTER — HOSPITAL ENCOUNTER (EMERGENCY)
Age: 75
Discharge: HOME | End: 2025-01-16
Attending: EMERGENCY MEDICINE
Payer: MEDICARE

## 2025-01-15 ENCOUNTER — APPOINTMENT (OUTPATIENT)
Dept: VASCULAR LAB | Age: 75
End: 2025-01-15
Attending: EMERGENCY MEDICINE
Payer: MEDICARE

## 2025-01-15 ENCOUNTER — TELEPHONE (OUTPATIENT)
Age: 75
End: 2025-01-15

## 2025-01-15 VITALS
RESPIRATION RATE: 18 BRPM | SYSTOLIC BLOOD PRESSURE: 135 MMHG | HEART RATE: 72 BPM | WEIGHT: 205 LBS | HEIGHT: 59 IN | BODY MASS INDEX: 41.33 KG/M2 | DIASTOLIC BLOOD PRESSURE: 86 MMHG | TEMPERATURE: 98 F | OXYGEN SATURATION: 93 %

## 2025-01-15 DIAGNOSIS — M79.605 LEFT LEG PAIN: Primary | ICD-10-CM

## 2025-01-15 DIAGNOSIS — Z51.5 PALLIATIVE CARE PATIENT: Primary | ICD-10-CM

## 2025-01-15 DIAGNOSIS — S80.12XA CONTUSION OF LEFT LEG, INITIAL ENCOUNTER: ICD-10-CM

## 2025-01-15 PROCEDURE — 93971 EXTREMITY STUDY: CPT

## 2025-01-15 PROCEDURE — 73590 X-RAY EXAM OF LOWER LEG: CPT

## 2025-01-15 PROCEDURE — 99284 EMERGENCY DEPT VISIT MOD MDM: CPT

## 2025-01-15 ASSESSMENT — PAIN DESCRIPTION - DESCRIPTORS: DESCRIPTORS: ACHING

## 2025-01-15 ASSESSMENT — PAIN - FUNCTIONAL ASSESSMENT: PAIN_FUNCTIONAL_ASSESSMENT: 0-10

## 2025-01-15 ASSESSMENT — PAIN DESCRIPTION - LOCATION: LOCATION: LEG

## 2025-01-15 ASSESSMENT — PAIN DESCRIPTION - ORIENTATION: ORIENTATION: LEFT;LOWER

## 2025-01-15 ASSESSMENT — PAIN SCALES - GENERAL: PAINLEVEL_OUTOF10: 6

## 2025-01-15 NOTE — ED TRIAGE NOTES
Pt was just discharged from hospital in [facility]) on Saturday, January 11th after ERCP per pt son.

## 2025-01-15 NOTE — TELEPHONE ENCOUNTER
Daniel HELLER on my direct line wanting to know if Lashonda could get any pain medication sent in. Please advise.

## 2025-01-15 NOTE — ED PROVIDER NOTES
ABDOMINAL (CERVIX REMOVED) N/A     With removal of tumor, ~, was a DANIS and BSO    LUMBAR SPINE SURGERY Right 10/4/2024    Decompressive lumbar laminectomy L2-3 for removal of epidural abscess/phlegmon using minimally invasive technique performed by Sarabjit Aguilar DO at [facility] OR    TONSILLECTOMY Bilateral     at age 17 or 18, withOUT Adneoids as per pt    TUMOR REMOVAL      intraabdominal, ~, states she was told it was feeding off her bowel         CURRENT MEDICATIONS     Previous Medications    AMIODARONE (CORDARONE) 200 MG TABLET    Take 1 tablet by mouth twice daily    APIXABAN (ELIQUIS) 5 MG TABS TABLET    Take 1 tablet by mouth twice daily    ATORVASTATIN (LIPITOR) 20 MG TABLET    Take 1 tablet by mouth once daily    BUSPIRONE (BUSPAR) 5 MG TABLET    Take 1 tablet by mouth three times daily as needed    ERGOCALCIFEROL (VITAMIN D) 47471 UNITS CAPS    Take 50,000 Units by mouth once a week    FOLIC ACID (FOLVITE) 1 MG TABLET    Take 1 tablet by mouth once daily    FUROSEMIDE (LASIX) 20 MG TABLET    TAKE 1 TABLET BY MOUTH ONCE DAILY AS NEEDED    MELATONIN 3 MG TABS TABLET    Take 2 tablets by mouth at bedtime    METHOCARBAMOL (ROBAXIN) 500 MG TABLET    Take 1 tablet by mouth twice daily    METOPROLOL TARTRATE (LOPRESSOR) 25 MG TABLET    Take 1 tablet by mouth twice daily    PREGABALIN (LYRICA) 100 MG CAPSULE    Take 2 capsules by mouth 2 times daily for 30 days. Max Daily Amount: 400 mg    SERTRALINE (ZOLOFT) 50 MG TABLET    Take 1 tablet by mouth once daily       ALLERGIES     Niacin and related    FAMILY HISTORY       Family History   Problem Relation Age of Onset    Pacemaker Mother     Heart Disease Mother         never tobacco    No Known Problems Father     No Known Problems Paternal Grandmother     No Known Problems Paternal Grandfather     No Known Problems Maternal Grandfather          of old age    No Known Problems Maternal Grandmother          of old age    No Known Problems Son     No

## 2025-01-15 NOTE — PROGRESS NOTES
Vascular Lab Prelim  Duplex venous scan of LLE shows no evidence for dvt noted at this time in areas that can be visualized. Chronic, calcified SVT left SSV. Final report pending.

## 2025-01-16 LAB — ECHO BSA: 1.97 M2

## 2025-01-16 PROCEDURE — 93971 EXTREMITY STUDY: CPT | Performed by: SURGERY

## 2025-01-16 RX ORDER — OXYCODONE AND ACETAMINOPHEN 10; 325 MG/1; MG/1
1 TABLET ORAL EVERY 6 HOURS PRN
Qty: 12 TABLET | Refills: 0 | Status: SHIPPED | OUTPATIENT
Start: 2025-01-16 | End: 2025-01-19

## 2025-01-16 NOTE — TELEPHONE ENCOUNTER
Is she in any improved condition to come in one day for a visit?    Has she heard from palliative care?

## 2025-01-16 NOTE — TELEPHONE ENCOUNTER
Based on hospice care planning to eval tomorrow and take over treatment, I will send her 3 day supply of percocet 10mg to hopefully last until they can get her started.  Have Daniel let me know if he needs anything else.

## 2025-06-25 NOTE — PROGRESS NOTES
[facility] Progress Note    Patient:  Lashonda Milian  YOB: 1950  Date of Service: 10/10/2024  MRN: [id number]   Acct: [id number]   Primary Care Physician: Nick Martinez MD  Advance Directive: Full Code  Admit Date: 9/30/2024       Hospital Day: 10     NOTE: Portions of this note have been copied forward, however, changes made to reflect the most current clinical status of this patient.    CHIEF COMPLAINT:     Chief Complaint   Patient presents with    Altered Mental Status       10/10/2024 6:13 AM  Subjective / Interval History:   10/10/2024  Patient seen and examined this a.m. No new complaints.  No acute changes or acute overnight event reported. Laying comfortably in bed in no apparent acute distress.  Denies any acute complaints or distress.       10/09/2024  Patient seen and examined this a.m. No new complaints.  Endorses intermittent back pain, although fairly controlled.  No acute changes or acute overnight event reported. Laying comfortably in bed in no apparent acute distress.  Denies any acute complaints or distress.          Review of Systems:   Review of Systems  ROS: 14 point review of systems is negative except as specifically addressed above.    ADULT DIET; Regular  ADULT ORAL NUTRITION SUPPLEMENT; Breakfast, Lunch, Dinner; Fortified Gelatin Oral Supplement    Intake/Output Summary (Last 24 hours) at 10/10/2024 0613  Last data filed at 10/9/2024 2007  Gross per 24 hour   Intake 240 ml   Output --   Net 240 ml       Medications:   sodium chloride       Current Facility-Administered Medications   Medication Dose Route Frequency Provider Last Rate Last Admin    oxyCODONE (ROXICODONE) immediate release tablet 5 mg  5 mg Oral Q6H PRN Reynaldo Posey MD   5 mg at 10/1950    megestrol (MEGACE) 40 MG/ML suspension 200 mg  200 mg Oral Daily Lauri White MD   200 mg at 10/08/24 1216    sennosides-docusate sodium (SENOKOT-S) 8.6-50 MG tablet 2 tablet  2 tablet Oral 
      [facility] Progress Note    Patient:  Lashonda Milian  YOB: 1950  Date of Service: 10/15/2024  MRN: [id number]   Acct: [id number]   Primary Care Physician: Nick Martinez MD  Advance Directive: Full Code  Admit Date: 9/30/2024       Hospital Day: 15     NOTE: Portions of this note have been copied forward, however, changes made to reflect the most current clinical status of this patient.    CHIEF COMPLAINT:     Chief Complaint   Patient presents with    Altered Mental Status       10/15/2024 9:20 AM  Subjective / Interval History:   10/15/2024  Patient seen and examined this a.m. No new complaints.  Back pain controlled.  No acute changes or acute overnight event reported. Laying comfortably in bed in no apparent acute distress.  Denies any acute complaints or distress.   Pending SNF placement.    10/14/2024  Patient seen and examined this a.m. No new complaints.  No acute changes or acute overnight event reported. Laying comfortably in bed in no apparent acute distress.  Denies any acute complaints or distress.      Patient and family initially wanted to go home with home health, however reportedly now interested in SNF placement.    10/13/2024  Patient seen and examined this a.m. No new complaints.  No acute changes or acute overnight event reported. Laying comfortably in bed in no apparent acute distress.  Denies any acute complaints or distress.  Endorses overall improvement.      10/12/2024  Patient seen and examined this a.m.   No new complaints.  No acute changes or acute overnight event reported.  Patient continues to report intermittent right hip/lower extremity pain, which intermittently controlled.Laying comfortably in bed in no apparent acute distress.  Denies any acute complaints or distress.       10/11/2024  Patient seen and examined this a.m. No new complaints.    No acute changes or acute overnight event reported.   Laying comfortably in bed in no apparent acute 
      [facility] Progress Note    Patient:  Lashonda Milian  YOB: 1950  Date of Service: 10/11/2024  MRN: [id number]   Acct: [id number]   Primary Care Physician: Nick Martinez MD  Advance Directive: Full Code  Admit Date: 9/30/2024       Hospital Day: 11     NOTE: Portions of this note have been copied forward, however, changes made to reflect the most current clinical status of this patient.    CHIEF COMPLAINT:     Chief Complaint   Patient presents with    Altered Mental Status       10/11/2024 7:35 AM  Subjective / Interval History:   10/11/2024  Patient seen and examined this a.m. No new complaints.    No acute changes or acute overnight event reported.   Laying comfortably in bed in no apparent acute distress.    Patient emotional and teary, endorsing fear about being gotten out of bed in anticipation of pain.  Patient adamantly does not want to be gotten out of bed today.  Denies any acute complaints or distress.    Family at bedside.    10/10/2024  Patient seen and examined this a.m. No new complaints.  No acute changes or acute overnight event reported. Laying comfortably in bed in no apparent acute distress.  Denies any acute complaints or distress.       10/09/2024  Patient seen and examined this a.m. No new complaints.  Endorses intermittent back pain, although fairly controlled.  No acute changes or acute overnight event reported. Laying comfortably in bed in no apparent acute distress.  Denies any acute complaints or distress.          Review of Systems:   Review of Systems  ROS: 14 point review of systems is negative except as specifically addressed above.    ADULT DIET; Regular  ADULT ORAL NUTRITION SUPPLEMENT; Breakfast, Lunch, Dinner; Fortified Gelatin Oral Supplement  No intake or output data in the 24 hours ending 10/11/24 0735      Medications:   sodium chloride       Current Facility-Administered Medications   Medication Dose Route Frequency Provider Last Rate Last Admin 
      [facility] Progress Note    Patient:  Lashonda Milian  YOB: 1950  Date of Service: 10/14/2024  MRN: [id number]   Acct: [id number]   Primary Care Physician: Nick Martinez MD  Advance Directive: Full Code  Admit Date: 9/30/2024       Hospital Day: 14     NOTE: Portions of this note have been copied forward, however, changes made to reflect the most current clinical status of this patient.    CHIEF COMPLAINT:     Chief Complaint   Patient presents with    Altered Mental Status       10/14/2024 7:05 AM  Subjective / Interval History:   10/14/2024  Patient seen and examined this a.m. No new complaints.  No acute changes or acute overnight event reported. Laying comfortably in bed in no apparent acute distress.  Denies any acute complaints or distress.      Patient and family initially wanted to go home with home health, however reportedly now interested in SNF placement.    10/13/2024  Patient seen and examined this a.m. No new complaints.  No acute changes or acute overnight event reported. Laying comfortably in bed in no apparent acute distress.  Denies any acute complaints or distress.  Endorses overall improvement.      10/12/2024  Patient seen and examined this a.m.   No new complaints.  No acute changes or acute overnight event reported.  Patient continues to report intermittent right hip/lower extremity pain, which intermittently controlled.Laying comfortably in bed in no apparent acute distress.  Denies any acute complaints or distress.       10/11/2024  Patient seen and examined this a.m. No new complaints.    No acute changes or acute overnight event reported.   Laying comfortably in bed in no apparent acute distress.    Patient emotional and teary, endorsing fear about being gotten out of bed in anticipation of pain.  Patient adamantly does not want to be gotten out of bed today.  Denies any acute complaints or distress.    Family at bedside.    10/10/2024  Patient seen and 
      [facility] Progress Note    Patient:  Lashonda Milian  YOB: 1950  Date of Service: 10/9/2024  MRN: [id number]   Acct: [id number]   Primary Care Physician: Nick Martinez MD  Advance Directive: Full Code  Admit Date: 9/30/2024       Hospital Day: 9     NOTE: Portions of this note have been copied forward, however, changes made to reflect the most current clinical status of this patient.    CHIEF COMPLAINT:     Chief Complaint   Patient presents with    Altered Mental Status       10/9/2024 1:32 PM  Subjective / Interval History:   10/09/2024  Patient seen and examined this a.m. No new complaints.  Endorses intermittent back pain, although fairly controlled.  No acute changes or acute overnight event reported. Laying comfortably in bed in no apparent acute distress.  Denies any acute complaints or distress.          Review of Systems:   Review of Systems  ROS: 14 point review of systems is negative except as specifically addressed above.    ADULT DIET; Regular  ADULT ORAL NUTRITION SUPPLEMENT; Breakfast, Lunch, Dinner; Fortified Gelatin Oral Supplement    Intake/Output Summary (Last 24 hours) at 10/9/2024 1332  Last data filed at 10/8/2024 1432  Gross per 24 hour   Intake --   Output 100 ml   Net -100 ml       Medications:   sodium chloride       Current Facility-Administered Medications   Medication Dose Route Frequency Provider Last Rate Last Admin    oxyCODONE (ROXICODONE) immediate release tablet 5 mg  5 mg Oral Q6H PRN Reynaldo Posey MD        megestrol (MEGACE) 40 MG/ML suspension 200 mg  200 mg Oral Daily Lauri White MD   200 mg at 10/08/24 1216    sennosides-docusate sodium (SENOKOT-S) 8.6-50 MG tablet 2 tablet  2 tablet Oral Daily Lauri White MD   2 tablet at 10/09/24 0931    naloxegol (MOVANTIK) tablet 12.5 mg  12.5 mg Oral QAM AC Lauri White MD   12.5 mg at 10/09/24 0935    QUEtiapine (SEROQUEL) tablet 25 mg  25 mg Oral BID Lauri White MD   25 mg at 
      [facility] Progress Note    Patient:  Lashonda Milian  YOB: 1950  Date of Service: 10/19/2024  MRN: [id number]   Acct: [id number]   Primary Care Physician: Nick Martinez MD  Advance Directive: Full Code  Admit Date: 9/30/2024       Hospital Day: 19     NOTE: Portions of this note have been copied forward, however, changes made to reflect the most current clinical status of this patient.    CHIEF COMPLAINT:     Chief Complaint   Patient presents with    Altered Mental Status       10/19/2024 7:34 AM  Subjective / Interval History:   10/19/2024  Patient seen and examined this a.m. No new complaints.  No acute changes or acute overnight event reported. Laying comfortably in bed in no apparent acute distress.  Denies any acute complaints or distress.   Currently controlled with adjustments of pain  meds dosage and frequency.   creatinine slightly improved to 1.5    10/18/2024  Patient seen and examined this a.m. No new complaints.  No acute changes or acute overnight event reported. Laying comfortably in bed in no apparent acute distress.  Denies any acute complaints or distress.    Patient endorses decently controlled pain this a.m.    Now with [facility] - Increase in SCr by  < 24 hours: Scr trended up from 1.3 to --> 1.6 this am (10/18/2024)    10/17/2024  Patient seen and examined this a.m. No new complaints.  Continues to endorse intermittently controlled pain.  No acute changes or acute overnight event reported. Laying comfortably in bed in no apparent acute distress.  Denies any acute complaints or distress.  Endorses overall improvement.        10/16/2024  Patient seen and examined this a.m. patient endorses overall improvement today endorses sleeping well overnight.  No new complaints.  No acute changes or acute overnight event reported. Laying comfortably in bed in no apparent acute distress.  Denies any acute complaints or distress.     Hypokalemia potassium of 3.1 this a.m.  Pending 
    NEUROSURGERY  Progress Note    INTERVAL HISTORY: No significant changes.  Pain controlled.  Working on disposition plan.  Patient states she would like to go home, however, that is very unlikely due to her profound lower extremity weakness and inability to provide care for herself.    CHIEF COMPLAINT: Severe back pain, AMS    HISTORY OF PRESENT ILLNESS:      The patient is a 73 y.o. female with A-fib, COPD, CHF who is very familiar to our service as we evaluated her on 8/22/2024 for a lumbar disc herniation and questionable discitis and osteomyelitis.  Blood cultures during that hospitalization on 8/26/2024 were positive for MRSA  We did not feel that there was an obvious collection to evacuate, infectious disease was consulted, she underwent an in NM abscess study which revealed possible infectious process within the spine along with a repeat MRI of the lumbar spine which was consistent with an L2-3 osteomyelitis with a small epidural collection.  It was felt that she may need tertiary care for drainage of the bilateral psoas abscesses and was transferred to [facility] in [place].     She was admitted a few days ago on 9/30/2024 for questionable stroke.  She has been evaluated by our neurology team without any clear evidence of acute stroke.  They recommend EEG.    Ms. Milian has severe left-sided low back pain and is writhing in pain upon entering the room.  She states that while she was at [place] [facility] she underwent some type of procedure where they broke part of the needle off in her low back and has had severe pain and trouble walking since then.  She has been receiving IV vancomycin through a PICC line.  She has been followed by Dr. Manuel Zavala.  She states that she has not walked in several weeks.      The patient does take anticoagulation medication (Eliquis).       Past Medical History:   Diagnosis Date    Arthritis     Atrial fibrillation (HCC)     Paroxysmal A Fib    Cerebral artery occlusion with 
    NEUROSURGERY  Progress Note    INTERVAL HISTORY: Now status post decompressive laminectomy for debulking of phlegmon postop day #1.  States pain is slightly improved.  No significant change in neurologic status.    CHIEF COMPLAINT: Severe back pain, AMS    HISTORY OF PRESENT ILLNESS:      The patient is a 73 y.o. female with A-fib, COPD, CHF who is very familiar to our service as we evaluated her on 8/22/2024 for a lumbar disc herniation and questionable discitis and osteomyelitis.  Blood cultures during that hospitalization on 8/26/2024 were positive for MRSA  We did not feel that there was an obvious collection to evacuate, infectious disease was consulted, she underwent an in NM abscess study which revealed possible infectious process within the spine along with a repeat MRI of the lumbar spine which was consistent with an L2-3 osteomyelitis with a small epidural collection.  It was felt that she may need tertiary care for drainage of the bilateral psoas abscesses and was transferred to [facility] in [place].     She was admitted a few days ago on 9/30/2024 for questionable stroke.  She has been evaluated by our neurology team without any clear evidence of acute stroke.  They recommend EEG.    Ms. Milian has severe left-sided low back pain and is writhing in pain upon entering the room.  She states that while she was at [place] she underwent some type of procedure where they broke part of the needle off in her low back and has had severe pain and trouble walking since then.  She has been receiving IV vancomycin through a PICC line.  She has been followed by Dr. Manuel Zavala.  She states that she has not walked in several weeks.      The patient does take anticoagulation medication (Eliquis).       Past Medical History:   Diagnosis Date    Arthritis     Atrial fibrillation (HCC)     Paroxysmal A Fib    Cerebral artery occlusion with cerebral infarction (HCC)     TWICE: once in 2012 or 2013, then had another 
    NEUROSURGERY  Progress Note    INTERVAL HISTORY: Patient seen and examined.  She states she is \"feeling a little better today\".  Has some leg pain but it is not severe.  No new changes.      CHIEF COMPLAINT: Severe back pain, AMS    HISTORY OF PRESENT ILLNESS:      The patient is a 73 y.o. female with A-fib, COPD, CHF who is very familiar to our service as we evaluated her on 8/22/2024 for a lumbar disc herniation and questionable discitis and osteomyelitis.  Blood cultures during that hospitalization on 8/26/2024 were positive for MRSA  We did not feel that there was an obvious collection to evacuate, infectious disease was consulted, she underwent an in NM abscess study which revealed possible infectious process within the spine along with a repeat MRI of the lumbar spine which was consistent with an L2-3 osteomyelitis with a small epidural collection.  It was felt that she may need tertiary care for drainage of the bilateral psoas abscesses and was transferred to [facility] in [place].     She was admitted a few days ago on 9/30/2024 for questionable stroke.  She has been evaluated by our neurology team without any clear evidence of acute stroke.  They recommend EEG.    Ms. Milian has severe left-sided low back pain and is writhing in pain upon entering the room.  She states that while she was at [place] she underwent some type of procedure where they broke part of the needle off in her low back and has had severe pain and trouble walking since then.  She has been receiving IV vancomycin through a PICC line.  She has been followed by Dr. Manuel Zavala.  She states that she has not walked in several weeks.      The patient does take anticoagulation medication (Eliquis).       Past Medical History:   Diagnosis Date    Arthritis     Atrial fibrillation (HCC)     Paroxysmal A Fib    Cerebral artery occlusion with cerebral infarction (HCC)     TWICE: once in 2012 or 2013, then had another one 2021    CHF 
    NEUROSURGERY  Progress Note    INTERVAL HISTORY: Patient seen and examined.  States she feels better today.  No significant changes in neurologic status.    CHIEF COMPLAINT: Severe back pain, AMS    HISTORY OF PRESENT ILLNESS:      The patient is a 73 y.o. female with A-fib, COPD, CHF who is very familiar to our service as we evaluated her on 8/22/2024 for a lumbar disc herniation and questionable discitis and osteomyelitis.  Blood cultures during that hospitalization on 8/26/2024 were positive for MRSA  We did not feel that there was an obvious collection to evacuate, infectious disease was consulted, she underwent an in NM abscess study which revealed possible infectious process within the spine along with a repeat MRI of the lumbar spine which was consistent with an L2-3 osteomyelitis with a small epidural collection.  It was felt that she may need tertiary care for drainage of the bilateral psoas abscesses and was transferred to [facility] in [place].     She was admitted a few days ago on 9/30/2024 for questionable stroke.  She has been evaluated by our neurology team without any clear evidence of acute stroke.  They recommend EEG.    Ms. Milian has severe left-sided low back pain and is writhing in pain upon entering the room.  She states that while she was at [facility] she underwent some type of procedure where they broke part of the needle off in her low back and has had severe pain and trouble walking since then.  She has been receiving IV vancomycin through a PICC line.  She has been followed by Dr. Manuel Zavala.  She states that she has not walked in several weeks.      The patient does take anticoagulation medication (Eliquis).       Past Medical History:   Diagnosis Date    Arthritis     Atrial fibrillation (HCC)     Paroxysmal A Fib    Cerebral artery occlusion with cerebral infarction (HCC)     TWICE: once in 2012 or 2013, then had another one 2021    CHF (congestive heart failure) ([facility])     COPD 
    NEUROSURGERY  Progress Note    INTERVAL HISTORY: Some family and friends at bedside today.  Discussed overall prognosis and goals of treatment.  Patient does complain of some back and right hip pain today.    CHIEF COMPLAINT: Severe back pain, AMS    HISTORY OF PRESENT ILLNESS:      The patient is a 73 y.o. female with A-fib, COPD, CHF who is very familiar to our service as we evaluated her on 8/22/2024 for a lumbar disc herniation and questionable discitis and osteomyelitis.  Blood cultures during that hospitalization on 8/26/2024 were positive for MRSA  We did not feel that there was an obvious collection to evacuate, infectious disease was consulted, she underwent an in NM abscess study which revealed possible infectious process within the spine along with a repeat MRI of the lumbar spine which was consistent with an L2-3 osteomyelitis with a small epidural collection.  It was felt that she may need tertiary care for drainage of the bilateral psoas abscesses and was transferred to [facility] in [place].     She was admitted a few days ago on 9/30/2024 for questionable stroke.  She has been evaluated by our neurology team without any clear evidence of acute stroke.  They recommend EEG.    Ms. Milian has severe left-sided low back pain and is writhing in pain upon entering the room.  She states that while she was at [place] she underwent some type of procedure where they broke part of the needle off in her low back and has had severe pain and trouble walking since then.  She has been receiving IV vancomycin through a PICC line.  She has been followed by Dr. Manuel Zavala.  She states that she has not walked in several weeks.      The patient does take anticoagulation medication (Eliquis).       Past Medical History:   Diagnosis Date    Arthritis     Atrial fibrillation (HCC)     Paroxysmal A Fib    Cerebral artery occlusion with cerebral infarction (HCC)     TWICE: once in 2012 or 2013, then had another one 2021 
    NEUROSURGERY  Progress Note    INTERVAL HISTORY: States pain is improved this a.m. admits to having some sensation improvement in her lower extremities today.  She denies any new neurologic issues.  Overall neurologic examination is unchanged.    CHIEF COMPLAINT: Severe back pain, AMS    HISTORY OF PRESENT ILLNESS:      The patient is a 73 y.o. female with A-fib, COPD, CHF who is very familiar to our service as we evaluated her on 8/22/2024 for a lumbar disc herniation and questionable discitis and osteomyelitis.  Blood cultures during that hospitalization on 8/26/2024 were positive for MRSA  We did not feel that there was an obvious collection to evacuate, infectious disease was consulted, she underwent an in NM abscess study which revealed possible infectious process within the spine along with a repeat MRI of the lumbar spine which was consistent with an L2-3 osteomyelitis with a small epidural collection.  It was felt that she may need tertiary care for drainage of the bilateral psoas abscesses and was transferred to [facility] in [place].     She was admitted a few days ago on 9/30/2024 for questionable stroke.  She has been evaluated by our neurology team without any clear evidence of acute stroke.  They recommend EEG.    Ms. Milian has severe left-sided low back pain and is writhing in pain upon entering the room.  She states that while she was at [place] she underwent some type of procedure where they broke part of the needle off in her low back and has had severe pain and trouble walking since then.  She has been receiving IV vancomycin through a PICC line.  She has been followed by Dr. Manuel Zavala.  She states that she has not walked in several weeks.      The patient does take anticoagulation medication (Eliquis).       Past Medical History:   Diagnosis Date    Arthritis     Atrial fibrillation (HCC)     Paroxysmal A Fib    Cerebral artery occlusion with cerebral infarction (HCC)     TWICE: once in 
   10/10/24 1000   Subjective   Subjective I am in [facility].   Pain Assessment   Pain Assessment 0-10   Pain Level 7  (With movement)   Pain Location Leg;Hip   Pain Orientation Right;Left   Non-Pharmaceutical Pain Intervention(s) Rest   Cognition   Overall Cognitive Status WFL   Orientation   Overall Orientation Status X   Orientation Level Oriented to person;Disoriented to place;Oriented to time   Vitals   O2 Device None (Room air)   PT Exercises   PROM Exercises BL LE EX X 10 reps.   Patient Education   Education Given To Patient   Education Provided Role of Therapy;Plan of Care;Home Exercise Program   Education Provided Comments Use of call light, staff A   Education Method Demonstration;Verbal   Barriers to Learning Cognition   Education Outcome Continued education needed   Other Specialty Interventions   Other Treatments/Modalities In bed call light all needs in reach.   Assessment   Activity Tolerance Patient limited by pain       Electronically signed by Abdiel Wisdom PTA on 10/10/2024 at 10:52 AM   
   10/16/24 1500   Subjective   Subjective No.  ( Patient declined sitting EOB at this time )   PT Plan of Care   Wednesday R       Electronically signed by Abdiel Wisdom PTA on 10/16/2024 at 3:59 PM   
 Daily Progress Note    Date:10/2/2024  Patient: Lashonda Milian  : 1950  MRN:[id number]  CODE:Full Code No additional code details  PCP:Nick Martinez MD    Admit Date: 2024 12:16 PM   LOS: 2 days     Chief Complaint   Patient presents with    Altered Mental Status         Subjective: NAEON. Pt endorses ongoing lower back pain with inability to sit upright due to the pain. She was unable to work with PT/OT due to pain. No fevers or chills.        Hospital Summary: 74 yo F with Afib on Eliquis, HTN, hypothyroidism, recent lumbar discitis/OM s/p IR guided drainage c/b MRSA bacteremia on IV Vancomycin who presented to [facility] ED from SNF for evaluation of AMS and inability to speak.   CT head was negative for acute changes.  Pt admitted to the hospitalist service for further management. Pt's mental status improved while in the ED. She then complained of worsening lower back pain.  MRI lumbar spine obtained due to recent OM/discitis which showed worsening discitis/OM at L2-3 with interval destruction of L2-3 disc space, epidural phlegmon causing severe spinal canal stenosis at L2-3 with multiple abscesses and myositis in bilateral psoas and paraspinal muscles.  Also showed advanced septic arthritis of right facet joint L2-3 and probable OM of bilateral facets of L3-4.   Neurosurgery consulted and planning to take pt to OR for washout after Eliquis has been held for a few days.   IV Vancomycin was continued. ID consulted for further recommendations.           Review of Systems   All other systems reviewed and are negative.      Objective:      Vital signs in last 24 hours:  Patient Vitals for the past 24 hrs:   BP Temp Temp src Pulse Resp SpO2 Height   10/02/24 1623 (!) 155/88 97.5 °F (36.4 °C) -- 63 18 100 % --   10/02/24 0943 -- -- -- -- -- -- 1.575 m (5' 2.01\")   10/02/24 0728 (!) 156/70 97.2 °F (36.2 °C) -- 67 18 95 % --   10/02/24 0601 (!) 144/65 98.1 °F (36.7 °C) Oral 70 16 96 % --   10/02/24 0205 -- -- -- 
 Daily Progress Note    Date:10/4/2024  Patient: Lashonda Miilan  : 1950  MRN:[id number]  CODE:Full Code No additional code details  PCP:Nick Martinez MD    Admit Date: 2024 12:16 PM   LOS: 4 days     Chief Complaint   Patient presents with    Altered Mental Status         Subjective: Pt's mentation better compared to yesterday. She was not agitated and let me evaluate her. Continues to endorse lower back pain, but better controlled than previous days. Plan for OR this afternoon.         Hospital Summary: 74 yo F with Afib on Eliquis, HTN, hypothyroidism, recent lumbar discitis/OM s/p IR guided drainage c/b MRSA bacteremia on IV Vancomycin who presented to [facility] ED from SNF for evaluation of AMS and inability to speak.   CT head was negative for acute changes.  Pt admitted to the hospitalist service for further management. Pt's mental status improved while in the ED. She then complained of worsening lower back pain.  MRI lumbar spine obtained due to recent OM/discitis which showed worsening discitis/OM at L2-3 with interval destruction of L2-3 disc space, epidural phlegmon causing severe spinal canal stenosis at L2-3 with multiple abscesses and myositis in bilateral psoas and paraspinal muscles.  Also showed advanced septic arthritis of right facet joint L2-3 and probable OM of bilateral facets of L3-4.   Neurosurgery consulted and planning to take pt to OR for washout after Eliquis has been held for a few days.   IV Vancomycin was continued. ID consulted for further recommendations.           Review of Systems   All other systems reviewed and are negative.      Objective:      Vital signs in last 24 hours:  Patient Vitals for the past 24 hrs:   BP Temp Temp src Pulse Resp SpO2   10/04/24 0950 -- -- -- -- -- 96 %   10/04/24 0741 136/64 98.2 °F (36.8 °C) -- 68 18 93 %   10/04/24 0600 (!) 142/59 96.8 °F (36 °C) -- 73 18 96 %   10/03/24 2045 (!) 148/72 97.5 °F (36.4 °C) Temporal 70 18 100 %   10/03/24 1756 -- 
 Daily Progress Note    Date:10/7/2024  Patient: Lashonda Milian  : 1950  MRN:[id number]  CODE:Full Code No additional code details  PCP:Nick Martinez MD    Admit Date: 2024 12:16 PM   LOS: 7 days     Chief Complaint   Patient presents with    Altered Mental Status         Subjective: Mentation overall improved. Seroquel has seemed to help with pain. Slept well last night.         Hospital Summary: 74 yo F with Afib on Eliquis, HTN, hypothyroidism, recent lumbar discitis/OM s/p IR guided drainage c/b MRSA bacteremia on IV Vancomycin who presented to [facility] ED from SNF for evaluation of AMS and inability to speak.   CT head was negative for acute changes.  Pt admitted to the hospitalist service for further management. Pt's mental status improved while in the ED. She then complained of worsening lower back pain.  MRI lumbar spine obtained due to recent OM/discitis which showed worsening discitis/OM at L2-3 with interval destruction of L2-3 disc space, epidural phlegmon causing severe spinal canal stenosis at L2-3 with multiple abscesses and myositis in bilateral psoas and paraspinal muscles.  Also showed advanced septic arthritis of right facet joint L2-3 and probable OM of bilateral facets of L3-4.   Neurosurgery consulted and planning to take pt to OR for washout after Eliquis has been held for a few days.   IV Vancomycin was continued. ID consulted.    Went to OR on 10/4 for L2 and L3 hemilaminectomies with medial facetectomy, debulking of epidural abscess and phlegmon.           Review of Systems   All other systems reviewed and are negative.      Objective:      Vital signs in last 24 hours:  Patient Vitals for the past 24 hrs:   BP Temp Temp src Pulse Resp SpO2   10/07/24 1125 -- -- -- -- -- 95 %   10/07/24 1112 124/66 98.6 °F (37 °C) Axillary 67 18 92 %   10/07/24 0734 92/74 98.4 °F (36.9 °C) Axillary 65 18 96 %   10/07/24 0510 135/60 97.2 °F (36.2 °C) -- 62 16 96 %   10/07/24 0453 -- -- -- -- 18 -- 
 Daily Progress Note    Date:10/8/2024  Patient: Lashonda Milian  : 1950  MRN:[id number]  CODE:Full Code No additional code details  PCP:Nick Martinez MD    Admit Date: 2024 12:16 PM   LOS: 8 days     Chief Complaint   Patient presents with    Altered Mental Status         Subjective: No acute events overnight. Pt states pain controlled currently. She is still hesitant to be turned due to pain.   PO intake continues to be poor. No bowel movements charted since . Pt told me she had a bowel movement a few days ago, but not sure if this was true.         Hospital Summary: 72 yo F with Afib on Eliquis, HTN, hypothyroidism, recent lumbar discitis/OM s/p IR guided drainage c/b MRSA bacteremia on IV Vancomycin who presented to [facility] ED from SNF for evaluation of AMS and inability to speak.   CT head was negative for acute changes.  Pt admitted to the hospitalist service for further management. Pt's mental status improved while in the ED. She then complained of worsening lower back pain.  MRI lumbar spine obtained due to recent OM/discitis which showed worsening discitis/OM at L2-3 with interval destruction of L2-3 disc space, epidural phlegmon causing severe spinal canal stenosis at L2-3 with multiple abscesses and myositis in bilateral psoas and paraspinal muscles.  Also showed advanced septic arthritis of right facet joint L2-3 and probable OM of bilateral facets of L3-4.   Neurosurgery consulted and planning to take pt to OR for washout after Eliquis has been held for a few days.   IV Vancomycin was continued. ID consulted.    Went to OR on 10/4 for L2 and L3 hemilaminectomies with medial facetectomy, debulking of epidural abscess and phlegmon.     Post-op, pain has been difficult to control. Pt has had limited mobility.  She also has poor PO intake, low appetite. Megace added.  Constipation is becoming an issue, partially due to poor PO intake, partially opioid induced. On Miralax, Senokot, and Movantik. 
 [facility]  [address], [address]  [place] [address]  Phone [phone]     Neurology Progress Note  10/8/2024 8:04 PM  Information:   Patient Name: Lashonda Milian  :   1950  Age:   73 y.o.  MRN:   [id number]  Account #:  [id number]  Admit Date:   2024  Today:  10/8/24     ADMIT DX:   TIA (transient ischemic attack)    Subjective:     Lashonda Milian is a 73 y.o. year old woman with old stroke, atrial fibrillation, chronic anticoagulation, and recent epidural abscess who was admitted  for difficulty talking.  It was documented that her lower extremity strength was normal when discharged from here on 2024.  She had a spine procedure at [place] and for the past month, she has been in a NH on IV Vancomycin.  Her legs are weak, numb, and painful.  She has had bowel and bladder impairment.      Interval History:   Pain is better today.  She still has leg weakness and numbness.  No PT or OT sessions.      Objective:     Past Medical History:  Past Medical History:   Diagnosis Date    Arthritis     Atrial fibrillation (HCC)     Paroxysmal A Fib    Cerebral artery occlusion with cerebral infarction (HCC)     TWICE: once in  or , then had another one     CHF (congestive heart failure) (HCC)     COPD (chronic obstructive pulmonary disease) (HCC)     Hyperlipidemia     Hypertension     On continuous oral anticoagulation     Apixaban    Pneumonia     Thyroid disease        Past Surgical History:   Procedure Laterality Date    CARDIOVERSION      HYSTERECTOMY, TOTAL ABDOMINAL (CERVIX REMOVED) N/A     With removal of tumor, ~, was a DANIS and BSO    LUMBAR SPINE SURGERY Right 10/4/2024    Decompressive lumbar laminectomy L2-3 for removal of epidural abscess/phlegmon using minimally invasive technique performed by Sarabjit Aguilar DO at [facility] OR    TONSILLECTOMY Bilateral     at age 17 or 18, withOUT Adneoids as per pt    TUMOR REMOVAL      intraabdominal, ~, states she 
 [facility]  [address], [address]  [place] [address]  Phone [phone]     Neurology Progress Note  10/7/2024  Information:   Patient Name: Lashonda Milian  :   1950  Age:   73 y.o.  MRN:   [id number]  Account #:  [id number]  Admit Date:   2024  Today:  10/7/24     ADMIT DX:   TIA (transient ischemic attack)    Subjective:     Lashonda Milian is a 73 y.o. year old woman with old stroke, atrial fibrillation, chronic anticoagulation, and recent epidural abscess who was admitted  for difficulty talking.  It was documented that her lower extremity strength was normal when discharged from here on 2024.  She had a spine procedure at [place] and for the past month, she has been in a NH on IV Vancomycin.  Her legs are weak, numb, and painful.  She has had bowel and bladder impairment.      Interval History:   She is s/p right L2 and L3 hemilaminotomies with medial facetectomy for decompression and debulking of the epidural abscess.  She is awake but mildly confused.  She complains of low back pain, worse with movement.  She has lower extremity pain as well.  She has not yet been able to participate with PT.    Objective:     Past Medical History:  Past Medical History:   Diagnosis Date    Arthritis     Atrial fibrillation (HCC)     Paroxysmal A Fib    Cerebral artery occlusion with cerebral infarction (HCC)     TWICE: once in  or , then had another one     CHF (congestive heart failure) (HCC)     COPD (chronic obstructive pulmonary disease) (HCC)     Hyperlipidemia     Hypertension     On continuous oral anticoagulation     Apixaban    Pneumonia     Thyroid disease        Past Surgical History:   Procedure Laterality Date    CARDIOVERSION      HYSTERECTOMY, TOTAL ABDOMINAL (CERVIX REMOVED) N/A     With removal of tumor, ~, was a DANIS and BSO    LUMBAR SPINE SURGERY Right 10/4/2024    Decompressive lumbar laminectomy L2-3 for removal of epidural abscess/phlegmon 
 Occupational Therapy Initial Assessment  Date: 10/14/2024   Patient Name: Lashonda Milian  MRN: [id number]     : 1950    Date of Service: 10/14/2024    Discharge Recommendations:  Patient would benefit from continued therapy after discharge       Assessment   Assessment: Evaluation completed and tx initiated.  The patient would benefit from further skilled therapy to upgrade safety and functional independence.  Treatment Diagnosis: Lumbar spinal stenosis L2-3 due to epidural abcess and phlegmon, Right L2-L3 hemilaminectomies with meidal facetectomy , B psoas abcess  REQUIRES OT FOLLOW-UP: Yes  Activity Tolerance  Activity Tolerance: Patient limited by pain           Patient Diagnosis(es): The primary encounter diagnosis was TIA (transient ischemic attack). Diagnoses of History of discitis and Abscess in epidural space of spine were also pertinent to this visit.   has a past medical history of Arthritis, Atrial fibrillation (HCC), Cerebral artery occlusion with cerebral infarction (HCC), CHF (congestive heart failure) (HCC), COPD (chronic obstructive pulmonary disease) (HCC), Hyperlipidemia, Hypertension, On continuous oral anticoagulation, Pneumonia, and Thyroid disease.   has a past surgical history that includes tumor removal; Tonsillectomy (Bilateral); Cardioversion (); Hysterectomy, total abdominal (N/A); and Lumbar spine surgery (Right, 10/4/2024).    Treatment Diagnosis: Lumbar spinal stenosis L2-3 due to epidural abcess and phlegmon, Right L2-L3 hemilaminectomies with meidal facetectomy , B psoas abcess      Restrictions  Restrictions/Precautions  Restrictions/Precautions: Fall Risk    Subjective   General  Chart Reviewed: Yes  Patient assessed for rehabilitation services?: Yes  Family / Caregiver Present: No  Pain Assessment  Pain Level: 7  Pain Screening  Pain at present: 10 (thighs, back)  Scale Used: Numeric Score  Comments / Details: Agreeable to only very limited therapy due to pain.  Nurse 
4 Eyes Skin Assessment     NAME:  Lashonda Milian  YOB: 1950  MEDICAL RECORD NUMBER:  [id number]    The patient is being assessed for  Admission    I agree that at least one RN has performed a thorough Head to Toe Skin Assessment on the patient. ALL assessment sites listed below have been assessed.      Areas assessed by both nurses:    Head, Face, Ears, Shoulders, Back, Chest, Arms, Elbows, Hands, Sacrum. Buttock, Coccyx, Ischium, and Legs. Feet and Heels        Does the Patient have a Wound? No noted wound(s)       Clark Prevention initiated by RN: Yes  Wound Care Orders initiated by RN: No    Pressure Injury (Stage 3,4, Unstageable, DTI, NWPT, and Complex wounds) if present, place Wound referral order by RN under : No    New Ostomies, if present place, Ostomy referral order under : No     Nurse 1 eSignature: Electronically signed by Gaurav Kiser RN on 10/1/24 at 12:49 AM CDT    **SHARE this note so that the co-signing nurse can place an eSignature**    Nurse 2 eSignature: Electronically signed by Navya Guadalupe LPN on 10/1/24 at 12:50 AM CDT    
ANGELO Lozada spoke with son, Daniel, who states he will be here to sign consents for pt's surgery tomorrow as she is unable to sign for herself.    Electronically signed by Kathi Castellanos RN on 10/3/2024 at 6:58 PM    
Cathflo admininstered via PICC. Instilled with some level of difficulty due to skin folds and positioning. Education provided to patient in regards to 30 minute follow up to reassess PICC blood flow.  
Comprehensive Nutrition Assessment    Type and Reason for Visit:  Initial, Positive Nutrition Screen    Nutrition Recommendations/Plan:   Start ONS, follow for po intake, weight     Malnutrition Assessment:  Malnutrition Status:  Moderate malnutrition (10/02/24 1001)    Context:  Acute Illness     Findings of the 6 clinical characteristics of malnutrition:  Energy Intake:  Mild decrease in energy intake (Comment)  Weight Loss:  Greater than 7.5% over 3 months     Body Fat Loss:  No significant body fat loss     Muscle Mass Loss:  No significant muscle mass loss    Fluid Accumulation:  No significant fluid accumulation Extremities   Strength:  Not Performed    Nutrition Assessment:    +NS for wounds.  Patient receiving Easy toChew 4 Carb Control diet.  Had not started on breakfast yet at time of visit.  Pt hs lost  approx 62# or 22.6% of UBW since 4/2024.  Current weight loss and muscle mass and fat depletion has patient meeting criteria for severe malnutrition    Nutrition Related Findings:    trace BLE edema Wound Type: Stage I, Stage II       Current Nutrition Intake & Therapies:    Average Meal Intake: Unable to assess  Average Supplements Intake: None Ordered  ADULT DIET; Easy to Chew; 4 carb choices (60 gm/meal)    Anthropometric Measures:  Height: 157.5 cm (5' 2.01\")  Ideal Body Weight (IBW): 110 lbs (50 kg)    Admission Body Weight: 113.4 kg (250 lb)  Current Body Weight: 96.6 kg (212 lb 15.4 oz), 193.6 % IBW.    Current BMI (kg/m2): 38.9  Usual Body Weight: 124.7 kg (274 lb 14.6 oz)  % Weight Change (Calculated): -22.5  Weight Adjustment For: Amputation                 BMI Categories: Obese Class 2 (BMI 35.0 -39.9)    Estimated Daily Nutrient Needs:  Energy Requirements Based On: Kcal/kg  Weight Used for Energy Requirements: Current  Energy (kcal/day): 9615-6555 kcals (11-14 kcals/kg(  Weight Used for Protein Requirements: Ideal  Protein (g/day): 60-100g  Method Used for Fluid Requirements: 1 
Comprehensive Nutrition Assessment    Type and Reason for Visit:  Reassess    Nutrition Recommendations/Plan:   Continue to encourage pt to take bites of meals/ONS.     Malnutrition Assessment:  Malnutrition Status:  Severe malnutrition (10/08/24 1415)    Context:  Acute Illness     Findings of the 6 clinical characteristics of malnutrition:  Energy Intake:  50% or less of estimated energy requirements for 5 or more days  Weight Loss:  Greater than 7.5% over 3 months     Body Fat Loss:  No significant body fat loss     Muscle Mass Loss:  No significant muscle mass loss    Fluid Accumulation:  Mild Extremities   Strength:  Not Performed    Nutrition Assessment:    PO intake still poor.  Aware Megace started today.  No preferences voiced--sleeps through mot meals.  New wt Na.    Nutrition Related Findings:    trace BLE edema Wound Type: Stage I, Stage II, Surgical Incision (blister)       Current Nutrition Intake & Therapies:    Average Meal Intake: 0%, Refusing to eat  Average Supplements Intake: Refusing to take, 0%, 1-25%  ADULT DIET; Regular  ADULT ORAL NUTRITION SUPPLEMENT; Breakfast, Lunch, Dinner; Fortified Gelatin Oral Supplement    Anthropometric Measures:  Height: 157.5 cm (5' 2.01\")  Ideal Body Weight (IBW): 110 lbs (50 kg)    Admission Body Weight: 113.4 kg (250 lb)  Current Body Weight: 97 kg (213 lb 13.5 oz), 194.4 % IBW. Weight Source: Bed Scale  Current BMI (kg/m2): 39.1  Usual Body Weight: 124.7 kg (274 lb 14.6 oz)  % Weight Change (Calculated): -22.5  Weight Adjustment For: Amputation  BMI Categories: Obese Class 2 (BMI 35.0 -39.9)    Estimated Daily Nutrient Needs:  Energy Requirements Based On: Kcal/kg  Weight Used for Energy Requirements: Current  Energy (kcal/day): 5277-0707 kcals (15-18 kcals/kg)  Weight Used for Protein Requirements: Ideal  Protein (g/day): 60-100g  Method Used for Fluid Requirements: 1 ml/kcal  Fluid (ml/day): 3462-9063 ml    Nutrition Diagnosis:   Unintended weight loss 
Dr Aguilar with neurosurgery notified of new consult.   
Dr Contreras notified of consult for stroke-like symptoms.   
Full volume of cathflo pulled from PICC with excellent brisk blood return. Two full saline flushes used to clear line after successful wasting of cathflo.  Patient voices gratitude at this time. Pain is better controlled at this time than previously.   
Heel boots placed on patient with family. Patient sat up to try to eat  
Infectious Diseases Progress Note    Patient:  Lashonda Milian  YOB: 1950  MRN: [id number]   Admit date: 9/30/2024   Admitting Physician: Reynaldo Posey MD  Primary Care Physician: Nick Martinez MD    Chief Complaint/Interval History: Family has decided to take her home.  Per discussion with nursing she worked some with physical therapy yesterday.  They were able to sit her up in bed.  She apparently was moving her legs if she was sitting up and they were able to reposition her.  Reviewed that with the patient today.  Explained I thought it was very important that she continue to work with therapy and try to keep moving, try to maintain some range of motion, and maybe eventually dean enough strength to at least be able to assist with transfers or stand.  Explained I thought it would be easier to get care and therapy and subacute rehab as opposed to home.  She remains insistent on going home.    In/Out  No intake or output data in the 24 hours ending 10/12/24 5335  Allergies:   Allergies   Allergen Reactions    Niacin And Related Rash     Current Meds: vancomycin (VANCOCIN) 1000 mg in 200 mL IVPB, Q24H  oxyCODONE (ROXICODONE) immediate release tablet 5 mg, Q6H PRN  megestrol (MEGACE) 40 MG/ML suspension 200 mg, Daily  sennosides-docusate sodium (SENOKOT-S) 8.6-50 MG tablet 2 tablet, Daily  naloxegol (MOVANTIK) tablet 12.5 mg, QAM AC  QUEtiapine (SEROQUEL) tablet 25 mg, BID  HYDROmorphone HCl PF (DILAUDID) injection 0.25 mg, Q4H PRN  folic acid (FOLVITE) tablet 1 mg, Daily  polyethylene glycol (GLYCOLAX) packet 17 g, Daily  naloxone (NARCAN) injection 0.4 mg, PRN  methocarbamol (ROBAXIN) tablet 500 mg, BID  sodium chloride flush 0.9 % injection 5-40 mL, 2 times per day  sodium chloride flush 0.9 % injection 5-40 mL, PRN  0.9 % sodium chloride infusion, PRN  ondansetron (ZOFRAN-ODT) disintegrating tablet 4 mg, Q8H PRN   Or  ondansetron (ZOFRAN) injection 4 mg, Q6H PRN  polyethylene glycol 
Infectious Diseases Progress Note    Patient:  Lashonda Milian  YOB: 1950  MRN: [id number]   Admit date: 9/30/2024   Admitting Physician: Reynaldo Posey MD  Primary Care Physician: Nick Martinez MD    Chief Complaint/Interval History: She looks more comfortable today.  I was able to  both her right and left leg.  I was able to work her through some range of motion at the knees and at the hips.  Was able to make more progress helping her stretch her muscles and regain some range of motion.  Although she could not flex at the knees or extend at the knees on her own, I could feel some hamstring activity on both legs.  I was able to get a little bit of muscle contraction bilaterally.  Was able to get similar findings with regard to the hip as well.  She continues to be able to wiggle her toes on both feet.  She has a little bit of plantar and dorsiflexion of both feet.  I explained to her what I was going to do with regard to her leg movements.  I had her watch as I was manipulating her legs.  Clearly she is able to move more and do more with better comfort than she was previously.  Offered encouragement.  Examined her with the nurse as well demonstrating the above.   was present as well.  Patient asking antibiotic going.  I again explained that I felt she would need more IV antibiotic treatment and more therapy likely at a rehab unit or skilled nursing facility before she would be able to do enough/participate in care enough to be able to have a chance to go home.    In/Out    Intake/Output Summary (Last 24 hours) at 10/10/2024 3603  Last data filed at 10/9/2024 2007  Gross per 24 hour   Intake 240 ml   Output --   Net 240 ml     Allergies:   Allergies   Allergen Reactions    Niacin And Related Rash     Current Meds: oxyCODONE (ROXICODONE) immediate release tablet 5 mg, Q6H PRN  megestrol (MEGACE) 40 MG/ML suspension 200 mg, Daily  sennosides-docusate sodium (SENOKOT-S) 8.6-50 
Infectious Diseases Progress Note    Patient:  Lashonda Milian  YOB: 1950  MRN: [id number]   Admit date: 9/30/2024   Admitting Physician: Reynaldo Posey MD  Primary Care Physician: Nick Martinez MD    Chief Complaint/Interval History: She remains about the same.  Per discussion with nursing, son was here today.  They apparently did not feel they can manage her at home currently.  Patient had indicated to me that nobody had told her anything about plans.  Per discussion with nursing, patient's son and nursing were in the room discussing with patient the likely need additional subacute rehab placement for therapy before strong enough to go home.  Nursing indicates with assistance with therapy they were able to help her sit at the bedside and work some with her legs.    In/Out    Intake/Output Summary (Last 24 hours) at 10/14/2024 1449  Last data filed at 10/14/2024 1033  Gross per 24 hour   Intake --   Output 500 ml   Net -500 ml     Allergies:   Allergies   Allergen Reactions    Niacin And Related Rash     Current Meds: potassium chloride (KLOR-CON M) extended release tablet 40 mEq, PRN   Or  potassium bicarb-citric acid (EFFER-K) effervescent tablet 40 mEq, PRN   Or  potassium chloride 10 mEq/100 mL IVPB (Peripheral Line), PRN  oxyCODONE (ROXICODONE) immediate release tablet 5 mg, Q6H PRN  megestrol (MEGACE) 40 MG/ML suspension 200 mg, Daily  sennosides-docusate sodium (SENOKOT-S) 8.6-50 MG tablet 2 tablet, Daily  naloxegol (MOVANTIK) tablet 12.5 mg, QAM AC  QUEtiapine (SEROQUEL) tablet 25 mg, BID  HYDROmorphone HCl PF (DILAUDID) injection 0.25 mg, Q4H PRN  folic acid (FOLVITE) tablet 1 mg, Daily  polyethylene glycol (GLYCOLAX) packet 17 g, Daily  naloxone (NARCAN) injection 0.4 mg, PRN  methocarbamol (ROBAXIN) tablet 500 mg, BID  sodium chloride flush 0.9 % injection 5-40 mL, 2 times per day  sodium chloride flush 0.9 % injection 5-40 mL, PRN  0.9 % sodium chloride infusion, PRN  ondansetron 
Infectious Diseases Progress Note    Patient:  Lashonda Milian  YOB: 1950  MRN: [id number]   Admit date: 9/30/2024   Admitting Physician: Lauri White MD  Primary Care Physician: Nick Martinez MD    Chief Complaint/Interval History: She is uncomfortable.  She had surgery yesterday.  No cardiopulmonary symptoms.  She reports back and leg pain.  She has just had some opiate pain medicine.  Will averbal her to nursing to try 1 dose of Toradol for better symptomatic relief.    In/Out    Intake/Output Summary (Last 24 hours) at 10/5/2024 1312  Last data filed at 10/4/2024 1725  Gross per 24 hour   Intake --   Output 510 ml   Net -510 ml     Allergies:   Allergies   Allergen Reactions    Niacin And Related Rash     Current Meds: vancomycin (VANCOCIN) 1,250 mg in sodium chloride 0.9 % 250 mL IVPB, Q24H  oxyCODONE (ROXICODONE) immediate release tablet 7.5 mg, Q6H  QUEtiapine (SEROQUEL) tablet 25 mg, Nightly  folic acid (FOLVITE) tablet 1 mg, Daily  oxyCODONE (ROXICODONE) immediate release tablet 5 mg, Q6H PRN  polyethylene glycol (GLYCOLAX) packet 17 g, Daily  naloxone (NARCAN) injection 0.4 mg, PRN  methocarbamol (ROBAXIN) tablet 500 mg, BID  sodium chloride flush 0.9 % injection 5-40 mL, 2 times per day  sodium chloride flush 0.9 % injection 5-40 mL, PRN  0.9 % sodium chloride infusion, PRN  ondansetron (ZOFRAN-ODT) disintegrating tablet 4 mg, Q8H PRN   Or  ondansetron (ZOFRAN) injection 4 mg, Q6H PRN  polyethylene glycol (GLYCOLAX) packet 17 g, Daily PRN  aspirin chewable tablet 81 mg, Daily   Or  aspirin suppository 300 mg, Daily  amiodarone (CORDARONE) tablet 200 mg, BID  [Held by provider] apixaban (ELIQUIS) tablet 5 mg, BID  atorvastatin (LIPITOR) tablet 20 mg, Daily  methIMAzole (TAPAZOLE) tablet 5 mg, BID  metoprolol tartrate (LOPRESSOR) tablet 25 mg, BID  sertraline (ZOLOFT) tablet 50 mg, Daily  vancomycin (VANCOCIN) intermittent dosing (placeholder), RX Placeholder      Review of Systems see 
Infectious Diseases Progress Note    Patient:  Lashonda Milian  YOB: 1950  MRN: [id number]   Admit date: 9/30/2024   Admitting Physician: Reynaldo Posey MD  Primary Care Physician: Nick Martinez MD    Chief Complaint/Interval History: She asked me again today about going home.  Explained I felt like her care requirements and home would be more than her family and she could manage.  She told me that her son felt he could manage her at home.  I encouraged her to have him speak with her attending physician and  along with nursing if he felt they had enough family support at home in conjunction with home health to be able to manage all her care needs.  Explained to her again today that she is not moving much in bed even with assistance.  Concerned that she will regain function of her legs.  Explained I felt she needed help being able to set up in bed, but due for more in bed, and be taught how to help facilitate transfer in the bed to wheelchair etc. if she does not regain additional leg function.    In/Out  No intake or output data in the 24 hours ending 10/09/24 0728  Allergies:   Allergies   Allergen Reactions    Niacin And Related Rash     Current Meds: oxyCODONE (ROXICODONE) immediate release tablet 5 mg, Q6H PRN  megestrol (MEGACE) 40 MG/ML suspension 200 mg, Daily  sennosides-docusate sodium (SENOKOT-S) 8.6-50 MG tablet 2 tablet, Daily  naloxegol (MOVANTIK) tablet 12.5 mg, QAM AC  QUEtiapine (SEROQUEL) tablet 25 mg, BID  HYDROmorphone HCl PF (DILAUDID) injection 0.25 mg, Q4H PRN  vancomycin (VANCOCIN) 1000 mg in sodium chloride 0.9% 250 mL IVPB, Q24H  folic acid (FOLVITE) tablet 1 mg, Daily  polyethylene glycol (GLYCOLAX) packet 17 g, Daily  naloxone (NARCAN) injection 0.4 mg, PRN  methocarbamol (ROBAXIN) tablet 500 mg, BID  sodium chloride flush 0.9 % injection 5-40 mL, 2 times per day  sodium chloride flush 0.9 % injection 5-40 mL, PRN  0.9 % sodium chloride infusion, 
Infectious Diseases Progress Note    Patient:  Lashonda Milian  YOB: 1950  MRN: [id number]   Admit date: 9/30/2024   Admitting Physician: Lauri White MD  Primary Care Physician: Nick Martinez MD    Chief Complaint/Interval History: She was sleeping when I came in the room.  She was tired.  She was arousable.  She did recognize me by name.  She appears comfortable at present.  Discussed with Dr. White.  Surgery planned for today.    In/Out    Intake/Output Summary (Last 24 hours) at 10/4/2024 0859  Last data filed at 10/4/2024 0813  Gross per 24 hour   Intake --   Output 1750 ml   Net -1750 ml     Allergies:   Allergies   Allergen Reactions    Niacin And Related Rash     Current Meds: oxyCODONE (ROXICODONE) immediate release tablet 7.5 mg, Q6H  QUEtiapine (SEROQUEL) tablet 25 mg, Nightly  folic acid (FOLVITE) tablet 1 mg, Daily  oxyCODONE (ROXICODONE) immediate release tablet 5 mg, Q6H PRN  polyethylene glycol (GLYCOLAX) packet 17 g, Daily  naloxone (NARCAN) injection 0.4 mg, PRN  methocarbamol (ROBAXIN) tablet 500 mg, BID  sodium chloride flush 0.9 % injection 5-40 mL, 2 times per day  sodium chloride flush 0.9 % injection 5-40 mL, PRN  0.9 % sodium chloride infusion, PRN  ondansetron (ZOFRAN-ODT) disintegrating tablet 4 mg, Q8H PRN   Or  ondansetron (ZOFRAN) injection 4 mg, Q6H PRN  polyethylene glycol (GLYCOLAX) packet 17 g, Daily PRN  aspirin chewable tablet 81 mg, Daily   Or  aspirin suppository 300 mg, Daily  amiodarone (CORDARONE) tablet 200 mg, BID  [Held by provider] apixaban (ELIQUIS) tablet 5 mg, BID  atorvastatin (LIPITOR) tablet 20 mg, Daily  methIMAzole (TAPAZOLE) tablet 5 mg, BID  metoprolol tartrate (LOPRESSOR) tablet 25 mg, BID  sertraline (ZOLOFT) tablet 50 mg, Daily  vancomycin (VANCOCIN) intermittent dosing (placeholder), RX Placeholder      Review of Systems see HPI.  She is not coughing.  She is not dyspneic.    VitalSigns:  /64   Pulse 68   Temp 98.2 °F (36.8 °C)   
Infectious Diseases Progress Note    Patient:  Lashonda Milian  YOB: 1950  MRN: [id number]   Admit date: 9/30/2024   Admitting Physician: Reynaldo Posey MD  Primary Care Physician: Nick Martinez MD    Chief Complaint/Interval History: She was a little more bright and interactive today.  She was alert.  She was comfortable appearing.  She indicates, \"I know I sleep too much.\".  I have been encouraging her to work more with therapy.  She seemed indicates she knows she should do so but has not been working with consistently.  She does acknowledge that her pain is under better control.  She indicates as long as she gets her pain medicine at the right time then control seems to be better.  She had had a slight decline in renal function.  Seems to have stabilized and shown some improvement.  Going to work with pharmacy about a consistent scheduled dose for her vancomycin administration.    In/Out    Intake/Output Summary (Last 24 hours) at 10/17/2024 0703  Last data filed at 10/17/2024 0656  Gross per 24 hour   Intake 440 ml   Output 1280 ml   Net -840 ml     Allergies:   Allergies   Allergen Reactions    Niacin And Related Rash     Current Meds: potassium chloride (KLOR-CON M) extended release tablet 40 mEq, PRN   Or  potassium bicarb-citric acid (EFFER-K) effervescent tablet 40 mEq, PRN   Or  potassium chloride 10 mEq/100 mL IVPB (Peripheral Line), PRN  oxyCODONE (ROXICODONE) immediate release tablet 5 mg, Q6H PRN  megestrol (MEGACE) 40 MG/ML suspension 200 mg, Daily  sennosides-docusate sodium (SENOKOT-S) 8.6-50 MG tablet 2 tablet, Daily  naloxegol (MOVANTIK) tablet 12.5 mg, QAM AC  QUEtiapine (SEROQUEL) tablet 25 mg, BID  HYDROmorphone HCl PF (DILAUDID) injection 0.25 mg, Q4H PRN  folic acid (FOLVITE) tablet 1 mg, Daily  polyethylene glycol (GLYCOLAX) packet 17 g, Daily  naloxone (NARCAN) injection 0.4 mg, PRN  methocarbamol (ROBAXIN) tablet 500 mg, BID  sodium chloride flush 0.9 % injection 5-40 
Infectious Diseases Progress Note    Patient:  Lashonda Milian  YOB: 1950  MRN: [id number]   Admit date: 9/30/2024   Admitting Physician: Reynaldo Posey MD  Primary Care Physician: Nick Martinez MD    Chief Complaint/Interval History: She had some friends at bedside.  Her friends had visited her the other day as well.  Her son is coming today.  Per discussion with nursing they are going to attempt to get her with a lift up to chair so they can more effectively work with legs.  Patient continues to put up some resistance to attempts to move and help increase her activity, strength, and range of motion.  Reminded her what we are able to do with her the other day which she was able to tolerate without significant discomfort.  With helping talk her through it, we were able to lift her legs and help support her head started to increase her range of motion and flexibility in knees and hips.    In/Out  No intake or output data in the 24 hours ending 10/11/24 0909  Allergies:   Allergies   Allergen Reactions    Niacin And Related Rash     Current Meds: oxyCODONE (ROXICODONE) immediate release tablet 5 mg, Q6H PRN  megestrol (MEGACE) 40 MG/ML suspension 200 mg, Daily  sennosides-docusate sodium (SENOKOT-S) 8.6-50 MG tablet 2 tablet, Daily  naloxegol (MOVANTIK) tablet 12.5 mg, QAM AC  QUEtiapine (SEROQUEL) tablet 25 mg, BID  HYDROmorphone HCl PF (DILAUDID) injection 0.25 mg, Q4H PRN  vancomycin (VANCOCIN) 1000 mg in sodium chloride 0.9% 250 mL IVPB, Q24H  folic acid (FOLVITE) tablet 1 mg, Daily  polyethylene glycol (GLYCOLAX) packet 17 g, Daily  naloxone (NARCAN) injection 0.4 mg, PRN  methocarbamol (ROBAXIN) tablet 500 mg, BID  sodium chloride flush 0.9 % injection 5-40 mL, 2 times per day  sodium chloride flush 0.9 % injection 5-40 mL, PRN  0.9 % sodium chloride infusion, PRN  ondansetron (ZOFRAN-ODT) disintegrating tablet 4 mg, Q8H PRN   Or  ondansetron (ZOFRAN) injection 4 mg, Q6H PRN  polyethylene 
Infectious Diseases Progress Note    Patient:  Lashonda Milian  YOB: 1950  MRN: [id number]   Admit date: 9/30/2024   Admitting Physician: Lauri White MD  Primary Care Physician: Nick Martinez MD    Chief Complaint/Interval History: She was seen earlier this morning on rounds.  She had a good friend at bedside.  Short while later her son arrived and I was able to speak with him as well.  She has been uncomfortable.  She described people maneuvering her with wires and chains.  It seems like she is referring to may be having been hoisted in a lift.  She has not been maneuvered with the left during current stay at [facility].  Her son indicated she had been lifted by left at the nursing home.  He indicates she is somewhat fearful of nursing home as she was uncomfortable when she was there with movement and just felt things were little rough for her.  She has not been allowing much in terms of rolling her therapy as of yet during her current stay.  She is not eating well.  Son indicates she has not been able to move her legs to any significant degree for 3 to 4 weeks.  Reviewed with him that with that duration of lack of movement the chances for recovery from her most recent surgery may be small.    In/Out    Intake/Output Summary (Last 24 hours) at 10/8/2024 1805  Last data filed at 10/8/2024 1432  Gross per 24 hour   Intake 250 ml   Output 100 ml   Net 150 ml     Allergies:   Allergies   Allergen Reactions    Niacin And Related Rash     Current Meds: megestrol (MEGACE) 40 MG/ML suspension 200 mg, Daily  sennosides-docusate sodium (SENOKOT-S) 8.6-50 MG tablet 2 tablet, Daily  naloxegol (MOVANTIK) tablet 12.5 mg, QAM AC  QUEtiapine (SEROQUEL) tablet 25 mg, BID  HYDROmorphone HCl PF (DILAUDID) injection 0.25 mg, Q4H PRN  vancomycin (VANCOCIN) 1000 mg in sodium chloride 0.9% 250 mL IVPB, Q24H  oxyCODONE (ROXICODONE) immediate release tablet 7.5 mg, Q6H  folic acid (FOLVITE) tablet 1 mg, Daily  oxyCODONE 
Infectious Diseases Progress Note    Patient:  Lashonda Milian  YOB: 1950  MRN: [id number]   Admit date: 9/30/2024   Admitting Physician: Reynaldo Posey MD  Primary Care Physician: Nick Martinez MD    Chief Complaint/Interval History: Talked with nursing again today.  They confirm that she was able to sit at bedside with some assistance.  Talked with patient again today.  She seems to indicate today that she would likely go to nursing home prior to returning home.  Again encouraged her to work with nursing and therapy is much as possible to continue to improve her range of motion, lower extremity discomfort, strength, and strength/stamina.  Encouraged her that I felt the more she could work with therapy consistently the better.  Explained to her that I feel subacute rehab placement for additional therapy, IV antibiotic treatment, and assistance with overall care would be better at least in the short run as opposed to trying to go home at this time.  Discussed with Dr. Ricci.  Discussed with .    In/Out    Intake/Output Summary (Last 24 hours) at 10/15/2024 0908  Last data filed at 10/14/2024 1033  Gross per 24 hour   Intake --   Output 500 ml   Net -500 ml     Allergies:   Allergies   Allergen Reactions    Niacin And Related Rash     Current Meds: potassium chloride (KLOR-CON M) extended release tablet 40 mEq, PRN   Or  potassium bicarb-citric acid (EFFER-K) effervescent tablet 40 mEq, PRN   Or  potassium chloride 10 mEq/100 mL IVPB (Peripheral Line), PRN  oxyCODONE (ROXICODONE) immediate release tablet 5 mg, Q6H PRN  megestrol (MEGACE) 40 MG/ML suspension 200 mg, Daily  sennosides-docusate sodium (SENOKOT-S) 8.6-50 MG tablet 2 tablet, Daily  naloxegol (MOVANTIK) tablet 12.5 mg, QAM AC  QUEtiapine (SEROQUEL) tablet 25 mg, BID  HYDROmorphone HCl PF (DILAUDID) injection 0.25 mg, Q4H PRN  folic acid (FOLVITE) tablet 1 mg, Daily  polyethylene glycol (GLYCOLAX) packet 17 g, 
Infectious Diseases Progress Note    Patient:  Lashonda Milian  YOB: 1950  MRN: [id number]   Admit date: 9/30/2024   Admitting Physician: Reynaldo Posey MD  Primary Care Physician: Nick Martinez MD    Chief Complaint/Interval History: She has had a slight increase in creatinine.  She acknowledges not maintaining very good oral intake of fluid.  She has been accepted at [facility].  Per discussion with nursing and chart review her precertification is good through Monday, October 21, 2024.  She is getting IV fluids.  Repeat BMP scheduled for tomorrow.  Per review of pharmacy notes, vancomycin treatment reported hours has been resumed.    In/Out  No intake or output data in the 24 hours ending 10/18/24 1939  Allergies:   Allergies   Allergen Reactions    Niacin And Related Rash     Current Meds: 0.9 % sodium chloride infusion, Continuous  HYDROcodone-acetaminophen (NORCO) 7.5-325 MG per tablet 1 tablet, Q4H PRN  vitamin D (ERGOCALCIFEROL) capsule 50,000 Units, Weekly  vancomycin (VANCOCIN) 1,250 mg in sodium chloride 0.9 % 250 mL IVPB, Q48H  potassium chloride (KLOR-CON M) extended release tablet 40 mEq, PRN   Or  potassium bicarb-citric acid (EFFER-K) effervescent tablet 40 mEq, PRN   Or  potassium chloride 10 mEq/100 mL IVPB (Peripheral Line), PRN  megestrol (MEGACE) 40 MG/ML suspension 200 mg, Daily  sennosides-docusate sodium (SENOKOT-S) 8.6-50 MG tablet 2 tablet, Daily  naloxegol (MOVANTIK) tablet 12.5 mg, QAM AC  QUEtiapine (SEROQUEL) tablet 25 mg, BID  folic acid (FOLVITE) tablet 1 mg, Daily  polyethylene glycol (GLYCOLAX) packet 17 g, Daily  naloxone (NARCAN) injection 0.4 mg, PRN  methocarbamol (ROBAXIN) tablet 500 mg, BID  sodium chloride flush 0.9 % injection 5-40 mL, 2 times per day  sodium chloride flush 0.9 % injection 5-40 mL, PRN  0.9 % sodium chloride infusion, PRN  ondansetron (ZOFRAN-ODT) disintegrating tablet 4 mg, Q8H PRN   Or  ondansetron (ZOFRAN) injection 4 mg, Q6H 
Lashonda Milian arrived to room # 328.   Presented with: TIA  Mental Status: Patient is oriented and able to concentrate and follow conversation.   Vitals:    09/30/24 2136   BP: (!) 128/52   Pulse: 64   Resp: 16   Temp: 97.8 °F (36.6 °C)   SpO2: 94%     Patient safety contract and falls prevention contract reviewed with patient Yes.  Oriented Patient to room.  Call light within reach. Yes.  Needs, issues or concerns expressed at this time: no.      Electronically signed by Gaurav Kiser RN on 10/1/2024 at 12:49 AM    
Neurosurgery  To OR today for laminectomy L2-3 for evacuation of abscess  Son to sign consent this afternoon  
Nutrition Assessment     Type and Reason for Visit: Reassess    Nutrition Recommendations/Plan:   Continue current POC     Malnutrition Assessment:  Malnutrition Status: Severe malnutrition    Nutrition Assessment:  PO intake remains poor.  May take a few bites of meals.  Family trying to get pt to take ONS.  New wt NA.  Continues to receive Megace    Estimated Daily Nutrient Needs:  Energy (kcal):  8695-1325 kcals (15-18 kcals/kg) Weight Used for Energy Requirements: Current     Protein (g):  60-100g Weight Used for Protein Requirements: Ideal        Fluid (ml/day):  1488-2843 ml Method Used for Fluid Requirements: 1 ml/kcal    Nutrition Related Findings:   trace BLE edema Wound Type: Surgical Incision, Stage I, Stage II (blister)    Current Nutrition Therapies:    ADULT DIET; Regular  ADULT ORAL NUTRITION SUPPLEMENT; Breakfast, Lunch, Dinner; Fortified Gelatin Oral Supplement    Anthropometric Measures:  Height: 157.5 cm (5' 2.01\")  Current Body Wt: 97 kg (213 lb 13.5 oz)   BMI: 39.1    Nutrition Diagnosis:   Unintended weight loss related to acute injury/trauma, cognitive or neurological impairment, early satiety, increase demand for energy/nutrients as evidenced by intake 0-25%, weight loss, wounds    Nutrition Interventions:   Food and/or Nutrient Delivery: Continue Current Diet, Continue Oral Nutrition Supplement  Nutrition Education/Counseling: No recommendation at this time  Coordination of Nutrition Care: Continue to monitor while inpatient  Plan of Care discussed with: family    Goals:  Previous Goal Met: No Progress toward Goal(s)  Goals: Meet at least 75% of estimated needs, PO intake 50% or greater       Nutrition Monitoring and Evaluation:   Behavioral-Environmental Outcomes: None Identified  Food/Nutrient Intake Outcomes: Food and Nutrient Intake, Supplement Intake  Physical Signs/Symptoms Outcomes: Biochemical Data, Fluid Status or Edema, Weight, Skin    Discharge Planning:    Continue current diet 
Nutrition Assessment     Type and Reason for Visit: Reassess    Nutrition Recommendations/Plan:   Continue current POC     Malnutrition Assessment:  Malnutrition Status: Severe malnutrition    Nutrition Assessment:  Pt's po intake remains variable.  Some meals 0-25%, other may be 50-75%.  Taking few bites of ONS.  New wt NA.  Megace continues    Estimated Daily Nutrient Needs:  Energy (kcal):  6417-5705 kcals (15-18 kcals/kg) Weight Used for Energy Requirements: Current     Protein (g):  60-100g Weight Used for Protein Requirements: Ideal        Fluid (ml/day):  8897-1454 ml Method Used for Fluid Requirements: 1 ml/kcal    Nutrition Related Findings:   +1 BLE edema Wound Type: Surgical Incision, Stage I, Stage II    Current Nutrition Therapies:    ADULT DIET; Regular  ADULT ORAL NUTRITION SUPPLEMENT; Breakfast, Lunch, Dinner; Fortified Gelatin Oral Supplement    Anthropometric Measures:  Height: 157.5 cm (5' 2.01\")  Current Body Wt: 97 kg (213 lb 13.5 oz)   BMI: 39.1    Nutrition Diagnosis:   Unintended weight loss related to acute injury/trauma, cognitive or neurological impairment, early satiety, increase demand for energy/nutrients as evidenced by intake 0-25%, intake 51-75%, weight loss, wounds    Nutrition Interventions:   Food and/or Nutrient Delivery: Continue Current Diet, Continue Oral Nutrition Supplement  Nutrition Education/Counseling: No recommendation at this time  Coordination of Nutrition Care: Continue to monitor while inpatient  Plan of Care discussed with: family    Goals:  Previous Goal Met: Progressing toward Goal(s)  Goals: Meet at least 75% of estimated needs, PO intake 50% or greater       Nutrition Monitoring and Evaluation:   Behavioral-Environmental Outcomes: None Identified  Food/Nutrient Intake Outcomes: Food and Nutrient Intake, Supplement Intake  Physical Signs/Symptoms Outcomes: Biochemical Data, Weight, Skin, Fluid Status or Edema    Discharge Planning:    Continue current diet 
Nutrition Assessment     Type and Reason for Visit: Reassess    Nutrition Recommendations/Plan:   Continue current interventions     Malnutrition Assessment:  Malnutrition Status: Severe malnutrition    Nutrition Assessment:  PO intake has been decreased the past couple of days with intake ranging 0-25%.  Continues to receive Megace.  Aware has had issues with constipation and this has been addressed.  New wt NA.  Hoping to be discharged today.    Estimated Daily Nutrient Needs:  Energy (kcal):  2638-8954 kcals (15-18 kcals/kg) Weight Used for Energy Requirements: Current     Protein (g):  60-100g Weight Used for Protein Requirements: Ideal        Fluid (ml/day):  8672-9194 ml Method Used for Fluid Requirements: 1 ml/kcal    Nutrition Related Findings:   +1 BLE edema Wound Type: Surgical Incision, Stage I, Stage II (blister)    Current Nutrition Therapies:    ADULT DIET; Regular  ADULT ORAL NUTRITION SUPPLEMENT; Breakfast, Lunch, Dinner; Fortified Gelatin Oral Supplement    Anthropometric Measures:  Height: 157.5 cm (5' 2.01\")  Current Body Wt: 97 kg (213 lb 13.5 oz)   BMI: 39.1    Nutrition Diagnosis:   Unintended weight loss related to acute injury/trauma, cognitive or neurological impairment, early satiety, increase demand for energy/nutrients as evidenced by intake 0-25%, intake 26-50%, weight loss, wounds    Nutrition Interventions:   Food and/or Nutrient Delivery: Continue Current Diet, Continue Oral Nutrition Supplement  Nutrition Education/Counseling: No recommendation at this time  Coordination of Nutrition Care: Continue to monitor while inpatient  Plan of Care discussed with: family    Goals:  Previous Goal Met: No Progress toward Goal(s)  Goals: Meet at least 75% of estimated needs, PO intake 50% or greater       Nutrition Monitoring and Evaluation:   Behavioral-Environmental Outcomes: None Identified  Food/Nutrient Intake Outcomes: Food and Nutrient Intake, Supplement Intake  Physical Signs/Symptoms 
Nutrition Assessment     Type and Reason for Visit: Reassess    Nutrition Recommendations/Plan:   Continue to follow for decreased nausea and pain, increased po intake, ONS acceptance     Malnutrition Assessment:  Malnutrition Status: Moderate malnutrition    Nutrition Assessment:  Patient refused dinner last night and lunch today.  Aware of increased pain and upset stomach.  Glucose     Estimated Daily Nutrient Needs:  Energy (kcal):  9604-7236 kcals (11-14 kcals/kg( Weight Used for Energy Requirements: Current     Protein (g):  60-100g Weight Used for Protein Requirements: Ideal        Fluid (ml/day):  9884-9757 ml Method Used for Fluid Requirements: 1 ml/kcal    Nutrition Related Findings:   trace BLE edema Wound Type: Stage I, Stage II    Current Nutrition Therapies:    ADULT DIET; Easy to Chew; 4 carb choices (60 gm/meal)  ADULT ORAL NUTRITION SUPPLEMENT; Lunch, Dinner; Fortified Gelatin Oral Supplement  Diet NPO    Anthropometric Measures:  Height: 157.5 cm (5' 2.01\")  Current Body Wt: 96.6 kg (212 lb 15.4 oz)   BMI: 38.9    Nutrition Diagnosis:   Unintended weight loss related to acute injury/trauma, pain as evidenced by weight loss, nausea    Nutrition Interventions:   Food and/or Nutrient Delivery: Continue NPO, Continue Oral Nutrition Supplement  Nutrition Education/Counseling: No recommendation at this time  Coordination of Nutrition Care: Continue to monitor while inpatient       Goals:  Previous Goal Met: No Progress toward Goal(s)  Goals: Meet at least 75% of estimated needs, PO intake 50% or greater       Nutrition Monitoring and Evaluation:   Behavioral-Environmental Outcomes: None Identified  Food/Nutrient Intake Outcomes: Food and Nutrient Intake, Supplement Intake  Physical Signs/Symptoms Outcomes: Biochemical Data, Nausea or Vomiting, Fluid Status or Edema, Weight, Skin    Discharge Planning:    Too soon to determine     Yandy Long MS, RD, LD  Contact: [phone]    
Nutrition Assessment     Type and Reason for Visit: Reassess    Nutrition Recommendations/Plan:   Continue to follow--if within clinical course, may benefit from alternate source of nutrition     Malnutrition Assessment:  Malnutrition Status: Moderate malnutrition    Nutrition Assessment:  PO intake continues to be poor--0 or 0-25%.  Pt sleeping through breakfast.    Estimated Daily Nutrient Needs:  Energy (kcal):  9345-2836 kcals (15-18 kcals/kg) Weight Used for Energy Requirements: Current     Protein (g):  60-100g Weight Used for Protein Requirements: Ideal        Fluid (ml/day):  8797-1601 ml Method Used for Fluid Requirements: 1 ml/kcal    Nutrition Related Findings:   trace BLE edema Wound Type: Stage I, Stage II, Surgical Incision (blister)    Current Nutrition Therapies:    ADULT DIET; Regular  ADULT ORAL NUTRITION SUPPLEMENT; Breakfast, Lunch, Dinner; Fortified Gelatin Oral Supplement    Anthropometric Measures:  Height: 157.5 cm (5' 2.01\")  Current Body Wt: 97 kg (213 lb 13.5 oz)   BMI: 39.1    Nutrition Diagnosis:   Unintended weight loss related to acute injury/trauma, cognitive or neurological impairment as evidenced by weight loss, intake 0-25%    Nutrition Interventions:   Food and/or Nutrient Delivery: Continue Current Diet, Continue Oral Nutrition Supplement  Nutrition Education/Counseling: No recommendation at this time  Coordination of Nutrition Care: Continue to monitor while inpatient       Goals:  Previous Goal Met: No Progress toward Goal(s)  Goals: Meet at least 75% of estimated needs, PO intake 50% or greater       Nutrition Monitoring and Evaluation:   Behavioral-Environmental Outcomes: None Identified  Food/Nutrient Intake Outcomes: Food and Nutrient Intake, Supplement Intake  Physical Signs/Symptoms Outcomes: Biochemical Data, Weight, Skin, Fluid Status or Edema    Discharge Planning:    Too soon to determine     Yandy Long, MS, RD, LD  Contact: [phone]    
Occupational Therapy    Name: Lashonda Milian  MRN:  [id number]  Date of service:  10/1/2024    Pt refusal of OT/PT eval this am.  Pt states she has too much pain in buttocks to even get out of this bed.  Will follow up at a later time.     Electronically signed by Rosy Yeung OT on 10/2/2024 at 12:46 PM      
Occupational Therapy  Name: Lashonda Milian  MRN:  [id number]  Date of service:  10/1/2024    Attempted OT/PT eval this am, pt reports she had a rough day yesterday and didn't want to participate today. Will follow up at a later time.    Electronically signed by Rosy Jennings OT on 10/1/2024 at 9:56 AM   
Occupational Therapy  Pt in bed lying in supine. Pt states \"I am sick today\". Pt declined sitting up to eat her lunch. Will continue to follow as able. Electronically signed by KATERIN Garcia on 10/15/2024 at 2:30 PM    
Occupational Therapy  Pt in bed with HOB slightly raised. Pt complaining of pain in BLE legs. Nursing aware. Pt refuses therapy today and states \"maybe tomorrow\", despite encouragement to participate. Will continue to follow as able. Electronically signed by KATERIN Garcia on 10/17/2024 at 1:05 PM    
Patient continues to decline turns, but pain medication have greatly improved tolerance for range of motion and repositioning of limbs. Unable to assess the state of her backside due to pain and refusal to turn adequately to inspect the area.     
Patient feeling better today, less anxious. She states she wants to lay down for now but will try to sit up and eat with her scheduled 1200 pain medication  
Patient moved to specialty bed.   
Patient refusing boots for her heels and refusing all turns at this time. Educated on risk for pressure injuries and the risk for foot drop  
Patient safely taken to Jung Jc RN at bedside. Electronically signed by Haresh Davila RN on 10/4/2024 at 6:03 PM    
Patient son, Daniel, signed consent form for procedure.  
Patient states she will let her son Daniel apply her heel boots to prevent skin breakdown. She also refuses to sit up at this time to eat. Daniel was informed on the phone  
Patient voices that her pain is not well controlled at this time. She has been tearful this morning, and staff has administered oral and IV medication for pain. Patient seems to be anxious as opposed uncomfortable at this time, but does cry out and endorse pain.  Patient states she \"must have on tiny shoes because my toes are all curled under.\" Feet are dropped, toes curled tightly under. Passive range of motion exercises attempted with patients feet and ankles with minimal extension achieved. Education provided about foot drop prevention, patient declines to allow staff to apply support boots.   Care ongoing with goal of transition to skilled nursing facility in near future.   
Patient with a RUE single lumen PICC that was placed for long-term abx treatment at [facility] following a spinal abscess per son. Unsure of placement date.    PICC dressing and needleless connectors were changed. The old dressing was dated 9/21. There was no alcohol cap present. There was no bio patch or CHG patch over insertion site. Brisk blood return noted in PICC.    Electronically signed by Gaurav Kiser RN on 10/1/2024 at 12:48 AM    
Physical Therapy    Pt has not been interested in therapy at this time due to pain and fatigue. Now awaiting surgery. Will await further post-op indications/orders.    Electronically signed by Rosy Moyer PT on 10/4/2024 at 7:23 AM    
Physical Therapy  Name: Lashonda Milian  MRN:  [id number]  Date of service:  10/11/2024       10/11/24 1400   General   Additional Pertinent Hx pt with prior hospital admit at [facility] in August 2024   Family / Caregiver Present No   Subjective   Subjective oh no you're torturing me.   Oxygen Therapy   O2 Device None (Room air)   Bed Mobility   Supine to Sit Moderate assistance;Maximal assistance   Sit to Supine Moderate assistance;Maximal assistance   Exercises   Heelslides 10   Ankle Pumps 10   Short Term Goals   Time Frame for Short Term Goals 2 wks   Short Term Goal 1 supine to sit Min A   Short Term Goal 2 log roll Min A   Short Term Goal 3 AROM BLEs x 10 reps in supine   Short Term Goal 4 sit to  SS Min A x 2   Conditions Requiring Skilled Therapeutic Intervention   Body Structures, Functions, Activity Limitations Requiring Skilled Therapeutic Intervention Decreased functional mobility ;Decreased ADL status;Decreased ROM;Decreased cognition;Decreased strength;Decreased safe awareness;Decreased balance;Decreased sensation;Increased pain;Decreased posture   Assessment Patient is extremely fearfull of falling and increasing pain.  She has increased anxiety which limits the ability to instruct her on safe techniques to use.  If she was to return home at this time she would need a 24/7 caregiver that she is comfortable with.   Treatment Diagnosis impaired mobility and gait   Therapy Prognosis Poor;Guarded   Barriers to Learning pain, confusion   Treatment Initiated  bed mobility, ROM   Discharge Recommendations 24 hour supervision or assist;Subacute/Skilled Nursing Facility;Long Term Care with PT;Therapy recommended at discharge;Patient would benefit from continued therapy after discharge   Activity Tolerance   Activity Tolerance Treatment limited secondary to medical complications;Treatment limited secondary to decreased cognition;Patient limited by endurance;Patient limited by pain;Patient limited by 
Physical Therapy  Name: Lashonda Milian  MRN:  [id number]  Date of service:  10/9/2024       10/09/24 1555   General   Additional Pertinent Hx pt with prior hospital admit at [facility] in August 2024   Family / Caregiver Present No   Subjective   Subjective When am I going to get to go home? The doctor said I could go home today.  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   Oxygen Therapy   O2 Device None (Room air)   Exercises   Heelslides 15   Hip Abduction hip IR/ER x 10   Knee Short Arc Quad 15   Ankle Pumps 15   Comments PROM   Short Term Goals   Time Frame for Short Term Goals 2 wks   Short Term Goal 1 supine to sit Min A   Short Term Goal 2 log roll Min A   Short Term Goal 3 AROM BLEs x 10 reps in supine   Short Term Goal 4 sit to  SS Min A x 2   Conditions Requiring Skilled Therapeutic Intervention   Body Structures, Functions, Activity Limitations Requiring Skilled Therapeutic Intervention Decreased functional mobility ;Decreased ADL status;Decreased ROM;Decreased cognition;Decreased strength;Decreased safe awareness;Decreased balance;Decreased sensation;Increased pain;Decreased posture   Assessment patient in supine with iram LE in extreme hip ER. after ex's attempted to position patient in neutral hip position with towels and booties.  Encouraged movement of toes, feet and ankles.   Treatment Diagnosis impaired mobility and gait   Therapy Prognosis Poor;Guarded   Barriers to Learning pain, confusion   Discharge Recommendations Continue to assess pending progress;Subacute/Skilled Nursing Facility;Long Term Care with PT;Patient would benefit from continued therapy after discharge   Activity Tolerance   Activity Tolerance Treatment limited secondary to decreased cognition;Patient limited by pain   Physical Therapy Plan   General Plan 3-5 times per week   Therapy Duration 2 Weeks   Current Treatment Recommendations Strengthening;ROM;Balance training;Functional mobility training;Transfer 
Physical Therapy  Name: Lashonda Milian  MRN:  [id number]  Date of service:  10/19/2024     10/19/24 1056   Restrictions/Precautions   Restrictions/Precautions Fall Risk;Contact Precautions   Position Activity Restriction   Other position/activity restrictions MRSA   General   Chart Reviewed Yes   Additional Pertinent Hx pt with prior hospital admit at [facility] in August 2024   Response To Previous Treatment Patient with no complaints from previous session.   Family / Caregiver Present No   Referring Practitioner Dr. Sarabjit Aguilar   Subjective   Subjective \" I can't do it. I can't stand. I can't roll over until I can move my legs and I can't move my legs yet. When am I going home? I have help at home\"   General Comment   Comments RNRochelle, harish PT treatment.   Pain Assessment   Pain Assessment 0-10   Pain Level 10   Patient's Stated Pain Goal 0 - No pain   Pain Location Generalized   Pain Orientation Other (Comment)  (\" All Over\")   Pain Descriptors Aching   Functional Pain Assessment Prevents or interferes some active activities and ADLs   Pain Type Acute pain   Pain Frequency Continuous   Pain Onset On-going   Non-Pharmaceutical Pain Intervention(s) Rest;Nurse notified (comment)   Response to Pain Intervention Patient satisfied   Side Effects No reported side effects   Bed Mobility   Rolling Unable to assess  (Pt refused)   Comment Pt would initiate by moving Upper extremities but would not cross midline to attempt controlled movement.   Exercises   Hip Flexion SLR 5-10 BLE   Hip Abduction 5-10 BLE   Comments PROM/ minimal AAROM   Conditions Requiring Skilled Therapeutic Intervention   Body Structures, Functions, Activity Limitations Requiring Skilled Therapeutic Intervention Decreased functional mobility ;Decreased ROM;Decreased strength;Decreased safe awareness;Decreased endurance;Increased pain;Decreased posture   Assessment Max encouragement provided yet pt very self limiting due to pain and possible fear 
Physical Therapy  Name: Lashonda Milian  MRN:  [id number]  Date of service:  10/14/2024       10/14/24 1423   Restrictions/Precautions   Restrictions/Precautions Fall Risk;Contact Precautions   Position Activity Restriction   Other position/activity restrictions MRSA   General   Chart Reviewed Yes   Additional Pertinent Hx pt with prior hospital admit at [facility] in August 2024   Response To Previous Treatment Patient with no complaints from previous session.   Family / Caregiver Present No   Referring Practitioner Dr. Sarabjit Aguilar   Subjective   Subjective \"I just can't stand.\"   General Comment   Comments RN, Edwige, okayed PT and notified pt wants pain med dose of toradol. 10/4 Right L2 and L3 hemilaminotomies with medial facetectomy for debulking of epidural abscess and phlegmon and decompression of the thecal sac and nerve roots   Pain Assessment   Pain Assessment 0-10   Pain Level 10   Pain Location Back   Oxygen Therapy   O2 Device None (Room air)   Bed Mobility   Supine to Sit Moderate assistance;Maximal assistance  (2A)   Transfers   Sit to Stand Unable to assess   Stand to Sit   (pt refused to attempt, even with SS; CGA with sitting balance once positioned.)   Comment   (sat on EOB x 6 mins SBA. RN encouraging mobility.)   Balance   Posture Fair  (L lateral lean)   Sitting - Static Fair   Sitting - Dynamic Fair;-   Exercises   Heelslides 10   Hip Abduction hip IR/ER x 10, add/abd x 10   Knee Short Arc Quad 10   Ankle Pumps 15   Comments PROM/AAROM   Patient Goals    Patient Goals  get better   Short Term Goals   Time Frame for Short Term Goals 2 wks   Short Term Goal 1 supine to sit Min A   Short Term Goal 2 log roll Min A   Short Term Goal 3 AROM BLEs x 10 reps in supine   Short Term Goal 4 sit to  SS Min A x 2   Conditions Requiring Skilled Therapeutic Intervention   Body Structures, Functions, Activity Limitations Requiring Skilled Therapeutic Intervention Decreased functional mobility 
Physical Therapy and Occupational Therapy  Name: Lashonda Milian  MRN:  [id number]  Date of service:  10/3/2024    Pt states she is not feeling well and BLEs are burning. Wants to wait until later to try therapy. Pt. declined sitting EOB as well. Family member present and could not convince pt to participate.    Electronically signed by Arianna Krishna, PT on 10/3/2024 at 2:09 PM      
Specialty bed ordered.   
Spiritual Health History and Assessment/Progress Note  [facility]    (P) Loneliness/Social Isolation, (P) Emotional distress, (P) Adjustment to illness, Life Adjustments,      Name: Lashonda Milian MRN: [id number]    Age: 73 y.o.     Sex: female   Language: English   Mormonism: None   TIA (transient ischemic attack)     Date: 10/1/2024            Total Time Calculated: (P) 25 min              Spiritual Assessment began in [facility] 3 RUPERT/VAS/MED        Referral/Consult From: (P) Multi-disciplinary team   Encounter Overview/Reason: (P) Loneliness/Social Isolation  Service Provided For: (P) Patient    Steffanie, Belief, Meaning:   Patient identifies as spiritual. The patient is not fond of any Confucianist affiliation and however her life experiences serves as guide post in her spiritual life.  Family/Friends No family/friends present      Importance and Influence:  Patient has spiritual/personal beliefs that influence decisions regarding their health  Family/Friends have no beliefs influential to healthcare decision-making identified during this visit      Assessment and Plan of Care:   The patient is not affliated with any churches or Confucianist fellowships.  However her sons death and her health status limited her mobility and time being spent in pensiveness.  Patient Interventions include: Facilitated expression of thoughts and feelings  Family/Friends Interventions include: Affirmed coping skills/support systems    Patient Plan of Care: No spiritual needs identified for follow-up  Family/Friends Plan of Care: No future visits per patient/family request    Electronically signed by Delmy Monte  on 10/1/2024 at 1:04 PM        10/01/24 1248   Encounter Summary   Encounter Overview/Reason Loneliness/Social Isolation   Service Provided For Patient   Referral/Consult From Multi-disciplinary team   Support System Family members   Complexity of Encounter Moderate   Begin Time 1130   End Time  1155 
Surgery scheduled for today. Will return at a later date to attempt evaluation.    Electronically Signed By:  Tasneem Michelle M.S., CCC-SLP  10/4/2024,7:36 AM.       
This nurse and Presjosé, PCA entered pt's room to adjust her in the bed and pt began swinging telemetry box at this nurse and grabbed my arm digging her nails in my hand. Security, , and Dr. Andino notified. 25 mg IM of Thorazine ordered and administered.     Electronically signed by Kathi Castellanos RN on 10/3/2024 at 8:49 PM    
[facility]   Pharmacy Pharmacokinetic Monitoring Service - Vancomycin     Lashonda Milian is a 73 y.o. female starting on vancomycin therapy for Bloodstream Infection. Pharmacy consulted by Kassandra Agrawal for monitoring and adjustment.    Target Concentration: Goal AUC/LAKHWINDER 400-600 mg*hr/L    Additional Antimicrobials: N/A    Pertinent Laboratory Values:   Wt Readings from Last 1 Encounters:   09/30/24 96.6 kg (213 lb)     Temp Readings from Last 1 Encounters:   10/01/24 98.3 °F (36.8 °C)     Estimated Creatinine Clearance: 70 mL/min (based on SCr of 0.8 mg/dL).  Recent Labs     09/30/24  1217 10/01/24  0105   CREATININE 0.8 0.8   BUN 21 20   WBC 5.0 4.7*     Procalcitonin: 09/30= 0.06    Pertinent Cultures: No current cultures at this time  Culture Date Source Results        MRSA Nasal Swab: N/A. Non-respiratory infection.    Plan:  Dosing recommendations based on Bayesian software  Start vancomycin 1250mg Q24hr (I changed her home dose from 1250mg Q12hr due to [facility] predicting an AUC of 861)  Anticipated AUC of 444 and trough concentration of 11.6 at steady state  Renal labs as indicated   Vancomycin concentration ordered for 10/2 @ 2200   Pharmacy will continue to monitor patient and adjust therapy as indicated    Thank you for the consult,  Veronica Antoine RPH  10/1/2024 5:02 AM   
[facility]   Pharmacy Pharmacokinetic Monitoring Service - Vancomycin    Consulting Provider: Kassandra Agrawal   Indication: Bloodstream Infection  Target Concentration:  Tr: 15-20  Day of Therapy: 15 days Inpatient  Additional Antimicrobials: None    Pertinent Laboratory Values:   Wt Readings from Last 1 Encounters:   10/03/24 97 kg (213 lb 13.5 oz)     Temp Readings from Last 1 Encounters:   10/14/24 97.2 °F (36.2 °C)     Estimated Creatinine Clearance: 43 mL/min (A) (based on SCr of 1.3 mg/dL (H)).  Recent Labs     10/13/24  0216 10/14/24  0355   CREATININE 1.3* 1.3*   BUN 30* 30*   WBC 5.2 4.6*     Procalcitonin: N/A    Pertinent Cultures:  Culture Date Source Results   10/04/24  10/02/24 Surgical  Blood No growth  No growth   MRSA Nasal Swab: N/A. Non-respiratory infection.    Recent vancomycin administrations                     vancomycin (VANCOCIN) 1000 mg in 200 mL IVPB (mg) 1,000 mg New Bag 10/13/24 1208    vancomycin (VANCOCIN) 1000 mg in 200 mL IVPB (mg) 1,000 mg New Bag 10/1950                    Assessment:  Date/Time Current Dose Concentration Timing of Concentration (h) AUC   10/15/24 Pulse dosing 23 Random level     Note: Serum concentrations collected for AUC dosing may appear elevated if collected in close proximity to the dose administered, this is not necessarily an indication of toxicity    Plan:  Current dosing regimen is supra-therapeutic  No Vancomycin needed, will draw next level 48 hr after last Vancomycin was given.  Repeat vancomycin concentration ordered for 10/15 @ 1200 (~48hr level)  Shows AUC= 440 and Tr= 13.2  Pharmacy will continue to monitor patient and adjust therapy as indicated    Thank you for the consult,  Veronica Antoine RPH  10/15/2024 12:53 AM   
[facility]   Pharmacy Pharmacokinetic Monitoring Service - Vancomycin    Consulting Provider: Kassandra Agrawal  Indication: Bloodstream Infection   Target Concentration: Goal trough of 15-20 mg/L switch to -600 once renal function stable  Day of Therapy: 18 days inpatient  Additional Antimicrobials: none    Pertinent Laboratory Values:   Wt Readings from Last 1 Encounters:   10/03/24 97 kg (213 lb 13.5 oz)     Temp Readings from Last 1 Encounters:   10/17/24 98.6 °F (37 °C)     Estimated Creatinine Clearance: 43 mL/min (A) (based on SCr of 1.3 mg/dL (H)).  Recent Labs     10/16/24  0345 10/17/24  0206   CREATININE 1.3* 1.3*   BUN 26* 25*   WBC 5.5 5.3     Procalcitonin: n/a    Pertinent Cultures:  Culture Date Source Results   10/04/24  10/02/24 Surgical  Blood  No growth  No growth   MRSA Nasal Swab: N/A. Non-respiratory infection.    Recent vancomycin administrations                     vancomycin (VANCOCIN) 1,250 mg in sodium chloride 0.9 % 250 mL IVPB (mg) 1,250 mg New Bag 10/15/24 1711                    Assessment:  Date/Time Current Dose Concentration Timing of Concentration (h) AUC   10/17/2024 14:20  1250 mg IV every 48 hours 18.8 45 h 9 m 475   Note: Serum concentrations collected for AUC dosing may appear elevated if collected in close proximity to the dose administered, this is not necessarily an indication of toxicity    Plan:  Current dosing regimen is therapeutic  Continue current dose  Repeat vancomycin concentration not ordered for now   Pharmacy will continue to monitor patient and adjust therapy as indicated    Thank you for the consult,  Joon Ferguson RPH  10/17/2024 3:43 PM    
[facility]   Pharmacy Pharmacokinetic Monitoring Service - Vancomycin    Consulting Provider: Kassandra Agrawal  Indication: Bloodstream Infection  Target Concentration: Goal trough of 15-20 mg/L switch to -600 once renal function stable  Day of Therapy: 16 days inpatient, was on PTA  Additional Antimicrobials: none    Pertinent Laboratory Values:   Wt Readings from Last 1 Encounters:   10/03/24 97 kg (213 lb 13.5 oz)     Temp Readings from Last 1 Encounters:   10/15/24 97.2 °F (36.2 °C)     Estimated Creatinine Clearance: 40 mL/min (A) (based on SCr of 1.4 mg/dL (H)).  Recent Labs     10/14/24  0355 10/15/24  1232   CREATININE 1.3* 1.4*   BUN 30* 27*   WBC 4.6* 5.4     Procalcitonin: n/a    Pertinent Cultures:  Culture Date Source Results   10/04/24  10/02/24 Surgical  Blood No growth  No growth   MRSA Nasal Swab: N/A. Non-respiratory infection.    Recent vancomycin administrations                     vancomycin (VANCOCIN) 1000 mg in 200 mL IVPB (mg) 1,000 mg New Bag 10/13/24 1208    vancomycin (VANCOCIN) 1000 mg in 200 mL IVPB (mg) 1,000 mg New Bag 10/1952                    Assessment:  Date/Time Current Dose Concentration Timing of Concentration (h)   10/15/24 1232 1000 mg IV once 17.9 ~48 hours   Note: Serum concentrations collected for AUC dosing may appear elevated if collected in close proximity to the dose administered, this is not necessarily an indication of toxicity    Plan:  Will give vancomycin 1250 mg IV once  Repeat vancomycin concentration ordered for 10/17 @ 1400  Regimen vancomycin 1250 mg IV every 48 hours predicted to give    Pharmacy will continue to monitor patient and adjust therapy as indicated    Thank you for the consult,  Joon Ferguson RPH  10/15/2024 2:01 PM    
[facility]   Pharmacy Pharmacokinetic Monitoring Service - Vancomycin    Consulting Provider: LALITA Arcos   Indication: Disitis/Osteomyelitis in L2-L4  Target Concentration: Goal AUC/LAKHWINDER 400-600 mg*hr/L  Day of Therapy: 4  Additional Antimicrobials: None    Pertinent Laboratory Values:   Wt Readings from Last 1 Encounters:   10/03/24 97 kg (213 lb 13.5 oz)     Temp Readings from Last 1 Encounters:   10/04/24 98.2 °F (36.8 °C)     Estimated Creatinine Clearance: 62 mL/min (based on SCr of 0.9 mg/dL).  Recent Labs     10/03/24  0234 10/04/24  0316   CREATININE 0.8 0.9   BUN 15 15   WBC 5.3 4.4*     Procalcitonin: NA    Pertinent Cultures:  Culture Date Source Results   10/2/24 Blood x2 No growth to date   MRSA Nasal Swab: N/A. Non-respiratory infection.    Recent vancomycin administrations                     vancomycin (VANCOCIN) 1,250 mg in sodium chloride 0.9 % 250 mL IVPB (mg) 1,250 mg New Bag 10/03/24 1804     1,250 mg New Bag  0415     1,250 mg New Bag 10/02/24 1713    vancomycin (VANCOCIN) 1,250 mg in sodium chloride 0.9 % 250 mL IVPB ()  Restarted 10/01/24 2304     1,250 mg New Bag  2300                    Assessment:  Date/Time Current Dose Concentration Timing of Concentration (h)   10/4/24 1010 1250 mg IV q12h 27.5 16h 6m   Note: Serum concentrations collected for AUC dosing may appear elevated if collected in close proximity to the dose administered, this is not necessarily an indication of toxicity    Plan:  Current dosing regimen is supra-therapeutic  \"Decrease dose to 1250 mg IV q24h  Repeat vancomycin concentration ordered for 10/5 @ 1730   Pharmacy will continue to monitor patient and adjust therapy as indicated    Thank you for the consult,  Melissa Graves RP, BCPS  10/4/2024 11:46 AM   
[facility]   Pharmacy Pharmacokinetic Monitoring Service - Vancomycin    Consulting Provider: Kassandra Agrawal  Indication: Bloodstream Infection (continued from outpt)  Target Concentration: Goal AUC/LAKHWINDER 400-600 mg*hr/L  Day of Therapy: 6 (inpatient)  Additional Antimicrobials: none    Pertinent Laboratory Values:   Wt Readings from Last 1 Encounters:   10/03/24 97 kg (213 lb 13.5 oz)     Temp Readings from Last 1 Encounters:   10/07/24 99 °F (37.2 °C) (Temporal)     Estimated Creatinine Clearance: 56 mL/min (A) (based on SCr of 1 mg/dL (H)).  Recent Labs     10/06/24  0256 10/07/24  0229   CREATININE 1.0* 1.0*   BUN 20 22   WBC 5.8 6.6       Pertinent Cultures:  Culture Date Source Results   10/2/24 Blood x2 No growth after 5 days   10/4/24 Surgical  No growth to date   MRSA Nasal Swab: N/A. Non-respiratory infection.    Recent vancomycin administrations                     vancomycin (VANCOCIN) 1000 mg in sodium chloride 0.9% 250 mL IVPB (mg) 1,000 mg New Bag 10/06/24 2126     1,000 mg New Bag 10/05/24 2049                    Assessment:  Date/Time Current Dose Concentration Timing of Concentration (h) AUC   10/7/24 2113 1000mg IV q24 20.7 22 h 19 m 526   Note: Serum concentrations collected for AUC dosing may appear elevated if collected in close proximity to the dose administered, this is not necessarily an indication of toxicity    Plan:  Current dosing regimen is therapeutic  Continue current dose  No repeat vancomycin concentration ordered at this time   Pharmacy will continue to monitor patient and adjust therapy as indicated    Thank you for the consult,  Becca Stockton RPH  10/7/2024 9:12 PM   
[facility]   Pharmacy Pharmacokinetic Monitoring Service - Vancomycin    Consulting Provider: LALITA Arcos   Indication: Bloodstream infection  Target Concentration: Goal AUC/LAKHWINDER 400-600 mg*hr/L  Day of Therapy: 5 (inpatient)  Additional Antimicrobials: None    Pertinent Laboratory Values:   Wt Readings from Last 1 Encounters:   10/03/24 97 kg (213 lb 13.5 oz)     Temp Readings from Last 1 Encounters:   10/05/24 98.2 °F (36.8 °C)     Estimated Creatinine Clearance: 62 mL/min (based on SCr of 0.9 mg/dL).  Recent Labs     10/04/24  0316 10/05/24  0327   CREATININE 0.9 0.9   BUN 15 17   WBC 4.4* 7.5     Procalcitonin: na    Pertinent Cultures:  Culture Date Source Results   10/2/24 Blood x2 No growth to date   MRSA Nasal Swab: N/A. Non-respiratory infection.    Recent vancomycin administrations                     sod chloride IRR soln 0.9 % 1,000 mL with vancomycin (VANCOCIN) 1,000 mg (mL) 1,000 mL Given 10/04/24 1704    sod chloride IRR soln 0.9 % 1,000 mL with vancomycin (VANCOCIN) 1,000 mg ()  Given 10/04/24 1637    vancomycin (VANCOCIN) 1,250 mg in sodium chloride 0.9 % 250 mL IVPB (mg) 1,250 mg New Bag 10/04/24 1602    vancomycin (VANCOCIN) 1,250 mg in sodium chloride 0.9 % 250 mL IVPB (mg) 1,250 mg New Bag 10/03/24 1804     1,250 mg New Bag  0415                    Assessment:  Date/Time Current Dose Concentration Timing of Concentration (h) AUC   10/05/24 1700 1250 mg q24h 20.9 24 h 8 m 579   Note: Serum concentrations collected for AUC dosing may appear elevated if collected in close proximity to the dose administered, this is not necessarily an indication of toxicity    Plan:  Current dosing regimen is supra-therapeutic  \"Decrease dose to 1000 mgIV q24h  Repeat vancomycin concentration ordered for 10/07 @ 1930   Pharmacy will continue to monitor patient and adjust therapy as indicated    Thank you for the consult,  Melissa Graves RP, BCPS  10/5/2024 5:42 PM   
[facility]   Pharmacy Pharmacokinetic Monitoring Service - Vancomycin    Consulting Provider: Kassandra Agrawal  Indication: Bloodstream Infection (continued from outpt)  Target Concentration: Goal AUC/LAKHWINDER 400-600 mg*hr/L  Day of Therapy: 14 +  Additional Antimicrobials: None ordered at this time    Pertinent Laboratory Values:   Wt Readings from Last 1 Encounters:   10/03/24 97 kg (213 lb 13.5 oz)     Temp Readings from Last 1 Encounters:   10/13/24 97.3 °F (36.3 °C)     Estimated Creatinine Clearance: 43 mL/min (A) (based on SCr of 1.3 mg/dL (H)).  Recent Labs     10/12/24  0340 10/13/24  0216   CREATININE 1.2* 1.3*   BUN 28* 30*   WBC 5.3 5.2     Procalcitonin: None ordered at this time    Pertinent Cultures:  Culture Date Source Results   10/02/24 Blood x 2 No growth to date   10/04/24 Surgical - Swab from back (L2-3) No WBCs or organisms seen    MRSA Nasal Swab: N/A. Non-respiratory infection.    Recent vancomycin administrations                     vancomycin (VANCOCIN) 1000 mg in 200 mL IVPB (mg) 1,000 mg New Bag 10/1952    vancomycin (VANCOCIN) 1000 mg in sodium chloride 0.9% 250 mL IVPB (mg) 1,000 mg New Bag 10/11/24 2049     1,000 mg New Bag 10/10/24 2103                    Assessment:  Date/Time Current Dose Concentration Timing of Concentration (h)   10/13/24 Pulse dose for presumptive SHELLEY 20.5 Unspecified - unsure of previous Vancomycin hang time. Documented as 1952 but note from midnight pharmacist states that nurse says it was NOT hung at that time.    Note: Serum concentrations collected for AUC dosing may appear elevated if collected in close proximity to the dose administered, this is not necessarily an indication of toxicity    Plan:  Current dosing regimen is therapeutic  Continue Vancomycin pulse dose. Give Vancomycin 1000 mg IV x 1.   Repeat vancomycin concentration ordered for 10/15/24 @ 0000   Pharmacy will continue to monitor patient and adjust therapy as indicated    Thank 
[facility]   Pharmacy Pharmacokinetic Monitoring Service - Vancomycin    Consulting Provider: Kassandra Agrawal/ ID following   Indication: Osteomyelitis L2/L3 (spinal abscess) from MRSA Bloodstream  Target Concentration: Goal AUC/LAKHWINDER 400-600 mg*hr/L  Day of Therapy: ? Possibly DAY 39 as started last admission here 8/24/24, was discharged to [facility], then to [facility] where she was still taking there 1250 mg Q12H upon admission  Additional Antimicrobials:     Pertinent Laboratory Values:   Wt Readings from Last 1 Encounters:   09/30/24 96.6 kg (213 lb)     Temp Readings from Last 1 Encounters:   10/02/24 97.2 °F (36.2 °C)     Estimated Creatinine Clearance: 70 mL/min (based on SCr of 0.8 mg/dL).  Recent Labs     10/01/24  0105 10/02/24  0340   CREATININE 0.8 0.8   BUN 20 17   WBC 4.7* 4.4*     Procalcitonin:     Pertinent Cultures:  Culture Date Source Results   10/02/24 Blood sent   MRSA Nasal Swab: N/A. Non-respiratory infection.    Recent vancomycin administrations                     vancomycin (VANCOCIN) 1,250 mg in sodium chloride 0.9 % 250 mL IVPB ()  Restarted 10/01/24 2304     1,250 mg New Bag  2300    vancomycin (VANCOCIN) 1,250 mg in sodium chloride 0.9 % 250 mL IVPB (mg) 1,250 mg New Bag 10/01/24 0103                    Assessment:  Date/Time Current Dose Concentration Timing of Concentration (h) AUC   09/30/24 @ 2127 1250 mg Q 12H 10.7 15 hr 57 min 564   Note: Serum concentrations collected for AUC dosing may appear elevated if collected in close proximity to the dose administered, this is not necessarily an indication of toxicity    Plan:  Current dosing regimen 1250 mg q 12h was  therapeutic based on admitting level  I have changed frequency back to q12h as was getting in [facility]  Repeat vancomycin concentration ordered for 10/04/24 @ 0400   Pharmacy will continue to monitor patient and adjust therapy as indicated    Thank you for the consult,  Stephanie Vieira, 
[facility]   Pharmacy Pharmacokinetic Monitoring Service - Vancomycin    Consulting Provider: Muriel Agrawal/Dr Zavala   Indication: Bloodstream infection  Target Concentration: Goal AUC/LAKHWINDER 400-600 mg*hr/L  Day of Therapy: 19 days inpatient  Additional Antimicrobials: none    Pertinent Laboratory Values:   Wt Readings from Last 1 Encounters:   10/03/24 97 kg (213 lb 13.5 oz)     Temp Readings from Last 1 Encounters:   10/18/24 97.1 °F (36.2 °C) (Temporal)     Estimated Creatinine Clearance: 35 mL/min (A) (based on SCr of 1.6 mg/dL (H)).  Recent Labs     10/17/24  0206 10/18/24  0242 10/18/24  0926   CREATININE 1.3* 1.6* 1.6*   BUN 25* 26* 25*   WBC 5.3 6.1  --      Procalcitonin:     Pertinent Cultures:  Culture Date Source Results   10/04/24  10/02/24 Surgical  Blood  No growth  No growth   MRSA Nasal Swab: N/A. Non-respiratory infection.    Recent vancomycin administrations                     vancomycin (VANCOCIN) 1,250 mg in sodium chloride 0.9 % 250 mL IVPB (mg) 1,250 mg New Bag 10/15/24 1711                      Plan:  Regimen Q48H was mistakenly not started 10/17/24 post review of level. I have entered Vancomycin 1250 mg Q48H to resume today 10/18/24 @ 1700 thru 11/13/24 per ID duration   Pharmacy will continue to monitor patient and adjust therapy as indicated  Repeat vancomycin concentration 10/22/24 1600 (will draw sooner 10/20 if SrCr continue to rise)  Thank you for the consult,  Stephanie Vieira RPH  10/18/2024 3:19 PM    
[facility]   Pharmacy Pharmacokinetic Monitoring Service - Vancomycin    Consulting Provider: Kassandra Agrawal  Indication: Bloodstream Infection (continued from outpt)  Target Concentration: Goal AUC/LAKHWINDER 400-600 mg*hr/L  Day of Therapy: 4 inpatient   Additional Antimicrobials: none    Pertinent Laboratory Values:   Wt Readings from Last 1 Encounters:   10/03/24 97 kg (213 lb 13.5 oz)     Temp Readings from Last 1 Encounters:     Estimated Creatinine Clearance: 62 mL/min (based on SCr of 0.9 mg/dL).  Recent Labs     10/03/24  0234 10/04/24  0316   CREATININE 0.8 0.9   BUN 15 15   WBC 5.3 4.4*     Pertinent Cultures:  Culture Date Source Results   10/2/24 Blood x2 No growth to date   MRSA Nasal Swab: N/A. Non-respiratory infection.    Recent vancomycin administrations                     vancomycin (VANCOCIN) 1,250 mg in sodium chloride 0.9 % 250 mL IVPB (mg) 1,250 mg New Bag 10/03/24 1804     1,250 mg New Bag  0415     1,250 mg New Bag 10/02/24 1713    vancomycin (VANCOCIN) 1,250 mg in sodium chloride 0.9 % 250 mL IVPB ()  Restarted 10/01/24 2304     1,250 mg New Bag  2300                    Assessment:  Date/Time Current Dose Concentration Timing of Concentration (h) AUC   10/4/24 0500  1250mg IV q12h 31 9 h 12 min 796   Note: Serum concentrations collected for AUC dosing may appear elevated if collected in close proximity to the dose administered, this is not necessarily an indication of toxicity    Plan:  Current dosing regimen is supra-therapeutic  Hold dose for now  Repeat vancomycin concentration ordered for 10/4 @ 1000   Pharmacy will continue to monitor patient and adjust therapy as indicated    Thank you for the consult,  Becca Stockton RPH  10/4/2024 4:50 AM   
[facility]   Pharmacy Pharmacokinetic Monitoring Service - Vancomycin    Consulting Provider: Kassandra Agrawal   Indication: Bloodstream Infection  Target Concentration: Goal AUC/LAKHWINDER 400-600 mg*hr/L  Day of Therapy: 12  Additional Antimicrobials: None    Pertinent Laboratory Values:   Wt Readings from Last 1 Encounters:   10/03/24 97 kg (213 lb 13.5 oz)     Temp Readings from Last 1 Encounters:   10/12/24 97.9 °F (36.6 °C) (Oral)     Estimated Creatinine Clearance: 47 mL/min (A) (based on SCr of 1.2 mg/dL (H)).  Recent Labs     10/11/24  0224 10/12/24  0340   CREATININE 1.2* 1.2*   BUN 30* 28*   WBC 6.1 5.3     Procalcitonin: N/A    Pertinent Cultures:  Culture Date Source Results   10/04/24  10/02/24 Surgical  Blood No growth  No growth   MRSA Nasal Swab: N/A. Non-respiratory infection.    Recent vancomycin administrations                     vancomycin (VANCOCIN) 1000 mg in 200 mL IVPB (mg) 1,000 mg New Bag 10/1952    vancomycin (VANCOCIN) 1000 mg in sodium chloride 0.9% 250 mL IVPB (mg) 1,000 mg New Bag 10/11/24 2049     1,000 mg New Bag 10/10/24 2103                    Assessment:  Date/Time Current Dose Concentration Timing of Concentration (h) AUC   10/12/24 1000mg Q24hr 26.9 22hr 55min 698   Note: Serum concentrations collected for AUC dosing may appear elevated if collected in close proximity to the dose administered, this is not necessarily an indication of toxicity    Plan:  Current dosing regimen is supra-therapeutic  Hold Vancomycin for now, with random level tomorrow morning  Repeat vancomycin concentration ordered for 10/13 @ 0900   Pharmacy will continue to monitor patient and adjust therapy as indicated    Thank you for the consult,  Veronica Antoine [facility]  10/12/2024 11:46 PM   
;Decreased ADL status;Decreased ROM;Decreased cognition;Decreased strength;Decreased safe awareness;Decreased balance;Decreased sensation;Increased pain;Decreased posture   Assessment Patient is extremely fearfull of falling and increasing pain.  She has increased anxiety which limits the ability to instruct her on safe techniques to use.  If she was to return home at this time she would need a 24/7 caregiver that she is comfortable with. She requires Max encouragement for mobility.   Treatment Diagnosis impaired mobility and gait   Therapy Prognosis Poor;Guarded   Decision Making Medium Complexity   Barriers to Learning pain, fear of falling   Treatment Initiated  bed mobility, ROM   Discharge Recommendations 24 hour supervision or assist;Subacute/Skilled Nursing Facility;Long Term Care with PT;Therapy recommended at discharge;Patient would benefit from continued therapy after discharge   Activity Tolerance   Activity Tolerance Treatment limited secondary to medical complications;Treatment limited secondary to decreased cognition;Patient limited by endurance;Patient limited by pain;Patient limited by fatigue;Treatment limited secondary to agitation   Physical Therapy Plan   General Plan 3-5 times per week   Therapy Duration 2 Weeks   Current Treatment Recommendations Strengthening;ROM;Balance training;Functional mobility training;Transfer training;Safety education & training;Positioning;Therapeutic activities;Equipment evaluation, education, & procurement;Patient/Caregiver education & training   PT Plan of Care   Therapy Eval Date 10/05/24   PT Re-Eval Due 10/19/24   Monday X   PT Whiteboard Notes   Therapy Whiteboard RE 10/19 TIA, Lumbar spinal stenosis L2-3 due to epidural abscess and phlegmon, Right L2 and L3 hemilaminotomies with medial facetectomy, 2A         Electronically signed by Arianna Krishna, PT on 10/14/2024 at 2:31 PM      
BALWINDER GUPTA M.D.  Date:     09/30/2024      Current Diet level:  Current Diet : Regular  Current Liquid Diet : Thin      Pain:  Pain Assessment  Pain Assessment: 0-10  Pain Level: 8  [place] Pain Rating: Hurts little more  Patient's Stated Pain Goal: 0 - No pain  Pain Location: Hip, Neck, Leg  Pain Orientation: Right  Pain Descriptors: Aching, Discomfort, Sore  Functional Pain Assessment: Prevents or interferes some active activities and ADLs  Non-Pharmaceutical Pain Intervention(s): Rest  Response to Pain Intervention: Patient satisfied  Side Effects: No reported side effects    Reason for Referral  Lashonda Milian was referred for a bedside swallow evaluation to assess the efficiency of her swallow function, identify signs and symptoms of aspiration and make recommendations regarding safe dietary consistencies, effective compensatory strategies, and safe eating environment.    Impression  Dysphagia Impression : Oral and pharyngeal phases of the swallow appear within functional limits. No overt s/s of aspiration observed with thin liquids via cup or straw, consecutive sips of thin via straw, pureed foods or solids. Clear voicing is noted after all sips and bites. Good hyolaryngeal elevation and timely swallow responses are noted. No oral residue was observed after pureed foods or solids. Her recent CXR was clear of infiltrates and she is afebrile.     She is recommended to continue a regular diet with thin liquids. Medications whole with water.Please contact speech therapy if she exhibits increased overt s/s of aspiration, elevated temp, or changes in lung sounds.      Treatment Plan  Requires SLP Intervention: Yes     D/C Recommendations: Ongoing speech therapy is recommended during this hospitalization       Recommended Diet and Intervention  Diet Solids Recommendation: Regular  Liquid Consistency Recommendation: Thin  Recommended Form of Meds: Whole with water  Recommendations: Dysphagia 
Comments: Toradol not working per pt    Plan  Physical Therapy Plan  General Plan: 3-5 times per week  Therapy Duration: 2 Weeks  Current Treatment Recommendations: Strengthening, ROM, Balance training, Functional mobility training, Transfer training, Safety education & training, Positioning, Therapeutic activities, Equipment evaluation, education, & procurement, Patient/Caregiver education & training  Safety Devices  Type of Devices: Call light within reach, Patient at risk for falls, Nurse notified, Heels elevated for pressure relief, Gait belt, Left in bed, Bed alarm in place    Restrictions        Subjective  Pain: 10/10 back  General  Chart Reviewed: Yes  Patient assessed for rehabilitation services?: Yes  Additional Pertinent Hx: pt with prior hospital admit at [facility] in August 2024  Response To Previous Treatment: Not applicable  Family / Caregiver Present: Yes (pt's son and other visitors)  Referring Practitioner: Dr. Sarabjit Aguilar  Referral Date : 09/30/24  Diagnosis: Lumbar spinal stenosis L2-3 due to epidural abscess and phlegmon  Follows Commands: Impaired  General Comment  Comments: RNPete, okayed PT and gave pt 1 time pain med dose of toradol. 10/4 Right L2 and L3 hemilaminotomies with medial facetectomy for debulking of epidural abscess and phlegmon and decompression of the thecal sac and nerve roots  Subjective  Subjective: Pt. states, \"No, no, no, no, no.\"         Social/Functional History  Social/Functional History  Lives With: Other (comment)  Type of Home:  ([place])  Home Layout: One level  Home Access: Level entry  Bathroom Shower/Tub: Tub/Shower unit  Bathroom Toilet: Standard  Bathroom Equipment: Built-in shower seat, Shower chair  Bathroom Accessibility: Accessible  Home Equipment: Cane, Walker - Standard  Receives Help From: Friend(s)  ADL Assistance: Needs assistance  Toileting: Needs assistance  Homemaking Assistance: Needs assistance  Homemaking Responsibilities: 
intermittent dosing (placeholder), RX Placeholder      Review of Systems see HPI    VitalSigns:  BP (!) 141/71   Pulse 65   Temp 99 °F (37.2 °C) (Temporal)   Resp 18   Ht 1.575 m (5' 2.01\")   Wt 97 kg (213 lb 13.5 oz)   SpO2 98%   BMI 39.10 kg/m²      Physical Exam  Line/IV site: No erythema, warmth, induration, or tenderness.  Lungs without crackles  Heart without murmur  Abdomen soft and nontender    Lab Results:  CBC:   Recent Labs     10/05/24  0327 10/06/24  0256 10/07/24  0229   WBC 7.5 5.8 6.6   HGB 10.5* 9.5* 9.6*    170 190     BMP:  Recent Labs     10/05/24  0327 10/06/24  0256 10/07/24  0229    137 137   K 4.0 3.5 3.4*    101 101   CO2 24 25 24   BUN 17 20 22   CREATININE 0.9 1.0* 1.0*   GLUCOSE 98 87 89     Culture Results:  Surgical cultures no growth    Radiology: None    Additional Studies Reviewed:  None    Impression:  1.  Prior MRSA bloodstream infection-under treatment  2.  Discitis/osteomyelitis/epidural phlegmon-operative cultures no growth  3.  Obesity  4.  Paraplegia    Recommendations:  Continue vancomycin  Encouraging to work with physical therapy, Occupational Therapy and nursing to try to increase movement/mobility  Continue to follow    SAMSON MORSE MD     
report intermittent right hip/lower extremity pain, which intermittently controlled.Laying comfortably in bed in no apparent acute distress.  Denies any acute complaints or distress.       10/11/2024  Patient seen and examined this a.m. No new complaints.    No acute changes or acute overnight event reported.   Laying comfortably in bed in no apparent acute distress.    Patient emotional and teary, endorsing fear about being gotten out of bed in anticipation of pain.  Patient adamantly does not want to be gotten out of bed today.  Denies any acute complaints or distress.    Family at bedside.    10/10/2024  Patient seen and examined this a.m. No new complaints.  No acute changes or acute overnight event reported. Laying comfortably in bed in no apparent acute distress.  Denies any acute complaints or distress.       10/09/2024  Patient seen and examined this a.m. No new complaints.  Endorses intermittent back pain, although fairly controlled.  No acute changes or acute overnight event reported. Laying comfortably in bed in no apparent acute distress.  Denies any acute complaints or distress.          Review of Systems:   Review of Systems  ROS: 14 point review of systems is negative except as specifically addressed above.    ADULT DIET; Regular  ADULT ORAL NUTRITION SUPPLEMENT; Breakfast, Lunch, Dinner; Fortified Gelatin Oral Supplement    Intake/Output Summary (Last 24 hours) at 10/16/2024 0625  Last data filed at 10/15/2024 1700  Gross per 24 hour   Intake --   Output 1100 ml   Net -1100 ml         Medications:   sodium chloride       Current Facility-Administered Medications   Medication Dose Route Frequency Provider Last Rate Last Admin    potassium chloride (KLOR-CON M) extended release tablet 40 mEq  40 mEq Oral PRN Reynaldo Posey MD   40 mEq at 10/16/24 0512    Or    potassium bicarb-citric acid (EFFER-K) effervescent tablet 40 mEq  40 mEq Oral PRN Reynaldo Posey MD        Or    potassium chloride 
  Blood cultures October 2, 2024-no growth  Blood cultures October 2, 2024-no growth     Blood cultures drawn October 2, 2024-no growth to date  Blood cultures drawn October 2, 2024-no growth to date     Blood culture results from prior admission:  Blood cultures August 23, 2024-MRSA (susceptibility below-first susceptibility listed)  Blood cultures August 24, 2024-MRSA  Blood cultures August 25, 2024-MRSA  Blood cultures August 26, 2024-MRSA  Blood cultures August 28, 2024-MRSA (susceptibility-second susceptibility listed)  Blood cultures August 29, 2024-MRSA  Blood cultures August 30, 2024-MRSA  Blood cultures August 31, 2024-MRSA     Susceptibility   Methicillin-Resistant Staphylococcus aureus (1)     Antibiotic Interpretation Microscan   Method Status     clindamycin Sensitive 0.25 mcg/mL BACTERIAL SUSCEPTIBILITY PANEL BY LAKHWINDER       doxycycline Sensitive <=0.5 mcg/mL BACTERIAL SUSCEPTIBILITY PANEL BY LAKHWINDER       erythromycin Resistant >=8 mcg/mL BACTERIAL SUSCEPTIBILITY PANEL BY LAKHWINDER       inducible clindamycin resistance   Neg mcg/mL BACTERIAL SUSCEPTIBILITY PANEL BY LAKHWINDER       oxacillin Resistant >=4 mcg/mL BACTERIAL SUSCEPTIBILITY PANEL BY LAKHWINDER       tetracycline Sensitive <=1 mcg/mL BACTERIAL SUSCEPTIBILITY PANEL BY LAKHWINDER       trimethoprim-sulfamethoxazole Sensitive <=10 mcg/mL BACTERIAL SUSCEPTIBILITY PANEL BY LAKHWINDER       vancomycin Sensitive 1 mcg/mL BACTERIAL SUSCEPTIBILIT                Susceptibility     Methicillin-Resistant Staphylococcus aureus (1)     Antibiotic Interpretation Microscan   Method Status     clindamycin Sensitive 0.25 mcg/mL BACTERIAL SUSCEPTIBILITY PANEL BY LAKHWINDER       DAPTOmycin Sensitive 0.25 mcg/mL BACTERIAL SUSCEPTIBILITY PANEL BY LAKHWINDER       doxycycline Sensitive <=0.5 mcg/mL BACTERIAL SUSCEPTIBILITY PANEL BY LAKHWINDER       erythromycin Resistant >=8 mcg/mL BACTERIAL SUSCEPTIBILITY PANEL BY LAKHWINDER       inducible clindamycin resistance   Neg mcg/mL BACTERIAL SUSCEPTIBILITY PANEL BY LAKHWINDER     
  Food Insecurity: No Food Insecurity (9/30/2024)    Hunger Vital Sign     Worried About Running Out of Food in the Last Year: Never true     Ran Out of Food in the Last Year: Never true   Transportation Needs: No Transportation Needs (9/30/2024)    PRAPARE - Transportation     Lack of Transportation (Medical): No     Lack of Transportation (Non-Medical): No   Physical Activity: Not on file   Stress: Not on file   Social Connections: Moderately Integrated (10/15/2024)    Social Connections ([facility] HRSN)     If for any reason you need help with day-to-day activities such as bathing, preparing meals, shopping, managing finances, etc., do you get the help you need?: Not on file   Intimate Partner Violence: Unknown (8/26/2024)    Received from [facility]    Abuse Screen     Unsafe at Home or Work/School: Not on file     Feels Threatened by Someone?: Not on file     Does Anyone Keep You from Contacting Others or Doint Things Outside the Home?: Not on file     Physical Sign of Abuse Present: Not on file   Housing Stability: Low Risk  (9/30/2024)    Housing Stability Vital Sign     Unable to Pay for Housing in the Last Year: No     Number of Times Moved in the Last Year: 1     Homeless in the Last Year: No       Medications:   sodium chloride        vancomycin  1,250 mg IntraVENous Once    megestrol  200 mg Oral Daily    sennosides-docusate sodium  2 tablet Oral Daily    naloxegol  12.5 mg Oral QAM AC    QUEtiapine  25 mg Oral BID    folic acid  1 mg Oral Daily    polyethylene glycol  17 g Oral Daily    methocarbamol  500 mg Oral BID    sodium chloride flush  5-40 mL IntraVENous 2 times per day    aspirin  81 mg Oral Daily    Or    aspirin  300 mg Rectal Daily    amiodarone  200 mg Oral BID    apixaban  5 mg Oral BID    atorvastatin  20 mg Oral Daily    methIMAzole  5 mg Oral BID    metoprolol tartrate  25 mg Oral BID    sertraline  50 mg Oral Daily    vancomycin (VANCOCIN) intermittent dosing (placeholder)   
DO Sarabjit, 10 mL at 10/06/24 2132    sodium chloride flush 0.9 % injection 5-40 mL, 5-40 mL, IntraVENous, PRN, Sarabjit Aguilar DO    0.9 % sodium chloride infusion, , IntraVENous, PRN, Sarabjit Aguilar DO    ondansetron (ZOFRAN-ODT) disintegrating tablet 4 mg, 4 mg, Oral, Q8H PRN, 4 mg at 10/02/24 0253 **OR** ondansetron (ZOFRAN) injection 4 mg, 4 mg, IntraVENous, Q6H PRN, Sarabjit Aguilar DO, 4 mg at 10/03/24 0910    polyethylene glycol (GLYCOLAX) packet 17 g, 17 g, Oral, Daily PRN, Sarabjit Aguilar DO    aspirin chewable tablet 81 mg, 81 mg, Oral, Daily, 81 mg at 10/06/24 0929 **OR** aspirin suppository 300 mg, 300 mg, Rectal, Daily, Sarabjit Aguilar DO    amiodarone (CORDARONE) tablet 200 mg, 200 mg, Oral, BID, Sarabjit Aguilar DO, 200 mg at 10/06/24 2131    [Held by provider] apixaban (ELIQUIS) tablet 5 mg, 5 mg, Oral, BID, Sarabjit Aguilar DO, 5 mg at 10/02/24 0740    atorvastatin (LIPITOR) tablet 20 mg, 20 mg, Oral, Daily, Sarabjit Aguilar DO, 20 mg at 10/06/24 0929    methIMAzole (TAPAZOLE) tablet 5 mg, 5 mg, Oral, BID, Sarabjit Aguilar DO, 5 mg at 10/06/24 2132    metoprolol tartrate (LOPRESSOR) tablet 25 mg, 25 mg, Oral, BID, Sarabjit Aguilar DO, 25 mg at 10/06/24 2132    sertraline (ZOLOFT) tablet 50 mg, 50 mg, Oral, Daily, Sarabjit Aguilar DO, 50 mg at 10/06/24 0929    vancomycin (VANCOCIN) intermittent dosing (placeholder), , Other, RX Placeholder, Sarabjit Aguilar DO  Niacin and related    SOCIAL HISTORY  Social History     Tobacco Use   Smoking Status Former    Types: Cigarettes    Passive exposure: Past (from her )   Smokeless Tobacco Never   Tobacco Comments    Smoked less than one pack in her life, started at age 15 in 1965     Social History     Substance and Sexual Activity   Alcohol Use Yes    Comment: just 1-2 at parties, never heavy         Family History   Problem Relation Age of Onset    Pacemaker Mother     Heart Disease Mother         never tobacco    No Known Problems Father     
Frequency Provider Last Rate Last Admin    potassium chloride (KLOR-CON M) extended release tablet 40 mEq  40 mEq Oral PRN Reynaldo Posey MD   40 mEq at 10/16/24 0512    Or    potassium bicarb-citric acid (EFFER-K) effervescent tablet 40 mEq  40 mEq Oral PRN Reynaldo Posey MD        Or    potassium chloride 10 mEq/100 mL IVPB (Peripheral Line)  10 mEq IntraVENous PRN Reynaldo Posey MD        oxyCODONE (ROXICODONE) immediate release tablet 5 mg  5 mg Oral Q6H PRN Reynaldo Posey MD   5 mg at 10/17/24 0315    megestrol (MEGACE) 40 MG/ML suspension 200 mg  200 mg Oral Daily Lauri White MD   200 mg at 10/12/24 0830    sennosides-docusate sodium (SENOKOT-S) 8.6-50 MG tablet 2 tablet  2 tablet Oral Daily Lauri White MD   2 tablet at 10/12/24 0830    naloxegol (MOVANTIK) tablet 12.5 mg  12.5 mg Oral QAM AC Lauri White MD   12.5 mg at 10/16/24 0512    QUEtiapine (SEROQUEL) tablet 25 mg  25 mg Oral BID Lauri White MD   25 mg at 10/16/24 2056    HYDROmorphone HCl PF (DILAUDID) injection 0.25 mg  0.25 mg IntraVENous Q4H PRN Lauri White MD   0.25 mg at 10/17/24 0713    folic acid (FOLVITE) tablet 1 mg  1 mg Oral Daily Sarabjit Aguilar DO   1 mg at 10/16/24 0836    polyethylene glycol (GLYCOLAX) packet 17 g  17 g Oral Daily Sarabjit Aguilar DO   17 g at 10/12/24 0829    naloxone (NARCAN) injection 0.4 mg  0.4 mg IntraVENous PRN Sarabjit Aguilar DO        methocarbamol (ROBAXIN) tablet 500 mg  500 mg Oral BID Sarabjit Aguilar DO   500 mg at 10/16/24 2056    sodium chloride flush 0.9 % injection 5-40 mL  5-40 mL IntraVENous 2 times per day Sarabjit Aguilar DO   10 mL at 10/16/24 0836    sodium chloride flush 0.9 % injection 5-40 mL  5-40 mL IntraVENous PRN Sarabjit Aguilar DO        0.9 % sodium chloride infusion   IntraVENous PRN Sarabjit Aguilar DO        ondansetron (ZOFRAN-ODT) disintegrating tablet 4 mg  4 mg Oral Q8H PRN Sarabjit Aguilar DO   4 mg at 10/13/24 1659    Or    
Neutrophils Absolute 4.2 1.5 - 7.5 K/uL    Immature Granulocytes # 0.0 K/uL    Lymphocytes Absolute 0.7 (L) 1.1 - 4.5 K/uL    Monocytes Absolute 0.70 0.00 - 0.90 K/uL    Eosinophils Absolute 0.10 0.00 - 0.60 K/uL    Basophils Absolute 0.00 0.00 - 0.20 K/uL   Basic Metabolic Panel w/ Reflex to MG    Collection Time: 10/06/24  2:56 AM   Result Value Ref Range    Sodium 137 136 - 145 mmol/L    Potassium reflex Magnesium 3.5 3.5 - 5.0 mmol/L    Chloride 101 98 - 111 mmol/L    CO2 25 22 - 29 mmol/L    Anion Gap 11 7 - 19 mmol/L    Glucose 87 70 - 99 mg/dL    BUN 20 8 - 23 mg/dL    Creatinine 1.0 (H) 0.5 - 0.9 mg/dL    Est, Glom Filt Rate 60 (A) >60    Calcium 8.2 (L) 8.8 - 10.2 mg/dL   Magnesium    Collection Time: 10/06/24  2:56 AM   Result Value Ref Range    Magnesium 1.7 1.6 - 2.4 mg/dL             Current Facility-Administered Medications:     HYDROmorphone HCl PF (DILAUDID) injection 0.25 mg, 0.25 mg, IntraVENous, Q4H PRN, Lauri White MD, 0.25 mg at 10/06/24 0908    vancomycin (VANCOCIN) 1000 mg in sodium chloride 0.9% 250 mL IVPB, 1,000 mg, IntraVENous, Q24H, Manuel Zavala MD, Stopped at 10/05/24 2150    oxyCODONE (ROXICODONE) immediate release tablet 7.5 mg, 7.5 mg, Oral, Q6H, Sarabjit Aguilar DO, 7.5 mg at 10/06/24 0441    QUEtiapine (SEROQUEL) tablet 25 mg, 25 mg, Oral, Nightly, Sarabjit Aguilar DO, 25 mg at 10/05/24 2048    folic acid (FOLVITE) tablet 1 mg, 1 mg, Oral, Daily, Sarabjit Aguilar DO, 1 mg at 10/06/24 0930    oxyCODONE (ROXICODONE) immediate release tablet 5 mg, 5 mg, Oral, Q6H PRN, Sarabjit Aguilar DO, 5 mg at 10/05/24 1333    polyethylene glycol (GLYCOLAX) packet 17 g, 17 g, Oral, Daily, Sarabjit Aguilar DO, 17 g at 10/06/24 0937    naloxone (NARCAN) injection 0.4 mg, 0.4 mg, IntraVENous, PRN, Sarabjit Aguilar DO    methocarbamol (ROBAXIN) tablet 500 mg, 500 mg, Oral, BID, Sarabjit Aguilar DO, 500 mg at 10/06/24 0929    sodium chloride flush 0.9 % injection 5-40 mL, 5-40 mL, IntraVENous, 2 
   No Known Problems Paternal Grandmother     No Known Problems Paternal Grandfather     No Known Problems Maternal Grandfather          of old age    No Known Problems Maternal Grandmother          of old age    No Known Problems Son     No Known Problems Son          REVIEW OF SYSTEMS:  Constitutional: No fevers No chills  Neck:No stiffness  Respiratory: No shortness of breath  Cardiovascular: No chest pain No palpitations  Gastrointestinal: No abdominal pain    Genitourinary: No Dysuria  Neurological: No headache, no confusion  All other systems are reviewed and are negative.      PHYSICAL EXAM:  Vitals:    10/08/24 0817   BP: 128/68   Pulse: 64   Resp: 18   Temp: 97.3 °F (36.3 °C)   SpO2: 93%       Constitutional: The patient appears well-developed and well-nourished.   Eyes - conjunctiva normal.  Conjugate Gaze  Ear, nose, throat - No scars, masses, or lesions over external nose or ears, no atrophy of tongue  Neck-symmetric, no masses noted, no jugular vein distension  Respiration- chest wall appears symmetric, good expansion, normal effort without use of accessory muscles  Musculoskeletal - no significant wasting of muscles noted, no bony deformities  Extremities-no clubbing, cyanosis or edema  Skin - warm, dry, and intact.  No rash, erythema, or pallor.  Psychiatric - mood, affect, and behavior appear normal.     NEUROLOGIC EXAMINATION  GCS:  Eyes:4  Verbal:5  Motor:6  GCS total: 15    Awake, Alert and oriented x 4  Normal speech pattern, following commands    Motor:  RIGHT: hand grasp 5/5    finger extension 5/5    bicep 5/5    triceps 5/5    deltoid 5/5      iliopsoas 2/5    hamstring 2/5    quadriceps 2/5    EHL 0/5   Tibialis anterior 0/5    Gastrocnemius 4-/5    LEFT:   hand grasp 5/5    finger extension 5/5    bicep 5/5    triceps 5/5    deltoid 5/5      iliopsoas 2/5    hamstring 2/5    quadriceps 2/5    EHL 0/5   Tibialis anterior 0/5    Gastrocnemius 4-/5    Decreased to pinprick sensation 
chronic white matter microvascular ischemic changes.   ______________________________________ Electronically signed by: ANA VALENTIN M.D. Date:     10/01/2024 Time:    08:15     XR CHEST PORTABLE    Result Date: 9/30/2024  Severe cardiomegaly and central pulmonary vascular prominence.  Chronic right hilar prominence.  No focal infiltrate, effusion, or pneumothorax is identified.   Right-sided PICC line noted tip terminating in the mid SVC.        ______________________________________ Electronically signed by: BALWINDER GUPTA M.D. Date:     09/30/2024 Time:    13:27     CT HEAD WO CONTRAST    Result Date: 9/30/2024  Stable brain without indication of acute intracranial abnormality.   COMMUNICATION:  Findings were communicated with Shaq Hopkins on 09/30/2024 at 1:10 p.m. by , Yonas Milligan MD.  All CT scans are performed using dose optimization techniques as appropriate to the performed exam and include at least one of the following: Automated exposure control, adjustment of the mA and/or kV according to size, and the use of iterative reconstruction technique.  ______________________________________ Electronically signed by: YONAS MILLIGAN M.D. Date:     09/30/2024 Time:    12:58     CTA HEAD NECK W CONTRAST    Result Date: 9/30/2024  1. No extracranial carotid or vertebral artery stenosis. 2. No large vessel occlusion or high grade stenosis within the [place].  All CT scans are performed using dose optimization techniques as appropriate to the performed exam and include at least one of the following: Automated exposure control, adjustment of the mA and/or kV according to size, and the use of iterative reconstruction technique.  ______________________________________ Electronically signed by: ANA VALENTIN M.D. Date:     09/30/2024 Time:    12:56          Assessment/Plan  Principal Problem:    TIA (transient ischemic attack)  Active Problems:    Atrial fibrillation (HCC)    Hypertension    Hyperlipidemia    
couple of decubiti on the left buttock and left posterior thigh.  HEENT: Head: Normal, normocephalic, atraumatic.  Neck:no adenopathy, no carotid bruit, no JVD, supple, symmetrical, trachea midline, and thyroid not enlarged, symmetric, no tenderness/mass/nodules  Lungs: clear to auscultation bilaterally  Heart: regular rate and rhythm, S1, S2 normal, no murmur, click, rub or gallop  Abdomen: soft  Extremities: mild lower extremity edema  Neurologic:  Alert, oriented to place, not date.  No dysarthria.  Speech is fluent.  EOMI, PERRL, VFF.  No facial weakness.  Mild left arm and hand weakness, moderate bilateral lower extremity weakness.  Mild left pronator drift is present.  Deep tendon reflexes are trace present in the UEs and absent in the LEs.  Toes are unresponsive to plantar stimulation.  Hoffmans signs are absent as is ankle clonus.     Assessment:   1.         Stroke-like episode.  Unclear if she had a TIA.  Even if so, she is chronically anticoagulated and already investigated  2.         L2-3 discitis/osteomyelitis, epidural abscess with spinal stenosis, clinically she has a cauda equina syndrome.  S/p surgical decompression and abscess debulking  3.         Paroxysmal atrial fibrillation, chronic anticoagulation  4.         Old stroke with cognitive impairment, left sided weakness  5. Low folic acid, replacement started    Plan:   1.         Pain control  2. PT/OT when able  3. Anticoagulation restarted     Discharge planning:   SNF for rehab    I spent 35 total minutes in face to face interaction with patient and coordination of care.   I spent > 50% of the total time discussing the diagnoses, evaluation, test results, treatment options, plans; counseling and advising with the patient and/or family and/or caregiver as well as with the care team.    Electronically signed by Brigido Contreras M.D.  
sensation bilateral anterior thighs, pinprick sensation intact distally  When attempting to raise her legs up in the bed she has severe debilitating pain    Wound: Clean dry and intact    DATA:  Nursing/pcp notes, imaging,labs and vitals reviewed.     PT,OT and/or speech notes reviewed    Lab Results   Component Value Date    WBC 5.8 10/06/2024    HGB 9.5 (L) 10/06/2024    HCT 30.6 (L) 10/06/2024    MCV 93.0 10/06/2024     10/06/2024     Lab Results   Component Value Date     10/06/2024    K 3.5 10/06/2024     10/06/2024    CO2 25 10/06/2024    BUN 20 10/06/2024    CREATININE 1.0 (H) 10/06/2024    GLUCOSE 87 10/06/2024    CALCIUM 8.2 (L) 10/06/2024    BILITOT 0.6 09/30/2024    ALKPHOS 109 (H) 09/30/2024    AST 50 (H) 09/30/2024    ALT 26 09/30/2024    LABGLOM 60 (A) 10/06/2024    GFRAA >59 12/06/2021     Lab Results   Component Value Date    INR 1.87 (H) 09/30/2024    INR 1.36 (H) 05/30/2023    INR 1.19 (H) 10/02/2020    PROTIME 21.0 (H) 09/30/2024    PROTIME 16.3 (H) 05/30/2023    PROTIME 15.1 (H) 10/02/2020         IMAGING:    EXAM:  LUMBAR SPINE MRI WITHOUT AND WITH CONTRAST     HISTORY:  Recent diskitis osteomyelitis.     TECHNIQUE:  Multi-sequential multiplanar MRI lumbar spine before and after the administration of 20 ml of MultiHance contrast intravenously.     COMPARISON:  Lumbar spine MRI dated 08/30/2024.     FINDINGS:     There has been interval destruction of the L2-L3 disc space and prominent endplate erosion.  There is retropulsion of L2-L3 endplate fracture fragments.  There is prominent marrow edema and enhancement in L2-L3 vertebrae.  There is bony destruction of   the right facet joint, which could represent advanced septic joint.  Multiple enhancing fluid collections are noted in adjacent bilateral psoas, largest measuring 4.5 x 2.5 x 2.7 cm on the left.  There are multiple enhancing fluid collections in the   adjacent paraspinous muscles bilaterally, largest measuring 1.7 cm on 
tenderness/mass/nodules  Lungs: clear to auscultation bilaterally  Heart: regular rate and rhythm, S1, S2 normal, no murmur, click, rub or gallop  Abdomen: soft  Extremities: mild lower extremity edema  Neurologic:  Alert, oriented to , [place], [place], 2024.  No dysarthria.  Speech is fluent.  EOMI, PERRL, VFF.  No facial weakness.  Mild left arm and hand weakness, moderate bilateral lower extremity weakness.  Mild left pronator drift is present.  Deep tendon reflexes are trace present in the UEs and absent in the LEs.  Toes are unresponsive to plantar stimulation.  Hoffmans signs are absent as is ankle clonus.  Rapid alternating movements are impaired I the left hand and fairly normal in the right hand.  Finger to nose testing shows mild left dysmetria.    Assessment:   1.         Stroke-like episode.  Unclear if she had a TIA.  Even if so, she is chronically anticoagulated and already investigated  2.         L2-3 discitis/osteomyelitis, epidural abscess with spinal stenosis, clinically she has a cauda equina syndrome.  3.         Paroxysmal atrial fibrillation, chronic anticoagulation  4.         Old stroke with cognitive impairment, left sided weakness  5. Low folic acid, replacement started    Plan:   1. L2-3 lumbar laminectomy and evacuation of epidural abscess anticipated Friday.  Thank You Dr. Aguilar.    2. Back on anticoagulation when able after surgery     Discharge planning:   Will need rehab, likely at SNF    I spent 35 total minutes in face to face interaction with patient and coordination of care.   I spent > 50% of the total time discussing the diagnoses, evaluation, test results, treatment options, plans; counseling and advising with the patient and/or family and/or caregiver as well as with the care team.    Electronically signed by Brigido Contreras M.D.  
Without a private nurse or nursing provider, it is unlikely a family can provide what she will require.      Plan:   1.         Pain control  2. PT/OT  3. Anticoagulation     Discharge planning:   Per family    I spent 35 total minutes in face to face interaction with patient and coordination of care.   I spent > 50% of the total time discussing the diagnoses, evaluation, test results, treatment options, plans; counseling and advising with the patient and/or family and/or caregiver as well as with the care team.    Electronically signed by Brigido Contreras M.D.  
of the following: Automated exposure control, adjustment of the mA and/or kV according to size, and the use of iterative reconstruction technique.  ______________________________________ Electronically signed by: SHANNON MARTINEZ M.D. Date:     09/30/2024 Time:    12:58     CTA HEAD NECK W CONTRAST    Result Date: 9/30/2024  1. No extracranial carotid or vertebral artery stenosis. 2. No large vessel occlusion or high grade stenosis within the [place] of [place].  All CT scans are performed using dose optimization techniques as appropriate to the performed exam and include at least one of the following: Automated exposure control, adjustment of the mA and/or kV according to size, and the use of iterative reconstruction technique.  ______________________________________ Electronically signed by: ANA VALENTIN M.D. Date:     09/30/2024 Time:    12:56          Assessment/Plan  Principal Problem:    TIA (transient ischemic attack)  Active Problems:    Atrial fibrillation (HCC)    Hypertension    Hyperlipidemia    Hyperthyroidism    Diastolic dysfunction    MRSA bacteremia    Moderate malnutrition (HCC)    Epidural abscess, L2-L5    Chronic anticoagulation    Transient alteration of awareness    Decreased responsiveness  Resolved Problems:    * No resolved hospital problems. *    Discitis and osteomyelitis of lumbar spine  Epidural phlegmon  Abscesses and myositis of bilateral psoas and paraspinal muscles  Severe lumbar spinal stenosis  -- MRI lumbar spine reviewed  -- Neurosurgery consulted - planning to take to OR today for washout  -- Blood cx NGTD  -- Continue Vancomycin  -- ID following  -- Pain control, bowel regimen  -- PT/OT as able  -- Follow up operative cultures  -- Added low dose IV Dilaudid due to increased pain post op - this will likely worsen her mentation    Altered mental status  -- Possible TIA, but most likely metabolic encephalopathy in the setting of worsening lumbar discitis/OM  -- CT head neg for acute 
changes as above.   ______________________________________ Electronically signed by: ANA VALENTIN M.D. Date:     10/01/2024 Time:    08:27     MRI brain without contrast    Result Date: 10/1/2024  EXAM:  BRAIN MRI WITHOUT CONTRAST  HISTORY:  Stroke-like symptoms  TECHNIQUE:  Multiplanar and multisequence MRI of the brain without the administration of intravenous contrast.  COMPARISON:  Brain CT dated 09/30/2024.  FINDINGS:  There is encephalomalacia in the right parietal and occipital lobes  There is no acute ischemic infarct or acute intracranial hemorrhage.  There is moderate cerebral parenchymal volume loss.  There is no hydrocephalus.  Scattered foci of T2/FLAIR hyperintense signal are present in the subcortical and periventricular white matter, most commonly seen in chronic white matter microvascular ischemic changes.  The basilar cisterns are patent.  The posterior fossa structures are within normal limits.  The paranasal sinuses and mastoid air cells are well aerated.  There is left mastoid effusion.  The orbits are within normal limits.  No calvarial abnormality is identified.       1. No acute intracranial findings. 2. Encephalomalacia in the right parietal and occipital lobes. 3. Moderate cerebral atrophy. 4. Mild chronic white matter microvascular ischemic changes.   ______________________________________ Electronically signed by: ANA VALENTIN M.D. Date:     10/01/2024 Time:    08:15     XR CHEST PORTABLE    Result Date: 9/30/2024  EXAM: CHEST RADIOGRAPH  TECHNIQUE: Single frontal chest radiograph.  HISTORY: Stroke.  COMPARISON: 04/25/2024      Severe cardiomegaly and central pulmonary vascular prominence.  Chronic right hilar prominence.  No focal infiltrate, effusion, or pneumothorax is identified.   Right-sided PICC line noted tip terminating in the mid SVC.        ______________________________________ Electronically signed by: BALWINDER GUPTA M.D. Date:     09/30/2024 Time:    13:27     CT HEAD 
vessel occlusion or high grade stenosis within the [place] of [place].   PT/OT & SLP  Neuro check Q4 hours.      MRSA bacteremia  Interval worsened Diskitis Osteomyelitis   -Transferred to [facility] on 8/30 for IR drainage of psoas abscess  Continue Abx: Vanc   -MRI L spine without Con - 10/01/2024- Interval worsened diskitis osteomyelitis at L2-L3 with interval destruction of the L2-L3 disc space and prominent endplate erosion.  Retropulsion of L2-L3 endplate fracture fragments with interval development of prominent epidural phlegmon, causing severe spinal canal stenosis at L2-L3.  Multiple abscesses and myositis in bilateral psoas and paraspinous muscles as above. 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  Probable osteomyelitis of bilateral facets of L3-L4. Multilevel degenerative changes as above.  Continue symptomatic and supportive management, including pain management as needed.    Paroxysmal  Atrial fibrillation (HCC)  Chronic anticoagulation  -Currently rate controlled  Amiodarone 200 mg p.o. twice daily  Metoprolol tartrate 25 mg p.o. twice daily  Apixaban 5 mg p.o. twice daily.     Hyperlipidemia  Continue statin       Hyperthyroidism  Continue methimazole       Continue management of other chronic medical conditions - see above and orders.          Advance Directive: Full Code    ADULT DIET; Easy to Chew; 4 carb choices (60 gm/meal)         Consults Made:   PHARMACY TO DOSE VANCOMYCIN  IP CONSULT TO NEUROLOGY  IP CONSULT TO SPIRITUAL SERVICES      DVT prophylaxis: Apixaban      Current medications reviewed  Lab work reviewed  Radiology  films reviewed  Much appreciated treatment recommendations from suspecialities reviewed  Discussed treatment plan with the nurse and addressed all questions/concerns  Discussed with Patient and Family at the bedside        Discharge planning: tbd      Multiple complex medical problems  Morbidity mortality high risk  Medical decision making High complexity       
oropharyngeal exudate or posterior oropharyngeal erythema.   Eyes:      General: No scleral icterus.        Right eye: No discharge.         Left eye: No discharge.      Extraocular Movements: Extraocular movements intact.      Conjunctiva/sclera: Conjunctivae normal.      Pupils: Pupils are equal, round, and reactive to light.   Neck:      Vascular: No carotid bruit.   Cardiovascular:      Rate and Rhythm: Normal rate and regular rhythm.      Pulses: Normal pulses.      Heart sounds: Normal heart sounds. No murmur heard.     No friction rub. No gallop.   Pulmonary:      Effort: Pulmonary effort is normal. No respiratory distress.      Breath sounds: Normal breath sounds. No stridor. No wheezing, rhonchi or rales.   Chest:      Chest wall: No tenderness.   Abdominal:      General: Bowel sounds are normal. There is no distension.      Palpations: Abdomen is soft.      Tenderness: There is no abdominal tenderness. There is no guarding or rebound.   Musculoskeletal:         General: No swelling, tenderness, deformity or signs of injury. Normal range of motion.      Cervical back: Normal range of motion and neck supple. No rigidity. No muscular tenderness.      Right lower leg: No edema.      Left lower leg: No edema.   Skin:     General: Skin is warm and dry.      Capillary Refill: Capillary refill takes less than 2 seconds.      Coloration: Skin is not jaundiced or pale.      Findings: No bruising, erythema, lesion or rash.   Neurological:      General: No focal deficit present.      Mental Status: She is alert and oriented to person, place, and time.      Cranial Nerves: No cranial nerve deficit.      Sensory: No sensory deficit.      Motor: No weakness.      Coordination: Coordination normal.   Psychiatric:         Mood and Affect: Mood normal.         Behavior: Behavior normal.         Thought Content: Thought content normal.         Judgment: Judgment normal.         Assessment/plan:     Patient Active Problem 
recommendations from suspecialities reviewed  Discussed treatment plan with the nurse and addressed all questions/concerns  Discussed with Patient at the bedside        Discharge planning: tbd  Patient and family initially wanted to go home with home health, however family now interested in SNF placement.  Insurance Pre-CERT approved for discharge to [facility], however patient now with [facility].    Now with [facility] - Increase in SCr by  < 24 hours:   Scr trended up from 1.3 to --> 1.6 this am (10/18/2024)  Continue management with IV fluids repeat BMP in a.m.  Likely discharge to SNF tomorrow pending clinical improvement.      Multiple complex medical problems  Morbidity mortality high risk  Medical decision making High complexity       Electronically signed by   Reynaldo Posey MD,   Internal Medicine Hospitalist   10/18/2024 7:31 AM       
[id number] [id number]

## 2025-07-21 ENCOUNTER — PATIENT MESSAGE (OUTPATIENT)
Age: 75
End: 2025-07-21

## (undated) DEVICE — TROCAR NEEDLE: Brand: COOK

## (undated) DEVICE — GLOVE SURG SZ 75 CRM LTX FREE POLYISOPRENE POLYMER BEAD ANTI

## (undated) DEVICE — TOOL MR8-T12MH25L MR8 12CM TL MH L 2.5MM: Brand: MIDAS REX MR8

## (undated) DEVICE — TOWEL,OR,DSP,ST,BLUE,DLX,4/PK,20PK/CS: Brand: MEDLINE

## (undated) DEVICE — NEEDLE SPNL 22GA L3.5IN BLK HUB S STL REG WALL FIT STYL

## (undated) DEVICE — Device

## (undated) DEVICE — ELECTRODE BLDE MOD BAYNT EZ CLN

## (undated) DEVICE — 3M™ STERI-STRIP™ REINFORCED ADHESIVE SKIN CLOSURES, R1547, 1/2 IN X 4 IN (12 MM X 100 MM), 6 STRIPS/ENVELOPE: Brand: 3M™ STERI-STRIP™

## (undated) DEVICE — SUTURE VICRYL + SZ 2-0 L27IN ABSRB VLT UR-6 5/8 CIR TAPR PNT VCP602H

## (undated) DEVICE — SUTURE VICRYL SZ 3-0 L18IN ABSRB UD L26MM SH 1/2 CIR J864D

## (undated) DEVICE — SPK10281 JACKSON TABLE KIT: Brand: SPK10281 JACKSON TABLE KIT

## (undated) DEVICE — NEPTUNE E-SEP SMOKE EVACUATION PENCIL, COATED, 70MM BLADE, ROCKER SWITCH: Brand: NEPTUNE E-SEP

## (undated) DEVICE — AGENT HEMOSTATIC SURGIFLOW MATRIX KIT W/THROMBIN

## (undated) DEVICE — TIBURON LAPAROTOMY DRAPE: Brand: CONVERTORS

## (undated) DEVICE — GOWN, ORBIS, LARGE, STERILE: Brand: MEDLINE

## (undated) DEVICE — DRESSING TRNSPAR W5XL4.5IN FLM SHT SEMIPERMEABLE WIND

## (undated) DEVICE — INSTRUMENT 8670005 350 MM GUIDEWRE SHARP

## (undated) DEVICE — ELECTRODE ES AD PED L2.5IN TEF INSUL MOD NONCORDED BLDE TIP

## (undated) DEVICE — CURAVIEW LED LARYNGOSCOPE BLADE & HANDLE,DISPOSABLE,MILLER 2: Brand: CURAPLEX

## (undated) DEVICE — MICRO KOVER: Brand: UNBRANDED

## (undated) DEVICE — UNDERGLOVE SURG SZ 8 FNGR THK0.21MIL GRN LTX BEAD CUF

## (undated) DEVICE — BAND BAG 36" X 36": Brand: TIDI

## (undated) DEVICE — MASTISOL ADHESIVE LIQ 2/3ML